# Patient Record
Sex: FEMALE | Race: WHITE | Employment: OTHER | URBAN - METROPOLITAN AREA
[De-identification: names, ages, dates, MRNs, and addresses within clinical notes are randomized per-mention and may not be internally consistent; named-entity substitution may affect disease eponyms.]

---

## 2017-02-14 ENCOUNTER — APPOINTMENT (OUTPATIENT)
Dept: GENERAL RADIOLOGY | Age: 82
End: 2017-02-14
Payer: MEDICARE

## 2017-02-14 ENCOUNTER — APPOINTMENT (OUTPATIENT)
Dept: CT IMAGING | Age: 82
End: 2017-02-14
Payer: MEDICARE

## 2017-02-14 ENCOUNTER — HOSPITAL ENCOUNTER (EMERGENCY)
Age: 82
Discharge: HOME | End: 2017-02-14
Attending: EMERGENCY MEDICINE
Payer: MEDICARE

## 2017-02-14 VITALS
DIASTOLIC BLOOD PRESSURE: 79 MMHG | TEMPERATURE: 97.9 F | WEIGHT: 106 LBS | HEART RATE: 82 BPM | RESPIRATION RATE: 16 BRPM | OXYGEN SATURATION: 97 % | SYSTOLIC BLOOD PRESSURE: 156 MMHG | HEIGHT: 59 IN | BODY MASS INDEX: 21.37 KG/M2

## 2017-02-14 DIAGNOSIS — S22.32XA CLOSED FRACTURE OF ONE RIB OF LEFT SIDE, INITIAL ENCOUNTER: ICD-10-CM

## 2017-02-14 DIAGNOSIS — S51.812A SKIN TEAR OF FOREARM WITHOUT COMPLICATION, LEFT, INITIAL ENCOUNTER: ICD-10-CM

## 2017-02-14 DIAGNOSIS — T30.0 BURN: Primary | ICD-10-CM
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
## 2017-02-14 PROCEDURE — 73562 X-RAY EXAM OF KNEE 3: CPT

## 2017-02-14 PROCEDURE — 70450 CT HEAD/BRAIN W/O DYE: CPT

## 2017-02-14 PROCEDURE — 99283 EMERGENCY DEPT VISIT LOW MDM: CPT | Performed by: PHYSICIAN ASSISTANT

## 2017-02-14 PROCEDURE — 71101 X-RAY EXAM UNILAT RIBS/CHEST: CPT

## 2017-02-14 PROCEDURE — 74011000250 HC RX REV CODE- 250: Performed by: PHYSICIAN ASSISTANT

## 2017-02-14 RX ORDER — LIDOCAINE HYDROCHLORIDE 20 MG/ML
JELLY TOPICAL
Status: COMPLETED | OUTPATIENT
Start: 2017-02-14 | End: 2017-02-14

## 2017-02-14 RX ADMIN — LIDOCAINE HYDROCHLORIDE: 20 JELLY TOPICAL at 15:22
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
## 2017-02-14 NOTE — DISCHARGE INSTRUCTIONS
Skin Tears: Care Instructions  Your Care Instructions  As we get older, our skin gets drier and more fragile. Sometimes this can cause the outer layers of skin to split and tear open. Skin tears are treated in different ways. In some cases, doctors use pieces of tape called Steri-Strips to pull the skin together and help it heal. Other times, it's best to leave the tear open and cover it with a special wound-care bandage. Skin tears are usually not serious. They usually heal in a few weeks. But how long you take to heal depends on your body and the type of tear you have. Sometimes the torn piece of skin is used to protect the wound while it heals. But that piece of skin does not heal. It may fall off on its own. Or the doctor may remove it. As your tear heals, it's important to keep it clean to help prevent infection. The doctor has checked you carefully, but problems can develop later. If you notice any problems or new symptoms, get medical treatment right away. Follow-up care is a key part of your treatment and safety. Be sure to make and go to all appointments, and call your doctor if you are having problems. It's also a good idea to know your test results and keep a list of the medicines you take. How can you care for yourself at home? · If you have pain, ask your doctor if you can take an over-the-counter pain medicine, such as acetaminophen (Tylenol), ibuprofen (Advil, Motrin), or naproxen (Aleve). Be safe with medicines. Read and follow all instructions on the label. · If you have a bandage, follow your doctor's instructions for changing it. · If you have Steri-Strips, leave them on until they fall off. · Follow your doctor's instructions about bathing. · Gently wash the skin tear with plain water 2 times a day. Do not rub the area. · Let the area air dry. Or you can pat it carefully with a soft towel. When should you call for help?   Call your doctor now or seek immediate medical care if:  · You have signs of infection, such as:  ¨ Increased pain, swelling, warmth, or redness around the tear. ¨ Red streaks leading from the tear. ¨ Pus draining from the tear. ¨ A fever. · The tear starts to bleed a lot. Small amounts of blood are normal.  Watch closely for changes in your health, and be sure to contact your doctor if:  · You do not get better as expected. Where can you learn more? Go to [employer]  Enter K466 in the search box to learn more about \"Skin Tears: Care Instructions. \"   © 2162-2874 [employer], [place]. Care instructions adapted under license by [employer] (which disclaims liability or warranty for this information). This care instruction is for use with your licensed healthcare professional. If you have questions about a medical condition or this instruction, always ask your healthcare professional. [place] [address] any warranty or liability for your use of this information. Content Version: 50.3.231443; Current as of: May 22, 2015           Reis: Care Instructions  Your Care Instructions    Reis--even minor ones--can be very painful. A minor burn may heal within several days, while a more serious burn may take weeks or even months to heal completely. You may notice that the burned area feels tight and hard while it is healing. It is important to continue to move the area as the burn heals to prevent loss of motion or loss of function in the area. When your skin is damaged by a burn, you have a greater risk of infection. Keep the wound clean and change the bandages regularly to prevent infection and help the burn heal.  Burns can leave permanent scars. Taking good care of the burn as it heals may help prevent bad scars. The doctor has checked you carefully, but problems can develop later. If you notice any problems or new symptoms, get medical treatment right away. Follow-up care is a key part of your treatment and safety. Be sure to make and go to all appointments, and call your doctor if you are having problems. It's also a good idea to know your test results and keep a list of the medicines you take. How can you care for yourself at home? · If your doctor told you how to care for your burn, follow your doctor's instructions. If you did not get instructions, follow this general advice:  ¨ Wash the burn with clean water 2 times a day. Don't use hydrogen peroxide or alcohol, which can slow healing. ¨ Gently pat the burn dry after you wash it. ¨ You may cover the burn with a thin layer of petroleum jelly, such as Vaseline, and a nonstick bandage. ¨ Apply more petroleum jelly and replace the bandage as needed. · Protect your burn while it is healing. Cover your burn if you are going out in the cold or the sun. ¨ Wear long sleeves if the burn is on your hands or arms. ¨ Wear a hat if the burn is on your face. ¨ Wear socks and shoes if the burn is on your feet. · Do not break blisters open. This increases the chance of infection. If a blister breaks open by itself, blot up the liquid, and leave the skin that covered the blister. This helps protect the new skin. · If your doctor prescribed antibiotics, take them as directed. Do not stop taking them just because you feel better. You need to take the full course of antibiotics. For pain and itching  · Take pain medicines exactly as directed. ¨ If the doctor gave you a prescription medicine for pain, take it as prescribed. ¨ If you are not taking a prescription pain medicine, ask your doctor if you can take an over-the-counter medicine. · If the burn itches, try not to scratch it. Try an over-the-counter antihistamine such as diphenhydramine (Benadryl) or loratadine (Claritin). Read and follow all instructions on the label. When should you call for help? Call your doctor now or seek immediate medical care if:  · Your pain gets worse.   · You have symptoms of infection, such as:  ¨ Increased pain, swelling, warmth, or redness near the burn. ¨ Red streaks leading from the burn. ¨ Pus draining from the burn. ¨ A fever. Watch closely for changes in your health, and be sure to contact your doctor if:  · You do not get better as expected. Where can you learn more? Go to http://ronda-shen.info/. Enter V128 in the search box to learn more about \"Burns: Care Instructions. \"  Current as of: May 27, 2016  Content Version: 11.1  © 4589-6567 [employer]. Care instructions adapted under license by [employer] (which disclaims liability or warranty for this information). If you have questions about a medical condition or this instruction, always ask your healthcare professional. [address] any warranty or liability for your use of this information.
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
## 2017-02-14 NOTE — ED NOTES
I have reviewed discharge instructions with the patient. Printed instructions and follow-up information was given. The patient verbalized understanding.
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
## 2017-02-15 PROBLEM — R94.6 ABNORMAL THYROID FUNCTION TEST: Status: ACTIVE | Noted: 2017-02-15

## 2017-04-03 ENCOUNTER — APPOINTMENT (RX ONLY)
Dept: URBAN - METROPOLITAN AREA CLINIC 23 | Facility: CLINIC | Age: 82
Setting detail: DERMATOLOGY
End: 2017-04-03

## 2017-04-03 PROBLEM — C44.722 SQUAMOUS CELL CARCINOMA OF SKIN OF RIGHT LOWER LIMB, INCLUDING HIP: Status: ACTIVE | Noted: 2017-04-03

## 2017-04-03 PROCEDURE — 99212 OFFICE O/P EST SF 10 MIN: CPT

## 2017-04-03 PROCEDURE — ? DEFER

## 2017-04-03 NOTE — HPI: SKIN LESION
How Severe Is Your Skin Lesion?: severe
treated_been_treated
Is This A New Presentation, Or A Follow-Up?: Growth

## 2017-04-19 ENCOUNTER — APPOINTMENT (RX ONLY)
Dept: URBAN - METROPOLITAN AREA CLINIC 23 | Facility: CLINIC | Age: 82
Setting detail: DERMATOLOGY
End: 2017-04-19

## 2017-04-19 PROBLEM — C44.722 SQUAMOUS CELL CARCINOMA OF SKIN OF RIGHT LOWER LIMB, INCLUDING HIP: Status: ACTIVE | Noted: 2017-04-19

## 2017-04-19 PROCEDURE — 11603 EXC TR-EXT MAL+MARG 2.1-3 CM: CPT

## 2017-04-19 PROCEDURE — ? PRESCRIPTION

## 2017-04-19 PROCEDURE — ? EXCISION

## 2017-04-19 PROCEDURE — 13121 CMPLX RPR S/A/L 2.6-7.5 CM: CPT

## 2017-04-19 PROCEDURE — 13122 CMPLX RPR S/A/L ADDL 5 CM/>: CPT

## 2017-04-19 RX ORDER — CEPHALEXIN 500 MG/1
CAPSULE ORAL BID
Qty: 28 | Refills: 0 | Status: ERX | COMMUNITY
Start: 2017-04-19 | End: 2017-05-03

## 2017-04-19 RX ADMIN — CEPHALEXIN: 500 CAPSULE ORAL at 00:00

## 2017-04-19 NOTE — PROCEDURE: EXCISION
Alar Island Pedicle Flap Text: The defect edges were debeveled with a #15 scalpel blade.  Given the location of the defect, shape of the defect and the proximity to the alar rim an island pedicle advancement flap was deemed most appropriate.  Using a sterile surgical marker, an appropriate advancement flap was drawn incorporating the defect, outlining the appropriate donor tissue and placing the expected incisions within the nasal ala running parallel to the alar rim. The area thus outlined was incised with a #15 scalpel blade.  The skin margins were undermined minimally to an appropriate distance in all directions around the primary defect and laterally outward around the island pedicle utilizing iris scissors.  There was minimal undermining beneath the pedicle flap.
Island Pedicle Flap Text: The defect edges were debeveled with a #15 scalpel blade.  Given the location of the defect, shape of the defect and the proximity to free margins an island pedicle advancement flap was deemed most appropriate.  Using a sterile surgical marker, an appropriate advancement flap was drawn incorporating the defect, outlining the appropriate donor tissue and placing the expected incisions within the relaxed skin tension lines where possible.    The area thus outlined was incised deep to adipose tissue with a #15 scalpel blade.  The skin margins were undermined to an appropriate distance in all directions around the primary defect and laterally outward around the island pedicle utilizing iris scissors.  There was minimal undermining beneath the pedicle flap.
Wound Care: Vaseline
Purse String (Simple) Text: Given the location of the defect and the characteristics of the surrounding skin a purse string simple closure was deemed most appropriate.  Undermining was performed circumferentially around the surgical defect.  A purse string suture was then placed and tightened.
Complex Repair And W Plasty Text: The defect edges were debeveled with a #15 scalpel blade.  The primary defect was closed partially with a complex linear closure.  Given the location of the remaining defect, shape of the defect and the proximity to free margins a W plasty was deemed most appropriate for complete closure of the defect.  Using a sterile surgical marker, an appropriate advancement flap was drawn incorporating the defect and placing the expected incisions within the relaxed skin tension lines where possible.    The area thus outlined was incised deep to adipose tissue with a #15 scalpel blade.  The skin margins were undermined to an appropriate distance in all directions utilizing iris scissors.
Complex Repair And Transposition Flap Text: The defect edges were debeveled with a #15 scalpel blade.  The primary defect was closed partially with a complex linear closure.  Given the location of the remaining defect, shape of the defect and the proximity to free margins a transposition flap was deemed most appropriate for complete closure of the defect.  Using a sterile surgical marker, an appropriate advancement flap was drawn incorporating the defect and placing the expected incisions within the relaxed skin tension lines where possible.    The area thus outlined was incised deep to adipose tissue with a #15 scalpel blade.  The skin margins were undermined to an appropriate distance in all directions utilizing iris scissors.
Render The Repair Note As A Separate Paragraph: No
Anesthesia Type: 1% lidocaine with epinephrine
Melolabial Interpolation Flap Text: A decision was made to reconstruct the defect utilizing an interpolation axial flap and a staged reconstruction.  A telfa template was made of the defect.  This telfa template was then used to outline the melolabial interpolation flap.  The donor area for the pedicle flap was then injected with anesthesia.  The flap was excised through the skin and subcutaneous tissue down to the layer of the underlying musculature.  The pedicle flap was carefully excised within this deep plane to maintain its blood supply.  The edges of the donor site were undermined.   The donor site was closed in a primary fashion.  The pedicle was then rotated into position and sutured.  Once the tube was sutured into place, adequate blood supply was confirmed with blanching and refill.  The pedicle was then wrapped with xeroform gauze and dressed appropriately with a telfa and gauze bandage to ensure continued blood supply and protect the attached pedicle.
Helical Rim Advancement Flap Text: The defect edges were debeveled with a #15 blade scalpel.  Given the location of the defect and the proximity to free margins (helical rim) a double helical rim advancement flap was deemed most appropriate.  Using a sterile surgical marker, the appropriate advancement flaps were drawn incorporating the defect and placing the expected incisions between the helical rim and antihelix where possible.  The area thus outlined was incised through and through with a #15 scalpel blade.  With a skin hook and iris scissors, the flaps were gently and sharply undermined and freed up.
Complex Repair And Tissue Cultured Epidermal Autograft Text: The defect edges were debeveled with a #15 scalpel blade.  The primary defect was closed partially with a complex linear closure.  Given the location of the defect, shape of the defect and the proximity to free margins an tissue cultured epidermal autograft was deemed most appropriate to repair the remaining defect.  The graft was trimmed to fit the size of the remaining defect.  The graft was then placed in the primary defect, oriented appropriately, and sutured into place.
Bilateral Helical Rim Advancement Flap Text: The defect edges were debeveled with a #15 blade scalpel.  Given the location of the defect and the proximity to free margins (helical rim) a bilateral helical rim advancement flap was deemed most appropriate.  Using a sterile surgical marker, the appropriate advancement flaps were drawn incorporating the defect and placing the expected incisions between the helical rim and antihelix where possible.  The area thus outlined was incised through and through with a #15 scalpel blade.  With a skin hook and iris scissors, the flaps were gently and sharply undermined and freed up.
Complex Repair And Skin Substitute Graft Text: The defect edges were debeveled with a #15 scalpel blade.  The primary defect was closed partially with a complex linear closure.  Given the location of the remaining defect, shape of the defect and the proximity to free margins a skin substitute graft was deemed most appropriate to repair the remaining defect.  The graft was trimmed to fit the size of the remaining defect.  The graft was then placed in the primary defect, oriented appropriately, and sutured into place.
Slit Excision Additional Text (Leave Blank If You Do Not Want): A linear line was drawn on the skin overlying the lesion. An incision was made slowly until the lesion was visualized.  Once visualized, the lesion was removed with blunt dissection.
Consent was obtained from the patient. The risks and benefits to therapy were discussed in detail. Specifically, the risks of infection, scarring, bleeding, prolonged wound healing, incomplete removal, allergy to anesthesia, nerve injury and recurrence were addressed. Prior to the procedure, the treatment site was clearly identified and confirmed by the patient. All components of Universal Protocol/PAUSE Rule completed.
Additional Secondary Defect Length (In Cm): -
Additional Anesthesia Volume In Cc: 0
Dorsal Nasal Flap Text: The defect edges were debeveled with a #15 scalpel blade.  Given the location of the defect and the proximity to free margins a dorsal nasal flap was deemed most appropriate.  Using a sterile surgical marker, an appropriate dorsal nasal flap was drawn around the defect.    The area thus outlined was incised deep to adipose tissue with a #15 scalpel blade.  The skin margins were undermined to an appropriate distance in all directions utilizing iris scissors.
Rotation Flap Text: The defect edges were debeveled with a #15 scalpel blade.  Given the location of the defect, shape of the defect and the proximity to free margins a rotation flap was deemed most appropriate.  Using a sterile surgical marker, an appropriate rotation flap was drawn incorporating the defect and placing the expected incisions within the relaxed skin tension lines where possible.    The area thus outlined was incised deep to adipose tissue with a #15 scalpel blade.  The skin margins were undermined to an appropriate distance in all directions utilizing iris scissors.
Complex Repair And Modified Advancement Flap Text: The defect edges were debeveled with a #15 scalpel blade.  The primary defect was closed partially with a complex linear closure.  Given the location of the remaining defect, shape of the defect and the proximity to free margins a modified advancement flap was deemed most appropriate for complete closure of the defect.  Using a sterile surgical marker, an appropriate advancement flap was drawn incorporating the defect and placing the expected incisions within the relaxed skin tension lines where possible.    The area thus outlined was incised deep to adipose tissue with a #15 scalpel blade.  The skin margins were undermined to an appropriate distance in all directions utilizing iris scissors.
Curvilinear Excision Additional Text (Leave Blank If You Do Not Want): The margin was drawn around the clinically apparent lesion.  A curvilinear shape was then drawn on the skin incorporating the lesion and margins.  Incisions were then made along these lines to the appropriate tissue plane and the lesion was extirpated.
Muscle Hinge Flap Text: The defect edges were debeveled with a #15 scalpel blade.  Given the size, depth and location of the defect and the proximity to free margins a muscle hinge flap was deemed most appropriate.  Using a sterile surgical marker, an appropriate hinge flap was drawn incorporating the defect. The area thus outlined was incised with a #15 scalpel blade.  The skin margins were undermined to an appropriate distance in all directions utilizing iris scissors.
Crescentic Advancement Flap Text: The defect edges were debeveled with a #15 scalpel blade.  Given the location of the defect and the proximity to free margins a crescentic advancement flap was deemed most appropriate.  Using a sterile surgical marker, the appropriate advancement flap was drawn incorporating the defect and placing the expected incisions within the relaxed skin tension lines where possible.    The area thus outlined was incised deep to adipose tissue with a #15 scalpel blade.  The skin margins were undermined to an appropriate distance in all directions utilizing iris scissors.
Advancement Flap (Double) Text: The defect edges were debeveled with a #15 scalpel blade.  Given the location of the defect and the proximity to free margins a double advancement flap was deemed most appropriate.  Using a sterile surgical marker, the appropriate advancement flaps were drawn incorporating the defect and placing the expected incisions within the relaxed skin tension lines where possible.    The area thus outlined was incised deep to adipose tissue with a #15 scalpel blade.  The skin margins were undermined to an appropriate distance in all directions utilizing iris scissors.
Complex Repair And O-L Flap Text: The defect edges were debeveled with a #15 scalpel blade.  The primary defect was closed partially with a complex linear closure.  Given the location of the remaining defect, shape of the defect and the proximity to free margins an O-L flap was deemed most appropriate for complete closure of the defect.  Using a sterile surgical marker, an appropriate flap was drawn incorporating the defect and placing the expected incisions within the relaxed skin tension lines where possible.    The area thus outlined was incised deep to adipose tissue with a #15 scalpel blade.  The skin margins were undermined to an appropriate distance in all directions utilizing iris scissors.
Complex Repair And Dorsal Nasal Flap Text: The defect edges were debeveled with a #15 scalpel blade.  The primary defect was closed partially with a complex linear closure.  Given the location of the remaining defect, shape of the defect and the proximity to free margins a dorsal nasal flap was deemed most appropriate for complete closure of the defect.  Using a sterile surgical marker, an appropriate flap was drawn incorporating the defect and placing the expected incisions within the relaxed skin tension lines where possible.    The area thus outlined was incised deep to adipose tissue with a #15 scalpel blade.  The skin margins were undermined to an appropriate distance in all directions utilizing iris scissors.
Complex Repair And Ftsg Text: The defect edges were debeveled with a #15 scalpel blade.  The primary defect was closed partially with a complex linear closure.  Given the location of the defect, shape of the defect and the proximity to free margins a full thickness skin graft was deemed most appropriate to repair the remaining defect.  The graft was trimmed to fit the size of the remaining defect.  The graft was then placed in the primary defect, oriented appropriately, and sutured into place.
Complex Repair And Z Plasty Text: The defect edges were debeveled with a #15 scalpel blade.  The primary defect was closed partially with a complex linear closure.  Given the location of the remaining defect, shape of the defect and the proximity to free margins a Z plasty was deemed most appropriate for complete closure of the defect.  Using a sterile surgical marker, an appropriate advancement flap was drawn incorporating the defect and placing the expected incisions within the relaxed skin tension lines where possible.    The area thus outlined was incised deep to adipose tissue with a #15 scalpel blade.  The skin margins were undermined to an appropriate distance in all directions utilizing iris scissors.
Cheek-To-Nose Interpolation Flap Text: A decision was made to reconstruct the defect utilizing an interpolation axial flap and a staged reconstruction.  A telfa template was made of the defect.  This telfa template was then used to outline the Cheek-To-Nose Interpolation flap.  The donor area for the pedicle flap was then injected with anesthesia.  The flap was excised through the skin and subcutaneous tissue down to the layer of the underlying musculature.  The interpolation flap was carefully excised within this deep plane to maintain its blood supply.  The edges of the donor site were undermined.   The donor site was closed in a primary fashion.  The pedicle was then rotated into position and sutured.  Once the tube was sutured into place, adequate blood supply was confirmed with blanching and refill.  The pedicle was then wrapped with xeroform gauze and dressed appropriately with a telfa and gauze bandage to ensure continued blood supply and protect the attached pedicle.
Complex Repair And Rotation Flap Text: The defect edges were debeveled with a #15 scalpel blade.  The primary defect was closed partially with a complex linear closure.  Given the location of the remaining defect, shape of the defect and the proximity to free margins a rotation flap was deemed most appropriate for complete closure of the defect.  Using a sterile surgical marker, an appropriate advancement flap was drawn incorporating the defect and placing the expected incisions within the relaxed skin tension lines where possible.    The area thus outlined was incised deep to adipose tissue with a #15 scalpel blade.  The skin margins were undermined to an appropriate distance in all directions utilizing iris scissors.
Suture Removal: 14 days
Home Suture Removal Text: Patient was provided a home suture removal kit and will remove their sutures at home.  If they have any questions or difficulties they will call the office.
Scalpel Size: 15 blade
Mucosal Advancement Flap Text: Given the location of the defect, shape of the defect and the proximity to free margins a mucosal advancement flap was deemed most appropriate. Incisions were made with a 15 blade scalpel in the appropriate fashion along the cutaneous vermillion border and the mucosal lip. The remaining actinically damaged mucosal tissue was excised.  The mucosal advancement flap was then elevated to the gingival sulcus with care taken to preserve the neurovascular structures and advanced into the primary defect. Care was taken to ensure that precise realignment of the vermillion border was achieved.
Size Of Margin In Cm: 0.2
Mastoid Interpolation Flap Text: A decision was made to reconstruct the defect utilizing an interpolation axial flap and a staged reconstruction.  A telfa template was made of the defect.  This telfa template was then used to outline the mastoid interpolation flap.  The donor area for the pedicle flap was then injected with anesthesia.  The flap was excised through the skin and subcutaneous tissue down to the layer of the underlying musculature.  The pedicle flap was carefully excised within this deep plane to maintain its blood supply.  The edges of the donor site were undermined.   The donor site was closed in a primary fashion.  The pedicle was then rotated into position and sutured.  Once the tube was sutured into place, adequate blood supply was confirmed with blanching and refill.  The pedicle was then wrapped with xeroform gauze and dressed appropriately with a telfa and gauze bandage to ensure continued blood supply and protect the attached pedicle.
Pre-Excision Curettage Text (Leave Blank If You Do Not Want): Prior to drawing the surgical margin the visible lesion was removed with electrodesiccation and curettage to clearly define the lesion size.
O-T Plasty Text: The defect edges were debeveled with a #15 scalpel blade.  Given the location of the defect, shape of the defect and the proximity to free margins an O-T plasty was deemed most appropriate.  Using a sterile surgical marker, an appropriate O-T plasty was drawn incorporating the defect and placing the expected incisions within the relaxed skin tension lines where possible.    The area thus outlined was incised deep to adipose tissue with a #15 scalpel blade.  The skin margins were undermined to an appropriate distance in all directions utilizing iris scissors.
V-Y Plasty Text: The defect edges were debeveled with a #15 scalpel blade.  Given the location of the defect, shape of the defect and the proximity to free margins an V-Y advancement flap was deemed most appropriate.  Using a sterile surgical marker, an appropriate advancement flap was drawn incorporating the defect and placing the expected incisions within the relaxed skin tension lines where possible.    The area thus outlined was incised deep to adipose tissue with a #15 scalpel blade.  The skin margins were undermined to an appropriate distance in all directions utilizing iris scissors.
V-Y Flap Text: The defect edges were debeveled with a #15 scalpel blade.  Given the location of the defect, shape of the defect and the proximity to free margins a V-Y flap was deemed most appropriate.  Using a sterile surgical marker, an appropriate advancement flap was drawn incorporating the defect and placing the expected incisions within the relaxed skin tension lines where possible.    The area thus outlined was incised deep to adipose tissue with a #15 scalpel blade.  The skin margins were undermined to an appropriate distance in all directions utilizing iris scissors.
W Plasty Text: The lesion was extirpated to the level of the fat with a #15 scalpel blade.  Given the location of the defect, shape of the defect and the proximity to free margins a W-plasty was deemed most appropriate for repair.  Using a sterile surgical marker, the appropriate transposition arms of the W-plasty were drawn incorporating the defect and placing the expected incisions within the relaxed skin tension lines where possible.    The area thus outlined was incised deep to adipose tissue with a #15 scalpel blade.  The skin margins were undermined to an appropriate distance in all directions utilizing iris scissors.  The opposing transposition arms were then transposed into place in opposite direction and anchored with interrupted buried subcutaneous sutures.
A-T Advancement Flap Text: The defect edges were debeveled with a #15 scalpel blade.  Given the location of the defect, shape of the defect and the proximity to free margins an A-T advancement flap was deemed most appropriate.  Using a sterile surgical marker, an appropriate advancement flap was drawn incorporating the defect and placing the expected incisions within the relaxed skin tension lines where possible.    The area thus outlined was incised deep to adipose tissue with a #15 scalpel blade.  The skin margins were undermined to an appropriate distance in all directions utilizing iris scissors.
Xenograft Text: The defect edges were debeveled with a #15 scalpel blade.  Given the location of the defect, shape of the defect and the proximity to free margins a xenograft was deemed most appropriate.  The graft was then trimmed to fit the size of the defect.  The graft was then placed in the primary defect and oriented appropriately.
Purse String (Intermediate) Text: Given the location of the defect and the characteristics of the surrounding skin a pursestring intermediate closure was deemed most appropriate.  Undermining was performed circumfirentially around the surgical defect.  A purstring suture was then placed and tightened.
Interpolation Flap Text: A decision was made to reconstruct the defect utilizing an interpolation axial flap and a staged reconstruction.  A telfa template was made of the defect.  This telfa template was then used to outline the interpolation flap.  The donor area for the pedicle flap was then injected with anesthesia.  The flap was excised through the skin and subcutaneous tissue down to the layer of the underlying musculature.  The interpolation flap was carefully excised within this deep plane to maintain its blood supply.  The edges of the donor site were undermined.   The donor site was closed in a primary fashion.  The pedicle was then rotated into position and sutured.  Once the tube was sutured into place, adequate blood supply was confirmed with blanching and refill.  The pedicle was then wrapped with xeroform gauze and dressed appropriately with a telfa and gauze bandage to ensure continued blood supply and protect the attached pedicle.
Dermal Autograft Text: The defect edges were debeveled with a #15 scalpel blade.  Given the location of the defect, shape of the defect and the proximity to free margins a dermal autograft was deemed most appropriate.  Using a sterile surgical marker, the primary defect shape was transferred to the donor site. The area thus outlined was incised deep to adipose tissue with a #15 scalpel blade.  The harvested graft was then trimmed of adipose and epidermal tissue until only dermis was left.  The skin graft was then placed in the primary defect and oriented appropriately.
Epidermal Sutures: 3-0 Prolene
Repair Type: Complex
Complex Repair And Dermal Autograft Text: The defect edges were debeveled with a #15 scalpel blade.  The primary defect was closed partially with a complex linear closure.  Given the location of the defect, shape of the defect and the proximity to free margins an dermal autograft was deemed most appropriate to repair the remaining defect.  The graft was trimmed to fit the size of the remaining defect.  The graft was then placed in the primary defect, oriented appropriately, and sutured into place.
Perilesional Excision Additional Text (Leave Blank If You Do Not Want): The margin was drawn around the clinically apparent lesion. Incisions were then made along these lines to the appropriate tissue plane and the lesion was extirpated.
Elliptical Excision Additional Text (Leave Blank If You Do Not Want): The margin was drawn around the clinically apparent lesion.  An elliptical shape was then drawn on the skin incorporating the lesion and margins.  Incisions were then made along these lines to the appropriate tissue plane and the lesion was extirpated.
Intermediate Repair Preamble Text (Leave Blank If You Do Not Want): Undermining was performed with blunt dissection.
Epidermal Closure: running
Path Notes (To The Dermatopathologist): Please check margins
Complex Repair And Rhombic Flap Text: The defect edges were debeveled with a #15 scalpel blade.  The primary defect was closed partially with a complex linear closure.  Given the location of the remaining defect, shape of the defect and the proximity to free margins a rhombic flap was deemed most appropriate for complete closure of the defect.  Using a sterile surgical marker, an appropriate advancement flap was drawn incorporating the defect and placing the expected incisions within the relaxed skin tension lines where possible.    The area thus outlined was incised deep to adipose tissue with a #15 scalpel blade.  The skin margins were undermined to an appropriate distance in all directions utilizing iris scissors.
Melolabial Transposition Flap Text: The defect edges were debeveled with a #15 scalpel blade.  Given the location of the defect and the proximity to free margins a melolabial flap was deemed most appropriate.  Using a sterile surgical marker, an appropriate melolabial transposition flap was drawn incorporating the defect.    The area thus outlined was incised deep to adipose tissue with a #15 scalpel blade.  The skin margins were undermined to an appropriate distance in all directions utilizing iris scissors.
Deep Sutures: 4-0 Vicryl
Detail Level: Detailed
Spiral Flap Text: The defect edges were debeveled with a #15 scalpel blade.  Given the location of the defect, shape of the defect and the proximity to free margins a spiral flap was deemed most appropriate.  Using a sterile surgical marker, an appropriate rotation flap was drawn incorporating the defect and placing the expected incisions within the relaxed skin tension lines where possible. The area thus outlined was incised deep to adipose tissue with a #15 scalpel blade.  The skin margins were undermined to an appropriate distance in all directions utilizing iris scissors.
Island Pedicle Flap-Requiring Vessel Identification Text: The defect edges were debeveled with a #15 scalpel blade.  Given the location of the defect, shape of the defect and the proximity to free margins an island pedicle advancement flap was deemed most appropriate.  Using a sterile surgical marker, an appropriate advancement flap was drawn, based on the axial vessel mentioned above, incorporating the defect, outlining the appropriate donor tissue and placing the expected incisions within the relaxed skin tension lines where possible.    The area thus outlined was incised deep to adipose tissue with a #15 scalpel blade.  The skin margins were undermined to an appropriate distance in all directions around the primary defect and laterally outward around the island pedicle utilizing iris scissors.  There was minimal undermining beneath the pedicle flap.
O-Z Plasty Text: The defect edges were debeveled with a #15 scalpel blade.  Given the location of the defect, shape of the defect and the proximity to free margins an O-Z plasty (double transposition flap) was deemed most appropriate.  Using a sterile surgical marker, the appropriate transposition flaps were drawn incorporating the defect and placing the expected incisions within the relaxed skin tension lines where possible.    The area thus outlined was incised deep to adipose tissue with a #15 scalpel blade.  The skin margins were undermined to an appropriate distance in all directions utilizing iris scissors.  Hemostasis was achieved with electrocautery.  The flaps were then transposed into place, one clockwise and the other counterclockwise, and anchored with interrupted buried subcutaneous sutures.
Epidermal Autograft Text: The defect edges were debeveled with a #15 scalpel blade.  Given the location of the defect, shape of the defect and the proximity to free margins an epidermal autograft was deemed most appropriate.  Using a sterile surgical marker, the primary defect shape was transferred to the donor site. The epidermal graft was then harvested.  The skin graft was then placed in the primary defect and oriented appropriately.
Advancement-Rotation Flap Text: The defect edges were debeveled with a #15 scalpel blade.  Given the location of the defect, shape of the defect and the proximity to free margins an advancement-rotation flap was deemed most appropriate.  Using a sterile surgical marker, an appropriate flap was drawn incorporating the defect and placing the expected incisions within the relaxed skin tension lines where possible. The area thus outlined was incised deep to adipose tissue with a #15 scalpel blade.  The skin margins were undermined to an appropriate distance in all directions utilizing iris scissors.
Render Post-Care Instructions In Note?: yes
Complex Repair Preamble Text (Leave Blank If You Do Not Want): Extensive wide undermining was performed.
Ear Star Wedge Flap Text: The defect edges were debeveled with a #15 blade scalpel.  Given the location of the defect and the proximity to free margins (helical rim) an ear star wedge flap was deemed most appropriate.  Using a sterile surgical marker, the appropriate flap was drawn incorporating the defect and placing the expected incisions between the helical rim and antihelix where possible.  The area thus outlined was incised through and through with a #15 scalpel blade.
H Plasty Text: Given the location of the defect, shape of the defect and the proximity to free margins a H-plasty was deemed most appropriate for repair.  Using a sterile surgical marker, the appropriate advancement arms of the H-plasty were drawn incorporating the defect and placing the expected incisions within the relaxed skin tension lines where possible. The area thus outlined was incised deep to adipose tissue with a #15 scalpel blade. The skin margins were undermined to an appropriate distance in all directions utilizing iris scissors.  The opposing advancement arms were then advanced into place in opposite direction and anchored with interrupted buried subcutaneous sutures.
Tissue Cultured Epidermal Autograft Text: The defect edges were debeveled with a #15 scalpel blade.  Given the location of the defect, shape of the defect and the proximity to free margins a tissue cultured epidermal autograft was deemed most appropriate.  The graft was then trimmed to fit the size of the defect.  The graft was then placed in the primary defect and oriented appropriately.
Split-Thickness Skin Graft Text: The defect edges were debeveled with a #15 scalpel blade.  Given the location of the defect, shape of the defect and the proximity to free margins a split thickness skin graft was deemed most appropriate.  Using a sterile surgical marker, the primary defect shape was transferred to the donor site. The split thickness graft was then harvested.  The skin graft was then placed in the primary defect and oriented appropriately.
Transposition Flap Text: The defect edges were debeveled with a #15 scalpel blade.  Given the location of the defect and the proximity to free margins a transposition flap was deemed most appropriate.  Using a sterile surgical marker, an appropriate transposition flap was drawn incorporating the defect.    The area thus outlined was incised deep to adipose tissue with a #15 scalpel blade.  The skin margins were undermined to an appropriate distance in all directions utilizing iris scissors.
Bilobed Transposition Flap Text: The defect edges were debeveled with a #15 scalpel blade.  Given the location of the defect and the proximity to free margins a bilobed transposition flap was deemed most appropriate.  Using a sterile surgical marker, an appropriate bilobe flap drawn around the defect.    The area thus outlined was incised deep to adipose tissue with a #15 scalpel blade.  The skin margins were undermined to an appropriate distance in all directions utilizing iris scissors.
Z Plasty Text: The lesion was extirpated to the level of the fat with a #15 scalpel blade.  Given the location of the defect, shape of the defect and the proximity to free margins a Z-plasty was deemed most appropriate for repair.  Using a sterile surgical marker, the appropriate transposition arms of the Z-plasty were drawn incorporating the defect and placing the expected incisions within the relaxed skin tension lines where possible.    The area thus outlined was incised deep to adipose tissue with a #15 scalpel blade.  The skin margins were undermined to an appropriate distance in all directions utilizing iris scissors.  The opposing transposition arms were then transposed into place in opposite direction and anchored with interrupted buried subcutaneous sutures.
Paramedian Forehead Flap Text: A decision was made to reconstruct the defect utilizing an interpolation axial flap and a staged reconstruction.  A telfa template was made of the defect.  This telfa template was then used to outline the paramedian forehead pedicle flap.  The donor area for the pedicle flap was then injected with anesthesia.  The flap was excised through the skin and subcutaneous tissue down to the layer of the underlying musculature.  The pedicle flap was carefully excised within this deep plane to maintain its blood supply.  The edges of the donor site were undermined.   The donor site was closed in a primary fashion.  The pedicle was then rotated into position and sutured.  Once the tube was sutured into place, adequate blood supply was confirmed with blanching and refill.  The pedicle was then wrapped with xeroform gauze and dressed appropriately with a telfa and gauze bandage to ensure continued blood supply and protect the attached pedicle.
Skin Substitute Text: The defect edges were debeveled with a #15 scalpel blade.  Given the location of the defect, shape of the defect and the proximity to free margins a skin substitute graft was deemed most appropriate.  The graft material was trimmed to fit the size of the defect. The graft was then placed in the primary defect and oriented appropriately.
Modified Advancement Flap Text: The defect edges were debeveled with a #15 scalpel blade.  Given the location of the defect, shape of the defect and the proximity to free margins a modified advancement flap was deemed most appropriate.  Using a sterile surgical marker, an appropriate advancement flap was drawn incorporating the defect and placing the expected incisions within the relaxed skin tension lines where possible.    The area thus outlined was incised deep to adipose tissue with a #15 scalpel blade.  The skin margins were undermined to an appropriate distance in all directions utilizing iris scissors.
Estimated Blood Loss (Cc): minimal
Keystone Flap Text: The defect edges were debeveled with a #15 scalpel blade.  Given the location of the defect, shape of the defect a keystone flap was deemed most appropriate.  Using a sterile surgical marker, an appropriate keystone flap was drawn incorporating the defect, outlining the appropriate donor tissue and placing the expected incisions within the relaxed skin tension lines where possible. The area thus outlined was incised deep to adipose tissue with a #15 scalpel blade.  The skin margins were undermined to an appropriate distance in all directions around the primary defect and laterally outward around the flap utilizing iris scissors.
Billing Type: Third-Party Bill
Posterior Auricular Interpolation Flap Text: A decision was made to reconstruct the defect utilizing an interpolation axial flap and a staged reconstruction.  A telfa template was made of the defect.  This telfa template was then used to outline the posterior auricular interpolation flap.  The donor area for the pedicle flap was then injected with anesthesia.  The flap was excised through the skin and subcutaneous tissue down to the layer of the underlying musculature.  The pedicle flap was carefully excised within this deep plane to maintain its blood supply.  The edges of the donor site were undermined.   The donor site was closed in a primary fashion.  The pedicle was then rotated into position and sutured.  Once the tube was sutured into place, adequate blood supply was confirmed with blanching and refill.  The pedicle was then wrapped with xeroform gauze and dressed appropriately with a telfa and gauze bandage to ensure continued blood supply and protect the attached pedicle.
Lip Wedge Excision Repair Text: Given the location of the defect and the proximity to free margins a full thickness wedge repair was deemed most appropriate.  Using a sterile surgical marker, the appropriate repair was drawn incorporating the defect and placing the expected incisions perpendicular to the vermillion border.  The vermillion border was also meticulously outlined to ensure appropriate reapproximation during the repair.  The area thus outlined was incised through and through with a #15 scalpel blade.  The muscularis and dermis were reaproximated with deep sutures following hemostasis. Care was taken to realign the vermillion border before proceeding with the superficial closure.  Once the vermillion was realigned the superfical and mucosal closure was finished.
Burow's Advancement Flap Text: The defect edges were debeveled with a #15 scalpel blade.  Given the location of the defect and the proximity to free margins a Burow's advancement flap was deemed most appropriate.  Using a sterile surgical marker, the appropriate advancement flap was drawn incorporating the defect and placing the expected incisions within the relaxed skin tension lines where possible.    The area thus outlined was incised deep to adipose tissue with a #15 scalpel blade.  The skin margins were undermined to an appropriate distance in all directions utilizing iris scissors.
Bilobed Flap Text: The defect edges were debeveled with a #15 scalpel blade.  Given the location of the defect and the proximity to free margins a bilobe flap was deemed most appropriate.  Using a sterile surgical marker, an appropriate bilobe flap drawn around the defect.    The area thus outlined was incised deep to adipose tissue with a #15 scalpel blade.  The skin margins were undermined to an appropriate distance in all directions utilizing iris scissors.
Complex Repair And V-Y Plasty Text: The defect edges were debeveled with a #15 scalpel blade.  The primary defect was closed partially with a complex linear closure.  Given the location of the remaining defect, shape of the defect and the proximity to free margins a V-Y plasty was deemed most appropriate for complete closure of the defect.  Using a sterile surgical marker, an appropriate advancement flap was drawn incorporating the defect and placing the expected incisions within the relaxed skin tension lines where possible.    The area thus outlined was incised deep to adipose tissue with a #15 scalpel blade.  The skin margins were undermined to an appropriate distance in all directions utilizing iris scissors.
Ftsg Text: The defect edges were debeveled with a #15 scalpel blade.  Given the location of the defect, shape of the defect and the proximity to free margins a full thickness skin graft was deemed most appropriate.  Using a sterile surgical marker, the primary defect shape was transferred to the donor site. The area thus outlined was incised deep to adipose tissue with a #15 scalpel blade.  The harvested graft was then trimmed of adipose tissue until only dermis and epidermis was left.  The skin margins of the secondary defect were undermined to an appropriate distance in all directions utilizing iris scissors.  The secondary defect was closed with interrupted buried subcutaneous sutures.  The skin edges were then re-apposed with running  sutures.  The skin graft was then placed in the primary defect and oriented appropriately.
Composite Graft Text: The defect edges were debeveled with a #15 scalpel blade.  Given the location of the defect, shape of the defect, the proximity to free margins and the fact the defect was full thickness a composite graft was deemed most appropriate.  The defect was outline and then transferred to the donor site.  A full thickness graft was then excised from the donor site. The graft was then placed in the primary defect, oriented appropriately and then sutured into place.  The secondary defect was then repaired using a primary closure.
Cheek Interpolation Flap Text: A decision was made to reconstruct the defect utilizing an interpolation axial flap and a staged reconstruction.  A telfa template was made of the defect.  This telfa template was then used to outline the Cheek Interpolation flap.  The donor area for the pedicle flap was then injected with anesthesia.  The flap was excised through the skin and subcutaneous tissue down to the layer of the underlying musculature.  The interpolation flap was carefully excised within this deep plane to maintain its blood supply.  The edges of the donor site were undermined.   The donor site was closed in a primary fashion.  The pedicle was then rotated into position and sutured.  Once the tube was sutured into place, adequate blood supply was confirmed with blanching and refill.  The pedicle was then wrapped with xeroform gauze and dressed appropriately with a telfa and gauze bandage to ensure continued blood supply and protect the attached pedicle.
Size Of Lesion In Cm: 2.1
Accession #: [id number]
Fusiform Excision Additional Text (Leave Blank If You Do Not Want): The margin was drawn around the clinically apparent lesion.  A fusiform shape was then drawn on the skin incorporating the lesion and margins.  Incisions were then made along these lines to the appropriate tissue plane and the lesion was extirpated.
Hatchet Flap Text: The defect edges were debeveled with a #15 scalpel blade.  Given the location of the defect, shape of the defect and the proximity to free margins a hatchet flap was deemed most appropriate.  Using a sterile surgical marker, an appropriate hatchet flap was drawn incorporating the defect and placing the expected incisions within the relaxed skin tension lines where possible.    The area thus outlined was incised deep to adipose tissue with a #15 scalpel blade.  The skin margins were undermined to an appropriate distance in all directions utilizing iris scissors.
Excision Method: Elliptical
Intermediate / Complex Repair - Final Wound Length In Cm: 8.2
O-T Advancement Flap Text: The defect edges were debeveled with a #15 scalpel blade.  Given the location of the defect, shape of the defect and the proximity to free margins an O-T advancement flap was deemed most appropriate.  Using a sterile surgical marker, an appropriate advancement flap was drawn incorporating the defect and placing the expected incisions within the relaxed skin tension lines where possible.    The area thus outlined was incised deep to adipose tissue with a #15 scalpel blade.  The skin margins were undermined to an appropriate distance in all directions utilizing iris scissors.
Island Pedicle Flap With Canthal Suspension Text: The defect edges were debeveled with a #15 scalpel blade.  Given the location of the defect, shape of the defect and the proximity to free margins an island pedicle advancement flap was deemed most appropriate.  Using a sterile surgical marker, an appropriate advancement flap was drawn incorporating the defect, outlining the appropriate donor tissue and placing the expected incisions within the relaxed skin tension lines where possible. The area thus outlined was incised deep to adipose tissue with a #15 scalpel blade.  The skin margins were undermined to an appropriate distance in all directions around the primary defect and laterally outward around the island pedicle utilizing iris scissors.  There was minimal undermining beneath the pedicle flap. A suspension suture was placed in the canthal tendon to prevent tension and prevent ectropion.
Hemostasis: Electrocautery
Rhombic Flap Text: The defect edges were debeveled with a #15 scalpel blade.  Given the location of the defect and the proximity to free margins a rhombic flap was deemed most appropriate.  Using a sterile surgical marker, an appropriate rhombic flap was drawn incorporating the defect.    The area thus outlined was incised deep to adipose tissue with a #15 scalpel blade.  The skin margins were undermined to an appropriate distance in all directions utilizing iris scissors.
Complex Repair And A-T Advancement Flap Text: The defect edges were debeveled with a #15 scalpel blade.  The primary defect was closed partially with a complex linear closure.  Given the location of the remaining defect, shape of the defect and the proximity to free margins an A-T advancement flap was deemed most appropriate for complete closure of the defect.  Using a sterile surgical marker, an appropriate advancement flap was drawn incorporating the defect and placing the expected incisions within the relaxed skin tension lines where possible.    The area thus outlined was incised deep to adipose tissue with a #15 scalpel blade.  The skin margins were undermined to an appropriate distance in all directions utilizing iris scissors.
Post-Care Instructions: I reviewed with the patient in detail post-care instructions. Patient is not to engage in any heavy lifting, exercise, or swimming for the next 7 days. Should the patient develop any fevers, chills, bleeding, severe pain patient will contact the office immediately.
Advancement Flap (Single) Text: The defect edges were debeveled with a #15 scalpel blade.  Given the location of the defect and the proximity to free margins a single advancement flap was deemed most appropriate.  Using a sterile surgical marker, an appropriate advancement flap was drawn incorporating the defect and placing the expected incisions within the relaxed skin tension lines where possible.    The area thus outlined was incised deep to adipose tissue with a #15 scalpel blade.  The skin margins were undermined to an appropriate distance in all directions utilizing iris scissors.
O-L Flap Text: The defect edges were debeveled with a #15 scalpel blade.  Given the location of the defect, shape of the defect and the proximity to free margins an O-L flap was deemed most appropriate.  Using a sterile surgical marker, an appropriate advancement flap was drawn incorporating the defect and placing the expected incisions within the relaxed skin tension lines where possible.    The area thus outlined was incised deep to adipose tissue with a #15 scalpel blade.  The skin margins were undermined to an appropriate distance in all directions utilizing iris scissors.
Complex Repair And O-T Advancement Flap Text: The defect edges were debeveled with a #15 scalpel blade.  The primary defect was closed partially with a complex linear closure.  Given the location of the remaining defect, shape of the defect and the proximity to free margins an O-T advancement flap was deemed most appropriate for complete closure of the defect.  Using a sterile surgical marker, an appropriate advancement flap was drawn incorporating the defect and placing the expected incisions within the relaxed skin tension lines where possible.    The area thus outlined was incised deep to adipose tissue with a #15 scalpel blade.  The skin margins were undermined to an appropriate distance in all directions utilizing iris scissors.
S Plasty Text: Given the location and shape of the defect, and the orientation of relaxed skin tension lines, an S-plasty was deemed most appropriate for repair.  Using a sterile surgical marker, the appropriate outline of the S-plasty was drawn, incorporating the defect and placing the expected incisions within the relaxed skin tension lines where possible.  The area thus outlined was incised deep to adipose tissue with a #15 scalpel blade.  The skin margins were undermined to an appropriate distance in all directions utilizing iris scissors. The skin flaps were advanced over the defect.  The opposing margins were then approximated with interrupted buried subcutaneous sutures.
Complex Repair And Bilobe Flap Text: The defect edges were debeveled with a #15 scalpel blade.  The primary defect was closed partially with a complex linear closure.  Given the location of the remaining defect, shape of the defect and the proximity to free margins a bilobe flap was deemed most appropriate for complete closure of the defect.  Using a sterile surgical marker, an appropriate advancement flap was drawn incorporating the defect and placing the expected incisions within the relaxed skin tension lines where possible.    The area thus outlined was incised deep to adipose tissue with a #15 scalpel blade.  The skin margins were undermined to an appropriate distance in all directions utilizing iris scissors.
Complex Repair And Epidermal Autograft Text: The defect edges were debeveled with a #15 scalpel blade.  The primary defect was closed partially with a complex linear closure.  Given the location of the defect, shape of the defect and the proximity to free margins an epidermal autograft was deemed most appropriate to repair the remaining defect.  The graft was trimmed to fit the size of the remaining defect.  The graft was then placed in the primary defect, oriented appropriately, and sutured into place.
Complex Repair And Single Advancement Flap Text: The defect edges were debeveled with a #15 scalpel blade.  The primary defect was closed partially with a complex linear closure.  Given the location of the remaining defect, shape of the defect and the proximity to free margins a single advancement flap was deemed most appropriate for complete closure of the defect.  Using a sterile surgical marker, an appropriate advancement flap was drawn incorporating the defect and placing the expected incisions within the relaxed skin tension lines where possible.    The area thus outlined was incised deep to adipose tissue with a #15 scalpel blade.  The skin margins were undermined to an appropriate distance in all directions utilizing iris scissors.
Double Island Pedicle Flap Text: The defect edges were debeveled with a #15 scalpel blade.  Given the location of the defect, shape of the defect and the proximity to free margins a double island pedicle advancement flap was deemed most appropriate.  Using a sterile surgical marker, an appropriate advancement flap was drawn incorporating the defect, outlining the appropriate donor tissue and placing the expected incisions within the relaxed skin tension lines where possible.    The area thus outlined was incised deep to adipose tissue with a #15 scalpel blade.  The skin margins were undermined to an appropriate distance in all directions around the primary defect and laterally outward around the island pedicle utilizing iris scissors.  There was minimal undermining beneath the pedicle flap.
Cartilage Graft Text: The defect edges were debeveled with a #15 scalpel blade.  Given the location of the defect, shape of the defect, the fact the defect involved a full thickness cartilage defect a cartilage graft was deemed most appropriate.  An appropriate donor site was identified, cleansed, and anesthetized. The cartilage graft was then harvested and transferred to the recipient site, oriented appropriately and then sutured into place.  The secondary defect was then repaired using a primary closure.
Complex Repair And Melolabial Flap Text: The defect edges were debeveled with a #15 scalpel blade.  The primary defect was closed partially with a complex linear closure.  Given the location of the remaining defect, shape of the defect and the proximity to free margins a melolabial flap was deemed most appropriate for complete closure of the defect.  Using a sterile surgical marker, an appropriate advancement flap was drawn incorporating the defect and placing the expected incisions within the relaxed skin tension lines where possible.    The area thus outlined was incised deep to adipose tissue with a #15 scalpel blade.  The skin margins were undermined to an appropriate distance in all directions utilizing iris scissors.
Anesthesia Volume In Cc: 5
Excision Depth: adipose tissue
Complex Repair And Double M Plasty Text: The defect edges were debeveled with a #15 scalpel blade.  The primary defect was closed partially with a complex linear closure.  Given the location of the remaining defect, shape of the defect and the proximity to free margins a double M plasty was deemed most appropriate for complete closure of the defect.  Using a sterile surgical marker, an appropriate advancement flap was drawn incorporating the defect and placing the expected incisions within the relaxed skin tension lines where possible.    The area thus outlined was incised deep to adipose tissue with a #15 scalpel blade.  The skin margins were undermined to an appropriate distance in all directions utilizing iris scissors.
Dressing: pressure dressing
No Repair - Repaired With Adjacent Surgical Defect Text (Leave Blank If You Do Not Want): After the excision the defect was repaired concurrently with another surgical defect which was in close approximation.
Saucerization Excision Additional Text (Leave Blank If You Do Not Want): The margin was drawn around the clinically apparent lesion.  Incisions were then made along these lines, in a tangential fashion, to the appropriate tissue plane and the lesion was extirpated.
Bi-Rhombic Flap Text: The defect edges were debeveled with a #15 scalpel blade.  Given the location of the defect and the proximity to free margins a bi-rhombic flap was deemed most appropriate.  Using a sterile surgical marker, an appropriate rhombic flap was drawn incorporating the defect. The area thus outlined was incised deep to adipose tissue with a #15 scalpel blade.  The skin margins were undermined to an appropriate distance in all directions utilizing iris scissors.
Complex Repair And Split-Thickness Skin Graft Text: The defect edges were debeveled with a #15 scalpel blade.  The primary defect was closed partially with a complex linear closure.  Given the location of the defect, shape of the defect and the proximity to free margins a split thickness skin graft was deemed most appropriate to repair the remaining defect.  The graft was trimmed to fit the size of the remaining defect.  The graft was then placed in the primary defect, oriented appropriately, and sutured into place.
Trilobed Flap Text: The defect edges were debeveled with a #15 scalpel blade.  Given the location of the defect and the proximity to free margins a trilobed flap was deemed most appropriate.  Using a sterile surgical marker, an appropriate trilobed flap drawn around the defect.    The area thus outlined was incised deep to adipose tissue with a #15 scalpel blade.  The skin margins were undermined to an appropriate distance in all directions utilizing iris scissors.
Complex Repair And Double Advancement Flap Text: The defect edges were debeveled with a #15 scalpel blade.  The primary defect was closed partially with a complex linear closure.  Given the location of the remaining defect, shape of the defect and the proximity to free margins a double advancement flap was deemed most appropriate for complete closure of the defect.  Using a sterile surgical marker, an appropriate advancement flap was drawn incorporating the defect and placing the expected incisions within the relaxed skin tension lines where possible.    The area thus outlined was incised deep to adipose tissue with a #15 scalpel blade.  The skin margins were undermined to an appropriate distance in all directions utilizing iris scissors.
Complex Repair And M Plasty Text: The defect edges were debeveled with a #15 scalpel blade.  The primary defect was closed partially with a complex linear closure.  Given the location of the remaining defect, shape of the defect and the proximity to free margins an M plasty was deemed most appropriate for complete closure of the defect.  Using a sterile surgical marker, an appropriate advancement flap was drawn incorporating the defect and placing the expected incisions within the relaxed skin tension lines where possible.    The area thus outlined was incised deep to adipose tissue with a #15 scalpel blade.  The skin margins were undermined to an appropriate distance in all directions utilizing iris scissors.

## 2017-05-03 ENCOUNTER — APPOINTMENT (RX ONLY)
Dept: URBAN - METROPOLITAN AREA CLINIC 23 | Facility: CLINIC | Age: 82
Setting detail: DERMATOLOGY
End: 2017-05-03

## 2017-05-03 DIAGNOSIS — Z48.01 ENCOUNTER FOR CHANGE OR REMOVAL OF SURGICAL WOUND DRESSING: ICD-10-CM

## 2017-05-03 PROCEDURE — ? SUTURE REMOVAL (GLOBAL PERIOD)

## 2017-05-03 PROCEDURE — ? PRESCRIPTION

## 2017-05-03 PROCEDURE — ? ORDER TESTS

## 2017-05-03 RX ORDER — SULFAMETHOXAZOLE AND TRIMETHOPRIM 800; 160 MG/1; MG/1
TABLET ORAL
Qty: 10 | Refills: 0 | Status: ERX | COMMUNITY
Start: 2017-05-03 | End: 2017-05-04

## 2017-05-03 RX ADMIN — SULFAMETHOXAZOLE AND TRIMETHOPRIM: 800; 160 TABLET ORAL at 00:00

## 2017-05-03 ASSESSMENT — LOCATION ZONE DERM: LOCATION ZONE: LEG

## 2017-05-03 ASSESSMENT — LOCATION DETAILED DESCRIPTION DERM: LOCATION DETAILED: RIGHT PROXIMAL PRETIBIAL REGION

## 2017-05-03 ASSESSMENT — LOCATION SIMPLE DESCRIPTION DERM: LOCATION SIMPLE: RIGHT PRETIBIAL REGION

## 2017-05-03 NOTE — PROCEDURE: SUTURE REMOVAL (GLOBAL PERIOD)
Detail Level: Simple
Add 06285 Cpt? (Important Note: In 2017 The Use Of 46185 Is Being Tracked By Cms To Determine Future Global Period Reimbursement For Global Periods): no

## 2017-05-04 ENCOUNTER — RX ONLY (OUTPATIENT)
Age: 82
Setting detail: RX ONLY
End: 2017-05-04

## 2017-05-04 RX ORDER — CIPROFLOXACIN HYDROCHLORIDE 500 MG/1
TABLET, FILM COATED ORAL
Qty: 20 | Refills: 0 | Status: ERX | COMMUNITY
Start: 2017-05-04 | End: 2017-05-04

## 2017-05-05 ENCOUNTER — RX ONLY (OUTPATIENT)
Age: 82
Setting detail: RX ONLY
End: 2017-05-05

## 2017-05-05 RX ORDER — CLINDAMYCIN HYDROCHLORIDE 300 MG/1
CAPSULE ORAL
Qty: 20 | Refills: 0 | Status: ERX | COMMUNITY
Start: 2017-05-05 | End: 2017-05-10

## 2017-05-10 ENCOUNTER — RX ONLY (OUTPATIENT)
Age: 82
Setting detail: RX ONLY
End: 2017-05-10

## 2017-05-10 RX ORDER — DOXYCYCLINE HYCLATE 100 MG/1
CAPSULE, GELATIN COATED ORAL
Qty: 10 | Refills: 0 | Status: CANCELLED

## 2017-05-17 ENCOUNTER — APPOINTMENT (RX ONLY)
Dept: URBAN - METROPOLITAN AREA CLINIC 23 | Facility: CLINIC | Age: 82
Setting detail: DERMATOLOGY
End: 2017-05-17

## 2017-05-17 DIAGNOSIS — Z48.817 ENCOUNTER FOR SURGICAL AFTERCARE FOLLOWING SURGERY ON THE SKIN AND SUBCUTANEOUS TISSUE: ICD-10-CM

## 2017-05-17 PROCEDURE — ? PRESCRIPTION

## 2017-05-17 PROCEDURE — ? POST-OP WOUND CHECK

## 2017-05-17 PROCEDURE — ? OTHER

## 2017-05-17 RX ORDER — COLLAGENASE SANTYL 250 [ARB'U]/G
OINTMENT TOPICAL
Qty: 1 | Refills: 1 | Status: ERX | COMMUNITY
Start: 2017-05-17

## 2017-05-17 RX ADMIN — COLLAGENASE SANTYL: 250 OINTMENT TOPICAL at 00:00

## 2017-05-17 ASSESSMENT — LOCATION SIMPLE DESCRIPTION DERM: LOCATION SIMPLE: RIGHT PRETIBIAL REGION

## 2017-05-17 ASSESSMENT — LOCATION ZONE DERM: LOCATION ZONE: LEG

## 2017-05-17 ASSESSMENT — LOCATION DETAILED DESCRIPTION DERM: LOCATION DETAILED: RIGHT PROXIMAL PRETIBIAL REGION

## 2017-05-17 NOTE — PROCEDURE: POST-OP WOUND CHECK
Detail Level: Detailed
Wound Evaluated By: Dr. Morro Morales
Add 48453 Cpt? (Important Note: In 2017 The Use Of 34001 Is Being Tracked By Cms To Determine Future Global Period Reimbursement For Global Periods): no

## 2017-05-17 NOTE — HPI: WOUND CHECK (POST-OP)
Date Of Procedure: 04/19/2017
Additional History: Culture done and Bactrim DS 1 PO QD x 10 days given at suture removal on May 3. Culture showed moderate growth enterobacter aerogenes and moderate growth psuedomonas aeruginosa. Due to other medication the patient is on Dr. Morales prescribed doxycycline 100mg 1 PO QD x 10 days. Pt stated she has 3 days left after today's appointment.

## 2017-05-17 NOTE — PROCEDURE: OTHER
Detail Level: Simple
Note Text (......Xxx Chief Complaint.): This diagnosis correlates with the
Other (Free Text): Samples of Doxycycline 100 mg capsules were provided to patient to continue taking for another 7 days. Take in the mornings by with a full glass of water

## 2017-05-26 ENCOUNTER — APPOINTMENT (RX ONLY)
Dept: URBAN - METROPOLITAN AREA CLINIC 23 | Facility: CLINIC | Age: 82
Setting detail: DERMATOLOGY
End: 2017-05-26

## 2017-05-26 DIAGNOSIS — Z48.817 ENCOUNTER FOR SURGICAL AFTERCARE FOLLOWING SURGERY ON THE SKIN AND SUBCUTANEOUS TISSUE: ICD-10-CM

## 2017-05-26 PROCEDURE — ? POST-OP WOUND CHECK

## 2017-05-26 PROCEDURE — ? OTHER

## 2017-05-26 PROCEDURE — ? PRESCRIPTION

## 2017-05-26 RX ORDER — COLLAGENASE SANTYL 250 [ARB'U]/G
OINTMENT TOPICAL
Qty: 1 | Refills: 1 | Status: ACTIVE

## 2017-05-26 ASSESSMENT — LOCATION DETAILED DESCRIPTION DERM: LOCATION DETAILED: RIGHT PROXIMAL PRETIBIAL REGION

## 2017-05-26 ASSESSMENT — LOCATION ZONE DERM: LOCATION ZONE: LEG

## 2017-05-26 ASSESSMENT — LOCATION SIMPLE DESCRIPTION DERM: LOCATION SIMPLE: RIGHT PRETIBIAL REGION

## 2017-05-26 NOTE — PROCEDURE: POST-OP WOUND CHECK
Wound Evaluated By: Dr. Morro Morales
Add 70962 Cpt? (Important Note: In 2017 The Use Of 04795 Is Being Tracked By Cms To Determine Future Global Period Reimbursement For Global Periods): no
Detail Level: Detailed

## 2017-05-26 NOTE — PROCEDURE: OTHER
Note Text (......Xxx Chief Complaint.): This diagnosis correlates with the
Detail Level: Simple
Other (Free Text): Samples of Doxycycline 100 mg capsules were provided to patient to continue taking for another 7 days. Take in the mornings by with a full glass of water

## 2017-06-12 ENCOUNTER — APPOINTMENT (RX ONLY)
Dept: URBAN - METROPOLITAN AREA CLINIC 23 | Facility: CLINIC | Age: 82
Setting detail: DERMATOLOGY
End: 2017-06-12

## 2017-06-12 DIAGNOSIS — Z48.817 ENCOUNTER FOR SURGICAL AFTERCARE FOLLOWING SURGERY ON THE SKIN AND SUBCUTANEOUS TISSUE: ICD-10-CM

## 2017-06-12 PROCEDURE — ? OTHER

## 2017-06-12 PROCEDURE — 99024 POSTOP FOLLOW-UP VISIT: CPT

## 2017-06-12 ASSESSMENT — LOCATION ZONE DERM: LOCATION ZONE: LEG

## 2017-06-12 ASSESSMENT — LOCATION SIMPLE DESCRIPTION DERM: LOCATION SIMPLE: RIGHT PRETIBIAL REGION

## 2017-06-12 ASSESSMENT — LOCATION DETAILED DESCRIPTION DERM: LOCATION DETAILED: RIGHT DISTAL PRETIBIAL REGION

## 2017-06-12 NOTE — PROCEDURE: OTHER
Detail Level: Simple
Other (Free Text): Continue regular wound care with santyl ointment and a bandage.
Note Text (......Xxx Chief Complaint.): This diagnosis correlates with the

## 2017-07-04 ENCOUNTER — APPOINTMENT (OUTPATIENT)
Dept: CT IMAGING | Age: 82
End: 2017-07-04
Attending: EMERGENCY MEDICINE
Payer: MEDICARE

## 2017-07-04 ENCOUNTER — APPOINTMENT (OUTPATIENT)
Dept: GENERAL RADIOLOGY | Age: 82
End: 2017-07-04
Attending: EMERGENCY MEDICINE
Payer: MEDICARE

## 2017-07-04 ENCOUNTER — HOSPITAL ENCOUNTER (EMERGENCY)
Age: 82
Discharge: HOME | End: 2017-07-04
Attending: EMERGENCY MEDICINE
Payer: MEDICARE

## 2017-07-04 VITALS
TEMPERATURE: 98.7 F | BODY MASS INDEX: 21.17 KG/M2 | RESPIRATION RATE: 16 BRPM | DIASTOLIC BLOOD PRESSURE: 99 MMHG | OXYGEN SATURATION: 95 % | SYSTOLIC BLOOD PRESSURE: 155 MMHG | HEIGHT: 59 IN | HEART RATE: 75 BPM | WEIGHT: 105 LBS

## 2017-07-04 DIAGNOSIS — R07.81 RIB PAIN ON RIGHT SIDE: Primary | ICD-10-CM

## 2017-07-04 DIAGNOSIS — S00.03XA CONTUSION OF SCALP, INITIAL ENCOUNTER: ICD-10-CM

## 2017-07-04 PROCEDURE — 71101 X-RAY EXAM UNILAT RIBS/CHEST: CPT

## 2017-07-04 PROCEDURE — 70450 CT HEAD/BRAIN W/O DYE: CPT

## 2017-07-04 PROCEDURE — 74011250637 HC RX REV CODE- 250/637: Performed by: EMERGENCY MEDICINE

## 2017-07-04 PROCEDURE — 99283 EMERGENCY DEPT VISIT LOW MDM: CPT | Performed by: EMERGENCY MEDICINE

## 2017-07-04 RX ORDER — HYDROCODONE BITARTRATE AND ACETAMINOPHEN 5; 325 MG/1; MG/1
1 TABLET ORAL ONCE
Status: COMPLETED | OUTPATIENT
Start: 2017-07-04 | End: 2017-07-04

## 2017-07-04 RX ORDER — HYDROCODONE BITARTRATE AND ACETAMINOPHEN 5; 325 MG/1; MG/1
TABLET ORAL
Qty: 10 TAB | Refills: 0 | Status: SHIPPED | OUTPATIENT
Start: 2017-07-04 | End: 2017-07-06 | Stop reason: SDUPTHER

## 2017-07-04 RX ADMIN — HYDROCODONE BITARTRATE AND ACETAMINOPHEN 1 TABLET: 5; 325 TABLET ORAL at 22:13

## 2017-07-05 NOTE — ED TRIAGE NOTES
Pt states that she lost her balance last night and fell. C/o right sided rib pain. States that she hit her head but no pain.   Took 2 tylenol PTA

## 2017-07-05 NOTE — ED PROVIDER NOTES
Patient is a 80 y.o. female presenting with fall. The history is provided by the patient and a relative. Fall   The accident occurred yesterday. The fall occurred while standing. She fell from a height of ground level. She landed on hard floor. There was no blood loss. The point of impact was the head (right upper lateral ribs). The pain is present in the head (Right upper lateral ribs). The pain is mild. She was ambulatory at the scene. Pertinent negatives include no fever, no numbness, no abdominal pain, no nausea, no vomiting, no headaches, no extremity weakness, no loss of consciousness, no tingling and no laceration. The risk factors include recurrent falls and being elderly. The symptoms are aggravated by activity (Movement worsens the rib pain, whereas deep breathing does not.). She has tried nothing for the symptoms. Past Medical History:   Diagnosis Date    Gastroesophageal reflux disease 12/9/2016    [facility] cell arter[facility] ([facility] [address]     Giant cell arteritis ([facility] [address] 3/20/2010    Hypertension     Macular degeneration     Other ill-defined conditions     back and pelvis pain    Other ill-defined conditions     hip fx ;   arteritis     TIA (transient ischemic attack) 6/23/2015       Past Surgical History:   Procedure Laterality Date    HX CHOLECYSTECTOMY      HX HEENT      tonsilectomy    HX ORTHOPAEDIC           Family History:   Problem Relation Age of Onset    Cancer Father      stomach    Arthritis-osteo Mother     Hypertension Mother     Arthritis-osteo Sister        Social History     Social History    Marital status:      Spouse name: N/A    Number of children: N/A    Years of education: N/A     Occupational History    Not on file.      Social History Main Topics    Smoking status: Never Smoker    Smokeless tobacco: Never Used    Alcohol use No    Drug use: No    Sexual activity: No     Other Topics Concern    Not on file     Social History Narrative         ALLERGIES: Calcium carbonate; Ranitidine hcl; and Guaifenesin    Review of Systems   Constitutional: Negative for chills and fever. Eyes: Negative for discharge and redness. Respiratory: Negative for cough and shortness of breath. Cardiovascular: Positive for chest pain (Right lateral ribs). Negative for palpitations. Gastrointestinal: Negative for abdominal pain, nausea and vomiting. Musculoskeletal: Positive for arthralgias and myalgias. Negative for back pain, extremity weakness, neck pain and neck stiffness. Sore all over, a little bit. Skin: Negative for rash. Neurological: Negative for dizziness, tingling, loss of consciousness, numbness and headaches. All other systems reviewed and are negative. Vitals:    07/04/17 2055 07/04/17 2213 07/04/17 2216   BP: 157/79 (!) 155/99    Pulse: 74  75   Resp: 16  16   Temp: 98.7 °F (37.1 °C)     SpO2: 94%  95%   Weight: 47.6 kg (105 lb)     Height: 4' 11\" (1.499 m)              Physical Exam   Constitutional: She is oriented to person, place, and time. She appears well-developed and well-nourished. No distress. HENT:   Head: Normocephalic and atraumatic. Eyes: Conjunctivae and EOM are normal. Pupils are equal, round, and reactive to light. Right eye exhibits no discharge. Left eye exhibits no discharge. No scleral icterus. Neck: Normal range of motion. Neck supple. Cardiovascular: Normal rate, regular rhythm and normal heart sounds. Exam reveals no gallop. No murmur heard. Pulmonary/Chest: Effort normal and breath sounds normal. No respiratory distress. She has no wheezes. She has no rales. She exhibits tenderness. Abdominal: Soft. Bowel sounds are normal. There is no tenderness. There is no guarding. Musculoskeletal: Normal range of motion. She exhibits no edema. Neurological: She is alert and oriented to person, place, and time. She exhibits normal muscle tone. cni 2-12 grossly   Skin: Skin is warm and dry.  No laceration noted. She is not diaphoretic. Psychiatric: She has a normal mood and affect. Her behavior is normal.   Nursing note and vitals reviewed. MDM  Number of Diagnoses or Management Options  Contusion of scalp, initial encounter:   Rib pain on right side:   Diagnosis management comments: Medical decision making note:  Patient presents 24 hours after a fall sustaining mild right rib pain. This is been worsened perhaps by application of heat rather than cold during the interim. No loss of consciousness but she is elderly so CT scan was ordered of her head and is negative. Rib x-rays are negative. A very short course of very low-dose pain medicine has been written for her patient encouraged to use cold packs rather than hot. She is stable and safe for discharge  This concludes the \"medical decision making note\" part of this emergency department visit note.       ED Course       Procedures

## 2017-07-05 NOTE — DISCHARGE INSTRUCTIONS
Use cold packs 5 to 10 minutes, every hour to every-other-hour, for first 48 hours,  Then switch to a heating pad, 5 to 10 minutes, every hour to every-other-hour, for a few days,  Do not go to heat, right away       Bruises: Care Instructions  Your Care Instructions    Bruises occur when small blood vessels under the skin tear or rupture, most often from a twist, bump, or fall. Blood leaks into tissues under the skin and causes a black-and-blue spot that often turns colors, including purplish black, reddish blue, or yellowish green, as the bruise heals. Bruises hurt, but most are not serious and will go away on their own within 2 to 4 weeks. Sometimes, gravity causes them to spread down the body. A leg bruise usually will take longer to heal than a bruise on the face or arms. Follow-up care is a key part of your treatment and safety. Be sure to make and go to all appointments, and call your doctor if you are having problems. Its also a good idea to know your test results and keep a list of the medicines you take. How can you care for yourself at home? · Take pain medicines exactly as directed. ¨ If the doctor gave you a prescription medicine for pain, take it as prescribed. ¨ If you are not taking a prescription pain medicine, ask your doctor if you can take an over-the-counter medicine. · Put ice or a cold pack on the area for 10 to 20 minutes at a time. Put a thin cloth between the ice and your skin. · If you can, prop up the bruised area on pillows as much as possible for the next few days. Try to keep the bruise above the level of your heart. When should you call for help? Call your doctor now or seek immediate medical care if:  · You have signs of infection, such as:  ¨ Increased pain, swelling, warmth, or redness. ¨ Red streaks leading from the bruise. ¨ Pus draining from the bruise. ¨ A fever.   · You have a bruise on your leg and signs of a blood clot, such as:  ¨ Increasing redness and swelling along with warmth, tenderness, and pain in the bruised area. ¨ Pain in your calf, back of the knee, thigh, or groin. ¨ Redness and swelling in your leg or groin. · Your pain gets worse. Watch closely for changes in your health, and be sure to contact your doctor if:  · You do not get better as expected. Where can you learn more? Go to http://ronda-shen.info/. Enter [phone] in the search box to learn more about \"Bruises: Care Instructions. \"  Current as of: March 20, 2017  Content Version: 11.3  © 2335-2288 [employer]. Care instructions adapted under license by [employer] (which disclaims liability or warranty for this information). If you have questions about a medical condition or this instruction, always ask your healthcare professional. [address] any warranty or liability for your use of this information.

## 2017-08-07 ENCOUNTER — APPOINTMENT (RX ONLY)
Dept: URBAN - METROPOLITAN AREA CLINIC 23 | Facility: CLINIC | Age: 82
Setting detail: DERMATOLOGY
End: 2017-08-07

## 2017-08-07 DIAGNOSIS — D18.0 HEMANGIOMA: ICD-10-CM

## 2017-08-07 DIAGNOSIS — Z85.828 PERSONAL HISTORY OF OTHER MALIGNANT NEOPLASM OF SKIN: ICD-10-CM

## 2017-08-07 DIAGNOSIS — L82.1 OTHER SEBORRHEIC KERATOSIS: ICD-10-CM

## 2017-08-07 DIAGNOSIS — D22 MELANOCYTIC NEVI: ICD-10-CM

## 2017-08-07 PROBLEM — D22.5 MELANOCYTIC NEVI OF TRUNK: Status: ACTIVE | Noted: 2017-08-07

## 2017-08-07 PROBLEM — L55.1 SUNBURN OF SECOND DEGREE: Status: ACTIVE | Noted: 2017-08-07

## 2017-08-07 PROBLEM — E13.9 OTHER SPECIFIED DIABETES MELLITUS WITHOUT COMPLICATIONS: Status: ACTIVE | Noted: 2017-08-07

## 2017-08-07 PROBLEM — I10 ESSENTIAL (PRIMARY) HYPERTENSION: Status: ACTIVE | Noted: 2017-08-07

## 2017-08-07 PROBLEM — D18.01 HEMANGIOMA OF SKIN AND SUBCUTANEOUS TISSUE: Status: ACTIVE | Noted: 2017-08-07

## 2017-08-07 PROBLEM — E78.5 HYPERLIPIDEMIA, UNSPECIFIED: Status: ACTIVE | Noted: 2017-08-07

## 2017-08-07 PROCEDURE — ? OTHER

## 2017-08-07 PROCEDURE — ? EDUCATIONAL RESOURCES PROVIDED

## 2017-08-07 PROCEDURE — 99213 OFFICE O/P EST LOW 20 MIN: CPT

## 2017-08-07 PROCEDURE — ? COUNSELING

## 2017-08-07 ASSESSMENT — LOCATION ZONE DERM
LOCATION ZONE: FACE
LOCATION ZONE: LEG
LOCATION ZONE: ARM
LOCATION ZONE: HAND
LOCATION ZONE: TRUNK
LOCATION ZONE: NECK

## 2017-08-07 ASSESSMENT — LOCATION DETAILED DESCRIPTION DERM
LOCATION DETAILED: LEFT MEDIAL BREAST 11-12:00 REGION
LOCATION DETAILED: RIGHT SUPERIOR CENTRAL MALAR CHEEK
LOCATION DETAILED: INFERIOR THORACIC SPINE
LOCATION DETAILED: LEFT POSTERIOR SHOULDER
LOCATION DETAILED: LEFT INFERIOR ANTERIOR NECK
LOCATION DETAILED: RIGHT LATERAL MALAR CHEEK
LOCATION DETAILED: LOWER STERNUM
LOCATION DETAILED: RIGHT DISTAL PRETIBIAL REGION
LOCATION DETAILED: RIGHT ANTERIOR DISTAL THIGH
LOCATION DETAILED: LEFT RADIAL DORSAL HAND
LOCATION DETAILED: LEFT MEDIAL POPLITEAL SKIN
LOCATION DETAILED: LEFT ANTERIOR DISTAL THIGH
LOCATION DETAILED: UPPER STERNUM

## 2017-08-07 ASSESSMENT — LOCATION SIMPLE DESCRIPTION DERM
LOCATION SIMPLE: RIGHT THIGH
LOCATION SIMPLE: UPPER BACK
LOCATION SIMPLE: LEFT THIGH
LOCATION SIMPLE: LEFT ANTERIOR NECK
LOCATION SIMPLE: LEFT POPLITEAL SKIN
LOCATION SIMPLE: LEFT HAND
LOCATION SIMPLE: LEFT SHOULDER
LOCATION SIMPLE: RIGHT CHEEK
LOCATION SIMPLE: LEFT BREAST
LOCATION SIMPLE: RIGHT PRETIBIAL REGION
LOCATION SIMPLE: CHEST

## 2017-08-07 NOTE — PROCEDURE: MIPS QUALITY
Quality 130: Documentation Of Current Medications In The Medical Record: Current Medications with Name, Dosage, Frequency, or Route not Documented, Reason not Given
Quality 111:Pneumonia Vaccination Status For Older Adults: Pneumococcal Vaccination Previously Received
Quality 110: Preventive Care And Screening: Influenza Immunization: Influenza Immunization previously received during influenza season
Detail Level: Detailed

## 2017-08-07 NOTE — PROCEDURE: OTHER
Note Text (......Xxx Chief Complaint.): This diagnosis correlates with the
Detail Level: Detailed
Other (Free Text): Area of scale is noticed on right leg

## 2017-08-09 ENCOUNTER — HOSPITAL ENCOUNTER (OUTPATIENT)
Dept: PHYSICAL THERAPY | Age: 82
Discharge: HOME | End: 2017-08-09
Attending: FAMILY MEDICINE
Payer: MEDICARE

## 2017-08-09 DIAGNOSIS — R29.6 FREQUENT FALLS: ICD-10-CM

## 2017-08-09 DIAGNOSIS — R53.81 PHYSICAL DECONDITIONING: ICD-10-CM

## 2017-08-09 DIAGNOSIS — R26.89 BALANCE PROBLEM: ICD-10-CM

## 2017-08-09 PROCEDURE — 97162 PT EVAL MOD COMPLEX 30 MIN: CPT

## 2017-08-09 PROCEDURE — G8982 BODY POS GOAL STATUS: HCPCS

## 2017-08-09 PROCEDURE — G8981 BODY POS CURRENT STATUS: HCPCS

## 2017-08-09 NOTE — PROGRESS NOTES
Ambulatory/Rehab Services H2 Model Falls Risk Assessment    Risk Factor Pts. ·   Confusion/Disorientation/Impulsivity  []    4 ·   Symptomatic Depression  []   2 ·   Altered Elimination  []   1 ·   Dizziness/Vertigo  []   1 ·   Gender (Male)  []   1 ·   Any administered antiepileptics (anticonvulsants):  []   2 ·   Any administered benzodiazepines:  []   1 ·   Visual Impairment (specify):  []   1 ·   Portable Oxygen Use  []   1 ·   Orthostatic ? BP  []   1 ·   History of Recent Falls (within 3 mos.)  [x]   5     Ability to Rise from Chair (choose one) Pts. ·   Ability to rise in a single movement  []   0 ·   Pushes up, successful in one attempt  [x]   1 ·   Multiple attempts, but successful  []   3 ·   Unable to rise without assistance  []   4   Total: (5 or greater = High Risk) 6     Falls Prevention Plan:   []                Physical Limitations to Exercise (specify):   []                Mobility Assistance Device (type):   [x]                Exercise/Equipment Adaptation (specify):close supervision  ©2010 [facility] [address] [place] [address] Patent #[id number].  Federal Law prohibits the replication, distribution or use without written permission from [facility] [employer]

## 2017-08-09 NOTE — PROGRESS NOTES
Elvira Elizalde  : 1928 [facility] [place] [address], [facility], [facility].  [phone]   [phone]           OUTPATIENT PHYSICAL THERAPY:Initial Assessment 2017    ICD-10: Treatment Diagnosis: Other abnormalities of gait and mobility R26.89;  Precautions/Allergies:   Calcium carbonate; Ranitidine hcl; and Guaifenesin   Fall Risk Score: 6 (? 5 = High Risk)  MD Orders: Eval and treat MEDICAL/REFERRING DIAGNOSIS:  Balance problem [R26.89]  Frequent falls [R29.6]  Physical deconditioning [R53.81]   DATE OF ONSET: a couple of years  REFERRING PHYSICIAN: Deja Rashid MD  RETURN PHYSICIAN APPOINTMENT:      INITIAL ASSESSMENT:  Ms. Raul Neal presents with decreased gait, imbalance, and high risk of falls. Patient would benefit from PT to address these problems to improve patient's independence and safety with mobility and daily activities. Thank you. PROBLEM LIST (Impacting functional limitations):  1. Decreased Strength  2. Decreased ADL/Functional Activities  3. Decreased Transfer Abilities  4. Decreased Ambulation Ability/Technique  5. Decreased Balance  6. Decreased Activity Tolerance  7. Decreased [place] with Home Exercise Program INTERVENTIONS PLANNED:  1. Balance Exercise  2. Gait Training  3. Home Exercise Program (HEP)  4. Neuromuscular Re-education/Strengthening  5. Therapeutic Activites  6. Therapeutic Exercise/Strengthening  7. Transfer Training   8. TREATMENT PLAN:  Effective Dates: 2017 TO 11/3/2017. Frequency/Duration: 1-2 times a week for 8-12 weeks  GOALS: (Goals have been discussed and agreed upon with patient.)  Short-Term Functional Goals: Time Frame: 2-4 weeks  1. Patient will demonstrate independence and compliance with home exercise program to improve balance and strength for daily activities.    2. Patient will increase her score on the Oliva Balance Scale to greater than or equal to 38/56 indicating improved safety and decreased fall risk for daily activities. 3.   Discharge Goals: Time Frame: 8-12 weeks  1. Patient will increase bilateral lower extremity strength to greater than or equal to 4+/5 to improve strength for functional mobility activities. 2. Patient will increase her score on the Oliva Balance Scale to greater than or equal to 48/56 indicating improved safety and decreased fall risk for daily activities. 3. Patient will increase her score on the dynamic gait index to greater than or equal to 15/24 indicating improved balance and safety for community ambulation. 4. Patient will ambulate with least assistive device over level and unlevel surfaces without evidence of imbalance to improve safety for daily activities. 5.   Rehabilitation Potential For Stated Goals: Good  Regarding Selene Lee's therapy, I certify that the treatment plan above will be carried out by a therapist or under their direction. Thank you for this referral,  Jeanie Kirby PT     Referring Physician Signature: Ethan James MD              Date                    The information in this section was collected on 8/9/17 (except where otherwise noted). HISTORY:   History of Present Injury/Illness (Reason for Referral):  Aylin De La Fuente 2/14/17 and broke ribs, getting tea out of microwave and burned self. 7/3/17 fell when standing and changing closes and bruised her ribs. In the last 6 months, vision has decreased a lot, macular degeneration, L eye 20/200. No dizziness. Starts to lose balance and can't catch self. Has temperal arteritis and take prednisone for that. No pain. Gets back pain with standing, relieved with sitting. Was using no AD until fell in July, now uses RW or HHA. Past Medical History/Comorbidities:   Ms. Nia Ortiz  has a past medical history of Gastroesophageal reflux disease (12/9/2016); Giant cell arteritis ([address] Giant cell arteritis ([address] (3/20/2010);  Hypertension; Macular degeneration; Other ill-defined conditions; Other ill-defined conditions; and TIA (transient ischemic attack) (6/23/2015). She also has no past medical history of Arthritis; Asthma; Autoimmune disease ([facility] CAD (coronary artery disease); Cancer ([facility] Chronic kidney disease; COPD; DEMENTIA; Diabetes ([facility] Endocrine disease; Heart failure ([facility] Infectious disease; Liver disease; Psychiatric disorder; PUD (peptic ulcer disease); Seizures ([facility] Sleep disorder; or Thromboembolus ([facility] Ms. Mechele Spurling  has a past surgical history that includes cholecystectomy; heent; and orthopaedic. Social History/Living Environment:     lives with family; no driving. One step to enter home. Walk in shower with bench but doesn't use bench. Prior Level of Function/Work/Activity:  Still cooks and cleans some  Dominant Side:         RIGHT  Personal Factors:          Sex:  female        Age:  80 y.o. Current Medications:       Current Outpatient Prescriptions:     HYDROcodone-acetaminophen (NORCO) 5-325 mg per tablet, Take 1/2 to 1 tablet every 6 hours as needed for pain, Disp: 20 Tab, Rfl: 0    amLODIPine (NORVASC) 10 mg tablet, Take 1 Tab by mouth daily. , Disp: 90 Tab, Rfl: 1    mupirocin (BACTROBAN) 2 % ointment, Apply  to affected area daily. , Disp: 22 g, Rfl: 0    predniSONE (DELTASONE) 1 mg tablet, TAKE FOUR TABLETS BY MOUTH ONE TIME DAILY, Disp: , Rfl: 6    atorvastatin (LIPITOR) 10 mg tablet, Take 1/2 tab every other night., Disp: 30 Tab, Rfl: 2    calcium-vitamin D (CALCIUM 600 + D,3,) 600 mg(1,500mg) -200 unit tab, Take 1 Tab by mouth., Disp: , Rfl:     aspirin delayed-release 81 mg tablet, Take 1 Tab by mouth daily. , Disp: 100 Tab, Rfl: 0    Cholecalciferol, Vitamin D3, (VITAMIN D3) 1,000 unit cap, Take 1 Cap by mouth daily. , Disp: , Rfl:     Denosumab (PROLIA) 60 mg/mL injection, 60 mg by SubCUTAneous route., Disp: , Rfl:     bevacizumab (AVASTIN) 25 mg/ml soln intravitreal solution, by IntraVITreal route once., Disp: , Rfl:    Date Last Reviewed:  8/9/17   Number of Personal Factors/Comorbidities that affect the Plan of Care: 1-2: MODERATE COMPLEXITY   EXAMINATION:   Observation/Orthostatic Postural Assessment:          Mild forward head posture  Strength:          LE STRENGTH:  4/5 B LE  Functional Mobility:         Gait/Ambulation:  Patient ambulates at a slower pace, decreased step length and heel strike, path deviations, with no AD. Transfers:  Slow, supervision with no AD        Bed Mobility:  independent        Stairs:  NT  Sensation:         intact  Postural Control & Balance:  · Oliva Balance Scale:  35/56.   (A score less than 45/56 indicates high risk of falls)     · Dynamic Gait Index:  8/24.   (A score less than or equal to19/24 is abnormal and predictive of falls)     · [employer] Sensory Organization Test:  ONT       Body Structures Involved:  1. Nerves  2. Eyes and Ears  3. Muscles Body Functions Affected:  1. Sensory/Pain  2. Neuromusculoskeletal  3. Movement Related Activities and Participation Affected:  1. General Tasks and Demands  2. Mobility  3. Community, Social and [place]   Number of elements (examined above) that affect the Plan of Care: 3: MODERATE COMPLEXITY   CLINICAL PRESENTATION:   Presentation: Evolving clinical presentation with changing clinical characteristics: MODERATE COMPLEXITY   CLINICAL DECISION MAKING:   Outcome Measure: Tool Used: Oliva Balance Scale  Score:  Initial: 35/56 Most Recent: X/56 (Date: -- )   Interpretation of Score: Each section is scored on a 0-4 scale, 0 representing the patients inability to perform the task and 4 representing independence. The scores of each section are added together for a total score of 56. The higher the patients score, the more independent the patient is. Any score below 45 indicates increased risk for falls. Score 56 55-45 44-34 33-23 22-12 11-1 0   Modifier CH CI CJ CK CL CM CN     ?  Changing and Maintaining Body Position:  - CURRENT STATUS: CJ - 20%-39% impaired, limited or restricted    - GOAL STATUS: CI - 1%-19% impaired, limited or restricted    - D/C STATUS:  ---------------To be determined---------------    Tool Used: Timed Up and Go (TUG)  Score:  Initial: 23 seconds Most Recent: X seconds (Date: -- )   Interpretation of Score: The test measures, in seconds, the time taken by an individual to stand up from a standard arm chair (seat height 46 cm [18 in], arm height 65 cm [25.6 in]), walk a distance of 3 meters (118 in, approx 10 ft), turn, walk back to the chair and sit down. If the individual takes longer than 14 seconds to complete TUG, this indicates risk for falls. Score 7 7.5-10.5 11-14 14.5-17.5 18-21 21.5-24.5 25+   Modifier CH CI CJ CK CL CM CN         Medical Necessity:   · Patient is expected to demonstrate progress in strength, balance and functional technique to improve safety during daily activities. Reason for Services/Other Comments:  · Patient continues to demonstrate capacity to improve strength, balance, gait which will increase independence and increase safety. Use of outcome tool(s) and clinical judgement create a POC that gives a: Questionable prediction of patient's progress: MODERATE COMPLEXITY            TREATMENT:   (In addition to Assessment/Re-Assessment sessions the following treatments were rendered)  Pre-treatment Symptoms/Complaints:  I'm okay  Pain: Initial:   Pain Intensity 1: 0  Post Session:  0     Assessment only today, no treatment provided. Neuromuscular Re-education ( ):  Exercise/activities per grid below to improve balance, coordination and proprioception. Required minimal verbal cues to promote static and dynamic balance in standing. [employer] Portal  Treatment/Session Assessment:    · Response to Treatment:  Patient tolerated session well and verbalized understanding of HEP. · Compliance with Program/Exercises:  Will assess as treatment progresses. · Recommendations/Intent for next treatment session: \"Next visit will focus on advancements to more challenging activities\".   Total Treatment Duration:  PT Patient Time In/Time Out  Time In: 1258  Time Out: 1350Linda Groves, PT

## 2017-08-15 ENCOUNTER — HOSPITAL ENCOUNTER (OUTPATIENT)
Dept: PHYSICAL THERAPY | Age: 82
Discharge: HOME | End: 2017-08-15
Attending: FAMILY MEDICINE
Payer: MEDICARE

## 2017-08-15 PROCEDURE — 97110 THERAPEUTIC EXERCISES: CPT

## 2017-08-15 PROCEDURE — 97112 NEUROMUSCULAR REEDUCATION: CPT

## 2017-08-15 NOTE — PROGRESS NOTES
Sherine Press  : 1928 [facility] [place] [address] [facility], [place] [address].  [phone]   [phone]           OUTPATIENT PHYSICAL THERAPY:Daily Note 8/15/2017    ICD-10: Treatment Diagnosis: Other abnormalities of gait and mobility R26.89;  Precautions/Allergies:   Calcium carbonate; Ranitidine hcl; and Guaifenesin   Fall Risk Score: 6 (? 5 = High Risk)  MD Orders: Eval and treat MEDICAL/REFERRING DIAGNOSIS:  Balance problem [R26.89]  Frequent falls [R29.6]  Physical deconditioning [R53.81]   DATE OF ONSET: a couple of years  REFERRING PHYSICIAN: Bibi Valentino MD  RETURN PHYSICIAN APPOINTMENT:      INITIAL ASSESSMENT:  Ms. Yajaira Jeffrey presents with decreased gait, imbalance, and high risk of falls. Patient would benefit from PT to address these problems to improve patient's independence and safety with mobility and daily activities. Thank you. PROBLEM LIST (Impacting functional limitations):  1. Decreased Strength  2. Decreased ADL/Functional Activities  3. Decreased Transfer Abilities  4. Decreased Ambulation Ability/Technique  5. Decreased Balance  6. Decreased Activity Tolerance  7. Decreased [place] with Home Exercise Program INTERVENTIONS PLANNED:  1. Balance Exercise  2. Gait Training  3. Home Exercise Program (HEP)  4. Neuromuscular Re-education/Strengthening  5. Therapeutic Activites  6. Therapeutic Exercise/Strengthening  7. Transfer Training   8. TREATMENT PLAN:  Effective Dates: 2017 TO 11/3/2017. Frequency/Duration: 1-2 times a week for 8-12 weeks  GOALS: (Goals have been discussed and agreed upon with patient.)  Short-Term Functional Goals: Time Frame: 2-4 weeks  1. Patient will demonstrate independence and compliance with home exercise program to improve balance and strength for daily activities.    2. Patient will increase her score on the Oliva Balance Scale to greater than or equal to 38/56 indicating improved safety and decreased fall risk for daily activities. 3.   Discharge Goals: Time Frame: 8-12 weeks  1. Patient will increase bilateral lower extremity strength to greater than or equal to 4+/5 to improve strength for functional mobility activities. 2. Patient will increase her score on the Oliva Balance Scale to greater than or equal to 48/56 indicating improved safety and decreased fall risk for daily activities. 3. Patient will increase her score on the dynamic gait index to greater than or equal to 15/24 indicating improved balance and safety for community ambulation. 4. Patient will ambulate with least assistive device over level and unlevel surfaces without evidence of imbalance to improve safety for daily activities. 5.   Rehabilitation Potential For Stated Goals: Good  Regarding Selene Lee's therapy, I certify that the treatment plan above will be carried out by a therapist or under their direction. Thank you for this referral,  Alonzo Ritter PT     Referring Physician Signature: Shorty Wilson MD              Date                    The information in this section was collected on 8/9/17 (except where otherwise noted). HISTORY:   History of Present Injury/Illness (Reason for Referral):  Michael Arellano 2/14/17 and broke ribs, getting tea out of microwave and burned self. 7/3/17 fell when standing and changing closes and bruised her ribs. In the last 6 months, vision has decreased a lot, macular degeneration, L eye 20/200. No dizziness. Starts to lose balance and can't catch self. Has temperal arteritis and take prednisone for that. No pain. Gets back pain with standing, relieved with sitting. Was using no AD until fell in July, now uses RW or HHA. Past Medical History/Comorbidities:   Ms. Harden He  has a past medical history of Gastroesophageal reflux disease (12/9/2016); Giant cell arteritis ([address] Giant cell arteritis ([address] (3/20/2010); Hypertension; Macular degeneration;  Other ill-defined conditions; Other ill-defined conditions; and TIA (transient ischemic attack) (6/23/2015). She also has no past medical history of Arthritis; Asthma; Autoimmune disease ([facility] CAD (coronary artery disease); Cancer ([facility] Chronic kidney disease; COPD; DEMENTIA; Diabetes ([facility] [address] [facility] Endocrine disease; Heart failure ([facility] [facility] Infectious disease; Liver disease; Psychiatric disorder; PUD (peptic ulcer disease); Seizures ([facility] Sleep disorder; or Thromboembolus ([facility] Ms. Malgorzata Montilla  has a past surgical history that includes cholecystectomy; heent; and orthopaedic. Social History/Living Environment:     lives with family; no driving. One step to enter home. Walk in shower with bench but doesn't use bench. Prior Level of Function/Work/Activity:  Still cooks and cleans some  Dominant Side:         RIGHT  Personal Factors:          Sex:  female        Age:  80 y.o. Current Medications:       Current Outpatient Prescriptions:     HYDROcodone-acetaminophen (NORCO) 5-325 mg per tablet, Take 1/2 to 1 tablet every 6 hours as needed for pain, Disp: 20 Tab, Rfl: 0    amLODIPine (NORVASC) 10 mg tablet, Take 1 Tab by mouth daily. , Disp: 90 Tab, Rfl: 1    mupirocin (BACTROBAN) 2 % ointment, Apply  to affected area daily. , Disp: 22 g, Rfl: 0    predniSONE (DELTASONE) 1 mg tablet, TAKE FOUR TABLETS BY MOUTH ONE TIME DAILY, Disp: , Rfl: 6    atorvastatin (LIPITOR) 10 mg tablet, Take 1/2 tab every other night., Disp: 30 Tab, Rfl: 2    calcium-vitamin D (CALCIUM 600 + D,3,) 600 mg(1,500mg) -200 unit tab, Take 1 Tab by mouth., Disp: , Rfl:     aspirin delayed-release 81 mg tablet, Take 1 Tab by mouth daily. , Disp: 100 Tab, Rfl: 0    Cholecalciferol, Vitamin D3, (VITAMIN D3) 1,000 unit cap, Take 1 Cap by mouth daily. , Disp: , Rfl:     Denosumab (PROLIA) 60 mg/mL injection, 60 mg by SubCUTAneous route., Disp: , Rfl:     bevacizumab (AVASTIN) 25 mg/ml soln intravitreal solution, by IntraVITreal route once., Disp: , Rfl: Date Last Reviewed:  8/15/17   Number of Personal Factors/Comorbidities that affect the Plan of Care: 1-2: MODERATE COMPLEXITY   EXAMINATION:   Observation/Orthostatic Postural Assessment:          Mild forward head posture  Strength:          LE STRENGTH:  4/5 B LE  Functional Mobility:         Gait/Ambulation:  Patient ambulates at a slower pace, decreased step length and heel strike, path deviations, with no AD. Transfers:  Slow, supervision with no AD        Bed Mobility:  independent        Stairs:  NT  Sensation:         intact  Postural Control & Balance:  · Oliva Balance Scale:  35/56.   (A score less than 45/56 indicates high risk of falls)     · Dynamic Gait Index:  8/24.   (A score less than or equal to19/24 is abnormal and predictive of falls)     · [employer] Sensory Organization Test:  ONT       Body Structures Involved:  1. Nerves  2. Eyes and Ears  3. Muscles Body Functions Affected:  1. Sensory/Pain  2. Neuromusculoskeletal  3. Movement Related Activities and Participation Affected:  1. General Tasks and Demands  2. Mobility  3. Community, Social and [place]   Number of elements (examined above) that affect the Plan of Care: 3: MODERATE COMPLEXITY   CLINICAL PRESENTATION:   Presentation: Evolving clinical presentation with changing clinical characteristics: MODERATE COMPLEXITY   CLINICAL DECISION MAKING:   Outcome Measure: Tool Used: Oliva Balance Scale  Score:  Initial: 35/56 Most Recent: X/56 (Date: -- )   Interpretation of Score: Each section is scored on a 0-4 scale, 0 representing the patients inability to perform the task and 4 representing independence. The scores of each section are added together for a total score of 56. The higher the patients score, the more independent the patient is. Any score below 45 indicates increased risk for falls. Score 56 55-45 44-34 33-23 22-12 11-1 0   Modifier CH CI CJ CK CL CM CN     ?  Changing and Maintaining Body Position:     - CURRENT STATUS: CJ - 20%-39% impaired, limited or restricted    - GOAL STATUS: CI - 1%-19% impaired, limited or restricted    - D/C STATUS:  ---------------To be determined---------------    Tool Used: Timed Up and Go (TUG)  Score:  Initial: 23 seconds Most Recent: X seconds (Date: -- )   Interpretation of Score: The test measures, in seconds, the time taken by an individual to stand up from a standard arm chair (seat height 46 cm [18 in], arm height 65 cm [25.6 in]), walk a distance of 3 meters (118 in, approx 10 ft), turn, walk back to the chair and sit down. If the individual takes longer than 14 seconds to complete TUG, this indicates risk for falls. Score 7 7.5-10.5 11-14 14.5-17.5 18-21 21.5-24.5 25+   Modifier CH CI CJ CK CL CM CN         Medical Necessity:   · Patient is expected to demonstrate progress in strength, balance and functional technique to improve safety during daily activities. Reason for Services/Other Comments:  · Patient continues to demonstrate capacity to improve strength, balance, gait which will increase independence and increase safety. Use of outcome tool(s) and clinical judgement create a POC that gives a: Questionable prediction of patient's progress: MODERATE COMPLEXITY            TREATMENT:   (In addition to Assessment/Re-Assessment sessions the following treatments were rendered)  Pre-treatment Symptoms/Complaints: \"doing HEP. Hardest one is getting out of the chair. \"  Pain: Initial:   Pain Intensity 1: 0  Post Session:  0         Neuromuscular Re-education (30 Minutes):  Exercise/activities per grid below to improve balance, coordination and proprioception. Required minimal verbal cues to promote static and dynamic balance in standing.   Balance/Vestibular Treatment:   Activity   Date  8/15/17   Date Date Date Date Date   Activity/Exercise   Sets/reps/equipment Sets/reps/  equipment Sets/reps/  equipment Sets/reps/  equipment Sets/reps/  equipment Sets/reps/  equipment   Walking with head turns             Walking with head up & down             Step overs     Over 1/2 foam roll x 15 reps each leg        Step taps     6 inch step x 30 reps forward and cross tap        Marching   4 laps in hallway        Sidestepping   4 laps in hallway        Crossovers           [place]           Walking  backwards     4 laps in hallway        Tandem walking           Weaving in/out of cones             Picking up cones             Sports cord             [employer]           Kick ball           Figure 8s            Circles right/left           Walking with 360 degree turns           Spirals           Weight shifting:    Left & Right     Blue foams eyes open        Weight shifting: Forward & Backward      Blue foams eyes open        Static Standing Balance     Blue foams eyes open and closed        Standing with feet apart             Standing with feet together             Standing with feet semitandem   Blue foams eyes open and closed        Standing with feet tandem           Single leg stance           X1/X2 Viewing exercises             Hallpike-Francois testing for BPPV (Benign Paroxysmal Positional Vertigo)             Hendrix-Daroff exercises           Canalith Repositioning treatment/Epley Maneuver  for BPPV (Benign Paroxysmal Positional Vertigo)           Smart [employer] Training: See scanned report. Therapeutic Exercise: (13 Minutes):  Exercises per grid below to improve mobility, strength and balance. Required minimal verbal cues to promote proper body alignment, promote proper body posture and promote proper body mechanics. Progressed resistance, repetitions and complexity of movement as indicated.    Date:  8/15/17 Date:   Date:     Activity/Exercise Parameters Parameters Parameters   nustep      Sit to stand 2 x 10 reps from  chair     Step ups 6 inch step x 10 reps each leg with rail                                   [employer] Portal  Treatment/Session Assessment:    · Response to Treatment:  Patient tolerated session well. Patient tends to weight bear more on R LE, and drifts back as well. Continue to progress as tolerated. · Compliance with Program/Exercises: Will assess as treatment progresses. · Recommendations/Intent for next treatment session: \"Next visit will focus on advancements to more challenging activities\".   Total Treatment Duration:  PT Patient Time In/Time Out  Time In: 0945  Time Out: 1028Marjorie Liza Holstein, DINH

## 2017-08-17 ENCOUNTER — HOSPITAL ENCOUNTER (OUTPATIENT)
Dept: PHYSICAL THERAPY | Age: 82
Discharge: HOME | End: 2017-08-17
Attending: FAMILY MEDICINE
Payer: MEDICARE

## 2017-08-17 PROCEDURE — 97110 THERAPEUTIC EXERCISES: CPT

## 2017-08-17 PROCEDURE — 97112 NEUROMUSCULAR REEDUCATION: CPT

## 2017-08-17 NOTE — PROGRESS NOTES
Key Lerma  : 1928 [facility] [place] [address] [facility], [place] [address].  [phone]   [phone]           OUTPATIENT PHYSICAL THERAPY:Daily Note 2017    ICD-10: Treatment Diagnosis: Other abnormalities of gait and mobility R26.89;  Precautions/Allergies:   Calcium carbonate; Ranitidine hcl; and Guaifenesin   Fall Risk Score: 6 (? 5 = High Risk)  MD Orders: Eval and treat MEDICAL/REFERRING DIAGNOSIS:  Balance problem [R26.89]  Frequent falls [R29.6]  Physical deconditioning [R53.81]   DATE OF ONSET: a couple of years  REFERRING PHYSICIAN: Essie Luna MD  RETURN PHYSICIAN APPOINTMENT:      INITIAL ASSESSMENT:  Ms. Ashok Garibay presents with decreased gait, imbalance, and high risk of falls. Patient would benefit from PT to address these problems to improve patient's independence and safety with mobility and daily activities. Thank you. PROBLEM LIST (Impacting functional limitations):  1. Decreased Strength  2. Decreased ADL/Functional Activities  3. Decreased Transfer Abilities  4. Decreased Ambulation Ability/Technique  5. Decreased Balance  6. Decreased Activity Tolerance  7. Decreased [place] with Home Exercise Program INTERVENTIONS PLANNED:  1. Balance Exercise  2. Gait Training  3. Home Exercise Program (HEP)  4. Neuromuscular Re-education/Strengthening  5. Therapeutic Activites  6. Therapeutic Exercise/Strengthening  7. Transfer Training   8. TREATMENT PLAN:  Effective Dates: 2017 TO 11/3/2017. Frequency/Duration: 1-2 times a week for 8-12 weeks  GOALS: (Goals have been discussed and agreed upon with patient.)  Short-Term Functional Goals: Time Frame: 2-4 weeks  1. Patient will demonstrate independence and compliance with home exercise program to improve balance and strength for daily activities.    2. Patient will increase her score on the Oliva Balance Scale to greater than or equal to 38/56 indicating improved safety and decreased fall risk for daily activities. Discharge Goals: Time Frame: 8-12 weeks  1. Patient will increase bilateral lower extremity strength to greater than or equal to 4+/5 to improve strength for functional mobility activities. 2. Patient will increase her score on the Loiva Balance Scale to greater than or equal to 48/56 indicating improved safety and decreased fall risk for daily activities. 3. Patient will increase her score on the dynamic gait index to greater than or equal to 15/24 indicating improved balance and safety for community ambulation. 4. Patient will ambulate with least assistive device over level and unlevel surfaces without evidence of imbalance to improve safety for daily activities. Rehabilitation Potential For Stated Goals: Good            The information in this section was collected on 8/9/17 (except where otherwise noted). HISTORY:   History of Present Injury/Illness (Reason for Referral):  Roberta Sol 2/14/17 and broke ribs, getting tea out of microwave and burned self. 7/3/17 fell when standing and changing closes and bruised her ribs. In the last 6 months, vision has decreased a lot, macular degeneration, L eye 20/200. No dizziness. Starts to lose balance and can't catch self. Has temperal arteritis and take prednisone for that. No pain. Gets back pain with standing, relieved with sitting. Was using no AD until fell in July, now uses RW or HHA. Past Medical History/Comorbidities:   Ms. Kofi Dawson  has a past medical history of Gastroesophageal reflux disease (12/9/2016); Giant cell arteritis ([address] Giant cell arteritis ([address] (3/20/2010); Hypertension; Macular degeneration; Other ill-defined conditions; Other ill-defined conditions; and TIA (transient ischemic attack) (6/23/2015). She also has no past medical history of Arthritis; Asthma; Autoimmune disease ([address] CAD (coronary artery disease); Cancer ([address]  Chronic kidney disease; COPD; DEMENTIA; Diabetes ([address] Endocrine disease; Heart failure ([facility] Infectious disease; Liver disease; Psychiatric disorder; PUD (peptic ulcer disease); Seizures ([facility] Sleep disorder; or Thromboembolus ([facility] Ms. Toribio Medina  has a past surgical history that includes cholecystectomy; heent; and orthopaedic. Social History/Living Environment:     lives with family; no driving. One step to enter home. Walk in shower with bench but doesn't use bench. Prior Level of Function/Work/Activity:  Still cooks and cleans some  Dominant Side:         RIGHT  Personal Factors:          Sex:  female        Age:  80 y.o. Current Medications:       Current Outpatient Prescriptions:     HYDROcodone-acetaminophen (NORCO) 5-325 mg per tablet, Take 1/2 to 1 tablet every 6 hours as needed for pain, Disp: 20 Tab, Rfl: 0    amLODIPine (NORVASC) 10 mg tablet, Take 1 Tab by mouth daily. , Disp: 90 Tab, Rfl: 1    mupirocin (BACTROBAN) 2 % ointment, Apply  to affected area daily. , Disp: 22 g, Rfl: 0    predniSONE (DELTASONE) 1 mg tablet, TAKE FOUR TABLETS BY MOUTH ONE TIME DAILY, Disp: , Rfl: 6    atorvastatin (LIPITOR) 10 mg tablet, Take 1/2 tab every other night., Disp: 30 Tab, Rfl: 2    calcium-vitamin D (CALCIUM 600 + D,3,) 600 mg(1,500mg) -200 unit tab, Take 1 Tab by mouth., Disp: , Rfl:     aspirin delayed-release 81 mg tablet, Take 1 Tab by mouth daily. , Disp: 100 Tab, Rfl: 0    Cholecalciferol, Vitamin D3, (VITAMIN D3) 1,000 unit cap, Take 1 Cap by mouth daily. , Disp: , Rfl:     Denosumab (PROLIA) 60 mg/mL injection, 60 mg by SubCUTAneous route., Disp: , Rfl:     bevacizumab (AVASTIN) 25 mg/ml soln intravitreal solution, by IntraVITreal route once., Disp: , Rfl:    Date Last Reviewed:  8/15/17   Number of Personal Factors/Comorbidities that affect the Plan of Care: 1-2: MODERATE COMPLEXITY   EXAMINATION:   Observation/Orthostatic Postural Assessment:          Mild forward head posture  Strength:          LE STRENGTH:  4/5 B LE  Functional Mobility: Gait/Ambulation:  Patient ambulates at a slower pace, decreased step length and heel strike, path deviations, with no AD. Transfers:  Slow, supervision with no AD        Bed Mobility:  independent        Stairs:  NT  Sensation:         intact  Postural Control & Balance:  · Oliva Balance Scale:  35/56.   (A score less than 45/56 indicates high risk of falls)     · Dynamic Gait Index:  8/24.   (A score less than or equal to19/24 is abnormal and predictive of falls)     · [employer] Sensory Organization Test:  ONT     Body Structures Involved:  1. Nerves  2. Eyes and Ears  3. Muscles Body Functions Affected:  1. Sensory/Pain  2. Neuromusculoskeletal  3. Movement Related Activities and Participation Affected:  1. General Tasks and Demands  2. Mobility  3. Community, Social and [place]   Number of elements (examined above) that affect the Plan of Care: 3: MODERATE COMPLEXITY   CLINICAL PRESENTATION:   Presentation: Evolving clinical presentation with changing clinical characteristics: MODERATE COMPLEXITY   CLINICAL DECISION MAKING:   Outcome Measure: Tool Used: Oliva Balance Scale  Score:  Initial: 35/56 Most Recent: X/56 (Date: -- )   Interpretation of Score: Each section is scored on a 0-4 scale, 0 representing the patients inability to perform the task and 4 representing independence. The scores of each section are added together for a total score of 56. The higher the patients score, the more independent the patient is. Any score below 45 indicates increased risk for falls. Score 56 55-45 44-34 33-23 22-12 11-1 0   Modifier CH CI CJ CK CL CM CN     ?  Changing and Maintaining Body Position:    [id number] - CURRENT STATUS: CJ - 20%-39% impaired, limited or restricted    - GOAL STATUS: CI - 1%-19% impaired, limited or restricted    - D/C STATUS:  ---------------To be determined---------------    Tool Used: Timed Up and Go (TUG)  Score:  Initial: 23 seconds Most Recent: X seconds (Date: -- )   Interpretation of Score: The test measures, in seconds, the time taken by an individual to stand up from a standard arm chair (seat height 46 cm [18 in], arm height 65 cm [25.6 in]), walk a distance of 3 meters (118 in, approx 10 ft), turn, walk back to the chair and sit down. If the individual takes longer than 14 seconds to complete TUG, this indicates risk for falls. Score 7 7.5-10.5 11-14 14.5-17.5 18-21 21.5-24.5 25+   Modifier CH CI CJ CK CL CM CN     Medical Necessity:   · Patient is expected to demonstrate progress in strength, balance and functional technique to improve safety during daily activities. Reason for Services/Other Comments:  · Patient continues to demonstrate capacity to improve strength, balance, gait which will increase independence and increase safety. Use of outcome tool(s) and clinical judgement create a POC that gives a: Questionable prediction of patient's progress: MODERATE COMPLEXITY            TREATMENT:   (In addition to Assessment/Re-Assessment sessions the following treatments were rendered)  Pre-treatment Symptoms/Complaints: 8/17/2017: Patient reports her back was sore after her last treatment, but she reports her back can get sore with different activities. Pain: Initial:   Pain Intensity 1: 0  Post Session:  0     Neuromuscular Re-education (30 Minutes):  Exercise/activities per grid below to improve balance, coordination and proprioception. Required minimal verbal cues to promote static and dynamic balance in standing.   Balance/Vestibular Treatment:   Activity   Date  8/15/17 Date  8/17/2017   Activity/Exercise   Sets/reps/equipment Sets/reps/  equipment   Walking with head turns         Walking with head up & down         Step overs     Over 1/2 foam roll x 15 reps each leg Over 1/2 foam roll  15 reps   B LE   Step taps     6 inch step x 30 reps forward and cross tap 6 inch step  30 reps forward and cross tap   Marching   4 laps in hallway 4 laps in hallway Sidestepping   4 laps in hallway 4 laps in hallway   Crossovers       [place]       Walking  backwards     4 laps in hallway 4 laps in hallway   Tandem walking       Weaving in/out of cones         Picking up cones         Sports cord         [employer]       Kick ball       Figure 8s        Circles right/left       Walking with 360 degree turns       Spirals       Weight shifting:    Left & Right     Blue foams eyes open Blue foams  Eyes open   Weight shifting: Forward & Backward      Blue foams eyes open Blue foams  Eyes open   Static Standing Balance     Blue foams eyes open and closed Blue foams  Eyes open and closed   Standing with feet apart         Standing with feet together         Standing with feet semitandem   Blue foams eyes open and closed Blue foams  Eyes open and closed   Standing with feet tandem       Single leg stance         Therapeutic Exercise: (15 Minutes):  Exercises per grid below to improve mobility, strength and balance. Required minimal verbal cues to promote proper body alignment, promote proper body posture and promote proper body mechanics. Progressed resistance, repetitions and complexity of movement as indicated. Date:  8/15/17 Date:  8/17/2017   Activity/Exercise Parameters Parameters   nustep     Sit to stand 2 x 10 reps from  chair 10 reps from chair  2 sets   Step ups 6 inch step x 10 reps each leg with rail 6 inch step  10 reps  B [facility] Portal  Treatment/Session Assessment:    · Response to Treatment:  Patient tolerated treatment well. Took extra rest breaks secondary to attempt to decrease back pain soreness after treatment. Patient reported no back pain after treatment. · Compliance with Program/Exercises: Will assess as treatment progresses. · Recommendations/Intent for next treatment session: \"Next visit will focus on advancements to more challenging activities\".   Total Treatment Duration:  PT Patient Time In/Time Out  Time In: 1615  Time Out: 1700 Alma Cates, PT

## 2017-08-22 ENCOUNTER — HOSPITAL ENCOUNTER (OUTPATIENT)
Dept: PHYSICAL THERAPY | Age: 82
Discharge: HOME | End: 2017-08-22
Attending: FAMILY MEDICINE
Payer: MEDICARE

## 2017-08-22 PROCEDURE — 97112 NEUROMUSCULAR REEDUCATION: CPT

## 2017-08-22 NOTE — PROGRESS NOTES
Tyrell Burnett  : 1928 [facility] [place] [address], [place] [address].  [phone]   [phone]           OUTPATIENT PHYSICAL THERAPY:Daily Note 2017    ICD-10: Treatment Diagnosis: Other abnormalities of gait and mobility R26.89;  Precautions/Allergies:   Calcium carbonate; Ranitidine hcl; and Guaifenesin   Fall Risk Score: 6 (? 5 = High Risk)  MD Orders: Eval and treat MEDICAL/REFERRING DIAGNOSIS:  Balance problem [R26.89]  Frequent falls [R29.6]  Physical deconditioning [R53.81]   DATE OF ONSET: a couple of years  REFERRING PHYSICIAN: Arlet Gonzalez MD  RETURN PHYSICIAN APPOINTMENT:      INITIAL ASSESSMENT:  Ms. Claudia Rizo presents with decreased gait, imbalance, and high risk of falls. Patient would benefit from PT to address these problems to improve patient's independence and safety with mobility and daily activities. Thank you. PROBLEM LIST (Impacting functional limitations):  1. Decreased Strength  2. Decreased ADL/Functional Activities  3. Decreased Transfer Abilities  4. Decreased Ambulation Ability/Technique  5. Decreased Balance  6. Decreased Activity Tolerance  7. Decreased [place] with Home Exercise Program INTERVENTIONS PLANNED:  1. Balance Exercise  2. Gait Training  3. Home Exercise Program (HEP)  4. Neuromuscular Re-education/Strengthening  5. Therapeutic Activites  6. Therapeutic Exercise/Strengthening  7. Transfer Training   8. TREATMENT PLAN:  Effective Dates: 2017 TO 11/3/2017. Frequency/Duration: 1-2 times a week for 8-12 weeks  GOALS: (Goals have been discussed and agreed upon with patient.)  Short-Term Functional Goals: Time Frame: 2-4 weeks  1. Patient will demonstrate independence and compliance with home exercise program to improve balance and strength for daily activities.    2. Patient will increase her score on the Oliva Balance Scale to greater than or equal to 38/56 indicating improved safety and decreased fall risk for daily activities. Discharge Goals: Time Frame: 8-12 weeks  1. Patient will increase bilateral lower extremity strength to greater than or equal to 4+/5 to improve strength for functional mobility activities. 2. Patient will increase her score on the Oliva Balance Scale to greater than or equal to 48/56 indicating improved safety and decreased fall risk for daily activities. 3. Patient will increase her score on the dynamic gait index to greater than or equal to 15/24 indicating improved balance and safety for community ambulation. 4. Patient will ambulate with least assistive device over level and unlevel surfaces without evidence of imbalance to improve safety for daily activities. Rehabilitation Potential For Stated Goals: Good            The information in this section was collected on 8/9/17 (except where otherwise noted). HISTORY:   History of Present Injury/Illness (Reason for Referral):  Rain Stanley 2/14/17 and broke ribs, getting tea out of microwave and burned self. 7/3/17 fell when standing and changing clothes and bruised her ribs. In the last 6 months, vision has decreased a lot, macular degeneration, L eye 20/200. No dizziness. Starts to lose balance and can't catch self. Has temperal arteritis and take prednisone for that. No pain. Gets back pain with standing, relieved with sitting. Was using no AD until fell in July, now uses RW or HHA. Past Medical History/Comorbidities:   Ms. Thien Rosario  has a past medical history of Gastroesophageal reflux disease (12/9/2016); Giant cell arteritis ([address] Giant cell arteritis ([address] (3/20/2010); Hypertension; Macular degeneration; Other ill-defined conditions; Other ill-defined conditions; and TIA (transient ischemic attack) (6/23/2015). She also has no past medical history of Arthritis; Asthma; Autoimmune disease ([address] CAD (coronary artery disease); Cancer ([address]  Chronic kidney disease; COPD; DEMENTIA; Diabetes ([address] Endocrine disease; Heart failure ([facility] Infectious disease; Liver disease; Psychiatric disorder; PUD (peptic ulcer disease); Seizures ([facility] Sleep disorder; or Thromboembolus ([facility] Ms. Massiel Schmidt  has a past surgical history that includes cholecystectomy; heent; and orthopaedic. Social History/Living Environment:     lives with family; no driving. One step to enter home. Walk in shower with bench but doesn't use bench. Prior Level of Function/Work/Activity:  Still cooks and cleans some  Dominant Side:         RIGHT  Personal Factors:          Sex:  female        Age:  80 y.o. Current Medications:       Current Outpatient Prescriptions:     HYDROcodone-acetaminophen (NORCO) 5-325 mg per tablet, Take 1/2 to 1 tablet every 6 hours as needed for pain, Disp: 20 Tab, Rfl: 0    amLODIPine (NORVASC) 10 mg tablet, Take 1 Tab by mouth daily. , Disp: 90 Tab, Rfl: 1    mupirocin (BACTROBAN) 2 % ointment, Apply  to affected area daily. , Disp: 22 g, Rfl: 0    predniSONE (DELTASONE) 1 mg tablet, TAKE FOUR TABLETS BY MOUTH ONE TIME DAILY, Disp: , Rfl: 6    atorvastatin (LIPITOR) 10 mg tablet, Take 1/2 tab every other night., Disp: 30 Tab, Rfl: 2    calcium-vitamin D (CALCIUM 600 + D,3,) 600 mg(1,500mg) -200 unit tab, Take 1 Tab by mouth., Disp: , Rfl:     aspirin delayed-release 81 mg tablet, Take 1 Tab by mouth daily. , Disp: 100 Tab, Rfl: 0    Cholecalciferol, Vitamin D3, (VITAMIN D3) 1,000 unit cap, Take 1 Cap by mouth daily. , Disp: , Rfl:     Denosumab (PROLIA) 60 mg/mL injection, 60 mg by SubCUTAneous route., Disp: , Rfl:     bevacizumab (AVASTIN) 25 mg/ml soln intravitreal solution, by IntraVITreal route once., Disp: , Rfl:    Date Last Reviewed:  8/22/17   Number of Personal Factors/Comorbidities that affect the Plan of Care: 1-2: MODERATE COMPLEXITY   EXAMINATION:   Observation/Orthostatic Postural Assessment:          Mild forward head posture  Strength:          LE STRENGTH:  4/5 B LE  Functional Mobility: Gait/Ambulation:  Patient ambulates at a slower pace, decreased step length and heel strike, path deviations, with no AD. Transfers:  Slow, supervision with no AD        Bed Mobility:  independent        Stairs:  NT  Sensation:         intact  Postural Control & Balance:  · Oliva Balance Scale:  35/56.   (A score less than 45/56 indicates high risk of falls)     · Dynamic Gait Index:  8/24.   (A score less than or equal to19/24 is abnormal and predictive of falls)     · [employer] Sensory Organization Test:  ONT     Body Structures Involved:  1. Nerves  2. Eyes and Ears  3. Muscles Body Functions Affected:  1. Sensory/Pain  2. Neuromusculoskeletal  3. Movement Related Activities and Participation Affected:  1. General Tasks and Demands  2. Mobility  3. Community, Social and [place]   Number of elements (examined above) that affect the Plan of Care: 3: MODERATE COMPLEXITY   CLINICAL PRESENTATION:   Presentation: Evolving clinical presentation with changing clinical characteristics: MODERATE COMPLEXITY   CLINICAL DECISION MAKING:   Outcome Measure: Tool Used: Oliva Balance Scale  Score:  Initial: 35/56 Most Recent: X/56 (Date: -- )   Interpretation of Score: Each section is scored on a 0-4 scale, 0 representing the patients inability to perform the task and 4 representing independence. The scores of each section are added together for a total score of 56. The higher the patients score, the more independent the patient is. Any score below 45 indicates increased risk for falls. Score 56 55-45 44-34 33-23 22-12 11-1 0   Modifier CH CI CJ CK CL CM CN     ?  Changing and Maintaining Body Position:    [id number] - CURRENT STATUS: CJ - 20%-39% impaired, limited or restricted    - GOAL STATUS: CI - 1%-19% impaired, limited or restricted    - D/C STATUS:  ---------------To be determined---------------    Tool Used: Timed Up and Go (TUG)  Score:  Initial: 23 seconds Most Recent: X seconds (Date: -- )   Interpretation of Score: The test measures, in seconds, the time taken by an individual to stand up from a standard arm chair (seat height 46 cm [18 in], arm height 65 cm [25.6 in]), walk a distance of 3 meters (118 in, approx 10 ft), turn, walk back to the chair and sit down. If the individual takes longer than 14 seconds to complete TUG, this indicates risk for falls. Score 7 7.5-10.5 11-14 14.5-17.5 18-21 21.5-24.5 25+   Modifier CH CI CJ CK CL CM CN     Medical Necessity:   · Patient is expected to demonstrate progress in strength, balance and functional technique to improve safety during daily activities. Reason for Services/Other Comments:  · Patient continues to demonstrate capacity to improve strength, balance, gait which will increase independence and increase safety. Use of outcome tool(s) and clinical judgement create a POC that gives a: Questionable prediction of patient's progress: MODERATE COMPLEXITY            TREATMENT:   (In addition to Assessment/Re-Assessment sessions the following treatments were rendered)  Pre-treatment Symptoms/Complaints: 8/22/2017: \"doing well. \"  Pain: Initial:   Pain Intensity 1: 0  Post Session:  0     Neuromuscular Re-education (42 Minutes):  Exercise/activities per grid below to improve balance, coordination and proprioception. Required minimal verbal cues to promote static and dynamic balance in standing.   Balance/Vestibular Treatment:   Activity   Date  8/15/17 Date  8/17/2017 Date  8/22/17   Activity/Exercise   Sets/reps/equipment Sets/reps/  equipment    Walking with head turns          Walking with head up & down          Step overs     Over 1/2 foam roll x 15 reps each leg Over 1/2 foam roll  15 reps   B LE Over blue foam   2x10 reps   B LE   Step taps     6 inch step x 30 reps forward and cross tap 6 inch step  30 reps forward and cross tap 6 inch step  30 reps forward and cross tap   Marching   4 laps in hallway 4 laps in hallway    Sidestepping   4 laps in hallway 4 laps in hallway    Crossovers        [place]        Walking  backwards     4 laps in hallway 4 laps in hallway    Walking with obstacles     In between cones and over 4 inch block x 6 reps with CGA   Walking with heel strike       4 laps [place]   Picking up cones          Sports cord          [employer]        Kick ball        Figure 8s         Circles right/left        Walking with 360 degree turns        Spirals        Weight shifting:    Left & Right     Blue foams eyes open Blue foams  Eyes open rockerboard  Eyes open   Weight shifting: Forward & Backward      Blue foams eyes open Blue foams  Eyes open rockerboard  Eyes open   Static Standing Balance     Blue foams eyes open and closed Blue foams  Eyes open and closed rockerboard  Eyes open   Standing with feet apart       Blue foams eyes open and closed   Standing with feet together          Standing with feet semitandem   Blue foams eyes open and closed Blue foams  Eyes open and closed Blue foams  Eyes open and closed   Standing with feet tandem        Single leg stance     Each LE      Therapeutic Exercise: (5 Minutes):  Exercises per grid below to improve mobility, strength and balance. Required minimal verbal cues to promote proper body alignment, promote proper body posture and promote proper body mechanics. Progressed resistance, repetitions and complexity of movement as indicated. Date:  8/15/17 Date:  8/17/2017 Date   8/22/17   Activity/Exercise Parameters Parameters    nustep      Sit to stand 2 x 10 reps from  chair 10 reps from chair  2 sets    Step ups 6 inch step x 10 reps each leg with rail 6 inch step  10 reps  B LE 6 inch step  10 reps  B LE     [employer] Portal  Treatment/Session Assessment:    · Response to Treatment:  Patient tolerated treatment well. Continue to work on gait pattern. · Compliance with Program/Exercises: Will assess as treatment progresses. · Recommendations/Intent for next treatment session:  \"Next visit will focus on advancements to more challenging activities\".   Total Treatment Duration:  PT Patient Time In/Time Out  Time In: 1313  Time Out: 300 [facility] Alec Rios

## 2017-08-24 ENCOUNTER — HOSPITAL ENCOUNTER (OUTPATIENT)
Dept: PHYSICAL THERAPY | Age: 82
Discharge: HOME | End: 2017-08-24
Attending: FAMILY MEDICINE
Payer: MEDICARE

## 2017-08-24 PROCEDURE — 97112 NEUROMUSCULAR REEDUCATION: CPT

## 2017-08-24 PROCEDURE — 97110 THERAPEUTIC EXERCISES: CPT

## 2017-08-24 NOTE — PROGRESS NOTES
Siomara Haile  : [facility] [place] [address], [place] [address].  [phone]   [phone]           OUTPATIENT PHYSICAL THERAPY:Daily Note 2017    ICD-10: Treatment Diagnosis: Other abnormalities of gait and mobility R26.89;  Precautions/Allergies:   Calcium carbonate; Ranitidine hcl; and Guaifenesin   Fall Risk Score: 6 (? 5 = High Risk)  MD Orders: Eval and treat MEDICAL/REFERRING DIAGNOSIS:  Balance problem [R26.89]  Frequent falls [R29.6]  Physical deconditioning [R53.81]   DATE OF ONSET: a couple of years  REFERRING PHYSICIAN: Gaby Gomez MD  RETURN PHYSICIAN APPOINTMENT:      INITIAL ASSESSMENT:  Ms. Mechele Spurling presents with decreased gait, imbalance, and high risk of falls. Patient would benefit from PT to address these problems to improve patient's independence and safety with mobility and daily activities. Thank you. PROBLEM LIST (Impacting functional limitations):  1. Decreased Strength  2. Decreased ADL/Functional Activities  3. Decreased Transfer Abilities  4. Decreased Ambulation Ability/Technique  5. Decreased Balance  6. Decreased Activity Tolerance  7. Decreased Morgan with Home Exercise Program INTERVENTIONS PLANNED:  1. Balance Exercise  2. Gait Training  3. Home Exercise Program (HEP)  4. Neuromuscular Re-education/Strengthening  5. Therapeutic Activites  6. Therapeutic Exercise/Strengthening  7. Transfer Training   8. TREATMENT PLAN:  Effective Dates: 2017 TO 11/3/2017. Frequency/Duration: 1-2 times a week for 8-12 weeks  GOALS: (Goals have been discussed and agreed upon with patient.)  Short-Term Functional Goals: Time Frame: 2-4 weeks  1. Patient will demonstrate independence and compliance with home exercise program to improve balance and strength for daily activities.    2. Patient will increase her score on the Oliva Balance Scale to greater than or equal to 38/56 indicating improved safety and decreased fall risk for daily activities. Discharge Goals: Time Frame: 8-12 weeks  1. Patient will increase bilateral lower extremity strength to greater than or equal to 4+/5 to improve strength for functional mobility activities. 2. Patient will increase her score on the Oliva Balance Scale to greater than or equal to 48/56 indicating improved safety and decreased fall risk for daily activities. 3. Patient will increase her score on the dynamic gait index to greater than or equal to 15/24 indicating improved balance and safety for community ambulation. 4. Patient will ambulate with least assistive device over level and unlevel surfaces without evidence of imbalance to improve safety for daily activities. Rehabilitation Potential For Stated Goals: Good            The information in this section was collected on 8/9/17 (except where otherwise noted). HISTORY:   History of Present Injury/Illness (Reason for Referral):  Mandie Campoverde 2/14/17 and broke ribs, getting tea out of microwave and burned self. 7/3/17 fell when standing and changing clothes and bruised her ribs. In the last 6 months, vision has decreased a lot, macular degeneration, L eye 20/200. No dizziness. Starts to lose balance and can't catch self. Has temperal arteritis and take prednisone for that. No pain. Gets back pain with standing, relieved with sitting. Was using no AD until fell in July, now uses RW or HHA. Past Medical History/Comorbidities:   Ms. Ashok Garibay  has a past medical history of Gastroesophageal reflux disease (12/9/2016); Giant cell arteritis ([facility] [address] Giant cell arteritis ([address] (3/20/2010); Hypertension; Macular degeneration; Other ill-defined conditions; Other ill-defined conditions; and TIA (transient ischemic attack) (6/23/2015). She also has no past medical history of Arthritis; Asthma; Autoimmune disease ([address] CAD (coronary artery disease); Cancer ([address]  Chronic kidney disease; COPD; DEMENTIA; Diabetes ([address] Endocrine disease; Heart failure ([facility] [address] Infectious disease; Liver disease; Psychiatric disorder; PUD (peptic ulcer disease); Seizures ([facility] [address] Sleep disorder; or Thromboembolus ([facility] [address] Ms. Indira Irwin  has a past surgical history that includes cholecystectomy; heent; and orthopaedic. Social History/Living Environment:     lives with family; no driving. One step to enter home. Walk in shower with bench but doesn't use bench. Prior Level of Function/Work/Activity:  Still cooks and cleans some  Dominant Side:         RIGHT  Personal Factors:          Sex:  female        Age:  80 y.o. Current Medications:       Current Outpatient Prescriptions:     HYDROcodone-acetaminophen (NORCO) 5-325 mg per tablet, Take 1/2 to 1 tablet every 6 hours as needed for pain, Disp: 20 Tab, Rfl: 0    amLODIPine (NORVASC) 10 mg tablet, Take 1 Tab by mouth daily. , Disp: 90 Tab, Rfl: 1    mupirocin (BACTROBAN) 2 % ointment, Apply  to affected area daily. , Disp: 22 g, Rfl: 0    predniSONE (DELTASONE) 1 mg tablet, TAKE FOUR TABLETS BY MOUTH ONE TIME DAILY, Disp: , Rfl: 6    atorvastatin (LIPITOR) 10 mg tablet, Take 1/2 tab every other night., Disp: 30 Tab, Rfl: 2    calcium-vitamin D (CALCIUM 600 + D,3,) 600 mg(1,500mg) -200 unit tab, Take 1 Tab by mouth., Disp: , Rfl:     aspirin delayed-release 81 mg tablet, Take 1 Tab by mouth daily. , Disp: 100 Tab, Rfl: 0    Cholecalciferol, Vitamin D3, (VITAMIN D3) 1,000 unit cap, Take 1 Cap by mouth daily. , Disp: , Rfl:     Denosumab (PROLIA) 60 mg/mL injection, 60 mg by SubCUTAneous route., Disp: , Rfl:     bevacizumab (AVASTIN) 25 mg/ml soln intravitreal solution, by IntraVITreal route once., Disp: , Rfl:    Date Last Reviewed:  8/24/17   Number of Personal Factors/Comorbidities that affect the Plan of Care: 1-2: MODERATE COMPLEXITY   EXAMINATION:   Observation/Orthostatic Postural Assessment:          Mild forward head posture  Strength:          LE STRENGTH:  4/5 B LE  Functional Mobility: Gait/Ambulation:  Patient ambulates at a slower pace, decreased step length and heel strike, path deviations, with no AD. Transfers:  Slow, supervision with no AD        Bed Mobility:  independent        Stairs:  NT  Sensation:         intact  Postural Control & Balance:  · Oliva Balance Scale:  35/56.   (A score less than 45/56 indicates high risk of falls)     · Dynamic Gait Index:  8/24.   (A score less than or equal to19/24 is abnormal and predictive of falls)     · [employer] Sensory Organization Test:  ONT     Body Structures Involved:  1. Nerves  2. Eyes and Ears  3. Muscles Body Functions Affected:  1. Sensory/Pain  2. Neuromusculoskeletal  3. Movement Related Activities and Participation Affected:  1. General Tasks and Demands  2. Mobility  3. Community, Social and [place]   Number of elements (examined above) that affect the Plan of Care: 3: MODERATE COMPLEXITY   CLINICAL PRESENTATION:   Presentation: Evolving clinical presentation with changing clinical characteristics: MODERATE COMPLEXITY   CLINICAL DECISION MAKING:   Outcome Measure: Tool Used: Oliva Balance Scale  Score:  Initial: 35/56 Most Recent: X/56 (Date: -- )   Interpretation of Score: Each section is scored on a 0-4 scale, 0 representing the patients inability to perform the task and 4 representing independence. The scores of each section are added together for a total score of 56. The higher the patients score, the more independent the patient is. Any score below 45 indicates increased risk for falls. Score 56 55-45 44-34 33-23 22-12 11-1 0   Modifier CH CI CJ CK CL CM CN     ?  Changing and Maintaining Body Position:    [id number] - CURRENT STATUS: CJ - 20%-39% impaired, limited or restricted    - GOAL STATUS: CI - 1%-19% impaired, limited or restricted    - D/C STATUS:  ---------------To be determined---------------    Tool Used: Timed Up and Go (TUG)  Score:  Initial: 23 seconds Most Recent: X seconds (Date: -- )   Interpretation of Score: The test measures, in seconds, the time taken by an individual to stand up from a standard arm chair (seat height 46 cm [18 in], arm height 65 cm [25.6 in]), walk a distance of 3 meters (118 in, approx 10 ft), turn, walk back to the chair and sit down. If the individual takes longer than 14 seconds to complete TUG, this indicates risk for falls. Score 7 7.5-10.5 11-14 14.5-17.5 18-21 21.5-24.5 25+   Modifier CH CI CJ CK CL CM CN     Medical Necessity:   · Patient is expected to demonstrate progress in strength, balance and functional technique to improve safety during daily activities. Reason for Services/Other Comments:  · Patient continues to demonstrate capacity to improve strength, balance, gait which will increase independence and increase safety. Use of outcome tool(s) and clinical judgement create a POC that gives a: Questionable prediction of patient's progress: MODERATE COMPLEXITY            TREATMENT:   (In addition to Assessment/Re-Assessment sessions the following treatments were rendered)  Pre-treatment Symptoms/Complaints: 8/24/2017: \"Doing HEP. \"  Pain: Initial:   Pain Intensity 1: 0  Post Session:  0     Neuromuscular Re-education (30 Minutes):  Exercise/activities per grid below to improve balance, coordination and proprioception. Required minimal verbal cues to promote static and dynamic balance in standing.   Balance/Vestibular Treatment:   Activity   Date  8/15/17 Date  8/17/2017 Date  8/22/17 Date   8/24/17   Activity/Exercise   Sets/reps/equipment Sets/reps/  equipment     Walking with head turns           Walking with head up & down           Step overs     Over 1/2 foam roll x 15 reps each leg Over 1/2 foam roll  15 reps   B LE Over blue foam   2x10 reps   B LE Over blue foam   2x10 reps   B LE forward and laterally   Step taps     6 inch step x 30 reps forward and cross tap 6 inch step  30 reps forward and cross tap 6 inch step  30 reps forward and cross tap 6 inch step  30 reps forward and cross tap   Marching   4 laps in hallway 4 laps in hallway  4 laps in hallway   Sidestepping   4 laps in hallway 4 laps in hallway  4 laps in hallway   Crossovers         Malin         Walking  backwards     4 laps in hallway 4 laps in hallway     Walking with obstacles     In between cones and over 4 inch block x 6 reps with CGA In between cones and over 4 inch block, up/down 4 inch and six inch step x 6 reps with CGA   Walking with heel strike       4 laps collier 4 laps collier and up/down ramps   Picking up cones           Sports cord           [employer]         Kick ball         Figure 8s          Circles right/left         Walking with 360 degree turns         Spirals         Weight shifting:    Left & Right     Blue foams eyes open Blue foams  Eyes open rockerboard  Eyes open rockerboard  Eyes open   Weight shifting: Forward & Backward      Blue foams eyes open Blue foams  Eyes open rockerboard  Eyes open rockerboard  Eyes open   Static Standing Balance     Blue foams eyes open and closed Blue foams  Eyes open and closed rockerboard  Eyes open rockerboard  Eyes open and closed   Standing with feet apart       Blue foams eyes open and closed    Standing with feet together           Standing with feet semitandem   Blue foams eyes open and closed Blue foams  Eyes open and closed Blue foams  Eyes open and closed Blue foams  Eyes open and closed   Standing with feet tandem         Single leg stance     Each LE  Each LE     Therapeutic Exercise: (15 Minutes):  Exercises per grid below to improve mobility, strength and balance. Required minimal verbal cues to promote proper body alignment, promote proper body posture and promote proper body mechanics. Progressed resistance, repetitions and complexity of movement as indicated.    Date:  8/15/17 Date:  8/17/2017 Date   8/22/17 Date   8/24/17   Activity/Exercise Parameters Parameters     nustep       Sit to stand 2 x 10 reps from chair 10 reps from chair  2 sets  2 x 10 reps from chair with no UE assist   Step ups 6 inch step x 10 reps each leg with rail 6 inch step  10 reps  B LE 6 inch step  10 reps  B LE 6 inch step  10 reps  B LE   Standing heel and toe raises    2 x 10 reps each B     [employer] [facility]  Treatment/Session Assessment:    · Response to Treatment:  Patient tolerated treatment well. Balance is improving. Continue to work on gait pattern. · Compliance with Program/Exercises: Will assess as treatment progresses. · Recommendations/Intent for next treatment session: \"Next visit will focus on advancements to more challenging activities\".   Total Treatment Duration:  PT Patient Time In/Time Out  Time In: 1400  Time Out: 1044 ALICIA Dumont

## 2017-08-29 ENCOUNTER — HOSPITAL ENCOUNTER (OUTPATIENT)
Dept: PHYSICAL THERAPY | Age: 82
Discharge: HOME | End: 2017-08-29
Attending: FAMILY MEDICINE
Payer: MEDICARE

## 2017-08-29 PROCEDURE — 97112 NEUROMUSCULAR REEDUCATION: CPT

## 2017-08-29 PROCEDURE — 97110 THERAPEUTIC EXERCISES: CPT

## 2017-08-31 ENCOUNTER — HOSPITAL ENCOUNTER (OUTPATIENT)
Dept: PHYSICAL THERAPY | Age: 82
Discharge: HOME | End: 2017-08-31
Attending: FAMILY MEDICINE
Payer: MEDICARE

## 2017-08-31 PROCEDURE — 97110 THERAPEUTIC EXERCISES: CPT

## 2017-08-31 PROCEDURE — 97112 NEUROMUSCULAR REEDUCATION: CPT

## 2017-08-31 NOTE — PROGRESS NOTES
Babita Goddard  : [address] [facility]  [address], [address], [address]  [phone]   [phone]           OUTPATIENT PHYSICAL THERAPY:Daily Note 2017    ICD-10: Treatment Diagnosis: Other abnormalities of gait and mobility R26.89;  Precautions/Allergies:   Calcium carbonate; Ranitidine hcl; and Guaifenesin   Fall Risk Score: 6 (? 5 = High Risk)  MD Orders: Eval and treat MEDICAL/REFERRING DIAGNOSIS:  Balance problem [R26.89]  Frequent falls [R29.6]  Physical deconditioning [R53.81]   DATE OF ONSET: a couple of years  REFERRING PHYSICIAN: Krys Awan MD  RETURN PHYSICIAN APPOINTMENT:      INITIAL ASSESSMENT:  Ms. Martinez Overall presents with decreased gait, imbalance, and high risk of falls. Patient would benefit from PT to address these problems to improve patient's independence and safety with mobility and daily activities. Thank you. PROBLEM LIST (Impacting functional limitations):  1. Decreased Strength  2. Decreased ADL/Functional Activities  3. Decreased Transfer Abilities  4. Decreased Ambulation Ability/Technique  5. Decreased Balance  6. Decreased Activity Tolerance  7. Decreased [place] with Home Exercise Program INTERVENTIONS PLANNED:  1. Balance Exercise  2. Gait Training  3. Home Exercise Program (HEP)  4. Neuromuscular Re-education/Strengthening  5. Therapeutic Activites  6. Therapeutic Exercise/Strengthening  7. Transfer Training   8. TREATMENT PLAN:  Effective Dates: 2017 TO 11/3/2017. Frequency/Duration: 1-2 times a week for 8-12 weeks  GOALS: (Goals have been discussed and agreed upon with patient.)  Short-Term Functional Goals: Time Frame: 2-4 weeks  1. Patient will demonstrate independence and compliance with home exercise program to improve balance and strength for daily activities.    2. Patient will increase her score on the Oliva Balance Scale to greater than or equal to 38/56 indicating improved safety and decreased fall risk for daily activities. Discharge Goals: Time Frame: 8-12 weeks  1. Patient will increase bilateral lower extremity strength to greater than or equal to 4+/5 to improve strength for functional mobility activities. 2. Patient will increase her score on the Oliva Balance Scale to greater than or equal to 48/56 indicating improved safety and decreased fall risk for daily activities. 3. Patient will increase her score on the dynamic gait index to greater than or equal to 15/24 indicating improved balance and safety for community ambulation. 4. Patient will ambulate with least assistive device over level and unlevel surfaces without evidence of imbalance to improve safety for daily activities. Rehabilitation Potential For Stated Goals: Good            The information in this section was collected on 8/9/17 (except where otherwise noted). HISTORY:   History of Present Injury/Illness (Reason for Referral):  Violeta Pradhan 2/14/17 and broke ribs, getting tea out of microwave and burned self. 7/3/17 fell when standing and changing clothes and bruised her ribs. In the last 6 months, vision has decreased a lot, macular degeneration, L eye 20/200. No dizziness. Starts to lose balance and can't catch self. Has temperal arteritis and take prednisone for that. No pain. Gets back pain with standing, relieved with sitting. Was using no AD until fell in July, now uses RW or HHA. Past Medical History/Comorbidities:   Ms. Augustine Braun  has a past medical history of Gastroesophageal reflux disease (12/9/2016); Giant cell arteritis ([address] Giant cell arteritis ([address] (3/20/2010); Hypertension; Macular degeneration; Other ill-defined conditions; Other ill-defined conditions; and TIA (transient ischemic attack) (6/23/2015). She also has no past medical history of Arthritis; Asthma; Autoimmune disease ([address] CAD (coronary artery disease); Cancer ([address]  Chronic kidney disease; COPD; DEMENTIA; Diabetes ([address] Endocrine disease; Heart failure ([facility] [address] [facility] Infectious disease; Liver disease; Psychiatric disorder; PUD (peptic ulcer disease); Seizures ([facility] [address] Sleep disorder; or Thromboembolus ([facility] [address] Ms. Tinsley Lipoma  has a past surgical history that includes cholecystectomy; heent; and orthopaedic. Social History/Living Environment:     lives with family; no driving. One step to enter home. Walk in shower with bench but doesn't use bench. Prior Level of Function/Work/Activity:  Still cooks and cleans some  Dominant Side:         RIGHT  Personal Factors:          Sex:  female        Age:  80 y.o. Current Medications:       Current Outpatient Prescriptions:     HYDROcodone-acetaminophen (NORCO) 5-325 mg per tablet, Take 1/2 to 1 tablet every 6 hours as needed for pain, Disp: 20 Tab, Rfl: 0    amLODIPine (NORVASC) 10 mg tablet, Take 1 Tab by mouth daily. , Disp: 90 Tab, Rfl: 1    mupirocin (BACTROBAN) 2 % ointment, Apply  to affected area daily. , Disp: 22 g, Rfl: 0    predniSONE (DELTASONE) 1 mg tablet, TAKE FOUR TABLETS BY MOUTH ONE TIME DAILY, Disp: , Rfl: 6    atorvastatin (LIPITOR) 10 mg tablet, Take 1/2 tab every other night., Disp: 30 Tab, Rfl: 2    calcium-vitamin D (CALCIUM 600 + D,3,) 600 mg(1,500mg) -200 unit tab, Take 1 Tab by mouth., Disp: , Rfl:     aspirin delayed-release 81 mg tablet, Take 1 Tab by mouth daily. , Disp: 100 Tab, Rfl: 0    Cholecalciferol, Vitamin D3, (VITAMIN D3) 1,000 unit cap, Take 1 Cap by mouth daily. , Disp: , Rfl:     Denosumab (PROLIA) 60 mg/mL injection, 60 mg by SubCUTAneous route., Disp: , Rfl:     bevacizumab (AVASTIN) 25 mg/ml soln intravitreal solution, by IntraVITreal route once., Disp: , Rfl:    Date Last Reviewed:  8/31/17   Number of Personal Factors/Comorbidities that affect the Plan of Care: 1-2: MODERATE COMPLEXITY   EXAMINATION:   Observation/Orthostatic Postural Assessment:          Mild forward head posture  Strength:          LE STRENGTH:  4/5 B LE  Functional Mobility: Gait/Ambulation:  Patient ambulates at a slower pace, decreased step length and heel strike, path deviations, with no AD. Transfers:  Slow, supervision with no AD        Bed Mobility:  independent        Stairs:  NT  Sensation:         intact  Postural Control & Balance:  · Oliva Balance Scale:  35/56.   (A score less than 45/56 indicates high risk of falls)     · Dynamic Gait Index:  8/24.   (A score less than or equal to19/24 is abnormal and predictive of falls)     · [employer] Sensory Organization Test:  ONT     Body Structures Involved:  1. Nerves  2. Eyes and Ears  3. Muscles Body Functions Affected:  1. Sensory/Pain  2. Neuromusculoskeletal  3. Movement Related Activities and Participation Affected:  1. General Tasks and Demands  2. Mobility  3. Community, Social and [place]   Number of elements (examined above) that affect the Plan of Care: 3: MODERATE COMPLEXITY   CLINICAL PRESENTATION:   Presentation: Evolving clinical presentation with changing clinical characteristics: MODERATE COMPLEXITY   CLINICAL DECISION MAKING:   Outcome Measure: Tool Used: Oliva Balance Scale  Score:  Initial: 35/56 Most Recent: X/56 (Date: -- )   Interpretation of Score: Each section is scored on a 0-4 scale, 0 representing the patients inability to perform the task and 4 representing independence. The scores of each section are added together for a total score of 56. The higher the patients score, the more independent the patient is. Any score below 45 indicates increased risk for falls. Score 56 55-45 44-34 33-23 22-12 11-1 0   Modifier CH CI CJ CK CL CM CN     ?  Changing and Maintaining Body Position:    [id number] - CURRENT STATUS: CJ - 20%-39% impaired, limited or restricted    - GOAL STATUS: CI - 1%-19% impaired, limited or restricted    - D/C STATUS:  ---------------To be determined---------------    Tool Used: Timed Up and Go (TUG)  Score:  Initial: 23 seconds Most Recent: X seconds (Date: -- )   Interpretation of Score: The test measures, in seconds, the time taken by an individual to stand up from a standard arm chair (seat height 46 cm [18 in], arm height 65 cm [25.6 in]), walk a distance of 3 meters (118 in, approx 10 ft), turn, walk back to the chair and sit down. If the individual takes longer than 14 seconds to complete TUG, this indicates risk for falls. Score 7 7.5-10.5 11-14 14.5-17.5 18-21 21.5-24.5 25+   Modifier CH CI CJ CK CL CM CN     Medical Necessity:   · Patient is expected to demonstrate progress in strength, balance and functional technique to improve safety during daily activities. Reason for Services/Other Comments:  · Patient continues to demonstrate capacity to improve strength, balance, gait which will increase independence and increase safety. Use of outcome tool(s) and clinical judgement create a POC that gives a: Questionable prediction of patient's progress: MODERATE COMPLEXITY            TREATMENT:   (In addition to Assessment/Re-Assessment sessions the following treatments were rendered)  Pre-treatment Symptoms/Complaints: 8/31/2017: \"Doing okay. No falls. \"  Pain: Initial: 0/10 Pain Intensity 1: 0  Post Session:  0     Neuromuscular Re-education (30 Minutes):  Exercise/activities per grid below to improve balance, coordination and proprioception. Required minimal verbal cues to promote static and dynamic balance in standing.   Balance/Vestibular Treatment:   Activity   Date  8/22/17 Date   8/24/17 Date:  8/29/2017 Date   8/31/17   Activity/Exercise         Walking with head turns           Walking with head up & down           Step overs     Over blue foam   2x10 reps   B LE Over blue foam   2x10 reps   B LE forward and laterally Blue foam  2 sets  10 reps  B LE  Forward and lateral Blue foam  2 sets  10 reps  B LE  Forward and lateral   Step taps     6 inch step  30 reps forward and cross tap 6 inch step  30 reps forward and cross tap 6 inch step  30 reps   Forward and cross tap 6 inch step  30 reps   Forward and cross tap   Marching    4 laps in hallway 4 laps 4 laps   Sidestepping    4 laps in hallway 4 laps 4 laps   Crossovers         [place]         Walking  backwards           Walking with obstacles   In between cones and over 4 inch block x 6 reps with CGA In between cones and over 4 inch block, up/down 4 inch and six inch step x 6 reps with CGA Cones, 4 inch step, 6 inch step  6 reps  CGA Cones, 4 inch step, 6 inch step  6 reps  CGA   Walking with heel strike     4 laps collier 4 laps collier and up/down ramps 4 laps with ramps 4 laps with ramps; then again in clinic working on keeping space between feet (increasing MARK), and heel strike   Picking up cones           Sports cord           [employer]         Kick ball         Figure 8s          Circles right/left         Walking with 360 degree turns         Spirals         Weight shifting:    Left & Right     rockerboard  Eyes open rockerboard  Eyes open Rockerboard  Eyes open    Weight shifting: Forward & Backward      rockerboard  Eyes open rockerboard  Eyes open Rockerboard  Eyes open    Static Standing Balance     rockerboard  Eyes open rockerboard  Eyes open and closed Rockerboard  Eyes open and closed    Standing with feet apart     Blue foams eyes open and closed      Standing with feet together           Standing with feet semitandem   Blue foams  Eyes open and closed Blue foams  Eyes open and closed Blue foams  Eyes open and closed    Standing with feet tandem         Single leg stance   Each LE  Each LE B LE      Therapeutic Exercise: (15 Minutes):  Exercises per grid below to improve mobility, strength and balance. Required minimal verbal cues to promote proper body alignment, promote proper body posture and promote proper body mechanics. Progressed resistance, repetitions and complexity of movement as indicated.    Date   8/22/17 Date   8/24/17 Date:  8/29/2017 Date   8/31/17   Activity/Exercise   Parameters nustep       Sit to stand  2 x 10 reps from chair with no UE assist 10 reps  2 sets  No UE assst 10 reps  2 sets from chair  No UE assist   Step ups 6 inch step  10 reps  B LE 6 inch step  10 reps  B LE 6 inch step  10 reps  B LE 6 inch step  10 reps  each LE   Standing heel and toe raises  2 x 10 reps each B 2 sets  10 reps  B LE 2 sets  10 reps  B LE   Heel walking    Along railing 6 laps to work on ankle strength     [employer] Portal  Treatment/Session Assessment:    · Response to Treatment:  Patient tolerated treatment well. Patient is demonstrating improving confidence with balance, and improving ability to perform gait and balance activities. Continue to work on gait pattern, MARK and heel strike. · Compliance with Program/Exercises: Will assess as treatment progresses. · Recommendations/Intent for next treatment session: \"Next visit will focus on advancements to more challenging activities\".   Total Treatment Duration:  PT Patient Time In/Time Out  Time In: 1400  Time Out: 1044 ALICIA Matthews

## 2017-09-05 ENCOUNTER — HOSPITAL ENCOUNTER (OUTPATIENT)
Dept: PHYSICAL THERAPY | Age: 82
Discharge: HOME | End: 2017-09-05
Attending: FAMILY MEDICINE
Payer: MEDICARE

## 2017-09-05 PROCEDURE — 97110 THERAPEUTIC EXERCISES: CPT

## 2017-09-05 PROCEDURE — 97112 NEUROMUSCULAR REEDUCATION: CPT

## 2017-09-05 NOTE — PROGRESS NOTES
Elliot Llamas  : [address] [facility] [place] [address], [place] [address].  [phone]   [phone]           OUTPATIENT PHYSICAL THERAPY:Daily Note 2017    ICD-10: Treatment Diagnosis: Other abnormalities of gait and mobility R26.89;  Precautions/Allergies:   Calcium carbonate; Ranitidine hcl; and Guaifenesin   Fall Risk Score: 6 (? 5 = High Risk)  MD Orders: Eval and treat MEDICAL/REFERRING DIAGNOSIS:  Balance problem [R26.89]  Frequent falls [R29.6]  Physical deconditioning [R53.81]   DATE OF ONSET: a couple of years  REFERRING PHYSICIAN: Rafal Gonzalez MD  RETURN PHYSICIAN APPOINTMENT:      INITIAL ASSESSMENT:  Ms. Augustine Braun presents with decreased gait, imbalance, and high risk of falls. Patient would benefit from PT to address these problems to improve patient's independence and safety with mobility and daily activities. Thank you. PROBLEM LIST (Impacting functional limitations):  1. Decreased Strength  2. Decreased ADL/Functional Activities  3. Decreased Transfer Abilities  4. Decreased Ambulation Ability/Technique  5. Decreased Balance  6. Decreased Activity Tolerance  7. Decreased [place] with Home Exercise Program INTERVENTIONS PLANNED:  1. Balance Exercise  2. Gait Training  3. Home Exercise Program (HEP)  4. Neuromuscular Re-education/Strengthening  5. Therapeutic Activites  6. Therapeutic Exercise/Strengthening  7. Transfer Training   8. TREATMENT PLAN:  Effective Dates: 2017 TO 11/3/2017. Frequency/Duration: 1-2 times a week for 8-12 weeks  GOALS: (Goals have been discussed and agreed upon with patient.)  Short-Term Functional Goals: Time Frame: 2-4 weeks  1. Patient will demonstrate independence and compliance with home exercise program to improve balance and strength for daily activities.    2. Patient will increase her score on the Oliva Balance Scale to greater than or equal to 38/56 indicating improved safety and decreased fall risk for daily activities. Discharge Goals: Time Frame: 8-12 weeks  1. Patient will increase bilateral lower extremity strength to greater than or equal to 4+/5 to improve strength for functional mobility activities. 2. Patient will increase her score on the Oliva Balance Scale to greater than or equal to 48/56 indicating improved safety and decreased fall risk for daily activities. 3. Patient will increase her score on the dynamic gait index to greater than or equal to 15/24 indicating improved balance and safety for community ambulation. 4. Patient will ambulate with least assistive device over level and unlevel surfaces without evidence of imbalance to improve safety for daily activities. Rehabilitation Potential For Stated Goals: Good            The information in this section was collected on 8/9/17 (except where otherwise noted). HISTORY:   History of Present Injury/Illness (Reason for Referral):  Bibiana Osorio 2/14/17 and broke ribs, getting tea out of microwave and burned self. 7/3/17 fell when standing and changing clothes and bruised her ribs. In the last 6 months, vision has decreased a lot, macular degeneration, L eye 20/200. No dizziness. Starts to lose balance and can't catch self. Has temperal arteritis and take prednisone for that. No pain. Gets back pain with standing, relieved with sitting. Was using no AD until fell in July, now uses RW or HHA. Past Medical History/Comorbidities:   Ms. Juan Jones  has a past medical history of Gastroesophageal reflux disease (12/9/2016); Giant cell arteritis ([address] Giant cell arteritis ([address] (3/20/2010); Hypertension; Macular degeneration; Other ill-defined conditions; Other ill-defined conditions; and TIA (transient ischemic attack) (6/23/2015). She also has no past medical history of Arthritis; Asthma; Autoimmune disease ([address] CAD (coronary artery disease); Cancer ([address] Chronic kidney disease; COPD; DEMENTIA; Diabetes ([address]  Endocrine disease; Heart failure ([facility] Infectious disease; Liver disease; Psychiatric disorder; PUD (peptic ulcer disease); Seizures ([facility] [facility] Sleep disorder; or Thromboembolus ([facility] [address] [facility] Ms. Kofi Dawson  has a past surgical history that includes cholecystectomy; heent; and orthopaedic. Social History/Living Environment:     lives with family; no driving. One step to enter home. Walk in shower with bench but doesn't use bench. Prior Level of Function/Work/Activity:  Still cooks and cleans some  Dominant Side:         RIGHT  Personal Factors:          Sex:  female        Age:  80 y.o. Current Medications:       Current Outpatient Prescriptions:     HYDROcodone-acetaminophen (NORCO) 5-325 mg per tablet, Take 1/2 to 1 tablet every 6 hours as needed for pain, Disp: 20 Tab, Rfl: 0    amLODIPine (NORVASC) 10 mg tablet, Take 1 Tab by mouth daily. , Disp: 90 Tab, Rfl: 1    mupirocin (BACTROBAN) 2 % ointment, Apply  to affected area daily. , Disp: 22 g, Rfl: 0    predniSONE (DELTASONE) 1 mg tablet, TAKE FOUR TABLETS BY MOUTH ONE TIME DAILY, Disp: , Rfl: 6    atorvastatin (LIPITOR) 10 mg tablet, Take 1/2 tab every other night., Disp: 30 Tab, Rfl: 2    calcium-vitamin D (CALCIUM 600 + D,3,) 600 mg(1,500mg) -200 unit tab, Take 1 Tab by mouth., Disp: , Rfl:     aspirin delayed-release 81 mg tablet, Take 1 Tab by mouth daily. , Disp: 100 Tab, Rfl: 0    Cholecalciferol, Vitamin D3, (VITAMIN D3) 1,000 unit cap, Take 1 Cap by mouth daily. , Disp: , Rfl:     Denosumab (PROLIA) 60 mg/mL injection, 60 mg by SubCUTAneous route., Disp: , Rfl:     bevacizumab (AVASTIN) 25 mg/ml soln intravitreal solution, by IntraVITreal route once., Disp: , Rfl:    Date Last Reviewed:  8/31/17   Number of Personal Factors/Comorbidities that affect the Plan of Care: 1-2: MODERATE COMPLEXITY   EXAMINATION:   Observation/Orthostatic Postural Assessment:          Mild forward head posture  Strength:          LE STRENGTH:  4/5 B LE  Functional Mobility: Gait/Ambulation:  Patient ambulates at a slower pace, decreased step length and heel strike, path deviations, with no AD. Transfers:  Slow, supervision with no AD        Bed Mobility:  independent        Stairs:  NT  Sensation:         intact  Postural Control & Balance:  · Oliva Balance Scale:  35/56.   (A score less than 45/56 indicates high risk of falls)     · Dynamic Gait Index:  8/24.   (A score less than or equal to19/24 is abnormal and predictive of falls)     · [employer]ensory [employer] Test:  ONT     Body Structures Involved:  1. Nerves  2. Eyes and Ears  3. Muscles Body Functions Affected:  1. Sensory/Pain  2. Neuromusculoskeletal  3. Movement Related Activities and Participation Affected:  1. General Tasks and Demands  2. Mobility  3. Community, Social and [place]   Number of elements (examined above) that affect the Plan of Care: 3: MODERATE COMPLEXITY   CLINICAL PRESENTATION:   Presentation: Evolving clinical presentation with changing clinical characteristics: MODERATE COMPLEXITY   CLINICAL DECISION MAKING:   Outcome Measure: Tool Used: Oliva Balance Scale  Score:  Initial: 35/56 Most Recent: X/56 (Date: -- )   Interpretation of Score: Each section is scored on a 0-4 scale, 0 representing the patients inability to perform the task and 4 representing independence. The scores of each section are added together for a total score of 56. The higher the patients score, the more independent the patient is. Any score below 45 indicates increased risk for falls. Score 56 55-45 44-34 33-23 22-12 11-1 0   Modifier CH CI CJ CK CL CM CN     ?  Changing and Maintaining Body Position:    [id number] - CURRENT STATUS: CJ - 20%-39% impaired, limited or restricted    - GOAL STATUS: CI - 1%-19% impaired, limited or restricted    - D/C STATUS:  ---------------To be determined---------------    Tool Used: Timed Up and Go (TUG)  Score:  Initial: 23 seconds Most Recent: X seconds (Date: -- ) Interpretation of Score: The test measures, in seconds, the time taken by an individual to stand up from a standard arm chair (seat height 46 cm [18 in], arm height 65 cm [25.6 in]), walk a distance of 3 meters (118 in, approx 10 ft), turn, walk back to the chair and sit down. If the individual takes longer than 14 seconds to complete TUG, this indicates risk for falls. Score 7 7.5-10.5 11-14 14.5-17.5 18-21 21.5-24.5 25+   Modifier CH CI CJ CK CL CM CN     Medical Necessity:   · Patient is expected to demonstrate progress in strength, balance and functional technique to improve safety during daily activities. Reason for Services/Other Comments:  · Patient continues to demonstrate capacity to improve strength, balance, gait which will increase independence and increase safety. Use of outcome tool(s) and clinical judgement create a POC that gives a: Questionable prediction of patient's progress: MODERATE COMPLEXITY            TREATMENT:   (In addition to Assessment/Re-Assessment sessions the following treatments were rendered)  Pre-treatment Symptoms/Complaints: 9/5/2017: Patient reports she is doing well today. Pain: Initial: 0/10 Pain Intensity 1: 0  Post Session:  0     Neuromuscular Re-education (30 Minutes):  Exercise/activities per grid below to improve balance, coordination and proprioception. Required minimal verbal cues to promote static and dynamic balance in standing.   Balance/Vestibular Treatment:   Activity   Date:  8/29/2017 Date   8/31/17 Date:  9/5/2017   Activity/Exercise        Walking with head turns          Walking with head up & down          Step overs     Blue foam  2 sets  10 reps  B LE  Forward and lateral Blue foam  2 sets  10 reps  B LE  Forward and lateral Blue foam  2 sets  10 reps  B LE  Forward and lateral   Step taps     6 inch step  30 reps   Forward and cross tap 6 inch step  30 reps   Forward and cross tap 6 inch step  30 reps   Forward and cross tap   Marching   4 laps 4 laps 4 laps   Sidestepping   4 laps 4 laps 4 laps   Crossovers        [place]        Walking  backwards          Walking with obstacles   Cones, 4 inch step, 6 inch step  6 reps  CGA [place], 4 inch step, 6 inch step  6 reps  CGA [place], 4 inch step, 6 inch step  6 reps  CGA   Walking with heel strike     4 laps with ramps 4 laps with ramps; then again in clinic working on keeping space between feet (increasing MARK), and heel strike 4 laps with ramps; then again in clinic working on keeping space between feet (increasing MARK), and heel strike   Picking up cones          Sports cord          [employer] ball        Figure 8s         Circles right/left        Walking with 360 degree turns        Spirals        Weight shifting:    Left & Right     Rockerboard  Eyes open     Weight shifting: Forward & Backward      Rockerboard  Eyes open     Static Standing Balance     Rockerboard  Eyes open and closed     Standing with feet apart          Standing with feet together          Standing with feet semitandem   Blue foams  Eyes open and closed     Standing with feet tandem        Single leg stance   B LE       Therapeutic Exercise: (15 Minutes):  Exercises per grid below to improve mobility, strength and balance. Required minimal verbal cues to promote proper body alignment, promote proper body posture and promote proper body mechanics. Progressed resistance, repetitions and complexity of movement as indicated.    Date:  8/29/2017 Date   8/31/17 Date:  9/5/2017   Activity/Exercise Parameters  Parameters   nustep      Sit to stand 10 reps  2 sets  No UE assst 10 reps  2 sets from chair  No UE assist 10 reps  2 sets from chair  No UE assist   Step ups 6 inch step  10 reps  B LE 6 inch step  10 reps  each LE 6 inch step  10 reps  each LE   Standing heel and toe raises 2 sets  10 reps  B LE 2 sets  10 reps  B LE 2 sets  10 reps  B LE   Heel walking  Along railing 6 laps to work on ankle strength Along railing 6 laps to work on ankle strength     [facility]  Treatment/Session Assessment:    · Response to Treatment:  Patient completed all exercises with minimal cueing and rest breaks. · Compliance with Program/Exercises: Compliant  · Recommendations/Intent for next treatment session: \"Next visit will focus on advancements to more challenging activities\".   Total Treatment Duration:  PT Patient Time In/Time Out  Time In: 1430  Time Out: Brandon Dougherty PT

## 2017-09-07 ENCOUNTER — HOSPITAL ENCOUNTER (OUTPATIENT)
Dept: PHYSICAL THERAPY | Age: 82
Discharge: HOME | End: 2017-09-07
Attending: FAMILY MEDICINE
Payer: MEDICARE

## 2017-09-07 PROCEDURE — 97110 THERAPEUTIC EXERCISES: CPT

## 2017-09-07 PROCEDURE — 97112 NEUROMUSCULAR REEDUCATION: CPT

## 2017-09-07 NOTE — PROGRESS NOTES
Arnoldo Arreaga  : [facility] [place] [address], [place] [address].  [phone]   [phone]           OUTPATIENT PHYSICAL THERAPY:Daily Note 2017    ICD-10: Treatment Diagnosis: Other abnormalities of gait and mobility R26.89;  Precautions/Allergies:   Calcium carbonate; Ranitidine hcl; and Guaifenesin   Fall Risk Score: 6 (? 5 = High Risk)  MD Orders: Eval and treat MEDICAL/REFERRING DIAGNOSIS:  Balance problem [R26.89]  Frequent falls [R29.6]  Physical deconditioning [R53.81]   DATE OF ONSET: a couple of years  REFERRING PHYSICIAN: Ronald Bernheim, MD  RETURN PHYSICIAN APPOINTMENT:      INITIAL ASSESSMENT:  Ms. Farheen Hernandez presents with decreased gait, imbalance, and high risk of falls. Patient would benefit from PT to address these problems to improve patient's independence and safety with mobility and daily activities. Thank you. PROBLEM LIST (Impacting functional limitations):  1. Decreased Strength  2. Decreased ADL/Functional Activities  3. Decreased Transfer Abilities  4. Decreased Ambulation Ability/Technique  5. Decreased Balance  6. Decreased Activity Tolerance  7. Decreased [place] with Home Exercise Program INTERVENTIONS PLANNED:  1. Balance Exercise  2. Gait Training  3. Home Exercise Program (HEP)  4. Neuromuscular Re-education/Strengthening  5. Therapeutic Activites  6. Therapeutic Exercise/Strengthening  7. Transfer Training   8. TREATMENT PLAN:  Effective Dates: 2017 TO 11/3/2017. Frequency/Duration: 1-2 times a week for 8-12 weeks  GOALS: (Goals have been discussed and agreed upon with patient.)  Short-Term Functional Goals: Time Frame: 2-4 weeks  1. Patient will demonstrate independence and compliance with home exercise program to improve balance and strength for daily activities.    2. Patient will increase her score on the Oliva Balance Scale to greater than or equal to 38/56 indicating improved safety and decreased fall risk for daily activities. Discharge Goals: Time Frame: 8-12 weeks  1. Patient will increase bilateral lower extremity strength to greater than or equal to 4+/5 to improve strength for functional mobility activities. 2. Patient will increase her score on the Oliva Balance Scale to greater than or equal to 48/56 indicating improved safety and decreased fall risk for daily activities. 3. Patient will increase her score on the dynamic gait index to greater than or equal to 15/24 indicating improved balance and safety for community ambulation. 4. Patient will ambulate with least assistive device over level and unlevel surfaces without evidence of imbalance to improve safety for daily activities. Rehabilitation Potential For Stated Goals: Good            The information in this section was collected on 8/9/17 (except where otherwise noted). HISTORY:   History of Present Injury/Illness (Reason for Referral):  Bia Echavarria 2/14/17 and broke ribs, getting tea out of microwave and burned self. 7/3/17 fell when standing and changing clothes and bruised her ribs. In the last 6 months, vision has decreased a lot, macular degeneration, L eye 20/200. No dizziness. Starts to lose balance and can't catch self. Has temperal arteritis and take prednisone for that. No pain. Gets back pain with standing, relieved with sitting. Was using no AD until fell in July, now uses RW or HHA. Past Medical History/Comorbidities:   Ms. Elmira Rubio  has a past medical history of Gastroesophageal reflux disease (12/9/2016); Giant cell arteritis ([address] Giant cell arteritis ([address] (3/20/2010); Hypertension; Macular degeneration; Other ill-defined conditions; Other ill-defined conditions; and TIA (transient ischemic attack) (6/23/2015). She also has no past medical history of Arthritis; Asthma; Autoimmune disease ([address] CAD (coronary artery disease); Cancer ([address] Chronic kidney disease; COPD; DEMENTIA; Diabetes ([address]  Endocrine disease; Heart failure ([facility] Infectious disease; Liver disease; Psychiatric disorder; PUD (peptic ulcer disease); Seizures ([facility] Sleep disorder; or Thromboembolus ([facility] Ms. Vicky Webster  has a past surgical history that includes cholecystectomy; heent; and orthopaedic. Social History/Living Environment:     lives with family; no driving. One step to enter home. Walk in shower with bench but doesn't use bench. Prior Level of Function/Work/Activity:  Still cooks and cleans some  Dominant Side:         RIGHT  Personal Factors:          Sex:  female        Age:  80 y.o. Current Medications:       Current Outpatient Prescriptions:     HYDROcodone-acetaminophen (NORCO) 5-325 mg per tablet, Take 1/2 to 1 tablet every 6 hours as needed for pain, Disp: 20 Tab, Rfl: 0    amLODIPine (NORVASC) 10 mg tablet, Take 1 Tab by mouth daily. , Disp: 90 Tab, Rfl: 1    mupirocin (BACTROBAN) 2 % ointment, Apply  to affected area daily. , Disp: 22 g, Rfl: 0    predniSONE (DELTASONE) 1 mg tablet, TAKE FOUR TABLETS BY MOUTH ONE TIME DAILY, Disp: , Rfl: 6    atorvastatin (LIPITOR) 10 mg tablet, Take 1/2 tab every other night., Disp: 30 Tab, Rfl: 2    calcium-vitamin D (CALCIUM 600 + D,3,) 600 mg(1,500mg) -200 unit tab, Take 1 Tab by mouth., Disp: , Rfl:     aspirin delayed-release 81 mg tablet, Take 1 Tab by mouth daily. , Disp: 100 Tab, Rfl: 0    Cholecalciferol, Vitamin D3, (VITAMIN D3) 1,000 unit cap, Take 1 Cap by mouth daily. , Disp: , Rfl:     Denosumab (PROLIA) 60 mg/mL injection, 60 mg by SubCUTAneous route., Disp: , Rfl:     bevacizumab (AVASTIN) 25 mg/ml soln intravitreal solution, by IntraVITreal route once., Disp: , Rfl:    Date Last Reviewed:  9/7/17   Number of Personal Factors/Comorbidities that affect the Plan of Care: 1-2: MODERATE COMPLEXITY   EXAMINATION:   Observation/Orthostatic Postural Assessment:          Mild forward head posture  Strength:          LE STRENGTH:  4/5 B LE  Functional Mobility: Gait/Ambulation:  Patient ambulates at a slower pace, decreased step length and heel strike, path deviations, with no AD. Transfers:  Slow, supervision with no AD        Bed Mobility:  independent        Stairs:  NT  Sensation:         intact  Postural Control & Balance:  · Oliva Balance Scale:  35/56.   (A score less than 45/56 indicates high risk of falls)     · Dynamic Gait Index:  8/24.   (A score less than or equal to19/24 is abnormal and predictive of falls)     · Videolicious Sensory Organization Test:  ONT     Body Structures Involved:  1. Nerves  2. Eyes and Ears  3. Muscles Body Functions Affected:  1. Sensory/Pain  2. Neuromusculoskeletal  3. Movement Related Activities and Participation Affected:  1. General Tasks and Demands  2. Mobility  3. Community, Social and [place]nn Guthrie   Number of elements (examined above) that affect the Plan of Care: 3: MODERATE COMPLEXITY   CLINICAL PRESENTATION:   Presentation: Evolving clinical presentation with changing clinical characteristics: MODERATE COMPLEXITY   CLINICAL DECISION MAKING:   Outcome Measure: Tool Used: Oliva Balance Scale  Score:  Initial: 35/56 Most Recent: X/56 (Date: -- )   Interpretation of Score: Each section is scored on a 0-4 scale, 0 representing the patients inability to perform the task and 4 representing independence. The scores of each section are added together for a total score of 56. The higher the patients score, the more independent the patient is. Any score below 45 indicates increased risk for falls. Score 56 55-45 44-34 33-23 22-12 11-1 0   Modifier CH CI CJ CK CL CM CN     ?  Changing and Maintaining Body Position:    [id number] - CURRENT STATUS: CJ - 20%-39% impaired, limited or restricted    - GOAL STATUS: CI - 1%-19% impaired, limited or restricted    - D/C STATUS:  ---------------To be determined---------------    Tool Used: Timed Up and Go (TUG)  Score:  Initial: 23 seconds Most Recent: X seconds (Date: -- ) Interpretation of Score: The test measures, in seconds, the time taken by an individual to stand up from a standard arm chair (seat height 46 cm [18 in], arm height 65 cm [25.6 in]), walk a distance of 3 meters (118 in, approx 10 ft), turn, walk back to the chair and sit down. If the individual takes longer than 14 seconds to complete TUG, this indicates risk for falls. Score 7 7.5-10.5 11-14 14.5-17.5 18-21 21.5-24.5 25+   Modifier CH CI CJ CK CL CM CN     Medical Necessity:   · Patient is expected to demonstrate progress in strength, balance and functional technique to improve safety during daily activities. Reason for Services/Other Comments:  · Patient continues to demonstrate capacity to improve strength, balance, gait which will increase independence and increase safety. Use of outcome tool(s) and clinical judgement create a POC that gives a: Questionable prediction of patient's progress: MODERATE COMPLEXITY            TREATMENT:   (In addition to Assessment/Re-Assessment sessions the following treatments were rendered)  Pre-treatment Symptoms/Complaints: 9/7/2017: \"doing well. \"  Pain: Initial: 0/10 Pain Intensity 1: 0  Post Session:  0     Neuromuscular Re-education (25 Minutes):  Exercise/activities per grid below to improve balance, coordination and proprioception. Required minimal verbal cues to promote static and dynamic balance in standing.   Balance/Vestibular Treatment:   Activity   Date:  8/29/2017 Date   8/31/17 Date:  9/5/2017 Date   9/7/17   Activity/Exercise         Walking with head turns           Walking with head up & down           Step overs     Blue foam  2 sets  10 reps  B LE  Forward and lateral Blue foam  2 sets  10 reps  B LE  Forward and lateral Blue foam  2 sets  10 reps  B LE  Forward and lateral Blue foam  2 sets  10 reps  B LE  Forward and lateral   Step taps     6 inch step  30 reps   Forward and cross tap 6 inch step  30 reps   Forward and cross tap 6 inch step  30 reps Forward and cross tap 6 inch step  30 reps   Forward and cross tap   Marching   4 laps 4 laps 4 laps 4 laps   Sidestepping   4 laps 4 laps 4 laps 4 laps   Crossovers         [place]         Walking  backwards           Walking with obstacles   Cones, 4 inch step, 6 inch step  6 reps  CGA Cones, 4 inch step, 6 inch step  6 reps  CGA Cones, 4 inch step, 6 inch step  6 reps  CGA Cones, 4 inch step, 6 inch step  6 reps  CGA   Walking with heel strike     4 laps with ramps 4 laps with ramps; then again in clinic working on keeping space between feet (increasing MARK), and heel strike 4 laps with ramps; then again in clinic working on keeping space between feet (increasing MARK), and heel strike 4 laps with ramps; then again in clinic working on keeping space between feet (increasing MARK), and heel strike   Picking up cones           Sports cord           [employer] ball         Figure 8s          Circles right/left         Walking with 360 degree turns         Spirals         Weight shifting:    Left & Right     Rockerboard  Eyes open      Weight shifting: Forward & Backward      Rockerboard  Eyes open      Static Standing Balance     Rockerboard  Eyes open and closed      Standing with feet apart           Standing with feet together           Standing with feet semitandem   Blue foams  Eyes open and closed      Standing with feet tandem         Single leg stance   B LE        Therapeutic Exercise: (15 Minutes):  Exercises per grid below to improve mobility, strength and balance. Required minimal verbal cues to promote proper body alignment, promote proper body posture and promote proper body mechanics. Progressed resistance, repetitions and complexity of movement as indicated.    Date:  8/29/2017 Date   8/31/17 Date:  9/5/2017 Date   9/7/17   Activity/Exercise Parameters  Parameters    nustep       Sit to stand 10 reps  2 sets  No UE assst 10 reps  2 sets from chair  No UE assist 10 reps  2 sets from chair  No UE assist 10 reps  2 sets from chair  No UE assist   Step ups 6 inch step  10 reps  B LE 6 inch step  10 reps  each LE 6 inch step  10 reps  each LE 6 inch step  10 reps  each LE   Standing heel and toe raises 2 sets  10 reps  B LE 2 sets  10 reps  B LE 2 sets  10 reps  B LE 2 sets  10 reps  B LE   Heel walking  Along railing 6 laps to work on ankle strength Along railing 6 laps to work on ankle strength Along railing 6 laps to work on ankle strength     [facility]  Treatment/Session Assessment:    · Response to Treatment:  Patient completed all exercises Patient is progressing well. REASSESS NEXT SESSION. · Compliance with Program/Exercises: Compliant  · Recommendations/Intent for next treatment session: \"Next visit will focus on advancements to more challenging activities\".   Total Treatment Duration:  PT Patient Time In/Time Out  Time In: 1320  Time Out: [address] [facility] Toby Pickett

## 2017-09-12 ENCOUNTER — HOSPITAL ENCOUNTER (OUTPATIENT)
Dept: PHYSICAL THERAPY | Age: 82
Discharge: HOME | End: 2017-09-12
Attending: FAMILY MEDICINE
Payer: MEDICARE

## 2017-09-12 PROCEDURE — G8981 BODY POS CURRENT STATUS: HCPCS

## 2017-09-12 PROCEDURE — G8982 BODY POS GOAL STATUS: HCPCS

## 2017-09-12 PROCEDURE — 97110 THERAPEUTIC EXERCISES: CPT

## 2017-09-12 PROCEDURE — 97112 NEUROMUSCULAR REEDUCATION: CPT

## 2017-09-12 NOTE — PROGRESS NOTES
Daniel Salcedo  : 1928 [facility]  [address], [address] [address],[address], [address] [facility]  [phone]   [phone]           OUTPATIENT PHYSICAL THERAPY:Daily Note and Progress Report 2017    ICD-10: Treatment Diagnosis: Other abnormalities of gait and mobility R26.89;  Precautions/Allergies:   Calcium carbonate; Ranitidine hcl; and Guaifenesin   Fall Risk Score: 6 (? 5 = High Risk)  MD Orders: Eval and treat MEDICAL/REFERRING DIAGNOSIS:  Balance problem [R26.89]  Frequent falls [R29.6]  Physical deconditioning [R53.81]   DATE OF ONSET: a couple of years  REFERRING PHYSICIAN: Ritu Johnson MD  RETURN PHYSICIAN APPOINTMENT:      PROGRESS NOTE 17:  Ms. Bhaskar Levi has attended 10 PT sessions from 17 to 17 for decreased gait, imbalance, and high risk of falls. Patient is demonstrating improving balance, strength, and gait since beginning therapy. Patient would benefit from continuing PT to address these problems to improve patient's independence and safety with mobility and daily activities. Thank you. PROBLEM LIST (Impacting functional limitations):  1. Decreased Strength  2. Decreased ADL/Functional Activities  3. Decreased Transfer Abilities  4. Decreased Ambulation Ability/Technique  5. Decreased Balance  6. Decreased Activity Tolerance  7. Decreased [place] with Home Exercise Program INTERVENTIONS PLANNED:  1. Balance Exercise  2. Gait Training  3. Home Exercise Program (HEP)  4. Neuromuscular Re-education/Strengthening  5. Therapeutic Activites  6. Therapeutic Exercise/Strengthening  7. Transfer Training   8. TREATMENT PLAN:  Effective Dates: 2017 TO 11/3/2017. Frequency/Duration: 1-2 times a week for 8-12 weeks  GOALS: (Goals have been discussed and agreed upon with patient.)  Short-Term Functional Goals: Time Frame: 2-4 weeks  1.  Patient will demonstrate independence and compliance with home exercise program to improve balance and strength for daily activities. MET  2. Patient will increase her score on the Oliva Balance Scale to greater than or equal to 38/56 indicating improved safety and decreased fall risk for daily activities. MET  Discharge Goals: Time Frame: 8-12 weeks  1. Patient will increase bilateral lower extremity strength to greater than or equal to 4+/5 to improve strength for functional mobility activities. Progressing and ongoing  2. Patient will increase her score on the Oliva Balance Scale to greater than or equal to 48/56 indicating improved safety and decreased fall risk for daily activities. Progressing and ongoing  3. Patient will increase her score on the dynamic gait index to greater than or equal to 15/24 indicating improved balance and safety for community ambulation. met  4. Patient will ambulate with least assistive device over level and unlevel surfaces without evidence of imbalance to improve safety for daily activities. Progressing and ongoing  Rehabilitation Potential For Stated Goals: Good            The information in this section was collected on 8/9/17 (except where otherwise noted). HISTORY:   History of Present Injury/Illness (Reason for Referral):  Violeta Pradhan 2/14/17 and broke ribs, getting tea out of microwave and burned self. 7/3/17 fell when standing and changing clothes and bruised her ribs. In the last 6 months, vision has decreased a lot, macular degeneration, L eye 20/200. No dizziness. Starts to lose balance and can't catch self. Has temperal arteritis and take prednisone for that. No pain. Gets back pain with standing, relieved with sitting. Was using no AD until fell in July, now uses RW or HHA. Past Medical History/Comorbidities:   Ms. Augustine Braun  has a past medical history of Gastroesophageal reflux disease (12/9/2016); Giant cell arteritis ([facility] Giant cell arteritis ([facility] (3/20/2010); Hypertension; Macular degeneration; Other ill-defined conditions;  Other ill-defined conditions; and TIA (transient ischemic attack) (6/23/2015). She also has no past medical history of Arthritis; Asthma; Autoimmune disease ([facility] CAD (coronary artery disease); Cancer ([facility] Chronic kidney disease; COPD; DEMENTIA; Diabetes ([facility] Endocrine disease; Heart failure ([facility] Infectious disease; Liver disease; Psychiatric disorder; PUD (peptic ulcer disease); Seizures ([facility] Sleep disorder; or Thromboembolus ([facility] Ms. Arelis Arredondo  has a past surgical history that includes cholecystectomy; heent; and orthopaedic. Social History/Living Environment:     lives with family; no driving. One step to enter home. Walk in shower with bench but doesn't use bench. Prior Level of Function/Work/Activity:  Still cooks and cleans some  Dominant Side:         RIGHT  Personal Factors:          Sex:  female        Age:  80 y.o. Current Medications:       Current Outpatient Prescriptions:     HYDROcodone-acetaminophen (NORCO) 5-325 mg per tablet, Take 1/2 to 1 tablet every 6 hours as needed for pain, Disp: 20 Tab, Rfl: 0    amLODIPine (NORVASC) 10 mg tablet, Take 1 Tab by mouth daily. , Disp: 90 Tab, Rfl: 1    mupirocin (BACTROBAN) 2 % ointment, Apply  to affected area daily. , Disp: 22 g, Rfl: 0    predniSONE (DELTASONE) 1 mg tablet, TAKE FOUR TABLETS BY MOUTH ONE TIME DAILY, Disp: , Rfl: 6    atorvastatin (LIPITOR) 10 mg tablet, Take 1/2 tab every other night., Disp: 30 Tab, Rfl: 2    calcium-vitamin D (CALCIUM 600 + D,3,) 600 mg(1,500mg) -200 unit tab, Take 1 Tab by mouth., Disp: , Rfl:     aspirin delayed-release 81 mg tablet, Take 1 Tab by mouth daily. , Disp: 100 Tab, Rfl: 0    Cholecalciferol, Vitamin D3, (VITAMIN D3) 1,000 unit cap, Take 1 Cap by mouth daily. , Disp: , Rfl:     Denosumab (PROLIA) 60 mg/mL injection, 60 mg by SubCUTAneous route., Disp: , Rfl:     bevacizumab (AVASTIN) 25 mg/ml soln intravitreal solution, by IntraVITreal route once., Disp: , Rfl:    Date Last Reviewed:  9/12/17   Number of Personal Factors/Comorbidities that affect the Plan of Care: 1-2: MODERATE COMPLEXITY   EXAMINATION:   Observation/Orthostatic Postural Assessment:          Mild forward head posture  Strength:          LE STRENGTH:  4+/5 B knees, 4/5 ankles, 4/5  Hip flexion and abduction. Functional Mobility:         Gait/Ambulation:  Patient ambulates at a more normal pace, demonstrating improving step length and heel strike, with no AD. Transfers:  Slow, supervision with no AD        Bed Mobility:  independent        Stairs:  NT  Sensation:         intact  Postural Control & Balance:  · Oliva Balance Scale:  45/56.   (A score less than 45/56 indicates high risk of falls)   Improved from 35/56 at evaluation  · Dynamic Gait Index:  15/24.   (A score less than or equal to19/24 is abnormal and predictive of falls)   Improved from 8/24 at evaluation  · Zulat Sensory Organization Test:  NT     Body Structures Involved:  1. Nerves  2. Eyes and Ears  3. Muscles Body Functions Affected:  1. Sensory/Pain  2. Neuromusculoskeletal  3. Movement Related Activities and Participation Affected:  1. General Tasks and Demands  2. Mobility  3. Community, Social and [place]   Number of elements (examined above) that affect the Plan of Care: 3: MODERATE COMPLEXITY   CLINICAL PRESENTATION:   Presentation: Evolving clinical presentation with changing clinical characteristics: MODERATE COMPLEXITY   CLINICAL DECISION MAKING:   Outcome Measure: Tool Used: Oliva Balance Scale  Score:  Initial: 35/56 Most Recent: 45/56 (Date: 9/12/17 )   Interpretation of Score: Each section is scored on a 0-4 scale, 0 representing the patients inability to perform the task and 4 representing independence. The scores of each section are added together for a total score of 56. The higher the patients score, the more independent the patient is. Any score below 45 indicates increased risk for falls.     Score 56 55-45 44-34 33-23 22-12 11-1 0   Modifier CH CI CJ CK CL CM CN     ? Changing and Maintaining Body Position:    [id number] - CURRENT STATUS: CI - 1%-19% impaired, limited or restricted    - GOAL STATUS: CI - 1%-19% impaired, limited or restricted    - D/C STATUS:  ---------------To be determined---------------    Tool Used: Timed Up and Go (TUG)  Score:  Initial: 23 seconds Most Recent: 18 seconds (Date: 9/12/17 )   Interpretation of Score: The test measures, in seconds, the time taken by an individual to stand up from a standard arm chair (seat height 46 cm [18 in], arm height 65 cm [25.6 in]), walk a distance of 3 meters (118 in, approx 10 ft), turn, walk back to the chair and sit down. If the individual takes longer than 14 seconds to complete TUG, this indicates risk for falls. Score 7 7.5-10.5 11-14 14.5-17.5 18-21 21.5-24.5 25+   Modifier CH CI CJ CK CL CM CN     Medical Necessity:   · Patient is expected to demonstrate progress in strength, balance and functional technique to improve safety during daily activities. Reason for Services/Other Comments:  · Patient continues to demonstrate capacity to improve strength, balance, gait which will increase independence and increase safety. Use of outcome tool(s) and clinical judgement create a POC that gives a: Questionable prediction of patient's progress: MODERATE COMPLEXITY            TREATMENT:   (In addition to Assessment/Re-Assessment sessions the following treatments were rendered)  Pre-treatment Symptoms/Complaints: 9/12/2017: I'm doing okay. Pain: Initial: 0/10 Pain Intensity 1: 0  Post Session:  0     Neuromuscular Re-education (35 Minutes):  Exercise/activities per grid below to improve balance, coordination and proprioception. Required minimal verbal cues to promote static and dynamic balance in standing.   Balance/Vestibular Treatment:   Activity   Date:  8/29/2017 Date   8/31/17 Date:  9/5/2017 Date   9/7/17 Date   9/12/17   Activity/Exercise          Walking with head turns         2 laps Walking with head up & down         2 laps   Step overs     Blue foam  2 sets  10 reps  B LE  Forward and lateral Blue foam  2 sets  10 reps  B LE  Forward and lateral Blue foam  2 sets  10 reps  B LE  Forward and lateral Blue foam  2 sets  10 reps  B LE  Forward and lateral Blue foam  X 20 reps each LE  Forward and laterally with no UE assist   Step taps     6 inch step  30 reps   Forward and cross tap 6 inch step  30 reps   Forward and cross tap 6 inch step  30 reps   Forward and cross tap 6 inch step  30 reps   Forward and cross tap 6 inch step  30 reps   Forward and cross tap   Marching   4 laps 4 laps 4 laps 4 laps 4 laps   Sidestepping   4 laps 4 laps 4 laps 4 laps 4 laps   Crossovers          [place]          Walking  backwards            Walking with obstacles   Cones, 4 inch step, 6 inch step  6 reps  CGA Cones, 4 inch step, 6 inch step  6 reps  CGA Cones, 4 inch step, 6 inch step  6 reps  CGA Cones, 4 inch step, 6 inch step  6 reps  CGA    Walking with heel strike     4 laps with ramps 4 laps with ramps; then again in clinic working on keeping space between feet (increasing MARK), and heel strike 4 laps with ramps; then again in clinic working on keeping space between feet (increasing MARK), and heel strike 4 laps with ramps; then again in clinic working on keeping space between feet (increasing MARK), and heel strike 4 laps with ramps; then again in clinic working on keeping space between feet (increasing MARK), and heel strike   Picking up cones            Sports cord            [employer] ball          Figure 8s           Circles right/left          Walking with 360 degree turns          Spirals          Weight shifting:    Left & Right     Rockerboard  Eyes open       Weight shifting:   Forward & Backward      Rockerboard  Eyes open       Static Standing Balance     Rockerboard  Eyes open and closed       Standing with feet apart            Standing with feet together            Standing with feet semitandem   Blue foams  Eyes open and closed    Blue foams  Eyes open   Standing with feet tandem          Single leg stance   B LE         Therapeutic Exercise: (10 Minutes):  Exercises per grid below to improve mobility, strength and balance. Required minimal verbal cues to promote proper body alignment, promote proper body posture and promote proper body mechanics. Progressed resistance, repetitions and complexity of movement as indicated. Date:  8/29/2017 Date   8/31/17 Date:  9/5/2017 Date   9/7/17 Date   9/12/17   Activity/Exercise Parameters  Parameters     nustep        Sit to stand 10 reps  2 sets  No UE assst 10 reps  2 sets from chair  No UE assist 10 reps  2 sets from chair  No UE assist 10 reps  2 sets from chair  No UE assist 10 reps   from chair  No UE assist   Step ups 6 inch step  10 reps  B LE 6 inch step  10 reps  each LE 6 inch step  10 reps  each LE 6 inch step  10 reps  each LE 6 inch step  10 reps  each LE with no UE assist, CG A   Standing heel and toe raises 2 sets  10 reps  B LE 2 sets  10 reps  B LE 2 sets  10 reps  B LE 2 sets  10 reps  B LE    Heel walking  Along railing 6 laps to work on ankle strength Along railing 6 laps to work on ankle strength Along railing 6 laps to work on ankle strength      [facility] Portal  Treatment/Session Assessment:    · Response to Treatment:  Patient completed all exercises Patient is progressing well and demonstrating improving scores on balance tests. · Compliance with Program/Exercises: Compliant  · Recommendations/Intent for next treatment session: \"Next visit will focus on advancements to more challenging activities\".   Total Treatment Duration:  PT Patient Time In/Time Out  Time In: 0910  Time Out: [address] BILLY Burrows PT

## 2017-09-14 ENCOUNTER — HOSPITAL ENCOUNTER (OUTPATIENT)
Dept: PHYSICAL THERAPY | Age: 82
Discharge: HOME | End: 2017-09-14
Attending: FAMILY MEDICINE
Payer: MEDICARE

## 2017-09-14 PROCEDURE — 97110 THERAPEUTIC EXERCISES: CPT

## 2017-09-14 PROCEDURE — 97112 NEUROMUSCULAR REEDUCATION: CPT

## 2017-09-14 NOTE — PROGRESS NOTES
Ximena Dumont  : [facility] [place] [id number] [facility], [address] [place] [address],[address], [place] [address].  [phone]   [phone]           OUTPATIENT PHYSICAL THERAPY:Daily Note 2017    ICD-10: Treatment Diagnosis: Other abnormalities of gait and mobility R26.89;  Precautions/Allergies:   Calcium carbonate; Ranitidine hcl; and Guaifenesin   Fall Risk Score: 6 (? 5 = High Risk)  MD Orders: Eval and treat MEDICAL/REFERRING DIAGNOSIS:  Balance problem [R26.89]  Frequent falls [R29.6]  Physical deconditioning [R53.81]   DATE OF ONSET: a couple of years  REFERRING PHYSICIAN: Booker Dove MD  RETURN PHYSICIAN APPOINTMENT:      PROGRESS NOTE 17:  Ms. Srini Kc has attended 10 PT sessions from 17 to 17 for decreased gait, imbalance, and high risk of falls. Patient is demonstrating improving balance, strength, and gait since beginning therapy. Patient would benefit from continuing PT to address these problems to improve patient's independence and safety with mobility and daily activities. Thank you. PROBLEM LIST (Impacting functional limitations):  1. Decreased Strength  2. Decreased ADL/Functional Activities  3. Decreased Transfer Abilities  4. Decreased Ambulation Ability/Technique  5. Decreased Balance  6. Decreased Activity Tolerance  7. Decreased [place] with Home Exercise Program INTERVENTIONS PLANNED:  1. Balance Exercise  2. Gait Training  3. Home Exercise Program (HEP)  4. Neuromuscular Re-education/Strengthening  5. Therapeutic Activites  6. Therapeutic Exercise/Strengthening  7. Transfer Training   8. TREATMENT PLAN:  Effective Dates: 2017 TO 11/3/2017. Frequency/Duration: 1-2 times a week for 8-12 weeks  GOALS: (Goals have been discussed and agreed upon with patient.)  Short-Term Functional Goals: Time Frame: 2-4 weeks  1.  Patient will demonstrate independence and compliance with home exercise program to improve balance and strength for daily activities. MET  2. Patient will increase her score on the Oliva Balance Scale to greater than or equal to 38/56 indicating improved safety and decreased fall risk for daily activities. MET  Discharge Goals: Time Frame: 8-12 weeks  1. Patient will increase bilateral lower extremity strength to greater than or equal to 4+/5 to improve strength for functional mobility activities. Progressing and ongoing  2. Patient will increase her score on the Oliva Balance Scale to greater than or equal to 48/56 indicating improved safety and decreased fall risk for daily activities. Progressing and ongoing  3. Patient will increase her score on the dynamic gait index to greater than or equal to 15/24 indicating improved balance and safety for community ambulation. met  4. Patient will ambulate with least assistive device over level and unlevel surfaces without evidence of imbalance to improve safety for daily activities. Progressing and ongoing  Rehabilitation Potential For Stated Goals: Good            The information in this section was collected on 8/9/17 (except where otherwise noted). HISTORY:   History of Present Injury/Illness (Reason for Referral):  Radha [place] 2/14/17 and broke ribs, getting tea out of microwave and burned self. 7/3/17 fell when standing and changing clothes and bruised her ribs. In the last 6 months, vision has decreased a lot, macular degeneration, L eye 20/200. No dizziness. Starts to lose balance and can't catch self. Has temperal arteritis and take prednisone for that. No pain. Gets back pain with standing, relieved with sitting. Was using no AD until fell in July, now uses RW or HHA. Past Medical History/Comorbidities:   Ms. Toribio Medina  has a past medical history of Gastroesophageal reflux disease (12/9/2016); Giant cell arteritis ([facility] Giant cell arteritis ([facility] (3/20/2010); Hypertension; Macular degeneration; Other ill-defined conditions;  Other ill-defined conditions; and TIA (transient ischemic attack) (6/23/2015). She also has no past medical history of Arthritis; Asthma; Autoimmune disease ([facility] CAD (coronary artery disease); Cancer ([facility] Chronic kidney disease; COPD; DEMENTIA; Diabetes ([facility] Endocrine disease; Heart failure ([facility] Infectious disease; Liver disease; Psychiatric disorder; PUD (peptic ulcer disease); Seizures ([facility] Sleep disorder; or Thromboembolus ([facility] Ms. Harden He  has a past surgical history that includes cholecystectomy; heent; and orthopaedic. Social History/Living Environment:     lives with family; no driving. One step to enter home. Walk in shower with bench but doesn't use bench. Prior Level of Function/Work/Activity:  Still cooks and cleans some  Dominant Side:         RIGHT  Personal Factors:          Sex:  female        Age:  80 y.o. Current Medications:       Current Outpatient Prescriptions:     HYDROcodone-acetaminophen (NORCO) 5-325 mg per tablet, Take 1/2 to 1 tablet every 6 hours as needed for pain, Disp: 20 Tab, Rfl: 0    amLODIPine (NORVASC) 10 mg tablet, Take 1 Tab by mouth daily. , Disp: 90 Tab, Rfl: 1    mupirocin (BACTROBAN) 2 % ointment, Apply  to affected area daily. , Disp: 22 g, Rfl: 0    predniSONE (DELTASONE) 1 mg tablet, TAKE FOUR TABLETS BY MOUTH ONE TIME DAILY, Disp: , Rfl: 6    atorvastatin (LIPITOR) 10 mg tablet, Take 1/2 tab every other night., Disp: 30 Tab, Rfl: 2    calcium-vitamin D (CALCIUM 600 + D,3,) 600 mg(1,500mg) -200 unit tab, Take 1 Tab by mouth., Disp: , Rfl:     aspirin delayed-release 81 mg tablet, Take 1 Tab by mouth daily. , Disp: 100 Tab, Rfl: 0    Cholecalciferol, Vitamin D3, (VITAMIN D3) 1,000 unit cap, Take 1 Cap by mouth daily. , Disp: , Rfl:     Denosumab (PROLIA) 60 mg/mL injection, 60 mg by SubCUTAneous route., Disp: , Rfl:     bevacizumab (AVASTIN) 25 mg/ml soln intravitreal solution, by IntraVITreal route once., Disp: , Rfl:    Date Last Reviewed:  9/14/17   Number of Personal Factors/Comorbidities that affect the Plan of Care: 1-2: MODERATE COMPLEXITY   EXAMINATION:   Observation/Orthostatic Postural Assessment:          Mild forward head posture  Strength:          LE STRENGTH:  4+/5 B knees, 4/5 ankles, 4/5  Hip flexion and abduction. Functional Mobility:         Gait/Ambulation:  Patient ambulates at a more normal pace, demonstrating improving step length and heel strike, with no AD. Transfers:  Slow, supervision with no AD        Bed Mobility:  independent        Stairs:  NT  Sensation:         intact  Postural Control & Balance:  · Oliva Balance Scale:  45/56.   (A score less than 45/56 indicates high risk of falls)   Improved from 35/56 at evaluation  · Dynamic Gait Index:  15/24.   (A score less than or equal to19/24 is abnormal and predictive of falls)   Improved from 8/24 at evaluation  · Ensyn Sensory Organization Test:  NT     Body Structures Involved:  1. Nerves  2. Eyes and Ears  3. Muscles Body Functions Affected:  1. Sensory/Pain  2. Neuromusculoskeletal  3. Movement Related Activities and Participation Affected:  1. General Tasks and Demands  2. Mobility  3. Community, Social and [place]   Number of elements (examined above) that affect the Plan of Care: 3: MODERATE COMPLEXITY   CLINICAL PRESENTATION:   Presentation: Evolving clinical presentation with changing clinical characteristics: MODERATE COMPLEXITY   CLINICAL DECISION MAKING:   Outcome Measure: Tool Used: Oliva Balance Scale  Score:  Initial: 35/56 Most Recent: 45/56 (Date: 9/12/17 )   Interpretation of Score: Each section is scored on a 0-4 scale, 0 representing the patients inability to perform the task and 4 representing independence. The scores of each section are added together for a total score of 56. The higher the patients score, the more independent the patient is. Any score below 45 indicates increased risk for falls. Score 56 55-45 44-34 33-23 22-12 11-1 0   Modifier CH CI CJ CK CL CM CN     ?  Changing and Maintaining Body Position:    [id number] - CURRENT STATUS: CI - 1%-19% impaired, limited or restricted    - GOAL STATUS: CI - 1%-19% impaired, limited or restricted    - D/C STATUS:  ---------------To be determined---------------    Tool Used: Timed Up and Go (TUG)  Score:  Initial: 23 seconds Most Recent: 18 seconds (Date: 9/12/17 )   Interpretation of Score: The test measures, in seconds, the time taken by an individual to stand up from a standard arm chair (seat height 46 cm [18 in], arm height 65 cm [25.6 in]), walk a distance of 3 meters (118 in, approx 10 ft), turn, walk back to the chair and sit down. If the individual takes longer than 14 seconds to complete TUG, this indicates risk for falls. Score 7 7.5-10.5 11-14 14.5-17.5 18-21 21.5-24.5 25+   Modifier CH CI CJ CK CL CM CN     Medical Necessity:   · Patient is expected to demonstrate progress in strength, balance and functional technique to improve safety during daily activities. Reason for Services/Other Comments:  · Patient continues to demonstrate capacity to improve strength, balance, gait which will increase independence and increase safety. Use of outcome tool(s) and clinical judgement create a POC that gives a: Questionable prediction of patient's progress: MODERATE COMPLEXITY            TREATMENT:   (In addition to Assessment/Re-Assessment sessions the following treatments were rendered)  Pre-treatment Symptoms/Complaints: 9/14/2017:\"Tired today. \"  Pain: Initial: 0/10 Pain Intensity 1: 0  Post Session:  0     Neuromuscular Re-education (32 Minutes):  Exercise/activities per grid below to improve balance, coordination and proprioception. Required minimal verbal cues to promote static and dynamic balance in standing.   Balance/Vestibular Treatment:   Activity   Date:  8/29/2017 Date   8/31/17 Date:  9/5/2017 Date   9/7/17 Date   9/12/17 Date   9/14/17   Activity/Exercise           Walking with head turns         2 laps 4 laps Walking with head up & down         2 laps 4 laps   Step overs     Blue foam  2 sets  10 reps  B LE  Forward and lateral Blue foam  2 sets  10 reps  B LE  Forward and lateral Blue foam  2 sets  10 reps  B LE  Forward and lateral Blue foam  2 sets  10 reps  B LE  Forward and lateral Blue foam  X 20 reps each LE  Forward and laterally with no UE assist Blue foam  X 15 reps each LE  Forward and laterally with no UE assist   Step taps     6 inch step  30 reps   Forward and cross tap 6 inch step  30 reps   Forward and cross tap 6 inch step  30 reps   Forward and cross tap 6 inch step  30 reps   Forward and cross tap 6 inch step  30 reps   Forward and cross tap 6 inch step  30 reps   Forward and cross tap   Marching   4 laps 4 laps 4 laps 4 laps 4 laps 4 laps   Sidestepping   4 laps 4 laps 4 laps 4 laps 4 laps 4 laps   Crossovers           [place]           Walking  backwards          4 laps in hallway   Walking with obstacles   Cones, 4 inch step, 6 inch step  6 reps  CGA Cones, 4 inch step, 6 inch step  6 reps  CGA Cones, 4 inch step, 6 inch step  6 reps  CGA Cones, 4 inch step, 6 inch step  6 reps  CGA  Cones, 4 inch step, 6 inch step, and over 2 obstacles x  6 reps  CGA   Walking with heel strike     4 laps with ramps 4 laps with ramps; then again in clinic working on keeping space between feet (increasing MARK), and heel strike 4 laps with ramps; then again in clinic working on keeping space between feet (increasing MARK), and heel strike 4 laps with ramps; then again in clinic working on keeping space between feet (increasing MARK), and heel strike 4 laps with ramps; then again in clinic working on keeping space between feet (increasing MARK), and heel strike In outside hallway 2 laps and up and down ramps   Picking up cones             Sports cord             [employer] ball           Figure 8s            Circles right/left           Walking with 360 degree turns           Spirals           Weight shifting:    Left & Right     Rockerboard  Eyes open        Weight shifting: Forward & Backward      Rockerboard  Eyes open        Static Standing Balance     Rockerboard  Eyes open and closed        Standing with feet apart             Standing with feet together             Standing with feet semitandem   Blue foams  Eyes open and closed    Blue foams  Eyes open    Standing with feet tandem           Single leg stance   B LE          Therapeutic Exercise: (10 Minutes):  Exercises per grid below to improve mobility, strength and balance. Required minimal verbal cues to promote proper body alignment, promote proper body posture and promote proper body mechanics. Progressed resistance, repetitions and complexity of movement as indicated. Date:  8/29/2017 Date   8/31/17 Date:  9/5/2017 Date   9/7/17 Date   9/12/17 Date   9/14/17   Activity/Exercise Parameters  Parameters      nustep         Sit to stand 10 reps  2 sets  No UE assst 10 reps  2 sets from chair  No UE assist 10 reps  2 sets from chair  No UE assist 10 reps  2 sets from chair  No UE assist 2 x 10 reps   from chair  No UE assist 10 reps  2 sets from chair  No UE assist   Step ups 6 inch step  10 reps  B LE 6 inch step  10 reps  each LE 6 inch step  10 reps  each LE 6 inch step  10 reps  each LE 6 inch step  10 reps  each LE with no UE assist, CG A 6 inch step  10 reps  each LE with no UE assist, CG A   Standing heel and toe raises 2 sets  10 reps  B LE 2 sets  10 reps  B LE 2 sets  10 reps  B LE 2 sets  10 reps  B LE     Heel walking  Along railing 6 laps to work on ankle strength Along railing 6 laps to work on ankle strength Along railing 6 laps to work on ankle strength       [facility] Portal  Treatment/Session Assessment:    · Response to Treatment:  Patient completed all exercises. Patient is demonstrating improving confidence with balance and gait activities.   · Compliance with Program/Exercises: Compliant  · Recommendations/Intent for next treatment session: \"Next visit will focus on advancements to more challenging activities\".   Total Treatment Duration:  PT Patient Time In/Time Out  Time In: 1358  Time Out: 1812 Isabella Mcmanus

## 2017-09-19 ENCOUNTER — HOSPITAL ENCOUNTER (OUTPATIENT)
Dept: PHYSICAL THERAPY | Age: 82
Discharge: HOME | End: 2017-09-19
Attending: FAMILY MEDICINE
Payer: MEDICARE

## 2017-09-19 PROCEDURE — 97110 THERAPEUTIC EXERCISES: CPT

## 2017-09-19 PROCEDURE — 97112 NEUROMUSCULAR REEDUCATION: CPT

## 2017-09-19 NOTE — PROGRESS NOTES
Daniel Salcedo  : 1928 [facility]  [facility] [address] [address],[address], [facility] [address]  [phone]   [phone]           OUTPATIENT PHYSICAL THERAPY:Daily Note 2017    ICD-10: Treatment Diagnosis: Other abnormalities of gait and mobility R26.89;  Precautions/Allergies:   Calcium carbonate; Ranitidine hcl; and Guaifenesin   Fall Risk Score: 6 (? 5 = High Risk)  MD Orders: Eval and treat MEDICAL/REFERRING DIAGNOSIS:  Balance problem [R26.89]  Frequent falls [R29.6]  Physical deconditioning [R53.81]   DATE OF ONSET: a couple of years  REFERRING PHYSICIAN: Ritu Johnson MD  RETURN PHYSICIAN APPOINTMENT:      PROGRESS NOTE 17:  Ms. Bhaskar Levi has attended 10 PT sessions from 17 to 17 for decreased gait, imbalance, and high risk of falls. Patient is demonstrating improving balance, strength, and gait since beginning therapy. Patient would benefit from continuing PT to address these problems to improve patient's independence and safety with mobility and daily activities. Thank you. PROBLEM LIST (Impacting functional limitations):  1. Decreased Strength  2. Decreased ADL/Functional Activities  3. Decreased Transfer Abilities  4. Decreased Ambulation Ability/Technique  5. Decreased Balance  6. Decreased Activity Tolerance  7. Decreased [place] with Home Exercise Program INTERVENTIONS PLANNED:  1. Balance Exercise  2. Gait Training  3. Home Exercise Program (HEP)  4. Neuromuscular Re-education/Strengthening  5. Therapeutic Activites  6. Therapeutic Exercise/Strengthening  7. Transfer Training   8. TREATMENT PLAN:  Effective Dates: 2017 TO 11/3/2017. Frequency/Duration: 1-2 times a week for 8-12 weeks  GOALS: (Goals have been discussed and agreed upon with patient.)  Short-Term Functional Goals: Time Frame: 2-4 weeks  1.  Patient will demonstrate independence and compliance with home exercise program to improve balance and strength for daily activities. MET  2. Patient will increase her score on the Oliva Balance Scale to greater than or equal to 38/56 indicating improved safety and decreased fall risk for daily activities. MET  Discharge Goals: Time Frame: 8-12 weeks  1. Patient will increase bilateral lower extremity strength to greater than or equal to 4+/5 to improve strength for functional mobility activities. Progressing and ongoing  2. Patient will increase her score on the Oliva Balance Scale to greater than or equal to 48/56 indicating improved safety and decreased fall risk for daily activities. Progressing and ongoing  3. Patient will increase her score on the dynamic gait index to greater than or equal to 15/24 indicating improved balance and safety for community ambulation. met  4. Patient will ambulate with least assistive device over level and unlevel surfaces without evidence of imbalance to improve safety for daily activities. Progressing and ongoing  Rehabilitation Potential For Stated Goals: Good            The information in this section was collected on 8/9/17 (except where otherwise noted). HISTORY:   History of Present Injury/Illness (Reason for Referral):  Zena Bond 2/14/17 and broke ribs, getting tea out of microwave and burned self. 7/3/17 fell when standing and changing clothes and bruised her ribs. In the last 6 months, vision has decreased a lot, macular degeneration, L eye 20/200. No dizziness. Starts to lose balance and can't catch self. Has temperal arteritis and take prednisone for that. No pain. Gets back pain with standing, relieved with sitting. Was using no AD until fell in July, now uses RW or HHA. Past Medical History/Comorbidities:   Ms. Tinsley Lipoma  has a past medical history of Gastroesophageal reflux disease (12/9/2016); Giant cell arteritis ([facility] [address] Giant cell arteritis ([facility] [address] (3/20/2010); Hypertension; Macular degeneration; Other ill-defined conditions;  Other ill-defined conditions; and TIA (transient ischemic attack) (6/23/2015). She also has no past medical history of Arthritis; Asthma; Autoimmune disease ([facility] [address] CAD (coronary artery disease); [facility] Chronic kidney disease; COPD; DEMENTIA; Diabetes ([facility] Endocrine disease; Heart failure ([facility] Infectious disease; Liver disease; Psychiatric disorder; PUD (peptic ulcer disease); Seizures ([facility] Sleep disorder; or Thromboembolus ([facility] Ms. Brown Benavidez  has a past surgical history that includes cholecystectomy; heent; and orthopaedic. Social History/Living Environment:     lives with family; no driving. One step to enter home. Walk in shower with bench but doesn't use bench. Prior Level of Function/Work/Activity:  Still cooks and cleans some  Dominant Side:         RIGHT  Personal Factors:          Sex:  female        Age:  80 y.o. Current Medications:       Current Outpatient Prescriptions:     HYDROcodone-acetaminophen (NORCO) 5-325 mg per tablet, Take 1/2 to 1 tablet every 6 hours as needed for pain, Disp: 20 Tab, Rfl: 0    amLODIPine (NORVASC) 10 mg tablet, Take 1 Tab by mouth daily. , Disp: 90 Tab, Rfl: 1    mupirocin (BACTROBAN) 2 % ointment, Apply  to affected area daily. , Disp: 22 g, Rfl: 0    predniSONE (DELTASONE) 1 mg tablet, TAKE FOUR TABLETS BY MOUTH ONE TIME DAILY, Disp: , Rfl: 6    atorvastatin (LIPITOR) 10 mg tablet, Take 1/2 tab every other night., Disp: 30 Tab, Rfl: 2    calcium-vitamin D (CALCIUM 600 + D,3,) 600 mg(1,500mg) -200 unit tab, Take 1 Tab by mouth., Disp: , Rfl:     aspirin delayed-release 81 mg tablet, Take 1 Tab by mouth daily. , Disp: 100 Tab, Rfl: 0    Cholecalciferol, Vitamin D3, (VITAMIN D3) 1,000 unit cap, Take 1 Cap by mouth daily. , Disp: , Rfl:     Denosumab (PROLIA) 60 mg/mL injection, 60 mg by SubCUTAneous route., Disp: , Rfl:     bevacizumab (AVASTIN) 25 mg/ml soln intravitreal solution, by IntraVITreal route once., Disp: , Rfl:    Date Last Reviewed:  9/19/17   Number of Personal Factors/Comorbidities that affect the Plan of Care: 1-2: MODERATE COMPLEXITY   EXAMINATION:   Observation/Orthostatic Postural Assessment:          Mild forward head posture  Strength:          LE STRENGTH:  4+/5 B knees, 4/5 ankles, 4/5  Hip flexion and abduction. Functional Mobility:         Gait/Ambulation:  Patient ambulates at a more normal pace, demonstrating improving step length and heel strike, with no AD. Transfers:  Slow, supervision with no AD        Bed Mobility:  independent        Stairs:  NT  Sensation:         intact  Postural Control & Balance:  · Oliva Balance Scale:  45/56.   (A score less than 45/56 indicates high risk of falls)   Improved from 35/56 at evaluation  · Dynamic Gait Index:  15/24.   (A score less than or equal to19/24 is abnormal and predictive of falls)   Improved from 8/24 at evaluation  · RegulatoryBinder Sensory Organization Test:  NT     Body Structures Involved:  1. Nerves  2. Eyes and Ears  3. Muscles Body Functions Affected:  1. Sensory/Pain  2. Neuromusculoskeletal  3. Movement Related Activities and Participation Affected:  1. General Tasks and Demands  2. Mobility  3. Community, Social and [place]   Number of elements (examined above) that affect the Plan of Care: 3: MODERATE COMPLEXITY   CLINICAL PRESENTATION:   Presentation: Evolving clinical presentation with changing clinical characteristics: MODERATE COMPLEXITY   CLINICAL DECISION MAKING:   Outcome Measure: Tool Used: Oliva Balance Scale  Score:  Initial: 35/56 Most Recent: 45/56 (Date: 9/12/17 )   Interpretation of Score: Each section is scored on a 0-4 scale, 0 representing the patients inability to perform the task and 4 representing independence. The scores of each section are added together for a total score of 56. The higher the patients score, the more independent the patient is. Any score below 45 indicates increased risk for falls. Score 56 55-45 44-34 33-23 22-12 11-1 0   Modifier CH CI CJ CK CL CM CN     ?  Changing and Maintaining Body Position:    [id number] - CURRENT STATUS: CI - 1%-19% impaired, limited or restricted    - GOAL STATUS: CI - 1%-19% impaired, limited or restricted    - D/C STATUS:  ---------------To be determined---------------    Tool Used: Timed Up and Go (TUG)  Score:  Initial: 23 seconds Most Recent: 18 seconds (Date: 9/12/17 )   Interpretation of Score: The test measures, in seconds, the time taken by an individual to stand up from a standard arm chair (seat height 46 cm [18 in], arm height 65 cm [25.6 in]), walk a distance of 3 meters (118 in, approx 10 ft), turn, walk back to the chair and sit down. If the individual takes longer than 14 seconds to complete TUG, this indicates risk for falls. Score 7 7.5-10.5 11-14 14.5-17.5 18-21 21.5-24.5 25+   Modifier CH CI CJ CK CL CM CN     Medical Necessity:   · Patient is expected to demonstrate progress in strength, balance and functional technique to improve safety during daily activities. Reason for Services/Other Comments:  · Patient continues to demonstrate capacity to improve strength, balance, gait which will increase independence and increase safety. Use of outcome tool(s) and clinical judgement create a POC that gives a: Questionable prediction of patient's progress: MODERATE COMPLEXITY            TREATMENT:   (In addition to Assessment/Re-Assessment sessions the following treatments were rendered)  Pre-treatment Symptoms/Complaints: 9/19/2017:\"Tired this morning. \"  Pain: Initial: 0/10 Pain Intensity 1: 0  Post Session:  0     Neuromuscular Re-education (34 Minutes):  Exercise/activities per grid below to improve balance, coordination and proprioception. Required minimal verbal cues to promote static and dynamic balance in standing.   Balance/Vestibular Treatment:   Activity   Date   9/19/17   Activity/Exercise      Walking with head turns     4 laps   Walking with head up & down     4 laps   Step overs     Blue foam  X 15 reps each LE  Forward and laterally with no UE assist   Step taps     6 inch step  30 reps   Forward and cross tap   Marching   4 laps   Sidestepping   4 laps   Crossovers      [place]      Walking  backwards     4 laps in hallway   Walking with obstacles   Cones, 4 inch step, 6 inch step, and over 2 obstacles x  8 reps  CGA   Walking with heel strike     In outside hallway 4 laps and up and down ramps   Picking up cones        Sports cord        [employer]OK ball      Figure 8s       Circles right/left      Walking with 360 degree turns      Spirals      Weight shifting:    Left & Right        Weight shifting: Forward & Backward         Static Standing Balance        Standing with feet apart        Standing with feet together        Standing with feet semitandem      Standing with feet tandem      Single leg stance        Therapeutic Exercise: (10 Minutes):  Exercises per grid below to improve mobility, strength and balance. Required minimal verbal cues to promote proper body alignment, promote proper body posture and promote proper body mechanics. Progressed resistance, repetitions and complexity of movement as indicated. Date   9/19/17   Activity/Exercise    nustep    Sit to stand 10 reps  2 sets from chair  No UE assist   Step ups 6 inch step  15 reps  each LE with no UE assist, CG A   Standing heel and toe raises    Heel walking      [facility]  Treatment/Session Assessment:    · Response to Treatment:  Patient completed all exercises. She is demonstrating improving endurance, strength and balance with exercises. Patient has no reports of dizziness. Continue to progress as tolerated. · Compliance with Program/Exercises: Compliant  · Recommendations/Intent for next treatment session: \"Next visit will focus on advancements to more challenging activities\".   Total Treatment Duration:  PT Patient Time In/Time Out  Time In: 0913  Time Out: [address] Davian Cohen, DINH

## 2017-09-21 ENCOUNTER — HOSPITAL ENCOUNTER (OUTPATIENT)
Dept: PHYSICAL THERAPY | Age: 82
Discharge: HOME | End: 2017-09-21
Attending: FAMILY MEDICINE
Payer: MEDICARE

## 2017-09-21 PROCEDURE — 97112 NEUROMUSCULAR REEDUCATION: CPT

## 2017-09-21 PROCEDURE — 97110 THERAPEUTIC EXERCISES: CPT

## 2017-09-21 NOTE — PROGRESS NOTES
Daniel Sterling  : [facility]  [address], [address] [place] [address], [place] [address].  [phone]   [phone]           OUTPATIENT PHYSICAL THERAPY:Daily Note 2017    ICD-10: Treatment Diagnosis: Other abnormalities of gait and mobility R26.89;  Precautions/Allergies:   Calcium carbonate; Ranitidine hcl; and Guaifenesin   Fall Risk Score: 6 (? 5 = High Risk)  MD Orders: Eval and treat MEDICAL/REFERRING DIAGNOSIS:  Balance problem [R26.89]  Frequent falls [R29.6]  Physical deconditioning [R53.81]   DATE OF ONSET: a couple of years  REFERRING PHYSICIAN: Corey Hale MD  RETURN PHYSICIAN APPOINTMENT:      PROGRESS NOTE 17:  Ms. Thien Rosario has attended 10 PT sessions from 17 to 17 for decreased gait, imbalance, and high risk of falls. Patient is demonstrating improving balance, strength, and gait since beginning therapy. Patient would benefit from continuing PT to address these problems to improve patient's independence and safety with mobility and daily activities. Thank you. PROBLEM LIST (Impacting functional limitations):  1. Decreased Strength  2. Decreased ADL/Functional Activities  3. Decreased Transfer Abilities  4. Decreased Ambulation Ability/Technique  5. Decreased Balance  6. Decreased Activity Tolerance  7. Decreased [place] with Home Exercise Program INTERVENTIONS PLANNED:  1. Balance Exercise  2. Gait Training  3. Home Exercise Program (HEP)  4. Neuromuscular Re-education/Strengthening  5. Therapeutic Activites  6. Therapeutic Exercise/Strengthening  7. Transfer Training   8. TREATMENT PLAN:  Effective Dates: 2017 TO 11/3/2017. Frequency/Duration: 1-2 times a week for 8-12 weeks  GOALS: (Goals have been discussed and agreed upon with patient.)  Short-Term Functional Goals: Time Frame: 2-4 weeks  1.  Patient will demonstrate independence and compliance with home exercise program to improve balance and strength for daily activities. MET  2. Patient will increase her score on the Oliva Balance Scale to greater than or equal to 38/56 indicating improved safety and decreased fall risk for daily activities. MET  Discharge Goals: Time Frame: 8-12 weeks  1. Patient will increase bilateral lower extremity strength to greater than or equal to 4+/5 to improve strength for functional mobility activities. Progressing and ongoing  2. Patient will increase her score on the Oliva Balance Scale to greater than or equal to 48/56 indicating improved safety and decreased fall risk for daily activities. Progressing and ongoing  3. Patient will increase her score on the dynamic gait index to greater than or equal to 15/24 indicating improved balance and safety for community ambulation. met  4. Patient will ambulate with least assistive device over level and unlevel surfaces without evidence of imbalance to improve safety for daily activities. Progressing and ongoing  Rehabilitation Potential For Stated Goals: Good            The information in this section was collected on 8/9/17 (except where otherwise noted). HISTORY:   History of Present Injury/Illness (Reason for Referral):  Evangelina Leas 2/14/17 and broke ribs, getting tea out of microwave and burned self. 7/3/17 fell when standing and changing clothes and bruised her ribs. In the last 6 months, vision has decreased a lot, macular degeneration, L eye 20/200. No dizziness. Starts to lose balance and can't catch self. Has temperal arteritis and take prednisone for that. No pain. Gets back pain with standing, relieved with sitting. Was using no AD until fell in July, now uses RW or HHA. Past Medical History/Comorbidities:   Ms. Henny Slater  has a past medical history of Gastroesophageal reflux disease (12/9/2016); Giant cell arteritis ([facility] [address] Giant cell arteritis ([facility] [address] (3/20/2010); Hypertension; Macular degeneration; Other ill-defined conditions;  Other ill-defined conditions; and TIA (transient ischemic attack) (6/23/2015). She also has no past medical history of Arthritis; Asthma; Autoimmune disease ([facility] [address] CAD (coronary artery disease); Cancer ([facility] Chronic kidney disease; COPD; DEMENTIA; Diabetes ([facility] [address] Endocrine disease; Heart failure ([facility] Infectious disease; Liver disease; Psychiatric disorder; PUD (peptic ulcer disease); Seizures ([facility] Sleep disorder; or Thromboembolus ([facility] Ms. Debra Cadena  has a past surgical history that includes cholecystectomy; heent; and orthopaedic. Social History/Living Environment:     lives with family; no driving. One step to enter home. Walk in shower with bench but doesn't use bench. Prior Level of Function/Work/Activity:  Still cooks and cleans some  Dominant Side:         RIGHT  Personal Factors:          Sex:  female        Age:  80 y.o. Current Medications:       Current Outpatient Prescriptions:     HYDROcodone-acetaminophen (NORCO) 5-325 mg per tablet, Take 1/2 to 1 tablet every 6 hours as needed for pain, Disp: 20 Tab, Rfl: 0    amLODIPine (NORVASC) 10 mg tablet, Take 1 Tab by mouth daily. , Disp: 90 Tab, Rfl: 1    mupirocin (BACTROBAN) 2 % ointment, Apply  to affected area daily. , Disp: 22 g, Rfl: 0    predniSONE (DELTASONE) 1 mg tablet, TAKE FOUR TABLETS BY MOUTH ONE TIME DAILY, Disp: , Rfl: 6    atorvastatin (LIPITOR) 10 mg tablet, Take 1/2 tab every other night., Disp: 30 Tab, Rfl: 2    calcium-vitamin D (CALCIUM 600 + D,3,) 600 mg(1,500mg) -200 unit tab, Take 1 Tab by mouth., Disp: , Rfl:     aspirin delayed-release 81 mg tablet, Take 1 Tab by mouth daily. , Disp: 100 Tab, Rfl: 0    Cholecalciferol, Vitamin D3, (VITAMIN D3) 1,000 unit cap, Take 1 Cap by mouth daily. , Disp: , Rfl:     Denosumab (PROLIA) 60 mg/mL injection, 60 mg by SubCUTAneous route., Disp: , Rfl:     bevacizumab (AVASTIN) 25 mg/ml soln intravitreal solution, by IntraVITreal route once., Disp: , Rfl:    Date Last Reviewed:  9/21/17   Number of Personal Factors/Comorbidities that affect the Plan of Care: 1-2: MODERATE COMPLEXITY   EXAMINATION:   Observation/Orthostatic Postural Assessment:          Mild forward head posture  Strength:          LE STRENGTH:  4+/5 B knees, 4/5 ankles, 4/5  Hip flexion and abduction. Functional Mobility:         Gait/Ambulation:  Patient ambulates at a more normal pace, demonstrating improving step length and heel strike, with no AD. Transfers:  Slow, supervision with no AD        Bed Mobility:  independent        Stairs:  NT  Sensation:         intact  Postural Control & Balance:  · Oliva Balance Scale:  45/56.   (A score less than 45/56 indicates high risk of falls)   Improved from 35/56 at evaluation  · Dynamic Gait Index:  15/24.   (A score less than or equal to19/24 is abnormal and predictive of falls)   Improved from 8/24 at evaluation  · Gild Sensory Organization Test:  NT     Body Structures Involved:  1. Nerves  2. Eyes and Ears  3. Muscles Body Functions Affected:  1. Sensory/Pain  2. Neuromusculoskeletal  3. Movement Related Activities and Participation Affected:  1. General Tasks and Demands  2. Mobility  3. Community, Social and [place]   Number of elements (examined above) that affect the Plan of Care: 3: MODERATE COMPLEXITY   CLINICAL PRESENTATION:   Presentation: Evolving clinical presentation with changing clinical characteristics: MODERATE COMPLEXITY   CLINICAL DECISION MAKING:   Outcome Measure: Tool Used: Oliva Balance Scale  Score:  Initial: 35/56 Most Recent: 45/56 (Date: 9/12/17 )   Interpretation of Score: Each section is scored on a 0-4 scale, 0 representing the patients inability to perform the task and 4 representing independence. The scores of each section are added together for a total score of 56. The higher the patients score, the more independent the patient is. Any score below 45 indicates increased risk for falls. Score 56 55-45 44-34 33-23 22-12 11-1 0   Modifier CH CI CJ CK CL CM CN     ?  Changing and Maintaining Body Position:    [id number] - CURRENT STATUS: CI - 1%-19% impaired, limited or restricted    - GOAL STATUS: CI - 1%-19% impaired, limited or restricted    - D/C STATUS:  ---------------To be determined---------------    Tool Used: Timed Up and Go (TUG)  Score:  Initial: 23 seconds Most Recent: 18 seconds (Date: 9/12/17 )   Interpretation of Score: The test measures, in seconds, the time taken by an individual to stand up from a standard arm chair (seat height 46 cm [18 in], arm height 65 cm [25.6 in]), walk a distance of 3 meters (118 in, approx 10 ft), turn, walk back to the chair and sit down. If the individual takes longer than 14 seconds to complete TUG, this indicates risk for falls. Score 7 7.5-10.5 11-14 14.5-17.5 18-21 21.5-24.5 25+   Modifier CH CI CJ CK CL CM CN     Medical Necessity:   · Patient is expected to demonstrate progress in strength, balance and functional technique to improve safety during daily activities. Reason for Services/Other Comments:  · Patient continues to demonstrate capacity to improve strength, balance, gait which will increase independence and increase safety. Use of outcome tool(s) and clinical judgement create a POC that gives a: Questionable prediction of patient's progress: MODERATE COMPLEXITY            TREATMENT:   (In addition to Assessment/Re-Assessment sessions the following treatments were rendered)  Pre-treatment Symptoms/Complaints: 9/21/2017:\"\"Did okay after last session. \"  Pain: Initial: 0/10 Pain Intensity 1: 0  Post Session:  0     Neuromuscular Re-education (35 Minutes):  Exercise/activities per grid below to improve balance, coordination and proprioception. Required minimal verbal cues to promote static and dynamic balance in standing.   Balance/Vestibular Treatment:   Activity   Date   9/19/17 Date   9/21/17   Activity/Exercise       Walking with head turns     4 laps 4 laps   Walking with head up & down     4 laps 4 laps   Step overs     Blue foam  X 15 reps each LE  Forward and laterally with no UE assist Blue foam  X 15 reps each LE  Forward and laterally with no UE assist   Step taps     6 inch step  30 reps   Forward and cross tap 6 inch step  30 reps   Forward and cross tap   Marching   4 laps 4 laps   Sidestepping   4 laps 4 laps   Crossovers       Weaving in/out of cones    4 laps   Walking  backwards     4 laps in hallway 4 laps in hallway   Walking with obstacles   Cones, 4 inch step, 6 inch step, and over 2 obstacles x  8 reps  CGA Cones, 4 inch step, 6 inch step, and over 2 obstacles, and up/down blue coams x  8 reps  CGA   Walking with heel strike     In outside hallway 4 laps and up and down ramps In outside hallway 4 laps and up and down ramps   Picking up cones         Sports cord         [employer] ball       Figure 8s        Circles right/left       Walking with 360 degree turns       Spirals       Weight shifting:    Left & Right         Weight shifting: Forward & Backward          Static Standing Balance         Standing with feet apart         Standing with feet together         Standing with feet semitandem       Standing with feet tandem       Single leg stance         Therapeutic Exercise: (10 Minutes):  Exercises per grid below to improve mobility, strength and balance. Required minimal verbal cues to promote proper body alignment, promote proper body posture and promote proper body mechanics. Progressed resistance, repetitions and complexity of movement as indicated.    Date   9/19/17 Date   9/21/17   Activity/Exercise     nustep     Sit to stand 10 reps  2 sets from chair  No UE assist 10 reps  2 sets from chair  No UE assist   Step ups 6 inch step  15 reps  each LE with no UE assist, CG A 6 inch step  15 reps  each LE with no UE assist, CG A   Standing heel and toe raises     Heel walking       [facility] Portal  Treatment/Session Assessment:    · Response to Treatment:  Patient completed all exercises. Patient did very well with obstacle course and is progressing very well. . Continue to progress as tolerated. REASSESS AGAIN NEXT WEEK. · Compliance with Program/Exercises: Compliant  · Recommendations/Intent for next treatment session: \"Next visit will focus on advancements to more challenging activities\".   Total Treatment Duration:  PT Patient Time In/Time Out  Time In: 0915  Time Out: 1000   Cam Desouza, PT

## 2017-09-26 ENCOUNTER — HOSPITAL ENCOUNTER (OUTPATIENT)
Dept: PHYSICAL THERAPY | Age: 82
Discharge: HOME | End: 2017-09-26
Attending: FAMILY MEDICINE
Payer: MEDICARE

## 2017-09-26 PROCEDURE — 97110 THERAPEUTIC EXERCISES: CPT

## 2017-09-26 PROCEDURE — 97112 NEUROMUSCULAR REEDUCATION: CPT

## 2017-09-26 NOTE — PROGRESS NOTES
Daniel Sterling  : [facility] [place] [address], [place] [address].  [phone]   [phone]           OUTPATIENT PHYSICAL THERAPY:Daily Note 2017    ICD-10: Treatment Diagnosis: Other abnormalities of gait and mobility R26.89;  Precautions/Allergies:   Calcium carbonate; Ranitidine hcl; and Guaifenesin   Fall Risk Score: 6 (? 5 = High Risk)  MD Orders: Eval and treat MEDICAL/REFERRING DIAGNOSIS:  Balance problem [R26.89]  Frequent falls [R29.6]  Physical deconditioning [R53.81]   DATE OF ONSET: a couple of years  REFERRING PHYSICIAN: Corey Hale MD  RETURN PHYSICIAN APPOINTMENT:      PROGRESS NOTE 17:  Ms. Thien Rosario has attended 10 PT sessions from 17 to 17 for decreased gait, imbalance, and high risk of falls. Patient is demonstrating improving balance, strength, and gait since beginning therapy. Patient would benefit from continuing PT to address these problems to improve patient's independence and safety with mobility and daily activities. Thank you. PROBLEM LIST (Impacting functional limitations):  1. Decreased Strength  2. Decreased ADL/Functional Activities  3. Decreased Transfer Abilities  4. Decreased Ambulation Ability/Technique  5. Decreased Balance  6. Decreased Activity Tolerance  7. Decreased Garber with Home Exercise Program INTERVENTIONS PLANNED:  1. Balance Exercise  2. Gait Training  3. Home Exercise Program (HEP)  4. Neuromuscular Re-education/Strengthening  5. Therapeutic Activites  6. Therapeutic Exercise/Strengthening  7. Transfer Training   8. TREATMENT PLAN:  Effective Dates: 2017 TO 11/3/2017. Frequency/Duration: 1-2 times a week for 8-12 weeks  GOALS: (Goals have been discussed and agreed upon with patient.)  Short-Term Functional Goals: Time Frame: 2-4 weeks  1.  Patient will demonstrate independence and compliance with home exercise program to improve balance and strength for daily activities. MET  2. Patient will increase her score on the Oliva Balance Scale to greater than or equal to 38/56 indicating improved safety and decreased fall risk for daily activities. MET  Discharge Goals: Time Frame: 8-12 weeks  1. Patient will increase bilateral lower extremity strength to greater than or equal to 4+/5 to improve strength for functional mobility activities. Progressing and ongoing  2. Patient will increase her score on the Oliva Balance Scale to greater than or equal to 48/56 indicating improved safety and decreased fall risk for daily activities. Progressing and ongoing  3. Patient will increase her score on the dynamic gait index to greater than or equal to 15/24 indicating improved balance and safety for community ambulation. met  4. Patient will ambulate with least assistive device over level and unlevel surfaces without evidence of imbalance to improve safety for daily activities. Progressing and ongoing  Rehabilitation Potential For Stated Goals: Good            The information in this section was collected on 8/9/17 (except where otherwise noted). HISTORY:   History of Present Injury/Illness (Reason for Referral):  Zena Bond 2/14/17 and broke ribs, getting tea out of microwave and burned self. 7/3/17 fell when standing and changing clothes and bruised her ribs. In the last 6 months, vision has decreased a lot, macular degeneration, L eye 20/200. No dizziness. Starts to lose balance and can't catch self. Has temperal arteritis and take prednisone for that. No pain. Gets back pain with standing, relieved with sitting. Was using no AD until fell in July, now uses RW or HHA. Past Medical History/Comorbidities:   Ms. Tinsley Lipoma  has a past medical history of Gastroesophageal reflux disease (12/9/2016); Giant cell arteritis ([facility] Giant cell arteritis ([facility] (3/20/2010); Hypertension; Macular degeneration; Other ill-defined conditions;  Other ill-defined conditions; and TIA (transient ischemic attack) (6/23/2015). She also has no past medical history of Arthritis; Asthma; Autoimmune disease ([facility] CAD (coronary artery disease); Cancer ([facility] Chronic kidney disease; COPD; DEMENTIA; Diabetes ([facility] Endocrine disease; Heart failure ([facility] Infectious disease; Liver disease; Psychiatric disorder; PUD (peptic ulcer disease); Seizures ([facility] Sleep disorder; or Thromboembolus ([facility] Ms. Vicky Webster  has a past surgical history that includes cholecystectomy; heent; and orthopaedic. Social History/Living Environment:     lives with family; no driving. One step to enter home. Walk in shower with bench but doesn't use bench. Prior Level of Function/Work/Activity:  Still cooks and cleans some  Dominant Side:         RIGHT  Personal Factors:          Sex:  female        Age:  80 y.o. Current Medications:       Current Outpatient Prescriptions:     HYDROcodone-acetaminophen (NORCO) 5-325 mg per tablet, Take 1/2 to 1 tablet every 6 hours as needed for pain, Disp: 20 Tab, Rfl: 0    amLODIPine (NORVASC) 10 mg tablet, Take 1 Tab by mouth daily. , Disp: 90 Tab, Rfl: 1    mupirocin (BACTROBAN) 2 % ointment, Apply  to affected area daily. , Disp: 22 g, Rfl: 0    predniSONE (DELTASONE) 1 mg tablet, TAKE FOUR TABLETS BY MOUTH ONE TIME DAILY, Disp: , Rfl: 6    atorvastatin (LIPITOR) 10 mg tablet, Take 1/2 tab every other night., Disp: 30 Tab, Rfl: 2    calcium-vitamin D (CALCIUM 600 + D,3,) 600 mg(1,500mg) -200 unit tab, Take 1 Tab by mouth., Disp: , Rfl:     aspirin delayed-release 81 mg tablet, Take 1 Tab by mouth daily. , Disp: 100 Tab, Rfl: 0    Cholecalciferol, Vitamin D3, (VITAMIN D3) 1,000 unit cap, Take 1 Cap by mouth daily. , Disp: , Rfl:     Denosumab (PROLIA) 60 mg/mL injection, 60 mg by SubCUTAneous route., Disp: , Rfl:     bevacizumab (AVASTIN) 25 mg/ml soln intravitreal solution, by IntraVITreal route once., Disp: , Rfl:    Date Last Reviewed:  9/26/17   Number of Personal Factors/Comorbidities that affect the Plan of Care: 1-2: MODERATE COMPLEXITY   EXAMINATION:   Observation/Orthostatic Postural Assessment:          Mild forward head posture  Strength:          LE STRENGTH:  4+/5 B knees, 4/5 ankles, 4/5  Hip flexion and abduction. Functional Mobility:         Gait/Ambulation:  Patient ambulates at a more normal pace, demonstrating improving step length and heel strike, with no AD. Transfers:  Slow, supervision with no AD        Bed Mobility:  independent        Stairs:  NT  Sensation:         intact  Postural Control & Balance:  · Oliva Balance Scale:  45/56.   (A score less than 45/56 indicates high risk of falls)   Improved from 35/56 at evaluation  · Dynamic Gait Index:  15/24.   (A score less than or equal to19/24 is abnormal and predictive of falls)   Improved from 8/24 at evaluation  · [id number] Sensory Organization Test:  NT     Body Structures Involved:  1. Nerves  2. Eyes and Ears  3. Muscles Body Functions Affected:  1. Sensory/Pain  2. Neuromusculoskeletal  3. Movement Related Activities and Participation Affected:  1. General Tasks and Demands  2. Mobility  3. Community, Social and [place]   Number of elements (examined above) that affect the Plan of Care: 3: MODERATE COMPLEXITY   CLINICAL PRESENTATION:   Presentation: Evolving clinical presentation with changing clinical characteristics: MODERATE COMPLEXITY   CLINICAL DECISION MAKING:   Outcome Measure: Tool Used: Oliva Balance Scale  Score:  Initial: 35/56 Most Recent: 45/56 (Date: 9/12/17 )   Interpretation of Score: Each section is scored on a 0-4 scale, 0 representing the patients inability to perform the task and 4 representing independence. The scores of each section are added together for a total score of 56. The higher the patients score, the more independent the patient is. Any score below 45 indicates increased risk for falls. Score 56 55-45 44-34 33-23 22-12 11-1 0   Modifier CH CI CJ CK CL CM CN     ?  Changing and Maintaining Body Position:    [id number] - CURRENT STATUS: CI - 1%-19% impaired, limited or restricted    - GOAL STATUS: CI - 1%-19% impaired, limited or restricted    - D/C STATUS:  ---------------To be determined---------------    Tool Used: Timed Up and Go (TUG)  Score:  Initial: 23 seconds Most Recent: 18 seconds (Date: 9/12/17 )   Interpretation of Score: The test measures, in seconds, the time taken by an individual to stand up from a standard arm chair (seat height 46 cm [18 in], arm height 65 cm [25.6 in]), walk a distance of 3 meters (118 in, approx 10 ft), turn, walk back to the chair and sit down. If the individual takes longer than 14 seconds to complete TUG, this indicates risk for falls. Score 7 7.5-10.5 11-14 14.5-17.5 18-21 21.5-24.5 25+   Modifier CH CI CJ CK CL CM CN     Medical Necessity:   · Patient is expected to demonstrate progress in strength, balance and functional technique to improve safety during daily activities. Reason for Services/Other Comments:  · Patient continues to demonstrate capacity to improve strength, balance, gait which will increase independence and increase safety. Use of outcome tool(s) and clinical judgement create a POC that gives a: Questionable prediction of patient's progress: MODERATE COMPLEXITY            TREATMENT:   (In addition to Assessment/Re-Assessment sessions the following treatments were rendered)  Pre-treatment Symptoms/Complaints: 9/26/2017:\"\"I want to be done this week. \"  Pain: Initial: 0/10 Pain Intensity 1: 0  Post Session:  0     Neuromuscular Re-education (32 Minutes):  Exercise/activities per grid below to improve balance, coordination and proprioception. Required minimal verbal cues to promote static and dynamic balance in standing.   Balance/Vestibular Treatment:   Activity   Date   9/19/17 Date   9/21/17 Date   9/26/17   Activity/Exercise        Walking with head turns     4 laps 4 laps 4 laps   Walking with head up & down     4 laps 4 laps 4 laps   Step overs     Blue foam  X 15 reps each LE  Forward and laterally with no UE assist Blue foam  X 15 reps each LE  Forward and laterally with no UE assist Blue foam  X 15 reps each LE  Forward and laterally with no UE assist   Step taps     6 inch step  30 reps   Forward and cross tap 6 inch step  30 reps   Forward and cross tap 6 inch step  30 reps   Forward and cross tap   Marching   4 laps 4 laps 4 laps   Sidestepping   4 laps 4 laps 4 laps   Crossovers        Weaving in/out of cones    4 laps 4 laps   Walking  backwards     4 laps in hallway 4 laps in hallway 4 laps in hallway   Walking with obstacles   Cones, 4 inch step, 6 inch step, and over 2 obstacles x  8 reps  CGA Cones, 4 inch step, 6 inch step, and over 2 obstacles, and up/down blue coams x  8 reps  CGA Cones, 4 inch step, 6 inch step, and over 2 obstacles, and up/down blue coams x  8 reps   Walking with heel strike     In outside hallway 4 laps and up and down ramps In outside hallway 4 laps and up and down ramps In outside hallway 4 laps and up and down ramps   Picking up cones          Sports cord          [employer]        Kick ball        Figure 8s         Circles right/left        Walking with 360 degree turns        Spirals        Weight shifting:    Left & Right          Weight shifting: Forward & Backward           Static Standing Balance          Standing with feet apart          Standing with feet together          Standing with feet semitandem        Standing with feet tandem        Single leg stance          Therapeutic Exercise: (10 Minutes):  Exercises per grid below to improve mobility, strength and balance. Required minimal verbal cues to promote proper body alignment, promote proper body posture and promote proper body mechanics. Progressed resistance, repetitions and complexity of movement as indicated.    Date   9/19/17 Date   9/21/17 Date   9/26/17   Activity/Exercise      nustep      Sit to stand 10 reps  2 sets from chair  No UE assist 10 reps  2 sets from chair  No UE assist 10 reps  2 sets from chair  No UE assist   Step ups 6 inch step  15 reps  each LE with no UE assist, CG A 6 inch step  15 reps  each LE with no UE assist, CG A 6 inch step  15 reps  each LE with no UE assist, CG A   Standing heel and toe raises      Heel walking        [facility] Portal  Treatment/Session Assessment:    · Response to Treatment:  Patient completed all exercises. Reassess next visit for discharge. · Compliance with Program/Exercises: Compliant  · Recommendations/Intent for next treatment session: \"Next visit will focus on advancements to more challenging activities\".   Total Treatment Duration:  PT Patient Time In/Time Out  Time In: 0915  Time Out: [address] Landon Libman, PT

## 2017-09-28 ENCOUNTER — HOSPITAL ENCOUNTER (OUTPATIENT)
Dept: PHYSICAL THERAPY | Age: 82
Discharge: HOME | End: 2017-09-28
Attending: FAMILY MEDICINE
Payer: MEDICARE

## 2017-09-28 PROCEDURE — 97112 NEUROMUSCULAR REEDUCATION: CPT

## 2017-09-28 PROCEDURE — 97110 THERAPEUTIC EXERCISES: CPT

## 2017-09-28 PROCEDURE — G8982 BODY POS GOAL STATUS: HCPCS

## 2017-09-28 PROCEDURE — G8983 BODY POS D/C STATUS: HCPCS

## 2017-09-28 NOTE — PROGRESS NOTES
Tonya Lock  : [facility]  [address], [address] [address],[address], [address] [facility]  [phone]   [phone]           OUTPATIENT PHYSICAL THERAPY:Daily Note and Discharge 2017    ICD-10: Treatment Diagnosis: Other abnormalities of gait and mobility R26.89;  Precautions/Allergies:   Calcium carbonate; Ranitidine hcl; and Guaifenesin   Fall Risk Score: 6 (? 5 = High Risk)  MD Orders: Eval and treat MEDICAL/REFERRING DIAGNOSIS:  Balance problem [R26.89]  Frequent falls [R29.6]  Physical deconditioning [R53.81]   DATE OF ONSET: a couple of years  REFERRING PHYSICIAN: Fiordaliza Johnson MD  RETURN PHYSICIAN APPOINTMENT:      DISCHARGE NOTE 17: Ms. Rashaun Batista has attended 15 PT sessions from 17 to 17 for decreased gait, imbalance, and high risk of falls. Patient has demonstrated improving balance, strength, and gait since beginning therapy, as well as improved confidence with mobility. Patient has met goals. Patient is independent in HEP and is to continue with HEP. We will discharge patient at this time. Thank you      PROBLEM LIST (Impacting functional limitations):  1. Decreased Strength  2. Decreased ADL/Functional Activities  3. Decreased Transfer Abilities  4. Decreased Ambulation Ability/Technique  5. Decreased Balance  6. Decreased Activity Tolerance  7. Decreased White with Home Exercise Program INTERVENTIONS PLANNED:  1. Balance Exercise  2. Gait Training  3. Home Exercise Program (HEP)  4. Neuromuscular Re-education/Strengthening  5. Therapeutic Activites  6. Therapeutic Exercise/Strengthening  7. Transfer Training   8. TREATMENT PLAN:  Effective Dates: 2017 TO 2017  Frequency/Duration: Discharge  GOALS: (Goals have been discussed and agreed upon with patient.)  Short-Term Functional Goals: Time Frame: 2-4 weeks  1.  Patient will demonstrate independence and compliance with home exercise program to improve balance and strength for daily activities. MET  2. Patient will increase her score on the Oliva Balance Scale to greater than or equal to 38/56 indicating improved safety and decreased fall risk for daily activities. MET  Discharge Goals: Time Frame: 8-12 weeks  1. Patient will increase bilateral lower extremity strength to greater than or equal to 4+/5 to improve strength for functional mobility activities. MET  2. Patient will increase her score on the Oliva Balance Scale to greater than or equal to 48/56 indicating improved safety and decreased fall risk for daily activities. MET  3. Patient will increase her score on the dynamic gait index to greater than or equal to 15/24 indicating improved balance and safety for community ambulation. MET  4. Patient will ambulate with least assistive device over level and unlevel surfaces without evidence of imbalance to improve safety for daily activities. MET  Rehabilitation Potential For Stated Goals: Good            The information in this section was collected on 8/9/17 (except where otherwise noted). HISTORY:   History of Present Injury/Illness (Reason for Referral):  Cletamara Latricia 2/14/17 and broke ribs, getting tea out of microwave and burned self. 7/3/17 fell when standing and changing clothes and bruised her ribs. In the last 6 months, vision has decreased a lot, macular degeneration, L eye 20/200. No dizziness. Starts to lose balance and can't catch self. Has temperal arteritis and take prednisone for that. No pain. Gets back pain with standing, relieved with sitting. Was using no AD until fell in July, now uses RW or HHA. Past Medical History/Comorbidities:   Ms. Lori Barrera  has a past medical history of Gastroesophageal reflux disease (12/9/2016); Giant cell arteritis ([facility] [address] Giant cell arteritis ([facility] [address] (3/20/2010); Hypertension; Macular degeneration; Other ill-defined conditions; Other ill-defined conditions; and TIA (transient ischemic attack) (6/23/2015).  She also has no past medical history of Arthritis; Asthma; Autoimmune disease ([facility] [address] CAD (coronary artery disease); Cancer ([facility] [address] Chronic kidney disease; COPD; DEMENTIA; Diabetes ([facility] [address] Endocrine disease; Heart failure ([facility] [address] Infectious disease; Liver disease; Psychiatric disorder; PUD (peptic ulcer disease); Seizures ([facility] [address] Sleep disorder; or Thromboembolus ([facility] [address] Ms. Ramesh   has a past surgical history that includes cholecystectomy; heent; and orthopaedic. Social History/Living Environment:     lives with family; no driving. One step to enter home. Walk in shower with bench but doesn't use bench. Prior Level of Function/Work/Activity:  Still cooks and cleans some  Dominant Side:         RIGHT  Personal Factors:          Sex:  female        Age:  80 y.o. Current Medications:       Current Outpatient Prescriptions:     HYDROcodone-acetaminophen (NORCO) 5-325 mg per tablet, Take 1/2 to 1 tablet every 6 hours as needed for pain, Disp: 20 Tab, Rfl: 0    amLODIPine (NORVASC) 10 mg tablet, Take 1 Tab by mouth daily. , Disp: 90 Tab, Rfl: 1    mupirocin (BACTROBAN) 2 % ointment, Apply  to affected area daily. , Disp: 22 g, Rfl: 0    predniSONE (DELTASONE) 1 mg tablet, TAKE FOUR TABLETS BY MOUTH ONE TIME DAILY, Disp: , Rfl: 6    atorvastatin (LIPITOR) 10 mg tablet, Take 1/2 tab every other night., Disp: 30 Tab, Rfl: 2    calcium-vitamin D (CALCIUM 600 + D,3,) 600 mg(1,500mg) -200 unit tab, Take 1 Tab by mouth., Disp: , Rfl:     aspirin delayed-release 81 mg tablet, Take 1 Tab by mouth daily. , Disp: 100 Tab, Rfl: 0    Cholecalciferol, Vitamin D3, (VITAMIN D3) 1,000 unit cap, Take 1 Cap by mouth daily. , Disp: , Rfl:     Denosumab (PROLIA) 60 mg/mL injection, 60 mg by SubCUTAneous route., Disp: , Rfl:     bevacizumab (AVASTIN) 25 mg/ml soln intravitreal solution, by IntraVITreal route once., Disp: , Rfl:    Date Last Reviewed:  9/28/17   Number of Personal Factors/Comorbidities that affect the Plan of Care: 1-2: MODERATE COMPLEXITY EXAMINATION:   Observation/Orthostatic Postural Assessment:          Mild forward head posture  Strength:          LE STRENGTH:  4+/5 B LE  Functional Mobility:         Gait/Ambulation:  Patient ambulates at a more normal pace, demonstrating improving step length and heel strike, with no AD. Transfers:  Independent with No AD        Bed Mobility:  independent        Stairs:  NT  Sensation:         intact  Postural Control & Balance:  · Oliva Balance Scale:  51/56.   (A score less than 45/56 indicates high risk of falls)   Improved from 35/56 at evaluation  · Dynamic Gait Index:  21/24.   (A score less than or equal to19/24 is abnormal and predictive of falls)   Improved from 8/24 at evaluation  · [email or web address] Sensory Organization Test:  NT     Body Structures Involved:  1. Nerves  2. Eyes and Ears  3. Muscles Body Functions Affected:  1. Sensory/Pain  2. Neuromusculoskeletal  3. Movement Related Activities and Participation Affected:  1. General Tasks and Demands  2. Mobility  3. Community, Social and [facility]   Number of elements (examined above) that affect the Plan of Care: 3: MODERATE COMPLEXITY   CLINICAL PRESENTATION:   Presentation: Evolving clinical presentation with changing clinical characteristics: MODERATE COMPLEXITY   CLINICAL DECISION MAKING:   Outcome Measure: Tool Used: Oliva Balance Scale  Score:  Initial: 35/56 Most Recent: 51/56 (Date: 9/28/17 )   Interpretation of Score: Each section is scored on a 0-4 scale, 0 representing the patients inability to perform the task and 4 representing independence. The scores of each section are added together for a total score of 56. The higher the patients score, the more independent the patient is. Any score below 45 indicates increased risk for falls. Score 56 55-45 44-34 33-23 22-12 11-1 0   Modifier CH CI CJ CK CL CM CN     ?  Changing and Maintaining Body Position:    [id number] - CURRENT STATUS: CI - 1%-19% impaired, limited or restricted    - GOAL STATUS: CI - 1%-19% impaired, limited or restricted    - D/C STATUS:  CI - 1%-19% impaired, limited or restricted    Tool Used: Timed Up and Go (TUG)  Score:  Initial: 23 seconds Most Recent: 18 seconds (Date: 9/28/17 )   Interpretation of Score: The test measures, in seconds, the time taken by an individual to stand up from a standard arm chair (seat height 46 cm [18 in], arm height 65 cm [25.6 in]), walk a distance of 3 meters (118 in, approx 10 ft), turn, walk back to the chair and sit down. If the individual takes longer than 14 seconds to complete TUG, this indicates risk for falls. Score 7 7.5-10.5 11-14 14.5-17.5 18-21 21.5-24.5 25+   Modifier CH CI CJ CK CL CM CN     Medical Necessity:   · Patient is expected to demonstrate progress in strength, balance and functional technique to improve safety during daily activities. Reason for Services/Other Comments:  · Patient continues to demonstrate capacity to improve strength, balance, gait which will increase independence and increase safety. Use of outcome tool(s) and clinical judgement create a POC that gives a: Questionable prediction of patient's progress: MODERATE COMPLEXITY            TREATMENT:   (In addition to Assessment/Re-Assessment sessions the following treatments were rendered)  Pre-treatment Symptoms/Complaints: 9/28/2017:\"\"today is my last day. \"  Pain: Initial: 0/10 Pain Intensity 1: 0  Post Session:  0     Neuromuscular Re-education (34 Minutes):  Exercise/activities per grid below to improve balance, coordination and proprioception. Required minimal verbal cues to promote static and dynamic balance in standing.   Balance/Vestibular Treatment:   Activity   Date   9/28/17   Activity/Exercise      Walking with head turns     4 laps   Walking with head up & down     4 laps   Step overs     Blue foam  X 15 reps each LE  Forward and laterally with no UE assist   Step taps     6 inch step  30 reps   Forward and cross tap   Marching   4 laps   Sidestepping   4 laps   Crossovers      Weaving in/out of cones   4 laps   Walking  backwards     4 laps in hallway   Walking with obstacles   Cones, 4 inch step, 6 inch step, and over 2 obstacles, and up/down blue coams x  8 reps   Walking with heel strike     In outside hallway 4 laps and up and down ramps   Picking up cones        Sports cord        [employer]      Kick ball      Figure 8s       Circles right/left      Walking with 360 degree turns      Spirals      Weight shifting:    Left & Right        Weight shifting: Forward & Backward         Static Standing Balance        Standing with feet apart        Standing with feet together        Standing with feet semitandem      Standing with feet tandem      Single leg stance        Therapeutic Exercise: (10 Minutes):  Exercises per grid below to improve mobility, strength and balance. Required minimal verbal cues to promote proper body alignment, promote proper body posture and promote proper body mechanics. Progressed resistance, repetitions and complexity of movement as indicated. Date   9/28/17   Activity/Exercise    nustep    Sit to stand 10 reps  2 sets from chair  No UE assist   Step ups 6 inch step  15 reps  each LE with no UE assist, CG A   Standing heel and toe raises    Heel walking      [facility] Portal  Treatment/Session Assessment:    · Response to Treatment:  Patient completed all exercises. She has met all goals. Recommended to patient to continue with HEP. Discharge today with HEP. · Compliance with Program/Exercises: Compliant  · Recommendations/Intent for next treatment session: \"Next visit will focus on advancements to more challenging activities\".   Total Treatment Duration:  PT Patient Time In/Time Out  Time In: 0912  Time Out: 238 [facility] Juwan Yates PT

## 2018-02-22 ENCOUNTER — APPOINTMENT (RX ONLY)
Dept: URBAN - METROPOLITAN AREA CLINIC 23 | Facility: CLINIC | Age: 83
Setting detail: DERMATOLOGY
End: 2018-02-22

## 2018-02-22 DIAGNOSIS — I83.9 ASYMPTOMATIC VARICOSE VEINS OF LOWER EXTREMITIES: ICD-10-CM

## 2018-02-22 DIAGNOSIS — L82.1 OTHER SEBORRHEIC KERATOSIS: ICD-10-CM

## 2018-02-22 DIAGNOSIS — L81.8 OTHER SPECIFIED DISORDERS OF PIGMENTATION: ICD-10-CM

## 2018-02-22 DIAGNOSIS — Z85.828 PERSONAL HISTORY OF OTHER MALIGNANT NEOPLASM OF SKIN: ICD-10-CM

## 2018-02-22 PROBLEM — I83.91 ASYMPTOMATIC VARICOSE VEINS OF RIGHT LOWER EXTREMITY: Status: ACTIVE | Noted: 2018-02-22

## 2018-02-22 PROBLEM — M12.9 ARTHROPATHY, UNSPECIFIED: Status: ACTIVE | Noted: 2018-02-22

## 2018-02-22 PROCEDURE — 99213 OFFICE O/P EST LOW 20 MIN: CPT

## 2018-02-22 PROCEDURE — ? COUNSELING

## 2018-02-22 PROCEDURE — ? ADDITIONAL NOTES

## 2018-02-22 ASSESSMENT — LOCATION DETAILED DESCRIPTION DERM
LOCATION DETAILED: RIGHT SUPERIOR CENTRAL MALAR CHEEK
LOCATION DETAILED: LEFT MEDIAL POPLITEAL SKIN
LOCATION DETAILED: RIGHT LATERAL MALAR CHEEK
LOCATION DETAILED: RIGHT SUPERIOR LATERAL MALAR CHEEK
LOCATION DETAILED: RIGHT LATERAL ZYGOMA
LOCATION DETAILED: LEFT SUPERIOR ANTERIOR NECK
LOCATION DETAILED: LEFT VENTRAL PROXIMAL FOREARM
LOCATION DETAILED: LEFT CLAVICULAR SKIN
LOCATION DETAILED: RIGHT INFERIOR LATERAL NECK
LOCATION DETAILED: RIGHT DISTAL PRETIBIAL REGION
LOCATION DETAILED: LEFT PROXIMAL DORSAL FOREARM
LOCATION DETAILED: RIGHT PROXIMAL DORSAL FOREARM
LOCATION DETAILED: LEFT RADIAL DORSAL HAND
LOCATION DETAILED: RIGHT VENTRAL PROXIMAL FOREARM
LOCATION DETAILED: LEFT PROXIMAL PRETIBIAL REGION
LOCATION DETAILED: RIGHT PROXIMAL PRETIBIAL REGION

## 2018-02-22 ASSESSMENT — LOCATION SIMPLE DESCRIPTION DERM
LOCATION SIMPLE: RIGHT ANTERIOR NECK
LOCATION SIMPLE: LEFT CLAVICULAR SKIN
LOCATION SIMPLE: RIGHT PRETIBIAL REGION
LOCATION SIMPLE: RIGHT CHEEK
LOCATION SIMPLE: RIGHT FOREARM
LOCATION SIMPLE: LEFT FOREARM
LOCATION SIMPLE: LEFT ANTERIOR NECK
LOCATION SIMPLE: LEFT PRETIBIAL REGION
LOCATION SIMPLE: LEFT POPLITEAL SKIN
LOCATION SIMPLE: RIGHT ZYGOMA
LOCATION SIMPLE: LEFT HAND

## 2018-02-22 ASSESSMENT — LOCATION ZONE DERM
LOCATION ZONE: NECK
LOCATION ZONE: TRUNK
LOCATION ZONE: FACE
LOCATION ZONE: LEG
LOCATION ZONE: ARM
LOCATION ZONE: HAND

## 2018-02-22 NOTE — PROCEDURE: MIPS QUALITY
Quality 110: Preventive Care And Screening: Influenza Immunization: Influenza Immunization previously received during influenza season
Quality 130: Documentation Of Current Medications In The Medical Record: Current Medications with Name, Dosage, Frequency, or Route not Documented, Reason not Given
Detail Level: Detailed
Quality 111:Pneumonia Vaccination Status For Older Adults: Pneumococcal Vaccination Previously Received

## 2018-06-28 ENCOUNTER — APPOINTMENT (RX ONLY)
Dept: URBAN - METROPOLITAN AREA CLINIC 23 | Facility: CLINIC | Age: 83
Setting detail: DERMATOLOGY
End: 2018-06-28

## 2018-06-28 DIAGNOSIS — L82.1 OTHER SEBORRHEIC KERATOSIS: ICD-10-CM

## 2018-06-28 DIAGNOSIS — L81.8 OTHER SPECIFIED DISORDERS OF PIGMENTATION: ICD-10-CM

## 2018-06-28 PROBLEM — E13.9 OTHER SPECIFIED DIABETES MELLITUS WITHOUT COMPLICATIONS: Status: ACTIVE | Noted: 2018-06-28

## 2018-06-28 PROBLEM — M12.9 ARTHROPATHY, UNSPECIFIED: Status: ACTIVE | Noted: 2018-06-28

## 2018-06-28 PROCEDURE — ? COUNSELING

## 2018-06-28 PROCEDURE — 99213 OFFICE O/P EST LOW 20 MIN: CPT

## 2018-06-28 ASSESSMENT — LOCATION SIMPLE DESCRIPTION DERM
LOCATION SIMPLE: LEFT FOREARM
LOCATION SIMPLE: LEFT PRETIBIAL REGION

## 2018-06-28 ASSESSMENT — LOCATION DETAILED DESCRIPTION DERM
LOCATION DETAILED: LEFT DISTAL PRETIBIAL REGION
LOCATION DETAILED: LEFT VENTRAL LATERAL PROXIMAL FOREARM

## 2018-06-28 ASSESSMENT — LOCATION ZONE DERM
LOCATION ZONE: ARM
LOCATION ZONE: LEG

## 2018-07-23 PROBLEM — E03.9 ACQUIRED HYPOTHYROIDISM: Status: ACTIVE | Noted: 2017-02-15

## 2018-09-12 ENCOUNTER — APPOINTMENT (OUTPATIENT)
Dept: GENERAL RADIOLOGY | Age: 83
DRG: 281 | End: 2018-09-12
Attending: EMERGENCY MEDICINE
Payer: MEDICARE

## 2018-09-12 ENCOUNTER — HOSPITAL ENCOUNTER (INPATIENT)
Age: 83
LOS: 1 days | Discharge: HOME | DRG: 281 | End: 2018-09-15
Attending: EMERGENCY MEDICINE | Admitting: INTERNAL MEDICINE
Payer: MEDICARE

## 2018-09-12 DIAGNOSIS — D63.1 ANEMIA IN STAGE 1 CHRONIC KIDNEY DISEASE: ICD-10-CM

## 2018-09-12 DIAGNOSIS — N18.1 ANEMIA IN STAGE 1 CHRONIC KIDNEY DISEASE: ICD-10-CM

## 2018-09-12 DIAGNOSIS — E78.5 HYPERLIPIDEMIA LDL GOAL <100: ICD-10-CM

## 2018-09-12 DIAGNOSIS — K92.2 GASTROINTESTINAL HEMORRHAGE, UNSPECIFIED GASTROINTESTINAL HEMORRHAGE TYPE: ICD-10-CM

## 2018-09-12 DIAGNOSIS — D64.9 ANEMIA, UNSPECIFIED TYPE: ICD-10-CM

## 2018-09-12 DIAGNOSIS — I10 ESSENTIAL HYPERTENSION: Chronic | ICD-10-CM

## 2018-09-12 DIAGNOSIS — R07.9 CHEST PAIN, UNSPECIFIED TYPE: Primary | ICD-10-CM

## 2018-09-12 LAB
ALBUMIN SERPL-MCNC: 3.3 G/DL (ref 3.2–4.6)
ALBUMIN/GLOB SERPL: 1.1 {RATIO} (ref 1.2–3.5)
ALP SERPL-CCNC: 58 U/L (ref 50–136)
ALT SERPL-CCNC: 14 U/L (ref 12–65)
ANION GAP SERPL CALC-SCNC: 10 MMOL/L (ref 7–16)
AST SERPL-CCNC: 17 U/L (ref 15–37)
BACTERIA URNS QL MICRO: ABNORMAL /HPF
BASOPHILS # BLD: 0.1 K/UL (ref 0–0.2)
BASOPHILS NFR BLD: 1 % (ref 0–2)
BILIRUB SERPL-MCNC: 0.3 MG/DL (ref 0.2–1.1)
BUN SERPL-MCNC: 22 MG/DL (ref 8–23)
CALCIUM SERPL-MCNC: 9 MG/DL (ref 8.3–10.4)
CASTS URNS QL MICRO: 0 /LPF
CHLORIDE SERPL-SCNC: 111 MMOL/L (ref 98–107)
CO2 SERPL-SCNC: 22 MMOL/L (ref 21–32)
CREAT SERPL-MCNC: 1.21 MG/DL (ref 0.6–1)
CRYSTALS URNS QL MICRO: 0 /LPF
DIFFERENTIAL METHOD BLD: ABNORMAL
EOSINOPHIL # BLD: 0.1 K/UL (ref 0–0.8)
EOSINOPHIL NFR BLD: 2 % (ref 0.5–7.8)
EPI CELLS #/AREA URNS HPF: ABNORMAL /HPF
ERYTHROCYTE [DISTWIDTH] IN BLOOD BY AUTOMATED COUNT: 15.2 %
GLOBULIN SER CALC-MCNC: 3.1 G/DL (ref 2.3–3.5)
GLUCOSE SERPL-MCNC: 149 MG/DL (ref 65–100)
HCT VFR BLD AUTO: 27.1 % (ref 35.8–46.3)
HGB BLD-MCNC: 8.1 G/DL (ref 11.7–15.4)
IMM GRANULOCYTES # BLD: 0 K/UL (ref 0–0.5)
IMM GRANULOCYTES NFR BLD AUTO: 0 % (ref 0–5)
LYMPHOCYTES # BLD: 1.3 K/UL (ref 0.5–4.6)
LYMPHOCYTES NFR BLD: 14 % (ref 13–44)
MCH RBC QN AUTO: 25 PG (ref 26.1–32.9)
MCHC RBC AUTO-ENTMCNC: 29.9 G/DL (ref 31.4–35)
MCV RBC AUTO: 83.6 FL (ref 79.6–97.8)
MONOCYTES # BLD: 1 K/UL (ref 0.1–1.3)
MONOCYTES NFR BLD: 11 % (ref 4–12)
MUCOUS THREADS URNS QL MICRO: 0 /LPF
NEUTS SEG # BLD: 6.5 K/UL (ref 1.7–8.2)
NEUTS SEG NFR BLD: 72 % (ref 43–78)
NRBC # BLD: 0 K/UL (ref 0–0.2)
PLATELET # BLD AUTO: 185 K/UL (ref 150–450)
PMV BLD AUTO: 12.2 FL (ref 9.4–12.3)
POTASSIUM SERPL-SCNC: 4.2 MMOL/L (ref 3.5–5.1)
PROT SERPL-MCNC: 6.4 G/DL (ref 6.3–8.2)
RBC # BLD AUTO: 3.24 M/UL (ref 4.05–5.2)
RBC #/AREA URNS HPF: ABNORMAL /HPF
SODIUM SERPL-SCNC: 143 MMOL/L (ref 136–145)
TROPONIN I BLD-MCNC: 0.03 NG/ML (ref 0.02–0.05)
TROPONIN I SERPL-MCNC: 0.02 NG/ML (ref 0.02–0.05)
WBC # BLD AUTO: 9 K/UL (ref 4.3–11.1)
WBC URNS QL MICRO: ABNORMAL /HPF

## 2018-09-12 PROCEDURE — 84484 ASSAY OF TROPONIN QUANT: CPT

## 2018-09-12 PROCEDURE — 81003 URINALYSIS AUTO W/O SCOPE: CPT | Performed by: EMERGENCY MEDICINE

## 2018-09-12 PROCEDURE — 81015 MICROSCOPIC EXAM OF URINE: CPT

## 2018-09-12 PROCEDURE — 80053 COMPREHEN METABOLIC PANEL: CPT

## 2018-09-12 PROCEDURE — 99285 EMERGENCY DEPT VISIT HI MDM: CPT | Performed by: EMERGENCY MEDICINE

## 2018-09-12 PROCEDURE — 87086 URINE CULTURE/COLONY COUNT: CPT

## 2018-09-12 PROCEDURE — 71045 X-RAY EXAM CHEST 1 VIEW: CPT

## 2018-09-12 PROCEDURE — 87088 URINE BACTERIA CULTURE: CPT

## 2018-09-12 PROCEDURE — 87186 SC STD MICRODIL/AGAR DIL: CPT

## 2018-09-12 PROCEDURE — 85025 COMPLETE CBC W/AUTO DIFF WBC: CPT

## 2018-09-12 PROCEDURE — 93005 ELECTROCARDIOGRAM TRACING: CPT | Performed by: EMERGENCY MEDICINE

## 2018-09-12 PROCEDURE — 74011250637 HC RX REV CODE- 250/637: Performed by: EMERGENCY MEDICINE

## 2018-09-12 RX ORDER — NITROGLYCERIN 0.4 MG/1
0.4 TABLET SUBLINGUAL
Status: DISCONTINUED | OUTPATIENT
Start: 2018-09-12 | End: 2018-09-15 | Stop reason: HOSPADM

## 2018-09-12 RX ADMIN — NITROGLYCERIN 0.4 MG: 0.4 TABLET, ORALLY DISINTEGRATING SUBLINGUAL at 21:32

## 2018-09-12 NOTE — Clinical Note
Patient Class[de-identified] Observation [897] Type of Bed: Telemetry [19] Reason for Observation: chest pain Admitting Diagnosis: Chest pain [[id number]] Admitting Physician: Sonu Rios [[id number]] Attending Physician: Sonu Rios [[id number]]

## 2018-09-12 NOTE — ED TRIAGE NOTES
Pt arrives via [facility] from home, pt c/o chest pain, states feels like dog sitting on chest, having hard time placing a number on pain, sudden onset 1730, radiation bilateral arms, SHOB, pt given 2 nitro and 325 ASA with relief. EMS noted ST depression.

## 2018-09-12 NOTE — IP AVS SNAPSHOT
[address] [id number] [facility] [address] [facility] 
[phone] Patient: Sofi Fischer MRN: [id number] About your hospitalization You were admitted on:  2018 You last received care in the:  [facility] 8 MED SURG You were discharged on:  September 15, 2018 Why you were hospitalized Your primary diagnosis was:  Nstemi (Non-St Elevated Myocardial Infarction) (Hcc) Your diagnoses also included:  Chest Pain, Acquired Hypothyroidism, Giant Cell Arteritis (Hcc), Hyperlipidemia Ldl Goal <100, Hypertension, Anemia Follow-up Information Follow up With Details Comments Contact Info Dmitry Infante MD  Call Monday am for a 3-5 day hospital follow up appointment [address] [address] [phone] [facility]  Call Monday am for a 2-3 week hospital follow up appointment 2 Chanel Daugherty [id number] [place] [address] 
[phone] Discharge Orders None A check mayank indicates which time of day the medication should be taken. My Medications START taking these medications Instructions Each Dose to Equal  
 Morning Noon Evening Bedtime  
 ciprofloxacin HCl 250 mg tablet Commonly known as:  CIPRO Your next dose is:  09/15/18 pm  
   
 Take 1 Tab by mouth every twelve (12) hours. 250 mg  
    
  
   
   
   
  
  
 metoprolol succinate 25 mg XL tablet Commonly known as:  TOPROL-XL Start taking on:  2018 Your next dose is:  18 am  
   
 Take 1 Tab by mouth daily. 25 mg CONTINUE taking these medications Instructions Each Dose to Equal  
 Morning Noon Evening Bedtime  
 aspirin delayed-release 81 mg tablet Your next dose is:  18 am  
   
 Take 1 Tab by mouth daily. 81 mg  
    
  
   
   
   
  
 atorvastatin 10 mg tablet Commonly known as:  LIPITOR Your next dose is:  09/15/18 pm  
   
 Take 1/2 tab every other night. bevacizumab 25 mg/ml Soln intravitreal solution Commonly known as:  AVASTIN  
   
 by IntraVITreal route once. CALCIUM 600 + D(3) 600 mg(1,500mg) -200 unit Tab Generic drug:  calcium-vitamin D Your next dose is:  09/16/18 am  
   
 Take 1 Tab by mouth. 1 Tab * levothyroxine 50 mcg tablet Commonly known as:  SYNTHROID Your next dose is: Alternate with 25 mg dose Take one tab by mouth every other day. Alternate 50 and 25 mcg dose. * levothyroxine 25 mcg tablet Commonly known as:  SYNTHROID Your next dose is:  09/16/18 am  
   
 Take 1 Tab by mouth Daily (before breakfast). 25 mcg  
    
  
   
   
   
  
 olmesartan 40 mg tablet Commonly known as:  [employer]ands Your next dose is:  09/16/18 am  
   
 Take 0.5 Tabs by mouth daily. 20 mg  
    
  
   
   
   
  
 predniSONE 1 mg tablet Commonly known as:  Renée Hawks Your next dose is:  09/16/18 am  
   
 TAKE FOUR TABLETS BY MOUTH ONE TIME DAILY PROLIA 60 mg/mL injection Generic drug:  denosumab Your next dose is:  Resume home schedule 60 mg by SubCUTAneous route. 60 mg  
    
   
   
   
  
 VITAMIN D3 1,000 unit Cap Generic drug:  cholecalciferol Your next dose is:  09/16/18 am  
   
 Take 1 Cap by mouth daily. 1 Cap * Notice: This list has 2 medication(s) that are the same as other medications prescribed for you. Read the directions carefully, and ask your doctor or other care provider to review them with you. STOP taking these medications   
 amLODIPine 10 mg tablet Commonly known as:  Radha Che Where to Get Your Medications Information on where to get these meds will be given to you by the nurse or doctor. ! Ask your nurse or doctor about these medications ciprofloxacin HCl 250 mg tablet  
 metoprolol succinate 25 mg XL tablet Discharge Instructions DISCHARGE SUMMARY from Nurse PATIENT INSTRUCTIONS: 
 
After general anesthesia or intravenous sedation, for 24 hours or while taking prescription Narcotics: · Limit your activities · Do not drive and operate hazardous machinery · Do not make important personal or business decisions · Do  not drink alcoholic beverages · If you have not urinated within 8 hours after discharge, please contact your surgeon on call. What to do at Home: 
Recommended activity: Activity as tolerated. If you experience any of the following symptoms temp > 101.5, unrelieved pain, nausea or vomiting, shortness of breath or fatigue not relieved with rest, please follow up with MD. 
 
*  Please give a list of your current medications to your Primary Care Provider. *  Please update this list whenever your medications are discontinued, doses are 
    changed, or new medications (including over-the-counter products) are added. *  Please carry medication information at all times in case of emergency situations. These are general instructions for a healthy lifestyle: No smoking/ No tobacco products/ Avoid exposure to second hand smoke Surgeon General's Warning:  Quitting smoking now greatly reduces serious risk to your health. Obesity, smoking, and sedentary lifestyle greatly increases your risk for illness A healthy diet, regular physical exercise & weight monitoring are important for maintaining a healthy lifestyle You may be retaining fluid if you have a history of heart failure or if you experience any of the following symptoms:  Weight gain of 3 pounds or more overnight or 5 pounds in a week, increased swelling in our hands or feet or shortness of breath while lying flat in bed. Please call your doctor as soon as you notice any of these symptoms; do not wait until your next office visit. Recognize signs and symptoms of STROKE: 
 
F-face looks uneven A-arms unable to move or move unevenly S-speech slurred or non-existent T-time-call 911 as soon as signs and symptoms begin-DO NOT go Back to bed or wait to see if you get better-TIME IS BRAIN. Warning Signs of HEART ATTACK Call 911 if you have these symptoms: 
? Chest discomfort. Most heart attacks involve discomfort in the center of the chest that lasts more than a few minutes, or that goes away and comes back. It can feel like uncomfortable pressure, squeezing, fullness, or pain. ? Discomfort in other areas of the upper body. Symptoms can include pain or discomfort in one or both arms, the back, neck, jaw, or stomach. ? Shortness of breath with or without chest discomfort. ? Other signs may include breaking out in a cold sweat, nausea, or lightheadedness. Don't wait more than five minutes to call [address]! Fast action can save your life. Calling 911 is almost always the fastest way to get lifesaving treatment. Emergency Medical Services staff can begin treatment when they arrive  up to an hour sooner than if someone gets to the hospital by car. The discharge information has been reviewed with the patient and caregiver. The patient and caregiver verbalized understanding. Discharge medications reviewed with the patient and caregiver and appropriate educational materials and side effects teaching were provided. Anemia: Care Instructions Your Care Instructions Anemia is a low level of red blood cells, which carry oxygen throughout your body. Many things can cause anemia. Lack of iron is one of the most common causes. Your body needs iron to make hemoglobin, a substance in red blood cells that carries oxygen from the lungs to your body's cells. Without enough iron, the body produces fewer and smaller red blood cells.  As a result, your body's cells do not get enough oxygen, and you feel tired and weak. And you may have trouble concentrating. Bleeding is the most common cause of a lack of iron. You may have heavy menstrual bleeding or bleeding caused by conditions such as ulcers, hemorrhoids, or cancer. Regular use of aspirin or other anti-inflammatory medicines (such as ibuprofen) also can cause bleeding in some people. A lack of iron in your diet also can cause anemia, especially at times when the body needs more iron, such as during pregnancy, infancy, and the teen years. Your doctor may have prescribed iron pills. It may take several months of treatment for your iron levels to return to normal. Your doctor also may suggest that you eat foods that are rich in iron, such as meat and beans. There are many other causes of anemia. It is not always due to a lack of iron. Finding the specific cause of your anemia will help your doctor find the right treatment for you. Follow-up care is a key part of your treatment and safety. Be sure to make and go to all appointments, and call your doctor if you are having problems. It's also a good idea to know your test results and keep a list of the medicines you take. How can you care for yourself at home? · Take your medicines exactly as prescribed. Call your doctor if you think you are having a problem with your medicine. · If your doctor recommends iron pills, take them as directed: ¨ Try to take the pills on an empty stomach about 1 hour before or 2 hours after meals. But you may need to take iron with food to avoid an upset stomach. ¨ Do not take antacids or drink milk or caffeine drinks (such as coffee, tea, or cola) at the same time or within 2 hours of the time that you take your iron. They can make it hard for your body to absorb the iron. ¨ Vitamin C (from food or supplements) helps your body absorb iron. Try taking iron pills with a glass of orange juice or some other food that is high in vitamin C, such as citrus fruits. ¨ Iron pills may cause stomach problems, such as heartburn, nausea, diarrhea, constipation, and cramps. Be sure to drink plenty of fluids, and include fruits, vegetables, and fiber in your diet each day. Iron pills often make your bowel movements dark or green. ¨ If you forget to take an iron pill, do not take a double dose of iron the next time you take a pill. ¨ Keep iron pills out of the reach of small children. An overdose of iron can be very dangerous. · Follow your doctor's advice about eating iron-rich foods. These include red meat, shellfish, poultry, eggs, beans, raisins, whole-grain bread, and leafy green vegetables. · Steam vegetables to help them keep their iron content. When should you call for help? Call 911 anytime you think you may need emergency care. For example, call if: 
  · You have symptoms of a heart attack. These may include: ¨ Chest pain or pressure, or a strange feeling in the chest. 
¨ Sweating. ¨ Shortness of breath. ¨ Nausea or vomiting. ¨ Pain, pressure, or a strange feeling in the back, neck, jaw, or upper belly or in one or both shoulders or arms. ¨ Lightheadedness or sudden weakness. ¨ A fast or irregular heartbeat. After you call 911, the  may tell you to chew 1 adult-strength or 2 to 4 low-dose aspirin. Wait for an ambulance. Do not try to drive yourself.  
  · You passed out (lost consciousness).  
 Call your doctor now or seek immediate medical care if: 
  · You have new or increased shortness of breath.  
  · You are dizzy or lightheaded, or you feel like you may faint.  
  · Your fatigue and weakness continue or get worse.  
  · You have any abnormal bleeding, such as: 
¨ Nosebleeds. ¨ Vaginal bleeding that is different (heavier, more frequent, at a different time of the month) than what you are used to. ¨ Bloody or black stools, or rectal bleeding.  
¨ Bloody or pink urine.  
 Watch closely for changes in your health, and be sure to contact your doctor if: 
  · You do not get better as expected. Where can you learn more? Go to http://ronda-shen.info/. Enter R301 in the search box to learn more about \"Anemia: Care Instructions. \" Current as of: October 9, 2017 Content Version: 11.7 © 8281-2756 [employer]. Care instructions adapted under license by [employer] (which disclaims liability or warranty for this information). If you have questions about a medical condition or this instruction, always ask your healthcare professional. [address] any warranty or liability for your use of this information. Chest Pain: Care Instructions Your Care Instructions There are many things that can cause chest pain. Some are not serious and will get better on their own in a few days. But some kinds of chest pain need more testing and treatment. Your doctor may have recommended a follow-up visit in the next 8 to 12 hours. If you are not getting better, you may need more tests or treatment. Even though your doctor has released you, you still need to watch for any problems. The doctor carefully checked you, but sometimes problems can develop later. If you have new symptoms or if your symptoms do not get better, get medical care right away. If you have worse or different chest pain or pressure that lasts more than 5 minutes or you passed out (lost consciousness), call 911 or seek other emergency help right away. A medical visit is only one step in your treatment. Even if you feel better, you still need to do what your doctor recommends, such as going to all suggested follow-up appointments and taking medicines exactly as directed. This will help you recover and help prevent future problems. How can you care for yourself at home? · Rest until you feel better. · Take your medicine exactly as prescribed. Call your doctor if you think you are having a problem with your medicine. · Do not drive after taking a prescription pain medicine. When should you call for help? Call 911 if: 
  · You passed out (lost consciousness).  
  · You have severe difficulty breathing.  
  · You have symptoms of a heart attack. These may include: ¨ Chest pain or pressure, or a strange feeling in your chest. 
¨ Sweating. ¨ Shortness of breath. ¨ Nausea or vomiting. ¨ Pain, pressure, or a strange feeling in your back, neck, jaw, or upper belly or in one or both shoulders or arms. ¨ Lightheadedness or sudden weakness. ¨ A fast or irregular heartbeat. After you call 911, the  may tell you to chew 1 adult-strength or 2 to 4 low-dose aspirin. Wait for an ambulance. Do not try to drive yourself.  
 Call your doctor today if: 
  · You have any trouble breathing.  
  · Your chest pain gets worse.  
  · You are dizzy or lightheaded, or you feel like you may faint.  
  · You are not getting better as expected.  
  · You are having new or different chest pain. Where can you learn more? Go to http://ronda-shen.info/. Enter A120 in the search box to learn more about \"Chest Pain: Care Instructions. \" Current as of: November 20, 2017 Content Version: 11.7 © 0812-1063 [employer], [employer]. Care instructions adapted under license by [employer] (which disclaims liability or warranty for this information). If you have questions about a medical condition or this instruction, always ask your healthcare professional. [address] [address] any warranty or liability for your use of this information. ___________________________________________________________________________________________________________________________________ ACO Transitions of Care [place] [address] Caroline Negro offers a voluntary care coordination program to provide high quality service and care to [facility] fee-for-service beneficiariesOsmel Murray was designed to help you enhance your health and well-being through the following services: ? Transitions of Care  support for individuals who are transitioning from one care setting to another (example: Hospital to home). ? Chronic and Complex Care Coordination  support for individuals and caregivers of those with serious or chronic illnesses or with more than one chronic (ongoing) condition and those who take a number of different medications. If you meet specific medical criteria, a [address] [place] [address] Rd may call you directly to coordinate your care with your primary care physician and your other care providers. For questions about the [facility] programs, please, contact your physicians office. For general questions or additional information about Accountable Care Organizations: 
Please visit www.medicare.gov/acos. html or call 1-800-MEDICARE (3-921.345.8087) TTY users should call 9-990.737.3449. [email or web address] Announcement We are excited to announce that we are making your provider's discharge notes available to you in [email or web address]. You will see these notes when they are completed and signed by the physician that discharged you from your recent hospital stay. If you have any questions or concerns about any information you see in iListhart, please call the Health Information Department where you were seen or reach out to your Primary Care Provider for more information about your plan of care. Introducing [facility]! Dear Melonie Massey: 
Thank you for requesting a [email or web address] account. Our records indicate that you already have an active [email or web address] account. You can access your account anytime at [email or web address] Did you know that you can access your hospital and ER discharge instructions at any time in [email or web address]? You can also review all of your test results from your hospital stay or ER visit. Additional Information If you have questions, please visit the Frequently Asked Questions section of the MyChart website at [email or web address] Remember, Kids Quizinehart is NOT to be used for urgent needs. For medical emergencies, dial 911. Now available from your iPhone and Android! Introducing Asaf Bañuelos As a [employer] patient, I wanted to make you aware of our electronic visit tool called Asaf Bañuelos. [employer] 24/7 allows you to connect within minutes with a medical provider 24 hours a day, seven days a week via a mobile device or tablet or logging into a secure website from your computer. You can access Asaf Bañuelos from anywhere in the [place]. A virtual visit might be right for you when you have a simple condition and feel like you just dont want to get out of bed, or cant get away from work for an appointment, when your regular [employer] provider is not available (evenings, weekends or holidays), or when youre out of town and need minor care. Electronic visits cost only $49 and if the [employer] 24/7 provider determines a prescription is needed to treat your condition, one can be electronically transmitted to a nearby pharmacy*. Please take a moment to enroll today if you have not already done so. The enrollment process is free and takes just a few minutes. To enroll, please download the [employer] 24/7 scott to your tablet or phone, or visit [email or web address] to enroll on your computer. And, as an [address] [place] [address] patient with a [employer] account, the results of your visits will be scanned into your electronic medical record and your primary care provider will be able to view the scanned results. We urge you to continue to see your regular [employer] provider for your ongoing medical care.   And while your primary care provider may not be the one available when you seek a Gumroadalexandreafin virtual visit, the peace of mind you get from getting a real diagnosis real time can be priceless. For more information on Gumroadalexandreafin, view our Frequently Asked Questions (FAQs) at [email or web address]. Sincerely, 
 
Heaven Dixon MD 
Chief Medical Officer Zaina Negro *:  certain medications cannot be prescribed via Gumroadalexandreaearthmine Unresulted Labs-Please follow up with your PCP about these lab tests Order Current Status CULTURE, URINE Preliminary result Providers Seen During Your Hospitalization Provider Specialty Primary office phone Belkys Prado MD Emergency Medicine [phone] Imer Araiza MD Cardiology [phone] Lindsay De La Cruz MD Internal Medicine [phone] Your Primary Care Physician (PCP) Primary Care Physician Office Phone Office Fax Dyana Saha [phone] You are allergic to the following Allergen Reactions Calcium Carbonate Diarrhea Ranitidine Hcl Diarrhea Guaifenesin Other (comments) Hallucinations Recent Documentation Height Weight BMI OB Status Smoking Status 1.422 m 47 kg 23.23 kg/m2 Postmenopausal Never Smoker Emergency Contacts Name Discharge Info Relation Home Work Mobile Jovana Lee  Child [2] [id number] Patient Belongings The following personal items are in your possession at time of discharge: 
  Dental Appliances: None  Visual Aid: Glasses      Home Medications: None   Jewelry: Ring (two)  Clothing: Shirt, Pants, Undergarments    Other Valuables: None Please provide this summary of care documentation to your next provider. Signatures-by signing, you are acknowledging that this After Visit Summary has been reviewed with you and you have received a copy.   
  
 
  
    
    
 Patient Signature: ____________________________________________________________ Date:  ____________________________________________________________  
  
Moshe Houston Provider Signature:  ____________________________________________________________ Date:  ____________________________________________________________

## 2018-09-12 NOTE — IP AVS SNAPSHOT
[address] 
[phone] Patient: Masha Francis MRN: [id number] :[id number] A check mayank indicates which time of day the medication should be taken. My Medications START taking these medications Instructions Each Dose to Equal  
 Morning Noon Evening Bedtime  
 ciprofloxacin HCl 250 mg tablet Commonly known as:  CIPRO Your next dose is:  09/15/18 pm  
   
 Take 1 Tab by mouth every twelve (12) hours. 250 mg  
    
  
   
   
   
  
  
 metoprolol succinate 25 mg XL tablet Commonly known as:  TOPROL-XL Start taking on:  2018 Your next dose is:  18 am  
   
 Take 1 Tab by mouth daily. 25 mg CONTINUE taking these medications Instructions Each Dose to Equal  
 Morning Noon Evening Bedtime  
 aspirin delayed-release 81 mg tablet Your next dose is:  18 am  
   
 Take 1 Tab by mouth daily. 81 mg  
    
  
   
   
   
  
 atorvastatin 10 mg tablet Commonly known as:  LIPITOR Your next dose is:  09/15/18 pm  
   
 Take 1/2 tab every other night. bevacizumab 25 mg/ml Soln intravitreal solution Commonly known as:  AVASTIN  
   
 by IntraVITreal route once. CALCIUM 600 + D(3) 600 mg(1,500mg) -200 unit Tab Generic drug:  calcium-vitamin D Your next dose is:  18 am  
   
 Take 1 Tab by mouth. 1 Tab * levothyroxine 50 mcg tablet Commonly known as:  SYNTHROID Your next dose is: Alternate with 25 mg dose Take one tab by mouth every other day. Alternate 50 and 25 mcg dose. * levothyroxine 25 mcg tablet Commonly known as:  SYNTHROID Your next dose is:  18 am  
   
 Take 1 Tab by mouth Daily (before breakfast). 25 mcg  
    
  
   
   
   
  
 olmesartan 40 mg tablet Commonly known as:  [employer]ands  
 Your next dose is:  09/16/18 am  
   
 Take 0.5 Tabs by mouth daily. 20 mg  
    
  
   
   
   
  
 predniSONE 1 mg tablet Commonly known as:  Dayanara Gelineau Your next dose is:  09/16/18 am  
   
 TAKE FOUR TABLETS BY MOUTH ONE TIME DAILY PROLIA 60 mg/mL injection Generic drug:  denosumab Your next dose is:  Resume home schedule 60 mg by SubCUTAneous route. 60 mg  
    
   
   
   
  
 VITAMIN D3 1,000 unit Cap Generic drug:  cholecalciferol Your next dose is:  09/16/18 am  
   
 Take 1 Cap by mouth daily. 1 Cap * Notice: This list has 2 medication(s) that are the same as other medications prescribed for you. Read the directions carefully, and ask your doctor or other care provider to review them with you. STOP taking these medications   
 amLODIPine 10 mg tablet Commonly known as:  Kari Bermeo Where to Get Your Medications Information on where to get these meds will be given to you by the nurse or doctor. ! Ask your nurse or doctor about these medications  
  ciprofloxacin HCl 250 mg tablet  
 metoprolol succinate 25 mg XL tablet

## 2018-09-13 PROBLEM — D64.9 ANEMIA: Status: ACTIVE | Noted: 2018-09-13

## 2018-09-13 LAB
APTT PPP: 36.4 SEC (ref 23.2–35.3)
ATRIAL RATE: 66 BPM
ATRIAL RATE: 70 BPM
CALCULATED P AXIS, ECG09: -26 DEGREES
CALCULATED P AXIS, ECG09: 54 DEGREES
CALCULATED R AXIS, ECG10: -55 DEGREES
CALCULATED R AXIS, ECG10: -57 DEGREES
CALCULATED T AXIS, ECG11: 105 DEGREES
CALCULATED T AXIS, ECG11: 90 DEGREES
DIAGNOSIS, 93000: NORMAL
DIAGNOSIS, 93000: NORMAL
HCT VFR BLD AUTO: 27.7 % (ref 35.8–46.3)
HCT VFR BLD AUTO: 33.8 % (ref 35.8–46.3)
HGB BLD-MCNC: 10.5 G/DL (ref 11.7–15.4)
HGB BLD-MCNC: 8.2 G/DL (ref 11.7–15.4)
INR PPP: 1.1
P-R INTERVAL, ECG05: 128 MS
P-R INTERVAL, ECG05: 184 MS
PROTHROMBIN TIME: 13.5 SEC (ref 11.5–14.5)
Q-T INTERVAL, ECG07: 402 MS
Q-T INTERVAL, ECG07: 410 MS
QRS DURATION, ECG06: 100 MS
QRS DURATION, ECG06: 94 MS
QTC CALCULATION (BEZET), ECG08: 421 MS
QTC CALCULATION (BEZET), ECG08: 442 MS
TROPONIN I BLD-MCNC: 0.28 NG/ML (ref 0.02–0.05)
TROPONIN I SERPL-MCNC: 18.7 NG/ML (ref 0.02–0.05)
TROPONIN I SERPL-MCNC: 23.9 NG/ML (ref 0.02–0.05)
VENTRICULAR RATE, ECG03: 66 BPM
VENTRICULAR RATE, ECG03: 70 BPM

## 2018-09-13 PROCEDURE — 74011250637 HC RX REV CODE- 250/637: Performed by: INTERNAL MEDICINE

## 2018-09-13 PROCEDURE — 86901 BLOOD TYPING SEROLOGIC RH(D): CPT

## 2018-09-13 PROCEDURE — 77030012893

## 2018-09-13 PROCEDURE — 74011636637 HC RX REV CODE- 636/637: Performed by: INTERNAL MEDICINE

## 2018-09-13 PROCEDURE — 74011250636 HC RX REV CODE- 250/636: Performed by: INTERNAL MEDICINE

## 2018-09-13 PROCEDURE — 84484 ASSAY OF TROPONIN QUANT: CPT

## 2018-09-13 PROCEDURE — 30233N1 TRANSFUSION OF NONAUTOLOGOUS RED BLOOD CELLS INTO PERIPHERAL VEIN, PERCUTANEOUS APPROACH: ICD-10-PCS | Performed by: INTERNAL MEDICINE

## 2018-09-13 PROCEDURE — 77030039270 HC TU BLD FLTR CARD -A

## 2018-09-13 PROCEDURE — 85730 THROMBOPLASTIN TIME PARTIAL: CPT

## 2018-09-13 PROCEDURE — C9113 INJ PANTOPRAZOLE SODIUM, VIA: HCPCS | Performed by: INTERNAL MEDICINE

## 2018-09-13 PROCEDURE — 99218 HC RM OBSERVATION: CPT

## 2018-09-13 PROCEDURE — 93005 ELECTROCARDIOGRAM TRACING: CPT | Performed by: INTERNAL MEDICINE

## 2018-09-13 PROCEDURE — A9270 NON-COVERED ITEM OR SERVICE: HCPCS | Performed by: INTERNAL MEDICINE

## 2018-09-13 PROCEDURE — 85014 HEMATOCRIT: CPT

## 2018-09-13 PROCEDURE — 93306 TTE W/DOPPLER COMPLETE: CPT

## 2018-09-13 PROCEDURE — 36415 COLL VENOUS BLD VENIPUNCTURE: CPT

## 2018-09-13 PROCEDURE — 86923 COMPATIBILITY TEST ELECTRIC: CPT

## 2018-09-13 PROCEDURE — P9016 RBC LEUKOCYTES REDUCED: HCPCS

## 2018-09-13 PROCEDURE — 77030020263 HC SOL INJ SOD CL0.9% LFCR 1000ML

## 2018-09-13 PROCEDURE — 36430 TRANSFUSION BLD/BLD COMPNT: CPT

## 2018-09-13 PROCEDURE — 85610 PROTHROMBIN TIME: CPT

## 2018-09-13 PROCEDURE — 74011000250 HC RX REV CODE- 250: Performed by: INTERNAL MEDICINE

## 2018-09-13 RX ORDER — AMLODIPINE BESYLATE 5 MG/1
5 TABLET ORAL 2 TIMES DAILY
Status: DISCONTINUED | OUTPATIENT
Start: 2018-09-13 | End: 2018-09-14

## 2018-09-13 RX ORDER — BEVACIZUMAB 100 MG/4ML
0.62 INJECTION, SOLUTION INTRAVENOUS ONCE
Status: DISCONTINUED | OUTPATIENT
Start: 2018-09-13 | End: 2018-09-13

## 2018-09-13 RX ORDER — ATORVASTATIN CALCIUM 40 MG/1
80 TABLET, FILM COATED ORAL
Status: DISCONTINUED | OUTPATIENT
Start: 2018-09-13 | End: 2018-09-15 | Stop reason: HOSPADM

## 2018-09-13 RX ORDER — PREDNISONE 10 MG/1
10 TABLET ORAL
Status: DISCONTINUED | OUTPATIENT
Start: 2018-09-13 | End: 2018-09-15 | Stop reason: HOSPADM

## 2018-09-13 RX ORDER — SODIUM CHLORIDE 9 MG/ML
75 INJECTION, SOLUTION INTRAVENOUS CONTINUOUS
Status: DISCONTINUED | OUTPATIENT
Start: 2018-09-13 | End: 2018-09-13

## 2018-09-13 RX ORDER — ONDANSETRON 2 MG/ML
4 INJECTION INTRAMUSCULAR; INTRAVENOUS
Status: DISCONTINUED | OUTPATIENT
Start: 2018-09-13 | End: 2018-09-15 | Stop reason: HOSPADM

## 2018-09-13 RX ORDER — ATORVASTATIN CALCIUM 10 MG/1
10 TABLET, FILM COATED ORAL
Status: DISCONTINUED | OUTPATIENT
Start: 2018-09-13 | End: 2018-09-13

## 2018-09-13 RX ORDER — LEVOTHYROXINE SODIUM 50 UG/1
25 TABLET ORAL
Status: DISCONTINUED | OUTPATIENT
Start: 2018-09-13 | End: 2018-09-15 | Stop reason: HOSPADM

## 2018-09-13 RX ORDER — ASPIRIN 81 MG/1
81 TABLET ORAL DAILY
Status: DISCONTINUED | OUTPATIENT
Start: 2018-09-13 | End: 2018-09-13

## 2018-09-13 RX ORDER — ASPIRIN 325 MG
325 TABLET ORAL DAILY
Status: DISCONTINUED | OUTPATIENT
Start: 2018-09-13 | End: 2018-09-14

## 2018-09-13 RX ORDER — OLMESARTAN MEDOXOMIL 20 MG/1
20 TABLET ORAL DAILY
Status: DISCONTINUED | OUTPATIENT
Start: 2018-09-13 | End: 2018-09-15 | Stop reason: HOSPADM

## 2018-09-13 RX ORDER — HEPARIN SODIUM 5000 [USP'U]/100ML
12-25 INJECTION, SOLUTION INTRAVENOUS
Status: DISCONTINUED | OUTPATIENT
Start: 2018-09-13 | End: 2018-09-15 | Stop reason: HOSPADM

## 2018-09-13 RX ORDER — ACETAMINOPHEN 325 MG/1
650 TABLET ORAL
Status: DISCONTINUED | OUTPATIENT
Start: 2018-09-13 | End: 2018-09-15 | Stop reason: HOSPADM

## 2018-09-13 RX ORDER — SUCRALFATE 1 G/10ML
1 SUSPENSION ORAL
Status: DISCONTINUED | OUTPATIENT
Start: 2018-09-13 | End: 2018-09-15 | Stop reason: HOSPADM

## 2018-09-13 RX ORDER — SODIUM CHLORIDE 9 MG/ML
250 INJECTION, SOLUTION INTRAVENOUS AS NEEDED
Status: DISCONTINUED | OUTPATIENT
Start: 2018-09-13 | End: 2018-09-15 | Stop reason: HOSPADM

## 2018-09-13 RX ADMIN — SUCRALFATE 1 G: 1 SUSPENSION ORAL at 16:43

## 2018-09-13 RX ADMIN — SODIUM CHLORIDE 40 MG: 9 INJECTION INTRAMUSCULAR; INTRAVENOUS; SUBCUTANEOUS at 05:29

## 2018-09-13 RX ADMIN — HEPARIN SODIUM AND DEXTROSE 12 UNITS/KG/HR: 5000; 5 INJECTION INTRAVENOUS at 11:31

## 2018-09-13 RX ADMIN — SODIUM CHLORIDE 40 MG: 9 INJECTION INTRAMUSCULAR; INTRAVENOUS; SUBCUTANEOUS at 21:44

## 2018-09-13 RX ADMIN — OLMESARTAN MEDOXOMIL 20 MG: 20 TABLET, FILM COATED ORAL at 09:38

## 2018-09-13 RX ADMIN — AMLODIPINE BESYLATE 5 MG: 5 TABLET ORAL at 16:43

## 2018-09-13 RX ADMIN — SUCRALFATE 1 G: 1 SUSPENSION ORAL at 11:34

## 2018-09-13 RX ADMIN — PREDNISONE 10 MG: 10 TABLET ORAL at 09:38

## 2018-09-13 RX ADMIN — SODIUM CHLORIDE 40 MG: 9 INJECTION INTRAMUSCULAR; INTRAVENOUS; SUBCUTANEOUS at 09:38

## 2018-09-13 RX ADMIN — LEVOTHYROXINE SODIUM 25 MCG: 50 TABLET ORAL at 05:30

## 2018-09-13 RX ADMIN — ASPIRIN 325 MG ORAL TABLET 325 MG: 325 PILL ORAL at 11:34

## 2018-09-13 RX ADMIN — AMLODIPINE BESYLATE 5 MG: 5 TABLET ORAL at 09:38

## 2018-09-13 RX ADMIN — SODIUM CHLORIDE 75 ML/HR: 900 INJECTION, SOLUTION INTRAVENOUS at 02:36

## 2018-09-13 RX ADMIN — ATORVASTATIN CALCIUM 80 MG: 40 TABLET, FILM COATED ORAL at 21:44

## 2018-09-13 RX ADMIN — SUCRALFATE 1 G: 1 SUSPENSION ORAL at 05:30

## 2018-09-13 NOTE — PROGRESS NOTES
Patient stable with no complaints at this time. Patient is receiving blood still and is having no s/s of reaction. Patient resting in bed with daughter at bedside for support. Patient denies any pain and appears comfortable. Call light within reach and patient instructed to call if assistance is needed. Report to be given to oncoming RN 7p-7a

## 2018-09-13 NOTE — PROGRESS NOTES
Patient with elevated cardiac biomarkers troponin of 23. Patient not given aspirin nor placed on heparin due to hemoglobin of 8 previous 13. Hemoglobin unchanged today. Patient chest pain-free serial ECG reveals inferior Q waves suggesting patient is evolving through inferior MI. Currently asymptomatic. We discussed additional interventional evaluation and patient is reluctant at her age to proceed with invasive procedures. At this time recommend blood transfusion with goal hemoglobin greater than 9 patient given aspirin due to what appears to be chronic gastrointestinal blood loss and will be placed on intravenous heparin for 48 hours [no bolus]. Plan a conservative therapy for now will discuss with family again. Holding on BB now due to poteintial for bradycardia currently hypertensive. Echo pending.

## 2018-09-13 NOTE — H&P
History and Physical 
 
Patient: Lance Cabezas MRN: [id number]  SSN: [id number] YOB: 1928  Age: 80 y.o. Sex: female Subjective:  
Cc :\" I have chest pain\" Lance Cabezas is a 80 y.o. female who has a PMH of HTN, hypothyroidism, chronic back pain, GERD, giant cell arteritis on chronic prednisone, polymyalgia rheumatica, macular degeneration, who came after she experienced retrosternal chest pain, pressure like, 10/10, radiated to both arms while she was having dinner around 5:30 pm. Her daughter was there and took her to the bathroom to have a BM and suddenly the patient had a near syncopal episode. She had no LOC, nor a fall. Denied dizziness, palpitations, abdominal pain, diarrhea. She did complained of heartburn and took some tums with no improvement. EMS was contacted secondary to her event. ROS: patient complains of chronic left groin pain. Denies falls,  hematemesis, melena. Upon ER arrival VS were stable. She had retrosternal chest pain that resolved after 2 nitroglycerin were given. Labs: normal troponin. Hb 8 ( last from 12/2017 12 gr ), chemistry: normal; CXR: unrevealing, EKG: incomplete RBBB, NSR. FOBT positive. Cardiology will see patient as consult. Hospitalist was consulted due to chest pain and anemia. PMH: as above Social hx: non smoker Family hx: her mother had stomach cancer; her father had OA, HTN Past Medical History:  
Diagnosis Date  Acquired hypothyroidism  Gastroesophageal reflux disease 12/9/2016  Giant cell arteritis ([address]  Giant cell arteritis ([address] 3/20/2010  Hypertension  Macular degeneration  Other ill-defined conditions(799.89)   
 back and pelvis pain  Other ill-defined conditions(799.89)   
 hip fx ;   arteritis  TIA (transient ischemic attack) 6/23/2015 Past Surgical History:  
Procedure Laterality Date  HX CHOLECYSTECTOMY  HX HEENT    
 tonsilectomy  HX ORTHOPAEDIC    
  
 Family History Problem Relation Age of Onset  Cancer Father   
  st[facility]ach [facility] Arthritis-osteo Mother  Hypertension Mother  Arthritis-osteo Sister Social History Substance Use Topics  Smoking status: Never Smoker  Smokeless tobacco: Never Used  Alcohol use No  
  
Prior to Admission medications Medication Sig Start Date End Date Taking? Authorizing Provider  
levothyroxine (SYNTHROID) 25 mcg tablet Take 1 Tab by mouth Daily (before breakfast). 8/31/18   Socorro Delgadillo MD  
levothyroxine (SYNTHROID) 50 mcg tablet Take one tab by mouth every other day. Alternate 50 and 25 mcg dose. 3/22/18   Socorro Delgadillo MD  
olmesartan (BENICAR) 40 mg tablet Take 0.5 Tabs by mouth daily. 1/15/18   Socorro Delgadillo MD  
amLODIPine (NORVASC) 10 mg tablet Take 1 Tab by mouth daily. 12/19/17   Socorro Delgadillo MD  
atorvastatin (LIPITOR) 10 mg tablet Take 1/2 tab every other night. 12/19/17   Socorro Delgadillo MD  
predniSONE (DELTASONE) 1 mg tablet TAKE FOUR TABLETS BY MOUTH ONE TIME DAILY 2/13/17   Historical Provider  
calcium-vitamin D (CALCIUM 600 + D,3,) 600 mg(1,500mg) -200 unit tab Take 1 Tab by mouth. 6/16/15   Historical Provider  
aspirin delayed-release 81 mg tablet Take 1 Tab by mouth daily. 6/24/15   Roddy Ash MD  
Cholecalciferol, Vitamin D3, (VITAMIN D3) 1,000 unit cap Take 1 Cap by mouth daily. Historical Provider Denosumab (PROLIA) 60 mg/mL injection 60 mg by SubCUTAneous route. Historical Provider  
bevacizumab (AVASTIN) 25 mg/ml soln intravitreal solution by IntraVITreal route once. Historical Provider Allergies Allergen Reactions  Calcium Carbonate Diarrhea  Ranitidine Hcl Diarrhea  Guaifenesin Other (comments) Hallucinations Review of Systems: A comprehensive review of systems was negative except for that written in the History of Present Illness. Objective:  
 
Vitals:  
 09/12/18 1848 09/12/18 2132 09/12/18 2338 BP: 156/74 179/79 169/81 Pulse: 69 72 70 Resp: 16 Temp: 97.9 °F (36.6 °C) SpO2: 90%  93% Weight: 47.2 kg (104 lb) Height: 4' 8\" (1.422 m) Physical Exam: 
GENERAL: alert, cooperative, no distress, appears stated age EYE: negative LYMPHATIC: Cervical, supraclavicular, and axillary nodes normal.  
THROAT & NECK: normal and no erythema or exudates noted. LUNG: clear to auscultation bilaterally HEART: regular rate and rhythm, S1, S2 normal, no murmur, click, rub or gallop ABDOMEN: soft, non-tender. Bowel sounds normal. She has a ventral hernia. no organomegaly Left groin area: no masses, no hernia. EXTREMITIES:  extremities normal, atraumatic, no cyanosis or edema SKIN: Normal. 
NEUROLOGIC: negative PSYCHIATRIC: non focal 
 
Assessment:  
 
Hospital Problems  Date Reviewed: 9/13/2018 Codes Class Noted POA * (Principal)Chest pain ICD-10-CM: R07.9 ICD-9-CM: 786.50  9/12/2018 Unknown Acquired hypothyroidism ICD-10-CM: E03.9 ICD-9-CM: 244.9  2/15/2017 Yes Hyperlipidemia LDL goal <100 ICD-10-CM: E78.5 ICD-9-CM: 272.4  12/9/2016 Yes Giant cell arteritis (HCC) (Chronic) ICD-10-CM: M31.6 ICD-9-CM: 446.5  3/20/2010 Yes Hypertension (Chronic) ICD-10-CM: I10 
ICD-9-CM: 401.9  3/20/2010 Yes Plan: 1. Typical chest pain, improved after NTG was given: patient has no cardiac history, but is on chronic prednisone due to giant cell arteritis / chest pain precipitated by anemia / GERD also within the differentials 2. Acute blood loss anemia, possible related to PUD/gastritis/AVM/chronic prednisone and aspirin use 3. GERD 4. HTN 
5.Giant cell arteritis/polymyalgia rheumatica on prednisone Plan: 
Admit as observation under remote telemetry Clear liquid diet Normal saline IVF PPI IV Zofran, carafate Continue prednisone at 10 mg daily ( this is her last regimen suggested by her outside rheumatologist ) Hold aspirin Resume rest of home meds Monitor troponin HH q 12 hrs and transfuse prn Check urine culture in view of mild positive UA. Patient is asymptomatic Consider GI consult if her Hb keeps dropping or if she has overt bleeding Consider cardiology evaluation if her chest pain recurs or if abnormal troponin/ekg changes DVT ppx: GCS Code status: full Estimated LOS < 2MN Risk: high Estimated DC planning: home Goals of care discussed with patient and her daughter present in the room Signed By: Lily Schumacher MD   
 September 13, 2018

## 2018-09-13 NOTE — PROGRESS NOTES
Blood started with no difficulties. Patient relaxed in bed and appears comfortable. Family at bedside for support

## 2018-09-13 NOTE — PROGRESS NOTES
Problem: Falls - Risk of 
Goal: *Absence of Falls Document Pk Knowles Fall Risk and appropriate interventions in the flowsheet. Outcome: Progressing Towards Goal 
Fall Risk Interventions: 
Mobility Interventions: Assess mobility with egress test, OT consult for ADLs, Strengthening exercises (ROM-active/passive), Patient to call before getting OOB, PT Consult for mobility concerns, PT Consult for assist device competence, Communicate number of staff needed for ambulation/transfer Medication Interventions: Evaluate medications/consider consulting pharmacy Elimination Interventions: Patient to call for help with toileting needs, Call light in reach History of Falls Interventions: Bed/chair exit alarm, Investigate reason for fall, Consult care management for discharge planning, Door open when patient unattended, Evaluate medications/consider consulting pharmacy Problem: Pressure Injury - Risk of 
Goal: *Prevention of pressure injury Document Dmitry Scale and appropriate interventions in the flowsheet. Outcome: Progressing Towards Goal 
Pressure Injury Interventions: 
Sensory Interventions: Assess changes in LOC Moisture Interventions: Limit adult briefs Activity Interventions: Increase time out of bed, Pressure redistribution bed/mattress(bed type), PT/OT evaluation Mobility Interventions: HOB 30 degrees or less, PT/OT evaluation, Pressure redistribution bed/mattress (bed type) Nutrition Interventions: Document food/fluid/supplement intake, Discuss nutritional consult with provider, Offer support with meals,snacks and hydration Friction and Shear Interventions: Lift sheet

## 2018-09-13 NOTE — PROGRESS NOTES
Bedside report received from Sinan, [facility]. Assessment completed. Pt is lying in bed with heparin infusing at this time. Pt A&O x 4. Respirations even and unlabored. No s/sx of acute pain or distress. Bed in low, locked position. Call light within reach. Pt instructed to call for assistance. Will monitor.

## 2018-09-13 NOTE — ED PROVIDER NOTES
HPI Comments: Patient states she was having dessert after eating dinner around 5:30 PM.  She was at home sitting down when she had the relatively sudden onset of chest pain. She described it as a feeling like she had gas then it evolved into chest tightness. She states it radiated to both shoulders and got progressively worse. She denies any associated shortness of breath or nausea or diaphoresis. She denies similar pain in the past.  Her daughter states that she helped her to the restroom where she had a bowel movement. She then helped the patient to a chair where she collapsed and passed out. The patient states that she does have syncopal episodes sometimes when she has severe pain. She states her chest pain is currently gone but she does have some mild shoulder discomfort. The daughter states that she had chest pain for about 15 minutes prior to her syncopal episode. She denies any similar symptoms in the past, denies any prior cardiac workup. Elements of this note were made using speech recognition software. As such, errors of speech recognition may occur. Patient is a 80 y.o. female presenting with chest pain. The history is provided by the patient. Chest Pain (Angina) Pertinent negatives include no fever, no nausea and no vomiting. Past Medical History:  
Diagnosis Date  Acquired hypothyroidism  Gastroesophageal reflux disease 12/9/2016  Giant cell arteritis ([address]  Giant cell arteritis ([address] 3/20/2010  Hypertension  Macular degeneration  Other ill-defined conditions(799.89)   
 back and pelvis pain  Other ill-defined conditions(799.89)   
 hip fx ;   arteritis  TIA (transient ischemic attack) 6/23/2015 Past Surgical History:  
Procedure Laterality Date  HX CHOLECYSTECTOMY  HX HEENT    
 tonsilectomy  HX ORTHOPAEDIC Family History:  
Problem Relation Age of Onset  Cancer Father   
  st[facility] Arthritis-osteo Mother  Hypertension Mother  Arthritis-osteo Sister Social History Social History  Marital status:  Spouse name: N/A  
 Number of children: N/A  
 Years of education: N/A Occupational History  Not on file. Social History Main Topics  Smoking status: Never Smoker  Smokeless tobacco: Never Used  Alcohol use No  
 Drug use: No  
 Sexual activity: No  
 
Other Topics Concern  Not on file Social History Narrative ALLERGIES: Calcium carbonate; Ranitidine hcl; and Guaifenesin Review of Systems Constitutional: Negative for chills and fever. Cardiovascular: Positive for chest pain. Gastrointestinal: Negative for nausea and vomiting. All other systems reviewed and are negative. Vitals:  
 09/12/18 1848 BP: 156/74 Pulse: 69 Resp: 16 Temp: 97.9 °F (36.6 °C) SpO2: 90% Weight: 47.2 kg (104 lb) Height: 4' 8\" (1.422 m) Physical Exam  
Constitutional: She is oriented to person, place, and time. She appears well-developed and well-nourished. HENT:  
Head: Normocephalic and atraumatic. Eyes: Conjunctivae are normal. Pupils are equal, round, and reactive to light. Neck: Normal range of motion. Neck supple. Cardiovascular: Normal rate and regular rhythm. Pulmonary/Chest: Effort normal and breath sounds normal.  
Genitourinary: Rectal exam shows guaiac positive stool. Musculoskeletal: She exhibits no edema or tenderness. Neurological: She is alert and oriented to person, place, and time. Skin: Skin is warm and dry. Psychiatric: She has a normal mood and affect. Her behavior is normal.  
Nursing note and vitals reviewed. MDM Number of Diagnoses or Management Options Anemia, unspecified type: new and requires workup Chest pain, unspecified type: new and requires workup Essential hypertension:  
Gastrointestinal hemorrhage, unspecified gastrointestinal hemorrhage type: new and requires workup Hyperlipidemia LDL goal <100:  
Diagnosis management comments: 10:15 PM her shoulder discomfort was relieved with nitroglycerin ×2 
11:15 PM discussed results with patient and daughter, concern for unstable angina given her symptoms. I spoke with Dr. Génesis Yan who will see her for likely admission. 12:50 AM patient's latest hemoglobin per old records was 15, is 8 tonight. Given her heme positive stool, she will be admitted to the hospitalist.  I spoke with Dr. Rain Padilla who will see her for admission Amount and/or Complexity of Data Reviewed Clinical lab tests: ordered and reviewed Tests in the radiology section of CPT®: ordered and reviewed Tests in the medicine section of CPT®: reviewed and ordered Obtain history from someone other than the patient: yes Discuss the patient with other providers: yes Risk of Complications, Morbidity, and/or Mortality Presenting problems: moderate Diagnostic procedures: moderate Management options: moderate Patient Progress Patient progress: stable ED Course Procedures

## 2018-09-13 NOTE — PROGRESS NOTES
Admission assessment complete. Pt alert and oriented times four with daughter at bedside at this time. Pt is on RA with clear lung sounds. Pt unsteady on feet and a little week. Pt has no c/o pain or distress at this time. Dual skin assessment completed with Caleb Howell RN and Lyndle Cogan, RN. Pt skin has age spots and skin tags scattered throughout. Pt has small healing scab on LLE. Pt has scattered bruising. Pt is ecchymotic on upper extremities. Pt has a ventral hernia. Otherwise skin intact. Pt oriented to room and surroundings. Call light within reach. Pt instructed to call for assistance. Will monitor.

## 2018-09-13 NOTE — PROGRESS NOTES
PROGRESS NOTE I saw and examined the patient. Admitted after midnight. Chest pain resolved. Troponin bumped to 23.9. Ordered 2U PRBC. Cardiology discussed with patient and daughter and they opted for medical management at this time. Start ASA daily. Trend trop until going down. Appreciate Cardiology's input and assistance.  
 
Dennis Pritchett DO

## 2018-09-13 NOTE — PROGRESS NOTES
In accordance with Medicare guidelines, the Medicare Outpatient Observation Notice was provided to this Medicare patient. Oral explanation was provided and all questions answered. Signed document placed in the medical record under media tab. Copy provided to patient. Care manager notified.

## 2018-09-13 NOTE — PROGRESS NOTES
Problem: Interdisciplinary Rounds Goal: Interdisciplinary Rounds Interdisciplinary team rounds were held 9/13/2018 with the following team members:Care Management, Physical Therapy, Physician and Clinical Coordinator and the patient and child(yesi). Plan of care discussed. See clinical pathway and/or care plan for interventions and desired outcomes.

## 2018-09-13 NOTE — ED NOTES
TRANSFER - OUT REPORT: 
 
Verbal report given to Dianne Frank RN(name) on [facility]  being transferred to 819(unit) for routine progression of care Report consisted of patients Situation, Background, Assessment and  
Recommendations(SBAR). Information from the following report(s) SBAR and ED Summary was reviewed with the receiving nurse. Lines:  
Peripheral IV 09/13/18 Left Arm (Active) Site Assessment Clean, dry, & intact 9/13/2018  1:36 AM  
Phlebitis Assessment 0 9/13/2018  1:36 AM  
Infiltration Assessment 0 9/13/2018  1:36 AM  
Dressing Status Clean, dry, & intact 9/13/2018  1:36 AM  
Hub Color/Line Status Pink 9/13/2018  1:36 AM  
Alcohol Cap Used Yes 9/13/2018  1:36 AM  
  
 
Opportunity for questions and clarification was provided. Patient transported with: 
 [employer]

## 2018-09-13 NOTE — PROGRESS NOTES
Spiritual Care Visit, initial visit. Visited with patient and adult son and daughter in law at bedside. Patient did not respond. Prayed for patient's healing and health. Visit by Cecelia Medina, Staff .  Gillian., Siddhartha., B.A.

## 2018-09-13 NOTE — PROGRESS NOTES
TRANSFER - IN REPORT: 
 
Verbal report received from Joanna RN(name) on [facility]  being received from ER(unit) for routine progression of care Report consisted of patients Situation, Background, Assessment and  
Recommendations(SBAR). Information from the following report(s) SBAR, ED Summary, [facility] and Recent Results was reviewed with the receiving nurse. Opportunity for questions and clarification was provided. Assessment completed upon patients arrival to unit and care assumed.

## 2018-09-13 NOTE — PROGRESS NOTES
Care Management Interventions PCP Verified by CM: Yes Occupational Therapy Consult: No 
Current Support Network: Relative's Home Confirm Follow Up Transport: Family Plan discussed with Pt/Family/Caregiver: Yes Freedom of Choice Offered: Yes Discharge Location Discharge Placement: Unable to determine at this time Patient is alert and oriented in all spheres. Lives with her daughter. Occasionally uses a cane to ambulate. Independent with ADLs. Verified pcp. History of HH and str. CM following.

## 2018-09-13 NOTE — CONSULTS
[address] Cardiology Consult Date of  Admission: 9/12/2018  6:46 PM  
 
Primary Care Physician: Dr. Dinh Trinidad Primary Cardiologist: None Referring Physician: Dr. Bret Luis (ER MD) Consulting Physician: Dr. Giovanna Quinn 
 
CC/Reason for consult: chest pain Quinn Brooks is a 80 y.o. female with past medical history of hypothyroidism, giant cell arteritis, GERD and HTN who presented to the ER with complaints of chest pain. Patient reports that she developed chest pain after eating dinner. She describes her pain as 10/10 pressure like with radiation into bilateral arms. Her daughter assisted her to the restroom to have a BM. The daughter reports that the patient passed out. EMS was summoned. En route to the ER, patient was given 324mg ASA and SL nitro x 2 with relief in pain. Upon arrival to the ER, EKG showed NSR with ST depression in lead 1. Initial troponin was negative. Other labs showed HGB of 8.1 (was 13.2 in 12/17). Patient reports that she has had dark stools in the past but denies bloody stools. Rectal exam noted to be guaiac positive stool. We were asked to see patient in consultation regarding chest pain. Patient is currently chest pain free. Given findings in the ER, hospitalist has been asked to see patient for admission. Patient Active Problem List  
Diagnosis Code  Syncope R55  Bradycardia R00.1  Giant cell arteritis ([address] M31.6  Hypertension I10  Abnormal EKG R94.31  
 Back pain M54.9  TIA (transient ischemic attack) G45.9  Hyperlipidemia LDL goal <100 E78.5  Diarrhea R19.7  Gastroesophageal reflux disease K21.9  Insulin resistance E88.81  
 Acquired hypothyroidism E03.9  Chest pain R07.9 Past Medical History:  
Diagnosis Date  Acquired hypothyroidism  Gastroesophageal reflux disease 12/9/2016  Giant cell arteritis ([address]  Giant cell arteritis ([address] 3/20/2010  Hypertension  Macular degeneration  Other ill-defined conditions(799.89)   
 back and pelvis pain  Other ill-defined conditions(799.89)   
 hip fx ;   arteritis  TIA (transient ischemic attack) 6/23/2015 Past Surgical History:  
Procedure Laterality Date  HX CHOLECYSTECTOMY  HX HEENT    
 tonsilectomy  HX ORTHOPAEDIC Allergies Allergen Reactions  Calcium Carbonate Diarrhea  Ranitidine Hcl Diarrhea  Guaifenesin Other (comments) Hallucinations Family History Problem Relation Age of Onset  Cancer Father   
  st[facility]ach [facility] Arthritis-osteo Mother  Hypertension Mother  Arthritis-osteo Sister Current Facility-Administered Medications Medication Dose Route Frequency  atorvastatin (LIPITOR) tablet 10 mg  10 mg Oral QHS  bevacizumab (AVASTIN) intravitreal solution 0.625 mg  0.625 mg IntraVITreal ONCE  
 levothyroxine (SYNTHROID) tablet 25 mcg  25 mcg Oral 6am  
 olmesartan (BENICAR) tablet 20 mg  20 mg Oral DAILY  predniSONE (DELTASONE) tablet 10 mg  10 mg Oral DAILY WITH BREAKFAST  amLODIPine (NORVASC) tablet 5 mg  5 mg Oral BID  
 0.9% sodium chloride infusion  75 mL/hr IntraVENous CONTINUOUS  
 ondansetron (ZOFRAN) injection 4 mg  4 mg IntraVENous Q8H PRN  
 acetaminophen (TYLENOL) tablet 650 mg  650 mg Oral Q6H PRN  
 sucralfate (CARAFATE) 100 mg/mL oral suspension 1 g  1 g Oral TIDAC  pantoprazole (PROTONIX) 40 mg in sodium chloride 0.9% 10 mL injection  40 mg IntraVENous Q12H  
 nitroglycerin (NITROSTAT) tablet 0.4 mg  0.4 mg SubLINGual Q5MIN PRN Review of Systems Respiratory: Positive for shortness of breath. Cardiovascular: Positive for chest pain. Musculoskeletal: Positive for back pain. Neurological: Positive for dizziness. Near syncope All other systems reviewed and are negative. Physical Exam 
Vitals:  
 09/12/18 4082 09/12/18 2132 09/12/18 2338 09/13/18 0216 BP: 156/74 179/79 169/81 163/60 Pulse: 69 72 70 78 Resp: 16   18 Temp: 97.9 °F (36.6 °C)   97.8 °F (36.6 °C) SpO2: 90%  93% 90% Weight: 47.2 kg (104 lb) Height: 4' 8\" (1.422 m) Physical Exam: 
Physical Exam  
Constitutional: She is oriented to person, place, and time and well-developed, well-nourished, and in no distress. Cardiovascular: Normal rate. Pulmonary/Chest: Effort normal and breath sounds normal.  
Abdominal: Soft. Bowel sounds are normal.  
Neurological: She is alert and oriented to person, place, and time. Skin: Skin is warm and dry. Psychiatric: Affect normal.  
 
 
Cardiographics Telemetry: normal sinus rhythm ECG: normal EKG, normal sinus rhythm, nonspecific ST and T waves changes Echocardiogram: ordered/pending Labs:  
Recent Labs  
   09/13/18 
 0146  09/12/18 
 1931  09/12/18 
 1854 NA   --    --   143 K   --    --   4.2 BUN   --    --   22  
CREA   --    --   1.21* GLU   --    --   149* WBC   --   9.0   --   
HGB   --   8.1*   --   
HCT   --   27.1*   --   
PLT   --   185   --   
INR  1.1   --    --   
 
 
 Assessment/Plan: 
 
 Assessment:  
  
  Chest pain -- possibly due to symptomatic anemia. Initial troponin negative, second troponin mildly elevated. ASA not ordered on admission by primary team. Check echocardiogram in the AM to rule out WMA. Given anemia and + occult stool, patient is not a cath lab candidate at this time. Giant cell arteritis -- on oral prednisone as outpatient. Hypertension -- continue home medications. Monitor BP closely. Titrate medications as needed. Hyperlipidemia LDL goal <100 -- continue statin therapy. Acquired hypothyroidism -- continue home Synthroid dose Anemia -- work-up per primary team 
 
 
Thank you very much for this referral. We appreciate the opportunity to participate in this patient's care. We will follow along with above stated plan. Wallace Miller, PAL[employer] Consulting MD: Teodoro Champion

## 2018-09-13 NOTE — PROGRESS NOTES
Patient voices no concerns at this time. Patient resting in bed with family at bedside for support. Patient denies any pain and appears comfortable. Call light within reach and patient instructed to call if assistance is needed. Will continue to monitor.

## 2018-09-14 ENCOUNTER — APPOINTMENT (OUTPATIENT)
Dept: CT IMAGING | Age: 83
DRG: 281 | End: 2018-09-14
Attending: INTERNAL MEDICINE
Payer: MEDICARE

## 2018-09-14 PROBLEM — I21.4 NSTEMI (NON-ST ELEVATED MYOCARDIAL INFARCTION) (HCC): Status: ACTIVE | Noted: 2018-09-14

## 2018-09-14 LAB
ANION GAP SERPL CALC-SCNC: 10 MMOL/L (ref 7–16)
APPEARANCE UR: CLEAR
APTT PPP: 115.2 SEC (ref 23.2–35.3)
APTT PPP: 60.4 SEC (ref 23.2–35.3)
APTT PPP: 99.9 SEC (ref 23.2–35.3)
BILIRUB UR QL: NEGATIVE
BUN SERPL-MCNC: 8 MG/DL (ref 8–23)
CALCIUM SERPL-MCNC: 8.5 MG/DL (ref 8.3–10.4)
CHLORIDE SERPL-SCNC: 111 MMOL/L (ref 98–107)
CO2 SERPL-SCNC: 22 MMOL/L (ref 21–32)
COLOR UR: YELLOW
CREAT SERPL-MCNC: 0.55 MG/DL (ref 0.6–1)
GLUCOSE SERPL-MCNC: 92 MG/DL (ref 65–100)
GLUCOSE UR STRIP.AUTO-MCNC: NEGATIVE MG/DL
HCT VFR BLD AUTO: 33 % (ref 35.8–46.3)
HCT VFR BLD AUTO: 36 % (ref 35.8–46.3)
HGB BLD-MCNC: 10.2 G/DL (ref 11.7–15.4)
HGB BLD-MCNC: 10.7 G/DL (ref 11.7–15.4)
HGB UR QL STRIP: NEGATIVE
KETONES UR QL STRIP.AUTO: NEGATIVE MG/DL
LEUKOCYTE ESTERASE UR QL STRIP.AUTO: NEGATIVE
MAGNESIUM SERPL-MCNC: 1.9 MG/DL (ref 1.8–2.4)
NITRITE UR QL STRIP.AUTO: NEGATIVE
PH UR STRIP: 6 [PH] (ref 5–9)
POTASSIUM SERPL-SCNC: 3.1 MMOL/L (ref 3.5–5.1)
PROT UR STRIP-MCNC: ABNORMAL MG/DL
SODIUM SERPL-SCNC: 143 MMOL/L (ref 136–145)
SP GR UR REFRACTOMETRY: 1.02 (ref 1–1.02)
TROPONIN I SERPL-MCNC: 16.4 NG/ML (ref 0.02–0.05)
UROBILINOGEN UR QL STRIP.AUTO: 0.2 EU/DL (ref 0.2–1)

## 2018-09-14 PROCEDURE — 99218 HC RM OBSERVATION: CPT

## 2018-09-14 PROCEDURE — 74011250637 HC RX REV CODE- 250/637: Performed by: INTERNAL MEDICINE

## 2018-09-14 PROCEDURE — 81003 URINALYSIS AUTO W/O SCOPE: CPT

## 2018-09-14 PROCEDURE — 85018 HEMOGLOBIN: CPT

## 2018-09-14 PROCEDURE — 74011250636 HC RX REV CODE- 250/636: Performed by: INTERNAL MEDICINE

## 2018-09-14 PROCEDURE — 65270000029 HC RM PRIVATE

## 2018-09-14 PROCEDURE — 84484 ASSAY OF TROPONIN QUANT: CPT

## 2018-09-14 PROCEDURE — 85730 THROMBOPLASTIN TIME PARTIAL: CPT

## 2018-09-14 PROCEDURE — 80048 BASIC METABOLIC PNL TOTAL CA: CPT

## 2018-09-14 PROCEDURE — 83735 ASSAY OF MAGNESIUM: CPT

## 2018-09-14 PROCEDURE — A9270 NON-COVERED ITEM OR SERVICE: HCPCS | Performed by: INTERNAL MEDICINE

## 2018-09-14 PROCEDURE — 70450 CT HEAD/BRAIN W/O DYE: CPT

## 2018-09-14 PROCEDURE — 74011636637 HC RX REV CODE- 636/637: Performed by: INTERNAL MEDICINE

## 2018-09-14 PROCEDURE — 36415 COLL VENOUS BLD VENIPUNCTURE: CPT

## 2018-09-14 PROCEDURE — 74011000250 HC RX REV CODE- 250: Performed by: INTERNAL MEDICINE

## 2018-09-14 PROCEDURE — C9113 INJ PANTOPRAZOLE SODIUM, VIA: HCPCS | Performed by: INTERNAL MEDICINE

## 2018-09-14 RX ORDER — METOPROLOL SUCCINATE 25 MG/1
25 TABLET, EXTENDED RELEASE ORAL DAILY
Status: DISCONTINUED | OUTPATIENT
Start: 2018-09-14 | End: 2018-09-15 | Stop reason: HOSPADM

## 2018-09-14 RX ORDER — POTASSIUM CHLORIDE 20 MEQ/1
40 TABLET, EXTENDED RELEASE ORAL 3 TIMES DAILY
Status: DISPENSED | OUTPATIENT
Start: 2018-09-14 | End: 2018-09-15

## 2018-09-14 RX ORDER — PANTOPRAZOLE SODIUM 40 MG/1
40 TABLET, DELAYED RELEASE ORAL
Status: DISCONTINUED | OUTPATIENT
Start: 2018-09-14 | End: 2018-09-15 | Stop reason: HOSPADM

## 2018-09-14 RX ORDER — GUAIFENESIN 100 MG/5ML
81 LIQUID (ML) ORAL DAILY
Status: DISCONTINUED | OUTPATIENT
Start: 2018-09-14 | End: 2018-09-15 | Stop reason: HOSPADM

## 2018-09-14 RX ADMIN — HEPARIN SODIUM AND DEXTROSE 18 UNITS/KG/HR: 5000; 5 INJECTION INTRAVENOUS at 03:04

## 2018-09-14 RX ADMIN — METOPROLOL SUCCINATE 25 MG: 25 TABLET, EXTENDED RELEASE ORAL at 12:25

## 2018-09-14 RX ADMIN — POTASSIUM CHLORIDE 40 MEQ: 20 TABLET, EXTENDED RELEASE ORAL at 16:48

## 2018-09-14 RX ADMIN — SUCRALFATE 1 G: 1 SUSPENSION ORAL at 05:40

## 2018-09-14 RX ADMIN — AMLODIPINE BESYLATE 5 MG: 5 TABLET ORAL at 09:00

## 2018-09-14 RX ADMIN — SUCRALFATE 1 G: 1 SUSPENSION ORAL at 16:48

## 2018-09-14 RX ADMIN — PREDNISONE 10 MG: 10 TABLET ORAL at 09:00

## 2018-09-14 RX ADMIN — PANTOPRAZOLE SODIUM 40 MG: 40 TABLET, DELAYED RELEASE ORAL at 16:48

## 2018-09-14 RX ADMIN — ASPIRIN 81 MG 81 MG: 81 TABLET ORAL at 09:09

## 2018-09-14 RX ADMIN — SODIUM CHLORIDE 40 MG: 9 INJECTION INTRAMUSCULAR; INTRAVENOUS; SUBCUTANEOUS at 09:00

## 2018-09-14 RX ADMIN — LEVOTHYROXINE SODIUM 25 MCG: 50 TABLET ORAL at 05:40

## 2018-09-14 RX ADMIN — POTASSIUM CHLORIDE 40 MEQ: 20 TABLET, EXTENDED RELEASE ORAL at 12:27

## 2018-09-14 RX ADMIN — HEPARIN SODIUM AND DEXTROSE 18 UNITS/KG/HR: 5000; 5 INJECTION INTRAVENOUS at 17:58

## 2018-09-14 RX ADMIN — SUCRALFATE 1 G: 1 SUSPENSION ORAL at 12:27

## 2018-09-14 RX ADMIN — OLMESARTAN MEDOXOMIL 20 MG: 20 TABLET, FILM COATED ORAL at 09:00

## 2018-09-14 NOTE — PROGRESS NOTES
.2. Heparin infusing at 18 units/kg/hr with rate of 17 ml/hr. No change in heparin dosing. Next PTT in 6 hrs. Pharmacy to be updated. No abnormal bleeding.

## 2018-09-14 NOTE — PROGRESS NOTES
Bedside report received from Damien, [facility]. Assessment completed. Pt is sitting in chair with daughter at bedside at this time. Pt A&O x 4 at this time. Respirations even and unlabored. No s/sx of acute pain or distress. Bed in low, locked position. Call light within reach. Pt instructed to call for assistance. Will monitor.

## 2018-09-14 NOTE — PROGRESS NOTES
Hospitalist Progress Note Patient: Tho Rodrigues MRN: [id number]  SSN: [id number] YOB: 1928  Age: 80 y.o. Sex: female Admit Date: 9/12/2018 LOS: 0 days Subjective:  
 
From H&P: \"80 y.o. female who has a PMH of HTN, hypothyroidism, chronic back pain, GERD, giant cell arteritis on chronic prednisone, polymyalgia rheumatica, macular degeneration, who came after she experienced retrosternal chest pain, pressure like, 10/10, radiated to both arms while she was having dinner around 5:30 pm. Her daughter was there and took her to the bathroom to have a BM and suddenly the patient had a near syncopal episode. She had no LOC, nor a fall. Denied dizziness, palpitations, abdominal pain, diarrhea. She did complained of heartburn and took some tums with no improvement. EMS was contacted secondary to her event. ROS: patient complains of chronic left groin pain. Denies falls,  hematemesis, melena.  
Upon ER arrival VS were stable. She had retrosternal chest pain that resolved after 2 nitroglycerin were given. Labs: normal troponin. Hb 8 ( last from 12/2017 12 gr ), chemistry: normal; CXR: unrevealing, EKG: incomplete RBBB, NSR. FOBT positive. Cardiology will see patient as consult. Hospitalist was consulted due to chest pain and anemia. \" Her troponin went up to 23.9 so she was started on a heparin drip. Family and patient opted for medical management and no cardiac cath. 9/14 - She remains pain free. No CP. Denies SOB. She is eager to go home. Review of systems negative except stated above. Objective:  
 
Visit Vitals  /77 (BP Patient Position: Sitting)  Pulse 81  Temp 98.3 °F (36.8 °C)  Resp 20  
 Ht 4' 8\" (1.422 m)  Wt 47 kg (103 lb 9.6 oz)  SpO2 91%  BMI 23.23 kg/m2 Oxygen Therapy O2 Sat (%): 91 % (09/14/18 0350) Pulse via Oximetry: 71 beats per minute (09/12/18 2338) O2 Device: Room air (09/14/18 0740) ETCO2 (mmHg): 93 mmHg (09/14/18 0822) Intake and Output:  
Intake/Output Summary (Last 24 hours) at 09/14/18 1024 Last data filed at 09/14/18 0154 Gross per 24 hour Intake              556 ml Output             1700 ml Net            -1144 ml Physical Exam:  
GENERAL: alert, cooperative, no distress, appears stated age EYE: conjunctivae/corneas clear. PERRL. THROAT & NECK: normal and no erythema or exudates noted. LUNG: clear to auscultation bilaterally HEART: regular rate and rhythm, S1S2, no murmur, no JVD ABDOMEN: soft, non-tender, non-distended. Bowel sounds normal.  
EXTREMITIES:  No edema, 2+ pedal/radial pulses bilaterally SKIN: no rash or abnormalities NEUROLOGIC: A&Ox3. Cranial nerves 2-12 grossly intact. Lab/Data Review: 
Recent Results (from the past 24 hour(s)) TYPE + CROSSMATCH Collection Time: 09/13/18 11:35 AM  
Result Value Ref Range Crossmatch Expiration 09/16/2018 ABO/Rh(D) O NEGATIVE Antibody screen NEG Unit number [id number] Blood component type [facility] Unit division 00 Status of unit ALLOCATED Crossmatch result Compatible Unit number [id number] Blood component type [facility] Unit division 00 Status of unit TRANSFUSED Crossmatch result Compatible PTT Collection Time: 09/13/18  6:44 PM  
Result Value Ref Range aPTT 36.4 (H) 23.2 - 35.3 SEC  
HGB & HCT Collection Time: 09/13/18  8:28 PM  
Result Value Ref Range HGB 10.5 (L) 11.7 - 15.4 g/dL HCT 33.8 (L) 35.8 - 46.3 % TROPONIN I Collection Time: 09/13/18  8:28 PM  
Result Value Ref Range Troponin-I, Qt. 18.70 (HH) 0.02 - 8.61 NG/ML  
METABOLIC PANEL, BASIC Collection Time: 09/14/18  2:16 AM  
Result Value Ref Range Sodium 143 136 - 145 mmol/L Potassium 3.1 (L) 3.5 - 5.1 mmol/L Chloride 111 (H) 98 - 107 mmol/L  
 CO2 22 21 - 32 mmol/L Anion gap 10 7 - 16 mmol/L Glucose 92 65 - 100 mg/dL  BUN 8 8 - 23 MG/DL  
 Creatinine 0.55 (L) 0.6 - 1.0 MG/DL  
 GFR est AA >60 >60 ml/min/1.73m2 GFR est non-AA >60 >60 ml/min/1.73m2 Calcium 8.5 8.3 - 10.4 MG/DL  
PTT Collection Time: 18  2:16 AM  
Result Value Ref Range aPTT 60.4 (H) 23.2 - 35.3 SEC  
TROPONIN I Collection Time: 18  2:16 AM  
Result Value Ref Range Troponin-I, Qt. 16.40 (HH) 0.02 - 0.05 NG/ML  
HGB & HCT Collection Time: 18  2:16 AM  
Result Value Ref Range HGB 10.2 (L) 11.7 - 15.4 g/dL HCT 33.0 (L) 35.8 - 46.3 % PTT Collection Time: 18  9:08 AM  
Result Value Ref Range aPTT 99.9 (HH) 23.2 - 35.3 SEC Imaging: Xr Chest [facility] Result Date: 2018 Impression: Enlarged cardiac silhouette with grossly clear lungs. Results for orders placed or performed during the hospital encounter of 18  
2D ECHO COMPLETE ADULT (TTE) W OR WO CONTR Narrative [facility] [address] [place], [address] [facility] 
[phone] Transthoracic Echocardiogram 
2D, M-mode, Doppler, and Color Doppler Patient: Solo Geller 
MR #: [id number] : 1928 Age: 80 years Gender: Female Study date: 13-Sep-2018 Account #: [de-identified] Height: 58 in 
Weight: 103.8 lb 
BSA: 1.38 mï¾² Status:Routine Location: 819 BP: 163/ 60 Allergies: CALCIUM CARBONATE, RANITIDINE HCL, GUAIFENESIN Sonographer:  Young Avelar RDCS Group:  [facility] Referring Physician:  Miko Tello NP Reading Physician:  Zabrina Moreno MD [facility] INDICATIONS: Chest Pain PROCEDURE: This was a routine study. A transthoracic echocardiogram was 
performed. The study included complete 2D imaging, M-mode, complete spectral 
Doppler, and color Doppler. Image quality was adequate. LEFT VENTRICLE: Size was normal. Systolic function was normal. Ejection 
fraction was estimated in the range of 60 % to 65 %.  There were no regional 
 wall motion abnormalities. Wall thickness was normal. Left ventricular 
diastolic function was indeterminate. Average E/e'~ 11. RIGHT VENTRICLE: The size was normal. Systolic function was normal. Estimated 
peak pressure was in the range of 35-40 mmHg. LEFT ATRIUM: The atrium was moderately dilated. RIGHT ATRIUM: Size was normal. 
 
SYSTEMIC VEINS: IVC: The inferior vena cava was normal in size and course. AORTIC VALVE: The valve was trileaflet. Leaflets exhibited mild  
calcification. There was no evidence for stenosis. There was trivial regurgitation. MITRAL VALVE: Valve structure was normal. There was no evidence for stenosis. There was moderate regurgitation. TRICUSPID VALVE: The valve structure was normal. There was no evidence for 
stenosis. There was mild to moderate regurgitation. PULMONIC VALVE: Not well visualized. There was no evidence for stenosis. There 
was trivial regurgitation. PERICARDIUM: There was no pericardial effusion. AORTA: The root exhibited normal size. SUMMARY: 
 
-  Left ventricle: Systolic function was normal. Ejection fraction was 
estimated in the range of 60 % to 65 %. There were no regional wall motion 
abnormalities. -  Left atrium: The atrium was moderately dilated. -  Mitral valve: There was moderate regurgitation. 
 
-  Tricuspid valve: There was mild to moderate regurgitation. SYSTEM MEASUREMENT TABLES 
 
2D mode AoR Diam (2D): 2.6 cm 
LA Dimension (2D): 4.8 cm Left Atrium Systolic Volume Index; Method of Disks, Biplane; 2D mode;: 47.8 
ml/m2 IVS/LVPW (2D): 0.9 IVSd (2D): 0.9 cm LVIDd (2D): 3.5 cm LVIDs (2D): 2.2 cm 
LVOT Area (2D): 3.1 cm2 LVPWd (2D): 1 cm Tissue Doppler Imaging LV Peak Early Hatch Tissue Madhu; Mean; Lateral MA (TDI): 12.3 cm/s LV Peak Early Hatch Tissue Madhu; Medial MA (TDI): 8.3 cm/s Unspecified Scan Mode Peak Grad; Mean; Antegrade Flow: 14 mm[Hg] Vmax; Antegrade Flow: 185 cm/s LVOT Diam: 2 cm MV Peak Madhu/LV Peak Tissue Madhu E-Wave; Lateral MA: 9.3 MV Peak Madhu/LV Peak Tissue Madhu E-Wave; Medial MA: 13.8 Prepared and signed by 
 
Rey Odom MD [facility] [place], [place] Signed 13-Sep-2018 11:01:34 Cultures: All Micro Results Procedure Component Value Units Date/Time CULTURE, URINE [[id number]]  (Abnormal) Collected:  09/12/18 2039 Order Status:  Completed Specimen:  Urine from Clean catch Updated:  09/14/18 1971 Special Requests: NO SPECIAL REQUESTS Culture result:      
  50,000-100,000 COLONIES/mL GRAM NEGATIVE RODS SUBCULTURE IN PROGRESS (A)  
        
  50,000-100,000 COLONIES/mL MIXED SKIN MIKE ISOLATED Assessment/Plan:  
 
Principal Problem: 
  NSTEMI (non-ST elevated myocardial infarction) ([facility] [address] (9/14/2018) - Continue Heparin gtt x 24 hours - Continue ASA - Add Plavix tomorrow - Continue Benicar - Cardiology following Active Problems: 
  Chest pain (9/12/2018) - Resolved - Due to #1 Anemia (9/13/2018) - Stable s/p 2U PRBC 
- No s/s of active bleeding Giant cell arteritis ([facility] [address] (3/20/2010) - Continue Prednisone Hypertension (3/20/2010) - Stable - Continue Benicar - Continue Toprol Hyperlipidemia LDL goal <100 (12/9/2016) - Continue Lipitor Acquired hypothyroidism (2/15/2017) - Continue Levothyroxine Dispo - Heparin gtt x 24 more hours. Discharge tomorrow if ok with Cardiology DIET CARDIAC Regular DVT Prophylaxis: Heparin gtt Signed By: EL Church 67, DO September 14, 2018

## 2018-09-14 NOTE — PROGRESS NOTES
Daughter noticed increased confusion. At the bedside with patient noted to be more confused and impulsive. Decreased attention. Facial symmetry normal. PERRLA to left eye. Right eye legally blind. Disoriented to place, situation, and time. Oriented only to self. Patient speech clear. Bilateral upper and lower extremities purposeful and MAEW. VS in document flow sheet. Dr. Randall Billingsley at bedside.

## 2018-09-14 NOTE — PROGRESS NOTES
PTT resulted at 60.4. Rate increased by two. No bolus ordered for this time because pt has history of chronic GI bleed.

## 2018-09-14 NOTE — PROGRESS NOTES
PTT 99.9. Heparin infusing at 18 units/kg/hr with rate of 17 ml/hr. No change in heparin dosing. Next PTT in 6 hrs. Pharmacy to be updated. No abnormal bleeding.

## 2018-09-14 NOTE — PROGRESS NOTES
[facility] CARDIOLOGY PROGRESS NOTE 
      
 
9/14/2018 9:14 AM 
 
Admit Date: 9/12/2018 Subjective:  
No chest pain ROS Objective:  
  
Vitals:  
 09/14/18 9444 09/14/18 4272 09/14/18 8831 09/14/18 7267 BP: 145/70   137/77 Pulse: 70  76 81 Resp: 20   20 Temp: 98.5 °F (36.9 °C)   98.3 °F (36.8 °C) SpO2: 91% Weight:  47 kg (103 lb 9.6 oz) Height:      
 
 
 
Physical Exam  
Constitutional: She is oriented to person, place, and time. HENT:  
Head: Normocephalic and atraumatic. Eyes: Conjunctivae are normal. Pupils are equal, round, and reactive to light. Neck: Normal range of motion. No JVD present. Cardiovascular: Normal rate and normal heart sounds. No murmur heard. Pulmonary/Chest: Effort normal. She has no wheezes. Abdominal: She exhibits no distension. Musculoskeletal: She exhibits no edema. Neurological: She is alert and oriented to person, place, and time. No cranial nerve deficit. Skin: Skin is warm and dry. No rash noted. She is not diaphoretic. Psychiatric: She has a normal mood and affect. Data Review:  
Recent Labs  
   09/14/18 
 0216  09/13/18 2028 09/13/18 
 0146  09/12/18 
 1931  09/12/18 
 1854 NA  143   --    --    --    --   143  
K  3.1*   --    --    --    --   4.2 BUN  8   --    --    --    --   22  
CREA  0.55*   --    --    --    --   1.21* GLU  92   --    --    --    --   149* WBC   --    --    --    --   9.0   --   
HGB  10.2*  10.5*   < >   --   8.1*   --   
HCT  33.0*  33.8*   < >   --   27.1*   --   
PLT   --    --    --    --   185   --   
INR   --    --    --   1.1   --    --   
TROIQ  16.40*  18.70*   < >   --    --   0.02  
 < > = values in this interval not displayed. Intake/Output Summary (Last 24 hours) at 09/14/18 9107 Last data filed at 09/14/18 9304 Gross per 24 hour Intake              676 ml Output             1700 ml Net            -1024 ml Current Facility-Administered Medications Medication Dose Route Frequency  aspirin chewable tablet 81 mg  81 mg Oral DAILY  levothyroxine (SYNTHROID) tablet 25 mcg  25 mcg Oral 6am  
 olmesartan (BENICAR) tablet 20 mg  20 mg Oral DAILY  predniSONE (DELTASONE) tablet 10 mg  10 mg Oral DAILY WITH BREAKFAST  amLODIPine (NORVASC) tablet 5 mg  5 mg Oral BID  ondansetron (ZOFRAN) injection 4 mg  4 mg IntraVENous Q8H PRN  
 acetaminophen (TYLENOL) tablet 650 mg  650 mg Oral Q6H PRN  
 sucralfate (CARAFATE) 100 mg/mL oral suspension 1 g  1 g Oral TIDAC  pantoprazole (PROTONIX) 40 mg in sodium chloride 0.9% 10 mL injection  40 mg IntraVENous Q12H  
 0.9% sodium chloride infusion 250 mL  250 mL IntraVENous PRN  
 heparin 25,000 units in dextrose 500 mL infusion  12-25 Units/kg/hr IntraVENous TITRATE  atorvastatin (LIPITOR) tablet 80 mg  80 mg Oral QHS  nitroglycerin (NITROSTAT) tablet 0.4 mg  0.4 mg SubLINGual Q5MIN PRN Assessment/Plan: 1. NSTEMI management medically patient with anemia due to her age improvement of symptoms and normal left ventricular systolic function medical management was discussed. Continue intravenous heparin 48 hours aspirin Plavix to be initiated if hemoglobin remained stable. Long discussion yesterday regarding invasive versus noninvasive approach patient and family opt for noninvasive evaluation likely infarct of distal RCA territory. Plan to fold and low-dose beta blockers today to prevent recurrent angina. Stop  Norvasc and initiate low-dose metoprolol 2. Anemia status post transfusion per primary team?  Malignancy 3.  Hypokalemia per primary team 
 
 
 
Stephen Perkins MD 
9/14/2018 9:14 AM

## 2018-09-14 NOTE — HYPERBARIC MEDICINE NOTE
Letter of Determination: Upgrade from Observation to Inpatient Status This patient was originally hospitalized as Outpatient Status with Observation Services on 9/12/2018 for chest pain. This patient now meets for Inpatient Admission in accordance with CMS regulation Section 43 .3. Specifically, patient's stay is now over Two Midnights and was medically necessary. The patient's stay was medically necessary based on extreme advanced age, nasogastric tube placement, and giant cell arteritis. Consistent with CMS guidelines, patient meets for inpatient status. It is our recommendation that this patient's hospitalization status should be upgraded from OBSERVATION to INPATIENT status.  
  
The final decision regarding the patient's hospitalization status depends on the attending physician's judgement. Reva Trujillo MD, [facility], Physician Advisor [phone] [facility].

## 2018-09-15 VITALS
BODY MASS INDEX: 23.3 KG/M2 | WEIGHT: 103.6 LBS | TEMPERATURE: 98.1 F | HEIGHT: 56 IN | DIASTOLIC BLOOD PRESSURE: 70 MMHG | HEART RATE: 64 BPM | OXYGEN SATURATION: 95 % | SYSTOLIC BLOOD PRESSURE: 141 MMHG | RESPIRATION RATE: 18 BRPM

## 2018-09-15 PROBLEM — R41.0 DELIRIUM: Status: ACTIVE | Noted: 2018-09-15

## 2018-09-15 PROBLEM — N30.00 ACUTE CYSTITIS WITHOUT HEMATURIA: Status: ACTIVE | Noted: 2018-09-15

## 2018-09-15 LAB
ANION GAP SERPL CALC-SCNC: 10 MMOL/L (ref 7–16)
APTT PPP: 116.5 SEC (ref 23.2–35.3)
APTT PPP: 134 SEC (ref 23.2–35.3)
BUN SERPL-MCNC: 17 MG/DL (ref 8–23)
CALCIUM SERPL-MCNC: 8.3 MG/DL (ref 8.3–10.4)
CHLORIDE SERPL-SCNC: 113 MMOL/L (ref 98–107)
CO2 SERPL-SCNC: 20 MMOL/L (ref 21–32)
CREAT SERPL-MCNC: 0.67 MG/DL (ref 0.6–1)
GLUCOSE SERPL-MCNC: 86 MG/DL (ref 65–100)
HCT VFR BLD AUTO: 33.7 % (ref 35.8–46.3)
HGB BLD-MCNC: 10.3 G/DL (ref 11.7–15.4)
POTASSIUM SERPL-SCNC: 3.6 MMOL/L (ref 3.5–5.1)
SODIUM SERPL-SCNC: 143 MMOL/L (ref 136–145)
TSH SERPL DL<=0.005 MIU/L-ACNC: 4.29 UIU/ML (ref 0.36–3.74)
VIT B12 SERPL-MCNC: 244 PG/ML (ref 193–986)

## 2018-09-15 PROCEDURE — 82607 VITAMIN B-12: CPT

## 2018-09-15 PROCEDURE — 84443 ASSAY THYROID STIM HORMONE: CPT

## 2018-09-15 PROCEDURE — 85018 HEMOGLOBIN: CPT

## 2018-09-15 PROCEDURE — 85730 THROMBOPLASTIN TIME PARTIAL: CPT

## 2018-09-15 PROCEDURE — 74011250637 HC RX REV CODE- 250/637: Performed by: INTERNAL MEDICINE

## 2018-09-15 PROCEDURE — 36415 COLL VENOUS BLD VENIPUNCTURE: CPT

## 2018-09-15 PROCEDURE — 74011250636 HC RX REV CODE- 250/636: Performed by: INTERNAL MEDICINE

## 2018-09-15 PROCEDURE — 80048 BASIC METABOLIC PNL TOTAL CA: CPT

## 2018-09-15 PROCEDURE — 74011000258 HC RX REV CODE- 258: Performed by: INTERNAL MEDICINE

## 2018-09-15 PROCEDURE — 74011636637 HC RX REV CODE- 636/637: Performed by: INTERNAL MEDICINE

## 2018-09-15 RX ORDER — CIPROFLOXACIN 250 MG/1
250 TABLET, FILM COATED ORAL EVERY 12 HOURS
Qty: 6 TAB | Refills: 0 | Status: SHIPPED | OUTPATIENT
Start: 2018-09-15 | End: 2018-09-20

## 2018-09-15 RX ORDER — METOPROLOL SUCCINATE 25 MG/1
25 TABLET, EXTENDED RELEASE ORAL DAILY
Qty: 30 TAB | Refills: 1 | Status: SHIPPED | OUTPATIENT
Start: 2018-09-16 | End: 2018-11-13 | Stop reason: SDUPTHER

## 2018-09-15 RX ORDER — CIPROFLOXACIN 500 MG/1
250 TABLET ORAL EVERY 12 HOURS
Status: DISCONTINUED | OUTPATIENT
Start: 2018-09-15 | End: 2018-09-15 | Stop reason: HOSPADM

## 2018-09-15 RX ORDER — HALOPERIDOL 5 MG/ML
1 INJECTION INTRAMUSCULAR
Status: DISCONTINUED | OUTPATIENT
Start: 2018-09-15 | End: 2018-09-15 | Stop reason: HOSPADM

## 2018-09-15 RX ADMIN — HEPARIN SODIUM AND DEXTROSE 16 UNITS/KG/HR: 5000; 5 INJECTION INTRAVENOUS at 07:26

## 2018-09-15 RX ADMIN — METOPROLOL SUCCINATE 25 MG: 25 TABLET, EXTENDED RELEASE ORAL at 09:11

## 2018-09-15 RX ADMIN — SUCRALFATE 1 G: 1 SUSPENSION ORAL at 05:42

## 2018-09-15 RX ADMIN — ASPIRIN 81 MG 81 MG: 81 TABLET ORAL at 09:11

## 2018-09-15 RX ADMIN — CEFTRIAXONE SODIUM 1 G: 1 INJECTION, POWDER, FOR SOLUTION INTRAMUSCULAR; INTRAVENOUS at 00:05

## 2018-09-15 RX ADMIN — OLMESARTAN MEDOXOMIL 20 MG: 20 TABLET, FILM COATED ORAL at 09:11

## 2018-09-15 RX ADMIN — CIPROFLOXACIN 250 MG: 500 TABLET, FILM COATED ORAL at 11:09

## 2018-09-15 RX ADMIN — PREDNISONE 10 MG: 10 TABLET ORAL at 09:11

## 2018-09-15 RX ADMIN — LEVOTHYROXINE SODIUM 25 MCG: 50 TABLET ORAL at 05:42

## 2018-09-15 RX ADMIN — SUCRALFATE 1 G: 1 SUSPENSION ORAL at 11:24

## 2018-09-15 RX ADMIN — PANTOPRAZOLE SODIUM 40 MG: 40 TABLET, DELAYED RELEASE ORAL at 05:41

## 2018-09-15 NOTE — PROGRESS NOTES
Discharge instructions, follow up information, medication list, and prescription information provided and explained to the patient and patient's daughter at bedside. IVs and remote telemetry removed. Opportunity for questions provided. Instructed to call once ready to leave.

## 2018-09-15 NOTE — PROGRESS NOTES
BSR received from [facility], [address] [place] [address], shift assessment completed, pt in bed resting, family at bedside, call light within reach, no dtsress noted, will continue to monitor

## 2018-09-15 NOTE — DISCHARGE INSTRUCTIONS
DISCHARGE SUMMARY from Nurse    PATIENT INSTRUCTIONS:    After general anesthesia or intravenous sedation, for 24 hours or while taking prescription Narcotics:  · Limit your activities  · Do not drive and operate hazardous machinery  · Do not make important personal or business decisions  · Do  not drink alcoholic beverages  · If you have not urinated within 8 hours after discharge, please contact your surgeon on call. What to do at Home:  Recommended activity: Activity as tolerated. If you experience any of the following symptoms temp > 101.5, unrelieved pain, nausea or vomiting, shortness of breath or fatigue not relieved with rest, please follow up with MD.    *  Please give a list of your current medications to your Primary Care Provider. *  Please update this list whenever your medications are discontinued, doses are      changed, or new medications (including over-the-counter products) are added. *  Please carry medication information at all times in case of emergency situations. These are general instructions for a healthy lifestyle:    No smoking/ No tobacco products/ Avoid exposure to second hand smoke  Surgeon General's Warning:  Quitting smoking now greatly reduces serious risk to your health. Obesity, smoking, and sedentary lifestyle greatly increases your risk for illness    A healthy diet, regular physical exercise & weight monitoring are important for maintaining a healthy lifestyle    You may be retaining fluid if you have a history of heart failure or if you experience any of the following symptoms:  Weight gain of 3 pounds or more overnight or 5 pounds in a week, increased swelling in our hands or feet or shortness of breath while lying flat in bed. Please call your doctor as soon as you notice any of these symptoms; do not wait until your next office visit.     Recognize signs and symptoms of STROKE:    F-face looks uneven    A-arms unable to move or move unevenly    S-speech slurred or non-existent    T-time-call 911 as soon as signs and symptoms begin-DO NOT go       Back to bed or wait to see if you get better-TIME IS BRAIN. Warning Signs of HEART ATTACK     Call 911 if you have these symptoms:   Chest discomfort. Most heart attacks involve discomfort in the center of the chest that lasts more than a few minutes, or that goes away and comes back. It can feel like uncomfortable pressure, squeezing, fullness, or pain.  Discomfort in other areas of the upper body. Symptoms can include pain or discomfort in one or both arms, the back, neck, jaw, or stomach.  Shortness of breath with or without chest discomfort.  Other signs may include breaking out in a cold sweat, nausea, or lightheadedness. Don't wait more than five minutes to call 911 - MINUTES MATTER! Fast action can save your life. Calling 911 is almost always the fastest way to get lifesaving treatment. Emergency Medical Services staff can begin treatment when they arrive -- up to an hour sooner than if someone gets to the hospital by car. The discharge information has been reviewed with the patient and caregiver. The patient and caregiver verbalized understanding. Discharge medications reviewed with the patient and caregiver and appropriate educational materials and side effects teaching were provided. Anemia: Care Instructions  Your Care Instructions    Anemia is a low level of red blood cells, which carry oxygen throughout your body. Many things can cause anemia. Lack of iron is one of the most common causes. Your body needs iron to make hemoglobin, a substance in red blood cells that carries oxygen from the lungs to your body's cells. Without enough iron, the body produces fewer and smaller red blood cells. As a result, your body's cells do not get enough oxygen, and you feel tired and weak. And you may have trouble concentrating. Bleeding is the most common cause of a lack of iron.  You may have heavy menstrual bleeding or bleeding caused by conditions such as ulcers, hemorrhoids, or cancer. Regular use of aspirin or other anti-inflammatory medicines (such as ibuprofen) also can cause bleeding in some people. A lack of iron in your diet also can cause anemia, especially at times when the body needs more iron, such as during pregnancy, infancy, and the teen years. Your doctor may have prescribed iron pills. It may take several months of treatment for your iron levels to return to normal. Your doctor also may suggest that you eat foods that are rich in iron, such as meat and beans. There are many other causes of anemia. It is not always due to a lack of iron. Finding the specific cause of your anemia will help your doctor find the right treatment for you. Follow-up care is a key part of your treatment and safety. Be sure to make and go to all appointments, and call your doctor if you are having problems. It's also a good idea to know your test results and keep a list of the medicines you take. How can you care for yourself at home? · Take your medicines exactly as prescribed. Call your doctor if you think you are having a problem with your medicine. · If your doctor recommends iron pills, take them as directed:  ¨ Try to take the pills on an empty stomach about 1 hour before or 2 hours after meals. But you may need to take iron with food to avoid an upset stomach. ¨ Do not take antacids or drink milk or caffeine drinks (such as coffee, tea, or cola) at the same time or within 2 hours of the time that you take your iron. They can make it hard for your body to absorb the iron. ¨ Vitamin C (from food or supplements) helps your body absorb iron. Try taking iron pills with a glass of orange juice or some other food that is high in vitamin C, such as citrus fruits. ¨ Iron pills may cause stomach problems, such as heartburn, nausea, diarrhea, constipation, and cramps.  Be sure to drink plenty of fluids, and include fruits, vegetables, and fiber in your diet each day. Iron pills often make your bowel movements dark or green. ¨ If you forget to take an iron pill, do not take a double dose of iron the next time you take a pill. ¨ Keep iron pills out of the reach of small children. An overdose of iron can be very dangerous. · Follow your doctor's advice about eating iron-rich foods. These include red meat, shellfish, poultry, eggs, beans, raisins, whole-grain bread, and leafy green vegetables. · Steam vegetables to help them keep their iron content. When should you call for help? Call 911 anytime you think you may need emergency care. For example, call if:    · You have symptoms of a heart attack. These may include:  ¨ Chest pain or pressure, or a strange feeling in the chest.  ¨ Sweating. ¨ Shortness of breath. ¨ Nausea or vomiting. ¨ Pain, pressure, or a strange feeling in the back, neck, jaw, or upper belly or in one or both shoulders or arms. ¨ Lightheadedness or sudden weakness. ¨ A fast or irregular heartbeat. After you call 911, the  may tell you to chew 1 adult-strength or 2 to 4 low-dose aspirin. Wait for an ambulance. Do not try to drive yourself.     · You passed out (lost consciousness).    Call your doctor now or seek immediate medical care if:    · You have new or increased shortness of breath.     · You are dizzy or lightheaded, or you feel like you may faint.     · Your fatigue and weakness continue or get worse.     · You have any abnormal bleeding, such as:  ¨ Nosebleeds. ¨ Vaginal bleeding that is different (heavier, more frequent, at a different time of the month) than what you are used to. ¨ Bloody or black stools, or rectal bleeding. ¨ Bloody or pink urine.    Watch closely for changes in your health, and be sure to contact your doctor if:    · You do not get better as expected. Where can you learn more? Go to http://marcus.info/.   Enter O600 in the search box to learn more about \"Anemia: Care Instructions. \"  Current as of: October 9, 2017  Content Version: 11.7  © 7612-0794 [employer]. Care instructions adapted under license by [employer] (which disclaims liability or warranty for this information). If you have questions about a medical condition or this instruction, always ask your healthcare professional. [address] any warranty or liability for your use of this information. Chest Pain: Care Instructions  Your Care Instructions    There are many things that can cause chest pain. Some are not serious and will get better on their own in a few days. But some kinds of chest pain need more testing and treatment. Your doctor may have recommended a follow-up visit in the next 8 to 12 hours. If you are not getting better, you may need more tests or treatment. Even though your doctor has released you, you still need to watch for any problems. The doctor carefully checked you, but sometimes problems can develop later. If you have new symptoms or if your symptoms do not get better, get medical care right away. If you have worse or different chest pain or pressure that lasts more than 5 minutes or you passed out (lost consciousness), call 911 or seek other emergency help right away. A medical visit is only one step in your treatment. Even if you feel better, you still need to do what your doctor recommends, such as going to all suggested follow-up appointments and taking medicines exactly as directed. This will help you recover and help prevent future problems. How can you care for yourself at home? · Rest until you feel better. · Take your medicine exactly as prescribed. Call your doctor if you think you are having a problem with your medicine. · Do not drive after taking a prescription pain medicine. When should you call for help?   Call 911 if:    · You passed out (lost consciousness).     · You have severe difficulty breathing.     · You have symptoms of a heart attack. These may include:  ¨ Chest pain or pressure, or a strange feeling in your chest.  ¨ Sweating. ¨ Shortness of breath. ¨ Nausea or vomiting. ¨ Pain, pressure, or a strange feeling in your back, neck, jaw, or upper belly or in one or both shoulders or arms. ¨ Lightheadedness or sudden weakness. ¨ A fast or irregular heartbeat. After you call 911, the  may tell you to chew 1 adult-strength or 2 to 4 low-dose aspirin. Wait for an ambulance. Do not try to drive yourself.    Call your doctor today if:    · You have any trouble breathing.     · Your chest pain gets worse.     · You are dizzy or lightheaded, or you feel like you may faint.     · You are not getting better as expected.     · You are having new or different chest pain. Where can you learn more? Go to http://ronda-shen.info/. Enter A120 in the search box to learn more about \"Chest Pain: Care Instructions. \"  Current as of: November 20, 2017  Content Version: 11.7  © 3888-9821 [email or web address]. Care instructions adapted under license by [employer] (which disclaims liability or warranty for this information). If you have questions about a medical condition or this instruction, always ask your healthcare professional. [address] any warranty or liability for your use of this information.     ___________________________________________________________________________________________________________________________________

## 2018-09-15 NOTE — CDMP QUERY
Please clarify if this patient is being treated/managed for: UTI in the setting of advanced age, elderly female with \" urine culture growing bacteria\" and being treated with order for Rocephin, discharge order for Cipro 
 
 
=>Other Explanation of clinical findings =>Unable to Determine (no explanation of clinical findings) The medical record reflects the following: 
 
Risk Factors: advanced age, elderly female Clinical Indicators: Urine culture growing bacteria Treatment: order for Rocephin, discharge order for Cipro Please clarify and document your clinical opinion in the progress notes and discharge summary including the definitive and/or presumptive diagnosis, (suspected or probable), related to the above clinical findings. Please include clinical findings supporting your diagnosis. Thanks, Little [place] RN, [facility] Compliant Documentation Management Program 
[phone]

## 2018-09-15 NOTE — DISCHARGE SUMMARY
Hospitalist Discharge Summary Patient ID: 
Delia Lawson [id number] 
88 y.o. 
7/12/1928 Admit date: 9/12/2018  6:46 PM 
Discharge date and time: 9/15/2018 Attending: No att. providers found PCP:  Cameron Chen MD 
Treatment Team: Consulting Provider: Carmen Longo MD; Utilization Review: Griffin Fritz RN; Care Manager: Merary Nolan Adeline Boothe Principal Diagnosis NSTEMI (non-ST elevated myocardial infarction) ([facility] [address] Hospital Problems as of 9/15/2018  Date Reviewed: 9/13/2018 Codes Class Noted - Resolved POA * (Principal)NSTEMI (non-ST elevated myocardial infarction) ([facility] [address] ICD-10-CM: I21.4 ICD-9-CM: 410.70  9/14/2018 - Present Yes Chest pain ICD-10-CM: R07.9 ICD-9-CM: 786.50  9/12/2018 - Present Yes Delirium ICD-10-CM: R41.0 ICD-9-CM: 780.09  9/15/2018 - Present No  
   
 Acute cystitis without hematuria ICD-10-CM: N30.00 ICD-9-CM: 595.0  9/15/2018 - Present Yes Anemia ICD-10-CM: D64.9 ICD-9-CM: 285.9  9/13/2018 - Present Yes Giant cell arteritis (HCC) (Chronic) ICD-10-CM: M31.6 ICD-9-CM: 446.5  3/20/2010 - Present Yes Hypertension (Chronic) ICD-10-CM: I10 
ICD-9-CM: 401.9  3/20/2010 - Present Yes Acquired hypothyroidism ICD-10-CM: E03.9 ICD-9-CM: 244.9  2/15/2017 - Present Yes Hyperlipidemia LDL goal <100 ICD-10-CM: E78.5 ICD-9-CM: 272.4  12/9/2016 - Present Yes From H&P: \"89 y.o. female who has a PMH of HTN, hypothyroidism, chronic back pain, GERD, giant cell arteritis on chronic prednisone, polymyalgia rheumatica, macular degeneration, who came after she experienced retrosternal chest pain, pressure like, 10/10, radiated to both arms while she was having dinner around 5:30 pm. Her daughter was there and took her to the bathroom to have a BM and suddenly the patient had a near syncopal episode. She had no LOC, nor a fall.  Denied dizziness, palpitations, abdominal pain, diarrhea. She did complained of heartburn and took some tums with no improvement. EMS was contacted secondary to her event. ROS: patient complains of chronic left groin pain. Denies falls,  hematemesis, melena.  
Upon ER arrival VS were stable. She had retrosternal chest pain that resolved after 2 nitroglycerin were given. Labs: normal troponin. Hb 8 ( last from 12/2017 12 gr ), chemistry: normal; CXR: unrevealing, EKG: incomplete RBBB, NSR. FOBT positive. Cardiology will see patient as consult. Hospitalist was consulted due to chest pain and anemia. \" Hospital Course: The patient was admitted to the floor and started on IVFs. Her troponin went up to 23.9 so she was started on a heparin drip. Family and patient opted for medical management and no cardiac cath. Cardiology followed closely. She was on the heparin gtt for 48 hours. On hospital day #3 the patient became acutely confused. CT Head was normal. UA was not suggestive of UTI. Urine culture from admission was growing bacteria so she was started on Ceftriaxone. Metabolic work up was unremarkable. She was transitioned to Cipro. Upon discussion with the daughter, it was decided the patient likely had hospital delirium and she wanted to take the patient home to her normal surrounding. The patient was discharged home in stable condition. Her daughter was instructed to return to the ER if the patient's delirium worsened. Significant Diagnostic Studies: 
 
Labs: Results:  
   
Chemistry Recent Labs  
   09/15/18 
 0602  09/14/18 
 0216  09/12/18 
 1854 GLU  86  92  149* NA  143  143  143  
K  3.6  3.1*  4.2 CL  113*  111*  111* CO2  20*  22  22 BUN  17  8  22 CREA  0.67  0.55*  1.21* CA  8.3  8.5  9.0 AGAP  10  10  10 AP   --    --   58  
TP   --    --   6.4 ALB   --    --   3.3 GLOB   --    --   3.1 AGRAT   --    --   1.1*  
  
CBC w/Diff Recent Labs  
   09/15/18 
 0620  09/14/18 
 1910  09/14/18 
 0216   09/12/18 1931  
WBC   --    --    --    --   9.0  
RBC   --    --    --    --   3.24* HGB  10.3*  10.7*  10.2*   < >  8.1* HCT  33.7*  36.0  33.0*   < >  27.1*  
PLT   --    --    --    --   185 GRANS   --    --    --    --   72  
LYMPH   --    --    --    --   14  
EOS   --    --    --    --   2  
 < > = values in this interval not displayed. Cardiac Enzymes No results for input(s): CPK, CKND1, BRENDON in the last 72 hours. No lab exists for component: Narayanan Peoples Coagulation Recent Labs  
   09/15/18 
 0620  09/14/18 
 2337   09/13/18 
 0146 PTP   --    --    --   13.5 INR   --    --    --   1.1 APTT  116.5*  134.0*   < >   --   
 < > = values in this interval not displayed. Lipid Panel Lab Results Component Value Date/Time Cholesterol, total 183 12/12/2017 09:17 AM  
 HDL Cholesterol 59 12/12/2017 09:17 AM  
 LDL, calculated 93 12/12/2017 09:17 AM  
 VLDL, calculated 31 12/12/2017 09:17 AM  
 Triglyceride 154 (H) 12/12/2017 09:17 AM  
 CHOL/HDL Ratio 4.6 06/24/2015 04:00 AM  
  
BNP No results for input(s): BNPP in the last 72 hours. Liver Enzymes Recent Labs  
   09/12/18 
 1854 TP  6.4 ALB  3.3 AP  58 SGOT  17 Thyroid Studies Lab Results Component Value Date/Time TSH 4.290 (H) 09/15/2018 06:02 AM  
    
 
 
Imaging: 
Ct Head Wo Cont Result Date: 9/14/2018 Impression: No acute intracranial abnormality. Xr Chest [facility] Result Date: 9/12/2018 Impression: Enlarged cardiac silhouette with grossly clear lungs. Microbiology/Cultures: All Micro Results Procedure Component Value Units Date/Time CULTURE, URINE [[id number]]  (Abnormal) Collected:  09/12/18 2039 Order Status:  Completed Specimen:  Urine from Clean catch Updated:  09/15/18 0701 Special Requests: NO SPECIAL REQUESTS Culture result:      
  50,000-100,000 COLONIES/mL GRAM NEGATIVE RODS IDENTIFICATION AND SUSCEPTIBILITY TO FOLLOW (A) 50,000-100,000 COLONIES/mL MIXED SKIN MIKE ISOLATED Discharge Exam: 
Visit Vitals  /70 (BP 1 Location: Right arm, BP Patient Position: Sitting)  Pulse 64  Temp 98.1 °F (36.7 °C)  Resp 18  Ht 4' 8\" (1.422 m)  Wt 47 kg (103 lb 9.6 oz)  SpO2 95%  BMI 23.23 kg/m2 General appearance: alert, cooperative, no distress, appears stated age Lungs: clear to auscultation bilaterally Heart: regular rate and rhythm, S1, S2 normal, no murmur, click, rub or gallop Abdomen: soft, non-tender. Bowel sounds normal. No masses,  no organomegaly Extremities: no cyanosis or edema Neurologic: A&Ox2 (person, place, 1972) Grossly normal 
 
Disposition: home Discharge Condition: stable Patient Instructions:  
Discharge Medication List as of 9/15/2018 10:58 AM  
  
START taking these medications Details  
ciprofloxacin HCl (CIPRO) 250 mg tablet Take 1 Tab by mouth every twelve (12) hours. , Print, Disp-6 Tab, R-0  
  
metoprolol succinate (TOPROL-XL) 25 mg XL tablet Take 1 Tab by mouth daily. , Print, Disp-30 Tab, R-1  
  
  
CONTINUE these medications which have NOT CHANGED Details  
!! levothyroxine (SYNTHROID) 25 mcg tablet Take 1 Tab by mouth Daily (before breakfast). , Normal, Disp-30 Tab, R-5  
  
!! levothyroxine (SYNTHROID) 50 mcg tablet Take one tab by mouth every other day. Alternate 50 and 25 mcg dose., Normal, Disp-30 Tab, R-2  
  
olmesartan (BENICAR) 40 mg tablet Take 0.5 Tabs by mouth daily. , No Print, Disp-30 Tab, R-11  
  
atorvastatin (LIPITOR) 10 mg tablet Take 1/2 tab every other night., Normal, Disp-30 Tab, R-11  
  
predniSONE (DELTASONE) 1 mg tablet TAKE FOUR TABLETS BY MOUTH ONE TIME DAILY, Historical Med, R-6  
  
calcium-vitamin D (CALCIUM 600 + D,3,) 600 mg(1,500mg) -200 unit tab Take 1 Tab by mouth., Historical Med  
  
aspirin delayed-release 81 mg tablet Take 1 Tab by mouth daily. , Print, Disp-100 Tab, R-0  
  
 Cholecalciferol, Vitamin D3, (VITAMIN D3) 1,000 unit cap Take 1 Cap by mouth daily. , Historical Med Denosumab (PROLIA) 60 mg/mL injection 60 mg by SubCUTAneous route., Historical Med  
  
bevacizumab (AVASTIN) 25 mg/ml soln intravitreal solution by IntraVITreal route once., Historical Med  
  
 !! - Potential duplicate medications found. Please discuss with provider. STOP taking these medications  
  
 amLODIPine (NORVASC) 10 mg tablet Comments:  
Reason for Stopping:   
   
  
 
 
Activity: Activity as tolerated Diet: Cardiac Diet Wound Care: None needed Follow-up ·   Dr. Titus Ervin in 3-5 days · [facility] in 2-3 weeks Time spent to discharge patient 35 minutes Signed: 
EL Church 67, DO 
9/15/2018 10:15 AM

## 2018-09-15 NOTE — PROGRESS NOTES
.[facility] CARDIOLOGY PROGRESS NOTE 
      
 
9/15/2018 9:14 AM 
 
Admit Date: 9/12/2018 Subjective:  
No chest pain Review of Systems Constitutional: Negative for chills. Respiratory: Negative for cough. Cardiovascular: Negative for chest pain. Musculoskeletal: Negative for myalgias. Skin: Negative for rash. Neurological: Negative for dizziness. Objective:  
  
Vitals:  
 09/14/18 2222 09/14/18 2321 09/15/18 3649 09/15/18 6560 BP:  162/81 155/78 141/70 Pulse: 78 61 60 64 Resp:  16 17 18 Temp:  97.2 °F (36.2 °C) 98.2 °F (36.8 °C) 98.1 °F (36.7 °C) SpO2:  93% 94% 95% Weight:      
Height:      
 
 
 
Physical Exam  
Constitutional: She is oriented to person, place, and time. HENT:  
Head: Normocephalic and atraumatic. Eyes: Conjunctivae are normal. Pupils are equal, round, and reactive to light. Neck: Normal range of motion. No JVD present. Cardiovascular: Normal rate and normal heart sounds. No murmur heard. Pulmonary/Chest: Effort normal. She has no wheezes. Abdominal: She exhibits no distension. Musculoskeletal: She exhibits no edema. Neurological: She is alert and oriented to person, place, and time. No cranial nerve deficit. Skin: Skin is warm and dry. No rash noted. She is not diaphoretic. Psychiatric: She has a normal mood and affect. Data Review:  
Recent Labs  
   09/15/18 
 0620  09/15/18 
 0602  09/14/18 
 1910  09/14/18 
 0216  09/13/18 2028   09/13/18 
 0146  09/12/18 
 1931 NA   --   143   --   143   --    --    --    --   
K   --   3.6   --   3.1*   --    --    --    --   
MG   --    --    --   1.9   --    --    --    --   
BUN   --   17   --   8   --    --    --    --   
CREA   --   0.67   --   0.55*   --    --    --    --   
GLU   --   86   --   92   --    --    --    --   
WBC   --    --    --    --    --    --    --   9.0 HGB  10.3*   --   10.7*  10.2*  10.5*   < >   --   8.1*  
 HCT  33.7*   --   36.0  33.0*  33.8*   < >   --   27.1*  
PLT   --    --    --    --    --    --    --   185 INR   --    --    --    --    --    --   1.1   --   
TROIQ   --    --    --   16.40*  18.70*   < >   --    --   
 < > = values in this interval not displayed. Intake/Output Summary (Last 24 hours) at 09/15/18 7563 Last data filed at 09/15/18 1002 Gross per 24 hour Intake             1631 ml Output              150 ml Net             1481 ml  
 
Current Facility-Administered Medications Medication Dose Route Frequency  haloperidol lactate (HALDOL) injection 1 mg  1 mg IntraMUSCular Q4H PRN  
 aspirin chewable tablet 81 mg  81 mg Oral DAILY  metoprolol succinate (TOPROL-XL) XL tablet 25 mg  25 mg Oral DAILY  potassium chloride (K-DUR, KLOR-CON) SR tablet 40 mEq  40 mEq Oral TID  pantoprazole (PROTONIX) tablet 40 mg  40 mg Oral ACB&D  cefTRIAXone (ROCEPHIN) 1 g in 0.9% sodium chloride (MBP/ADV) 50 mL  1 g IntraVENous Q24H  
 levothyroxine (SYNTHROID) tablet 25 mcg  25 mcg Oral 6am  
 olmesartan (BENICAR) tablet 20 mg  20 mg Oral DAILY  predniSONE (DELTASONE) tablet 10 mg  10 mg Oral DAILY WITH BREAKFAST  ondansetron (ZOFRAN) injection 4 mg  4 mg IntraVENous Q8H PRN  
 acetaminophen (TYLENOL) tablet 650 mg  650 mg Oral Q6H PRN  
 sucralfate (CARAFATE) 100 mg/mL oral suspension 1 g  1 g Oral TIDAC  
 0.9% sodium chloride infusion 250 mL  250 mL IntraVENous PRN  
 heparin 25,000 units in dextrose 500 mL infusion  12-25 Units/kg/hr IntraVENous TITRATE  atorvastatin (LIPITOR) tablet 80 mg  80 mg Oral QHS  nitroglycerin (NITROSTAT) tablet 0.4 mg  0.4 mg SubLINGual Q5MIN PRN Assessment/Plan: 1. NSTEMI management medically patient with anemia due to her age improvement of symptoms and normal left ventricular systolic function medical management was discussed.   s heparin 48 hours aspirin Plavix to be initiated if hemoglobin remained stable. Long discussion  regarding invasive versus noninvasive approach patient and family opt for noninvasive evaluation likely infarct of distal RCA territory. On BB. No angina 2. Anemia status post transfusion per primary team?  Malignancy 3.  Hypokalemia per primary team 
 
 
 
Candace Xavier MD 
9/15/2018 9:14 AM

## 2018-09-15 NOTE — PROGRESS NOTES
Brain CT scan was negative for any acute intra-cranial abnormality. UA not suggestive for UTI. Most likely her confusion is due to delirium. A low dose of IV haldol prn ordered in case she develops severe agitation. QTc checked in her EKG and considered OK.

## 2018-09-16 LAB
BACTERIA SPEC CULT: ABNORMAL
BACTERIA SPEC CULT: ABNORMAL
SERVICE CMNT-IMP: ABNORMAL

## 2018-09-17 ENCOUNTER — PATIENT OUTREACH (OUTPATIENT)
Dept: CASE MANAGEMENT | Age: 83
End: 2018-09-17

## 2018-09-17 LAB
ABO + RH BLD: NORMAL
BLD PROD TYP BPU: NORMAL
BLD PROD TYP BPU: NORMAL
BLOOD GROUP ANTIBODIES SERPL: NORMAL
BPU ID: NORMAL
BPU ID: NORMAL
CROSSMATCH RESULT,%XM: NORMAL
CROSSMATCH RESULT,%XM: NORMAL
SPECIMEN EXP DATE BLD: NORMAL
STATUS OF UNIT,%ST: NORMAL
STATUS OF UNIT,%ST: NORMAL
UNIT DIVISION, %UDIV: 0
UNIT DIVISION, %UDIV: 0

## 2018-09-17 NOTE — PROGRESS NOTES
Transition of Care Discharge Follow-up Questionnaire Date/Time of ISAÍAS Outreach: 9/17/18 3:00 PM  
What was the patient hospitalized for? Patient was hospitalized for NSTEMI Does the patient understand his/her diagnosis and/or treatment and what happened during the hospitalization? CM Assessed Risk for Readmission: 
 
 
 
Patient stated Risk for Readmission: 
 
 Spoke to Patient who consented to [facility] [place] outreach in presence of patient, and verbalized understanding of treatment and diagnosis. Risk for Readmission completed and assessed and Care Coordinator assessed risk would be due to diagnosis. Patient denies she will readmit. Did the patient receive discharge instructions? Patient states d/c instructions received. Review any discharge instructions (see notes in [facility]). Ask patient if they understand these. Do they have any questions? Patient discussed discharge plan and instruction as Patient understood it to be with Care Coordinator. Which I have documented in the pertinent areas throughout this progress note. Were home services ordered (nursing, PT, OT, ST, etc.)? No HH ordered at d/c. If so, has the first visit occurred? If not, why? (Assist with coordination of services if necessary.) N/A Was any DME ordered? No DME ordered at d/c. If so, has it been received? If not, why?  (Assist with coordination of arranging DME orders if necessary.) N/A Complete a review of all medications (new, continued and discontinued meds per the D/C instructions and medication tab in [facility]). Review of all meds completed with Patient and Care Coordinator. Cipro and Toprol XL prescribed at d/c. Were all new prescriptions filled? If not, why?  (Assist with obtainment of medications if necessary.) Yes. Does the patient understand the purpose and dosing instructions for all medications?   (If patient has questions, provide explanation and education.) Indicated by Patient to Care Coordinator, the purpose and dosing instructions for all medications were understood. Does the patient have any problems in performing ADLs? (If patient is unable to perform ADLs  what is the limiting factor(s)? Do they have a support system that can assist? If no support system is present, discuss possible assistance that they may be able to obtain.) Patient states she is independent with all ADLs. Please note patient resides with Daughter. Does the patient have all follow-up appointments scheduled? 7 day f/up with PCP? 
 
7-14 day f/up with specialist? 
 
If f/up has not been made  what actions has the care coordinator made to accomplish this? Has transportation been arranged? [facility] Pulmonary follow-up should be within 7 days of discharge; all others should have PCP follow-up within 7 days of discharge; follow-ups with other specialists as appropriate or ordered.) PCP f/u appt. 9/20. Cardiology f/u appt. 9/27. Patient denies need for transportation. Schedule next appointment with TAYLER Toth or refer to RN Case Manager/  per the workflow guidelines. Care Coordinator to f/u 9/28. Any other questions or concerns expressed by the patient? Gratitude stated and no further questions asked or needs identified. ISAÍAS Call Completed By:  
Hortencia Alvarez LPN/ Care Coordinator [email or web address]:[phone] Kumar [address] [place] [address] 
[email or web address] This note will not be viewable in [address]

## 2018-09-20 ENCOUNTER — HOSPITAL ENCOUNTER (OUTPATIENT)
Dept: GENERAL RADIOLOGY | Age: 83
Discharge: HOME | End: 2018-09-20
Attending: FAMILY MEDICINE
Payer: MEDICARE

## 2018-09-20 DIAGNOSIS — M25.552 LEFT HIP PAIN: ICD-10-CM

## 2018-09-20 PROCEDURE — 73502 X-RAY EXAM HIP UNI 2-3 VIEWS: CPT

## 2018-09-20 NOTE — PROGRESS NOTES
Please let daughter know that the xray did not show any fractures. If a week goes by and she is not any better, please let us know, may need to order a CT.

## 2018-09-27 ENCOUNTER — HOSPITAL ENCOUNTER (OUTPATIENT)
Dept: LAB | Age: 83
Discharge: HOME | End: 2018-09-27
Payer: MEDICARE

## 2018-09-27 DIAGNOSIS — N18.1 ANEMIA IN STAGE 1 CHRONIC KIDNEY DISEASE: ICD-10-CM

## 2018-09-27 DIAGNOSIS — D63.1 ANEMIA IN STAGE 1 CHRONIC KIDNEY DISEASE: ICD-10-CM

## 2018-09-27 LAB
FERRITIN SERPL-MCNC: 21 NG/ML (ref 8–388)
HCT VFR BLD AUTO: 32.3 % (ref 35.8–46.3)
HGB BLD-MCNC: 9.8 G/DL (ref 11.7–15.4)
IRON SERPL-MCNC: 18 UG/DL (ref 35–150)

## 2018-09-27 PROCEDURE — 85018 HEMOGLOBIN: CPT

## 2018-09-27 PROCEDURE — 82728 ASSAY OF FERRITIN: CPT

## 2018-09-27 PROCEDURE — 36415 COLL VENOUS BLD VENIPUNCTURE: CPT

## 2018-09-27 PROCEDURE — 83540 ASSAY OF IRON: CPT

## 2018-10-02 ENCOUNTER — HOSPITAL ENCOUNTER (OUTPATIENT)
Dept: MRI IMAGING | Age: 83
Discharge: HOME | End: 2018-10-02
Attending: FAMILY MEDICINE
Payer: MEDICARE

## 2018-10-02 DIAGNOSIS — M25.552 PAIN OF LEFT HIP JOINT: ICD-10-CM

## 2018-10-02 PROCEDURE — 73721 MRI JNT OF LWR EXTRE W/O DYE: CPT

## 2018-10-05 NOTE — PROGRESS NOTES
she needs to be on an iron supplement ferrous sulfate 325 bid if she can tolerate it and repeat her numbers with Dr David Morales in a month .   If no improvement she may need further evaluation for GI sources of blood loss and get iv iron

## 2018-10-29 ENCOUNTER — ANESTHESIA EVENT (OUTPATIENT)
Dept: ENDOSCOPY | Age: 83
End: 2018-10-29

## 2018-10-29 RX ORDER — SODIUM CHLORIDE 0.9 % (FLUSH) 0.9 %
5-10 SYRINGE (ML) INJECTION AS NEEDED
Status: CANCELLED | OUTPATIENT
Start: 2018-10-29

## 2018-10-29 RX ORDER — SODIUM CHLORIDE, SODIUM LACTATE, POTASSIUM CHLORIDE, CALCIUM CHLORIDE 600; 310; 30; 20 MG/100ML; MG/100ML; MG/100ML; MG/100ML
100 INJECTION, SOLUTION INTRAVENOUS CONTINUOUS
Status: CANCELLED | OUTPATIENT
Start: 2018-10-29

## 2018-10-30 ENCOUNTER — ANESTHESIA (OUTPATIENT)
Dept: ENDOSCOPY | Age: 83
End: 2018-10-30

## 2018-10-30 ENCOUNTER — APPOINTMENT (OUTPATIENT)
Dept: GENERAL RADIOLOGY | Age: 83
End: 2018-10-30
Attending: EMERGENCY MEDICINE
Payer: MEDICARE

## 2018-10-30 ENCOUNTER — HOSPITAL ENCOUNTER (OUTPATIENT)
Age: 83
Setting detail: OUTPATIENT SURGERY
Discharge: HOME | End: 2018-10-30
Attending: INTERNAL MEDICINE | Admitting: INTERNAL MEDICINE
Payer: MEDICARE

## 2018-10-30 ENCOUNTER — HOSPITAL ENCOUNTER (EMERGENCY)
Age: 83
Discharge: ANOTHER HOSPITAL | End: 2018-10-30
Attending: EMERGENCY MEDICINE
Payer: MEDICARE

## 2018-10-30 VITALS
TEMPERATURE: 98.6 F | OXYGEN SATURATION: 91 % | DIASTOLIC BLOOD PRESSURE: 91 MMHG | RESPIRATION RATE: 18 BRPM | HEIGHT: 59 IN | SYSTOLIC BLOOD PRESSURE: 189 MMHG | BODY MASS INDEX: 20.36 KG/M2 | WEIGHT: 101 LBS | HEART RATE: 58 BPM

## 2018-10-30 VITALS
OXYGEN SATURATION: 94 % | DIASTOLIC BLOOD PRESSURE: 74 MMHG | TEMPERATURE: 97.7 F | SYSTOLIC BLOOD PRESSURE: 168 MMHG | HEART RATE: 63 BPM | RESPIRATION RATE: 20 BRPM

## 2018-10-30 DIAGNOSIS — M87.052 AVASCULAR NECROSIS OF LEFT FEMORAL HEAD (HCC): Primary | ICD-10-CM

## 2018-10-30 LAB
ALBUMIN SERPL-MCNC: 3.3 G/DL (ref 3.2–4.6)
ALBUMIN/GLOB SERPL: 0.9 {RATIO} (ref 1.2–3.5)
ALP SERPL-CCNC: 70 U/L (ref 50–136)
ALT SERPL-CCNC: 19 U/L (ref 12–65)
ANION GAP SERPL CALC-SCNC: 10 MMOL/L (ref 7–16)
AST SERPL-CCNC: 72 U/L (ref 15–37)
ATRIAL RATE: 63 BPM
BASOPHILS # BLD: 0.1 K/UL (ref 0–0.2)
BASOPHILS NFR BLD: 1 % (ref 0–2)
BILIRUB SERPL-MCNC: 0.7 MG/DL (ref 0.2–1.1)
BNP SERPL-MCNC: 471 PG/ML
BUN SERPL-MCNC: 13 MG/DL (ref 8–23)
CALCIUM SERPL-MCNC: 9.2 MG/DL (ref 8.3–10.4)
CALCULATED P AXIS, ECG09: 51 DEGREES
CALCULATED R AXIS, ECG10: -64 DEGREES
CALCULATED T AXIS, ECG11: 90 DEGREES
CHLORIDE SERPL-SCNC: 105 MMOL/L (ref 98–107)
CO2 SERPL-SCNC: 22 MMOL/L (ref 21–32)
CREAT SERPL-MCNC: 0.68 MG/DL (ref 0.6–1)
DIAGNOSIS, 93000: NORMAL
DIFFERENTIAL METHOD BLD: ABNORMAL
EOSINOPHIL # BLD: 0.1 K/UL (ref 0–0.8)
EOSINOPHIL NFR BLD: 1 % (ref 0.5–7.8)
ERYTHROCYTE [DISTWIDTH] IN BLOOD BY AUTOMATED COUNT: 21.5 %
GLOBULIN SER CALC-MCNC: 3.5 G/DL (ref 2.3–3.5)
GLUCOSE SERPL-MCNC: 127 MG/DL (ref 65–100)
HCT VFR BLD AUTO: 34.3 % (ref 35.8–46.3)
HGB BLD-MCNC: 10.3 G/DL (ref 11.7–15.4)
IMM GRANULOCYTES # BLD: 0 K/UL (ref 0–0.5)
IMM GRANULOCYTES NFR BLD AUTO: 0 % (ref 0–5)
LYMPHOCYTES # BLD: 2 K/UL (ref 0.5–4.6)
LYMPHOCYTES NFR BLD: 23 % (ref 13–44)
MCH RBC QN AUTO: 26.6 PG (ref 26.1–32.9)
MCHC RBC AUTO-ENTMCNC: 30 G/DL (ref 31.4–35)
MCV RBC AUTO: 88.6 FL (ref 79.6–97.8)
MONOCYTES # BLD: 0.7 K/UL (ref 0.1–1.3)
MONOCYTES NFR BLD: 8 % (ref 4–12)
NEUTS SEG # BLD: 6.1 K/UL (ref 1.7–8.2)
NEUTS SEG NFR BLD: 68 % (ref 43–78)
NRBC # BLD: 0 K/UL (ref 0–0.2)
P-R INTERVAL, ECG05: 180 MS
PLATELET # BLD AUTO: 337 K/UL (ref 150–450)
PMV BLD AUTO: 10.9 FL (ref 9.4–12.3)
POTASSIUM SERPL-SCNC: 5.8 MMOL/L (ref 3.5–5.1)
PROT SERPL-MCNC: 6.8 G/DL (ref 6.3–8.2)
Q-T INTERVAL, ECG07: 406 MS
QRS DURATION, ECG06: 98 MS
QTC CALCULATION (BEZET), ECG08: 412 MS
RBC # BLD AUTO: 3.87 M/UL (ref 4.05–5.2)
SODIUM SERPL-SCNC: 137 MMOL/L (ref 136–145)
VENTRICULAR RATE, ECG03: 62 BPM
WBC # BLD AUTO: 9 K/UL (ref 4.3–11.1)

## 2018-10-30 PROCEDURE — 96374 THER/PROPH/DIAG INJ IV PUSH: CPT

## 2018-10-30 PROCEDURE — 99284 EMERGENCY DEPT VISIT MOD MDM: CPT | Performed by: EMERGENCY MEDICINE

## 2018-10-30 PROCEDURE — 74011000302 HC RX REV CODE- 302: Performed by: EMERGENCY MEDICINE

## 2018-10-30 PROCEDURE — 83880 ASSAY OF NATRIURETIC PEPTIDE: CPT

## 2018-10-30 PROCEDURE — 74011250636 HC RX REV CODE- 250/636: Performed by: ANESTHESIOLOGY

## 2018-10-30 PROCEDURE — 93005 ELECTROCARDIOGRAM TRACING: CPT | Performed by: EMERGENCY MEDICINE

## 2018-10-30 PROCEDURE — 85025 COMPLETE CBC W/AUTO DIFF WBC: CPT

## 2018-10-30 PROCEDURE — 74011250636 HC RX REV CODE- 250/636

## 2018-10-30 PROCEDURE — 86580 TB INTRADERMAL TEST: CPT | Performed by: EMERGENCY MEDICINE

## 2018-10-30 PROCEDURE — 71045 X-RAY EXAM CHEST 1 VIEW: CPT

## 2018-10-30 PROCEDURE — 74011250636 HC RX REV CODE- 250/636: Performed by: EMERGENCY MEDICINE

## 2018-10-30 PROCEDURE — 80053 COMPREHEN METABOLIC PANEL: CPT

## 2018-10-30 PROCEDURE — 96375 TX/PRO/DX INJ NEW DRUG ADDON: CPT | Performed by: EMERGENCY MEDICINE

## 2018-10-30 PROCEDURE — 73502 X-RAY EXAM HIP UNI 2-3 VIEWS: CPT

## 2018-10-30 PROCEDURE — 96374 THER/PROPH/DIAG INJ IV PUSH: CPT | Performed by: EMERGENCY MEDICINE

## 2018-10-30 RX ORDER — HYDRALAZINE HYDROCHLORIDE 20 MG/ML
10 INJECTION INTRAMUSCULAR; INTRAVENOUS
Status: COMPLETED | OUTPATIENT
Start: 2018-10-30 | End: 2018-10-30

## 2018-10-30 RX ORDER — HYDROMORPHONE HYDROCHLORIDE 2 MG/ML
0.25 INJECTION, SOLUTION INTRAMUSCULAR; INTRAVENOUS; SUBCUTANEOUS
Status: COMPLETED | OUTPATIENT
Start: 2018-10-30 | End: 2018-10-30

## 2018-10-30 RX ORDER — HYDROCODONE BITARTRATE AND ACETAMINOPHEN 5; 325 MG/1; MG/1
1 TABLET ORAL
Qty: 20 TAB | Refills: 0 | Status: SHIPPED | OUTPATIENT
Start: 2018-10-30 | End: 2018-11-15

## 2018-10-30 RX ORDER — SODIUM CHLORIDE, SODIUM LACTATE, POTASSIUM CHLORIDE, CALCIUM CHLORIDE 600; 310; 30; 20 MG/100ML; MG/100ML; MG/100ML; MG/100ML
100 INJECTION, SOLUTION INTRAVENOUS CONTINUOUS
Status: DISCONTINUED | OUTPATIENT
Start: 2018-10-30 | End: 2018-10-30 | Stop reason: HOSPADM

## 2018-10-30 RX ORDER — MORPHINE SULFATE 2 MG/ML
2 INJECTION, SOLUTION INTRAMUSCULAR; INTRAVENOUS ONCE
Status: COMPLETED | OUTPATIENT
Start: 2018-10-30 | End: 2018-10-30

## 2018-10-30 RX ORDER — HYDROMORPHONE HYDROCHLORIDE 2 MG/ML
INJECTION, SOLUTION INTRAMUSCULAR; INTRAVENOUS; SUBCUTANEOUS
Status: DISCONTINUED
Start: 2018-10-30 | End: 2018-10-30 | Stop reason: HOSPADM

## 2018-10-30 RX ADMIN — HYDRALAZINE HYDROCHLORIDE 10 MG: 20 INJECTION INTRAMUSCULAR; INTRAVENOUS at 13:44

## 2018-10-30 RX ADMIN — TUBERCULIN PURIFIED PROTEIN DERIVATIVE 5 UNITS: 5 INJECTION, SOLUTION INTRADERMAL at 16:44

## 2018-10-30 RX ADMIN — MORPHINE SULFATE 2 MG: 2 INJECTION, SOLUTION INTRAMUSCULAR; INTRAVENOUS at 15:34

## 2018-10-30 RX ADMIN — SODIUM CHLORIDE, SODIUM LACTATE, POTASSIUM CHLORIDE, AND CALCIUM CHLORIDE 100 ML/HR: 600; 310; 30; 20 INJECTION, SOLUTION INTRAVENOUS at 11:47

## 2018-10-30 RX ADMIN — HYDROMORPHONE HYDROCHLORIDE 0.26 MG: 2 INJECTION, SOLUTION INTRAMUSCULAR; INTRAVENOUS; SUBCUTANEOUS at 12:14

## 2018-10-30 NOTE — ED TRIAGE NOTES
Pt came from endoscopy for anemia, pt went to bathroom and started complaining of severe left hip pain. Pt had MRI done approximately 3 weeks ago according to family taht confirmed the fractures of the left hip. Pt was hypertensive and had low O2 sat before coming to ER post 0.25ml of dilaudid for the pain. Pt placed on 6L NC in ER for 85% O2 sat. Pt reports pain of 4/10 after dilaudid.

## 2018-10-30 NOTE — ED PROVIDER NOTES
Patient was referred to the emergency room from preoperative holding where she was awaiting to have an endoscopy performed due to history of anemia. The patient had gone to the bathroom and when she got to the bed she began complaining of acute worsening of pain in the left hip. The patient has a known left femoral head fracture which is been nondisplaced and been seen by Dr. Susan Cross of [facility] orthopedics and conservative therapy and management were underway. She denies any falls or trauma today. The fracture was also noted to be of the insufficiency type confirmed on MRI and was nondisplaced. There is no history of trauma as the original inciting event of the fracture which has been known for about 4 weeks. The history is provided by the patient. Hip Injury This is a chronic problem. The current episode started more than 1 week ago. The problem occurs constantly. Progression since onset: acutely worse within the last hour. The pain is present in the left hip. The quality of the pain is described as aching. The pain is severe. The symptoms are aggravated by contact, movement and palpation. Treatments tried: patient given a dose of IV Dilaudid for pain management by anesthesiology. The treatment provided moderate relief. There has been no history of extremity trauma. Past Medical History:  
Diagnosis Date  Acquired hypothyroidism   
 synthroid daily  Anemia   
 blood transfusion 9/2018  Gastroesophageal reflux disease 12/9/2016  GERD (gastroesophageal reflux disease)   
 nexium daily,well controlled per daughter-    hiatal hernia,   
 Giant cell arteritis ([address] 3/20/2010  Hip fracture ([address] 10/29/2018  
 left hip fracture-  followed by Dr Susan Cross @ [address] [place] [address]  Hypertension   
 managed with meds  Macular degeneration  NSTEMI (non-ST elevated myocardial infarction) ([address] 9/14/2018 9/12/18 \"mild mi, no damage, no heart cath or stents\"- medical management recommended however anemia persists, no current asa or thinners  Other ill-defined conditions(799.89)   
 back and pelvis pain  Other ill-defined conditions(799.89)   
 hip fx ;   arteritis  TIA (transient ischemic attack) 6/23/2015  Varicose veins of left lower extremity Past Surgical History:  
Procedure Laterality Date  CARDIAC SURG PROCEDURE UNLIST    
 Pt's family reports heart attack in Sept while in hospital at [place].  HX CHOLECYSTECTOMY  HX TONSILLECTOMY Family History:  
Problem Relation Age of Onset  Cancer Father   
     stomach [facility] Arthritis-osteo Mother  Hypertension Mother  Arthritis-osteo Sister Social History Socioeconomic History  Marital status:  Spouse name: Not on file  Number of children: Not on file  Years of education: Not on file  Highest education level: Not on file Social Needs  Financial resource strain: Not on file  Food insecurity - worry: Not on file  Food insecurity - inability: Not on file  Transportation needs - medical: Not on file  Transportation needs - non-medical: Not on file Occupational History  Not on file Tobacco Use  Smoking status: Never Smoker  Smokeless tobacco: Never Used Substance and Sexual Activity  Alcohol use: No  
 Drug use: No  
 Sexual activity: No  
Other Topics Concern  Not on file Social History Narrative  Not on file ALLERGIES: Calcium carbonate; Ranitidine hcl; and Guaifenesin Review of Systems All other systems reviewed and are negative. Vitals:  
 10/30/18 1233 10/30/18 1243 BP: 171/77 Pulse: 62 Resp: 20 Temp:  97.7 °F (36.5 °C) SpO2: 95% Physical Exam  
Constitutional: She is oriented to person, place, and time. She appears well-developed and well-nourished. HENT:  
Head: Normocephalic and atraumatic. Eyes: EOM are normal. Pupils are equal, round, and reactive to light. Neck: Normal range of motion. Neck supple. Cardiovascular: Normal rate and regular rhythm. Pulmonary/Chest: Effort normal and breath sounds normal.  
Abdominal: Soft. Bowel sounds are normal.  
Musculoskeletal: She exhibits tenderness and deformity. Patient exhibits tenderness with attempted range of motion of the left hip. There is no tenderness over the inguinal line however there is some tenderness laterally over the greater trochanter. There may be some slight shortening noted of the left lower extremity there is no external rotation. Neurological: She is alert and oriented to person, place, and time. Skin: Skin is warm and dry. Psychiatric: She has a normal mood and affect. Her behavior is normal.  
Nursing note and vitals reviewed. MDM Number of Diagnoses or Management Options Amount and/or Complexity of Data Reviewed Clinical lab tests: ordered and reviewed Tests in the radiology section of CPT®: ordered and reviewed Independent visualization of images, tracings, or specimens: yes Risk of Complications, Morbidity, and/or Mortality Presenting problems: high Diagnostic procedures: moderate Management options: moderate Patient Progress Patient progress: stable Procedures

## 2018-10-30 NOTE — ANESTHESIA PREPROCEDURE EVALUATION
Anesthetic History Review of Systems / Medical History Patient summary reviewed and pertinent labs reviewed Pulmonary Neuro/Psych Cardiovascular Hypertension Past MI and hyperlipidemia Exercise tolerance: <4 METS 
  
GI/Hepatic/Renal 
  
GERD Endo/Other Hypothyroidism Anemia Other Findings Anesthetic Plan Patient c/o severe hip pain after returning from the rest room. She had a hip fracture 8 weeks ago but states that the pain she feels is worse than when she fractured it. I have called her orthopedist and will send her to the ED to determine if her fracture has worsened or is unstable.

## 2018-10-30 NOTE — ED NOTES
Patient will be discharged from the hospital [facility] extended care facility with the following restrictions:  No weight bearing for 2 days then limited weight bearing as tolerated.

## 2018-11-15 ENCOUNTER — ANESTHESIA EVENT (OUTPATIENT)
Dept: ENDOSCOPY | Age: 83
End: 2018-11-15
Payer: MEDICARE

## 2018-11-15 PROBLEM — R73.01 IFG (IMPAIRED FASTING GLUCOSE): Status: ACTIVE | Noted: 2018-11-15

## 2018-11-16 ENCOUNTER — ANESTHESIA (OUTPATIENT)
Dept: ENDOSCOPY | Age: 83
End: 2018-11-16
Payer: MEDICARE

## 2018-11-16 ENCOUNTER — HOSPITAL ENCOUNTER (OUTPATIENT)
Age: 83
Setting detail: OUTPATIENT SURGERY
Discharge: HOME | End: 2018-11-16
Attending: INTERNAL MEDICINE | Admitting: INTERNAL MEDICINE
Payer: MEDICARE

## 2018-11-16 VITALS
HEART RATE: 59 BPM | SYSTOLIC BLOOD PRESSURE: 199 MMHG | BODY MASS INDEX: 20.56 KG/M2 | HEIGHT: 59 IN | TEMPERATURE: 98 F | OXYGEN SATURATION: 91 % | RESPIRATION RATE: 18 BRPM | WEIGHT: 102 LBS | DIASTOLIC BLOOD PRESSURE: 97 MMHG

## 2018-11-16 PROCEDURE — 76040000025: Performed by: INTERNAL MEDICINE

## 2018-11-16 PROCEDURE — 74011250636 HC RX REV CODE- 250/636

## 2018-11-16 PROCEDURE — 76060000031 HC ANESTHESIA FIRST 0.5 HR: Performed by: INTERNAL MEDICINE

## 2018-11-16 PROCEDURE — 74011250636 HC RX REV CODE- 250/636: Performed by: ANESTHESIOLOGY

## 2018-11-16 RX ORDER — SODIUM CHLORIDE, SODIUM LACTATE, POTASSIUM CHLORIDE, CALCIUM CHLORIDE 600; 310; 30; 20 MG/100ML; MG/100ML; MG/100ML; MG/100ML
INJECTION, SOLUTION INTRAVENOUS
Status: DISCONTINUED | OUTPATIENT
Start: 2018-11-16 | End: 2018-11-16 | Stop reason: HOSPADM

## 2018-11-16 RX ORDER — SODIUM CHLORIDE 0.9 % (FLUSH) 0.9 %
5-10 SYRINGE (ML) INJECTION AS NEEDED
Status: DISCONTINUED | OUTPATIENT
Start: 2018-11-16 | End: 2018-11-16 | Stop reason: HOSPADM

## 2018-11-16 RX ORDER — PROPOFOL 10 MG/ML
INJECTION, EMULSION INTRAVENOUS AS NEEDED
Status: DISCONTINUED | OUTPATIENT
Start: 2018-11-16 | End: 2018-11-16 | Stop reason: HOSPADM

## 2018-11-16 RX ORDER — SODIUM CHLORIDE, SODIUM LACTATE, POTASSIUM CHLORIDE, CALCIUM CHLORIDE 600; 310; 30; 20 MG/100ML; MG/100ML; MG/100ML; MG/100ML
25 INJECTION, SOLUTION INTRAVENOUS CONTINUOUS
Status: DISCONTINUED | OUTPATIENT
Start: 2018-11-16 | End: 2018-11-16 | Stop reason: HOSPADM

## 2018-11-16 RX ORDER — SODIUM CHLORIDE, SODIUM LACTATE, POTASSIUM CHLORIDE, CALCIUM CHLORIDE 600; 310; 30; 20 MG/100ML; MG/100ML; MG/100ML; MG/100ML
100 INJECTION, SOLUTION INTRAVENOUS CONTINUOUS
Status: DISCONTINUED | OUTPATIENT
Start: 2018-11-16 | End: 2018-11-16 | Stop reason: HOSPADM

## 2018-11-16 RX ORDER — LIDOCAINE HYDROCHLORIDE 20 MG/ML
INJECTION, SOLUTION EPIDURAL; INFILTRATION; INTRACAUDAL; PERINEURAL AS NEEDED
Status: DISCONTINUED | OUTPATIENT
Start: 2018-11-16 | End: 2018-11-16 | Stop reason: HOSPADM

## 2018-11-16 RX ADMIN — SODIUM CHLORIDE, SODIUM LACTATE, POTASSIUM CHLORIDE, AND CALCIUM CHLORIDE 100 ML/HR: 600; 310; 30; 20 INJECTION, SOLUTION INTRAVENOUS at 12:38

## 2018-11-16 RX ADMIN — PROPOFOL 20 MG: 10 INJECTION, EMULSION INTRAVENOUS at 12:48

## 2018-11-16 RX ADMIN — LIDOCAINE HYDROCHLORIDE 30 MG: 20 INJECTION, SOLUTION EPIDURAL; INFILTRATION; INTRACAUDAL; PERINEURAL at 12:45

## 2018-11-16 RX ADMIN — SODIUM CHLORIDE, SODIUM LACTATE, POTASSIUM CHLORIDE, CALCIUM CHLORIDE: 600; 310; 30; 20 INJECTION, SOLUTION INTRAVENOUS at 12:40

## 2018-11-16 RX ADMIN — PROPOFOL 40 MG: 10 INJECTION, EMULSION INTRAVENOUS at 12:45

## 2018-11-16 NOTE — PROGRESS NOTES
Dr. Burkett Born called and aware of /97 in recovery. He is okay for patient to be discharged to home. Patients family states she will take her tylenol arthritis when she gets home.

## 2018-11-16 NOTE — ANESTHESIA POSTPROCEDURE EVALUATION
Procedure(s): ESOPHAGOGASTRODUODENOSCOPY (EGD)/ 21. Anesthesia Post Evaluation Multimodal analgesia: multimodal analgesia not used between 6 hours prior to anesthesia start to PACU discharge Patient location during evaluation: PACU Patient participation: complete - patient participated Level of consciousness: awake and alert Pain management: adequate Airway patency: patent Anesthetic complications: no 
Cardiovascular status: hemodynamically stable Respiratory status: acceptable Hydration status: acceptable Visit Vitals BP (!) 199/97 Pulse (!) 59 Temp 36.7 °C (98 °F) Resp 18 Ht 4' 11\" (1.499 m) Wt 46.3 kg (102 lb) SpO2 91% BMI 20.60 kg/m²

## 2018-11-16 NOTE — PROCEDURES
Esophagogastroduodenoscopy DATE of PROCEDURE: 11/16/2018 MEDICATIONS ADMINISTERED: MAC INSTRUMENT: GIF 
 
PROCEDURE:  After obtaining informed consent, the patient was placed in the left lateral position and sedated. The endoscope was advanced under direct vision without difficulty. The esophagus, stomach (including retroflexed views) and duodenum were evaluated. The patient was taken to the recovery area in stable condition. FINDINGS: 
ESOPHAGUS:  There is a 3cm hiatal hernia with a non-obstructing schatzki's ring. NO esophagitis, varices, or bleeding noted. STOMACH:  There are few dimunitive fundic gland polyps noted. One of them was easily aggravated by the scope with self-limited bleeding. There are no ulcers, AVMs, or masses noted in the stomach DUODENUM:  No evidence of duodenitis or AVMs. There was extrinsic compression noted at the bulb that was easily traversed to evaluate the 2nd portion. Estimated blood loss: 0-minimal  
 
PLAN: 
1. Recommend CT Scan of abd and pelvis to evaluate duodenal bulge as well as colon in setting of anemia and altered stools prior to prepping for colonoscopy 2. CBC in 1 week to ensure stability 3. F/up in office 4-6 wks Alexandre Belle MD 
[facility] [place]

## 2018-11-16 NOTE — DISCHARGE INSTRUCTIONS
Gastrointestinal Esophagogastroduodenoscopy (EGD) - Upper Exam Discharge Instructions    1. Call Dr. Carlos Arguelles at [phone] for any problems or questions. 2. Contact the doctor's office for follow up appointment as directed. 3. Medication may cause drowsiness for several hours, therefore, do not drive or operate machinery for remainder of the day. 4. No alcohol today. 5. Ordinarily, you may resume regular diet and activity after exam unless otherwise specified by your physician. 6. For mild soreness in your throat you may use throat lozenges or warm salt-water gargles as needed. Any additional instructions:      PLAN:  1. Recommend CT Scan of abd and pelvis to evaluate duodenal bulge as well as colon in setting of anemia and altered stools prior to prepping for colonoscopy  2. CBC in 1 week to ensure stability  3.  F/up in office 4-6 wks

## 2018-11-16 NOTE — H&P
Gastroenterology Outpatient History and Physical 
 
Patient: Aubrey Bocanegra Physician: Nelia Carter MD 
 
Vital Signs: Temperature 98.3 °F (36.8 °C), height 4' 11\" (1.499 m), weight 46.3 kg (102 lb). Allergies: Allergies Allergen Reactions  Calcium Carbonate Diarrhea  Ranitidine Hcl Diarrhea  Guaifenesin Other (comments) Hallucinations Chief Complaint: weight loss and BHARATH History of Present Illness: 80 yr old female with BHARATH and weight loss who has also had black stools which are diarrhea and here for eval for PUD, esophagitis. She has been on prednisone for years and currently taking iron. She has never had EGD Justification for Procedure: eval for UGI bleeding History: 
Past Medical History:  
Diagnosis Date  Acquired hypothyroidism   
 synthroid daily  Anemia   
 blood transfusion 9/2018  Arthritis  Chronic pain  Fractured hip ([address]  Gastroesophageal reflux disease 12/9/2016  GERD (gastroesophageal reflux disease)   
 nexium daily,well controlled per daughter-    hiatal hernia,   
 Giant cell arteritis ([address] 3/20/2010  Hip fracture ([address] 10/29/2018  
 left hip fracture-  followed by Dr Dallas Hughes @ [address] [place] [address]  Hypertension   
 managed with meds  Legally blind  Macular degeneration  NSTEMI (non-ST elevated myocardial infarction) ([address] 9/14/2018 9/12/18 \"mild mi, no damage, no heart cath or stents\"- medical management recommended however anemia persists, no current asa or thinners  Other ill-defined conditions(799.89)   
 back and pelvis pain  Other ill-defined conditions(799.89)   
 hip fx ;   arteritis  TIA (transient ischemic attack) 6/23/2015  Varicose veins of left lower extremity Past Surgical History:  
Procedure Laterality Date  CARDIAC SURG PROCEDURE UNLIST    
 Pt's family reports heart attack in Sept while in hospital at [address] [place] [address].  HX CHOLECYSTECTOMY  HX HEENT    
 torn retina  HX TONSILLECTOMY Social History Socioeconomic History  Marital status:  Spouse name: Not on file  Number of children: Not on file  Years of education: Not on file  Highest education level: Not on file Social Needs  Financial resource strain: Not on file  Food insecurity - worry: Not on file  Food insecurity - inability: Not on file  Transportation needs - medical: Not on file  Transportation needs - non-medical: Not on file Occupational History  Not on file Tobacco Use  Smoking status: Never Smoker  Smokeless tobacco: Never Used Substance and Sexual Activity  Alcohol use: No  
 Drug use: No  
 Sexual activity: No  
Other Topics Concern  Not on file Social History Narrative  Not on file Family History Problem Relation Age of Onset  Cancer Father   
     stomach [facility] Arthritis-osteo Mother  Hypertension Mother  Arthritis-osteo Sister Medications:  
Prior to Admission medications Medication Sig Start Date End Date Taking? Authorizing Provider  
acetaminophen (TYLENOL) 650 mg TbER Take 650 mg by mouth every eight (8) hours. Provider, Historical  
ferrous sulfate (IRON) 325 mg (65 mg iron) tablet Take  by mouth Daily (before breakfast). Provider, Historical  
metoprolol succinate (TOPROL-XL) 25 mg XL tablet Take 1 Tab by mouth daily. 11/13/18   Ken Hillman MD  
predniSONE (DELTASONE) 1 mg tablet Take 3 mg by mouth daily. 3/22/18   Provider, Historical  
esomeprazole (NEXIUM) 40 mg capsule Take 40 mg by mouth daily. Provider, Historical  
amLODIPine (NORVASC) 5 mg tablet Take 1 Tab by mouth daily. Patient taking differently: Take 5 mg by mouth daily. 9/27/18   Lance Thayer MD  
levothyroxine (SYNTHROID) 50 mcg tablet Take one tab by mouth every other day. Alternate 50 and 25 mcg dose. Patient taking differently: Take 25-50 mcg by mouth every other day.  Take one tab by mouth every other day. Alternate 50 and 25 mcg dose. 9/24/18   Maurice Cisneros MD  
levothyroxine (SYNTHROID) 25 mcg tablet Take 1 Tab by mouth Daily (before breakfast). Patient taking differently: Take 25-50 mcg by mouth Daily (before breakfast). 25 mg alternating daily with 50 mg 9/20/18   Maurice Cisneros MD  
[facility]) 40 mg tablet Take 0.5 Tabs by mouth daily. 1/15/18   Jair Rosen MD  
atorvastatin (LIPITOR) 10 mg tablet Take 1/2 tab every other night. 12/19/17   Jair Rosen MD  
Cholecalciferol, Vitamin D3, (VITAMIN D3) 1,000 unit cap Take 1 Cap by mouth daily. Provider, Historical  
Denosumab (PROLIA) 60 mg/mL injection 60 mg by SubCUTAneous route every 6 months. Provider, Historical  
bevacizumab (AVASTIN) 25 mg/ml soln intravitreal solution by IntraVITreal route once. Provider, Historical  
 
 
Physical Exam:  
General: no distress HEENT: Head: Normocephalic, no lesions, without obvious abnormality. Heart: regular rate and rhythm, S1, S2 normal, no murmur, click, rub or gallop Lungs: chest clear, no wheezing, rales, normal symmetric air entry Abdominal: Bowel sounds are normal, liver is not enlarged, spleen is not enlarged Neurological: Grossly normal  
Extremities: extremities normal, atraumatic, no cyanosis or edema Findings/Diagnosis: Anemia and melena Plan of Care/Planned Procedure: EGD Signed By: Arthur Villafana MD   
 November 16, 2018

## 2018-11-16 NOTE — ROUTINE PROCESS
Pt. Discharged to car by Mariusz Soto with family . Vital signs stable. Able to tolerate PO fluids. Passing gas.  Seen by MD.

## 2018-11-16 NOTE — ANESTHESIA PREPROCEDURE EVALUATION
Anesthetic History Review of Systems / Medical History Patient summary reviewed and pertinent labs reviewed Pulmonary Neuro/Psych Cardiovascular Hypertension Past MI and hyperlipidemia Exercise tolerance: <4 METS 
  
GI/Hepatic/Renal 
  
GERD Endo/Other Hypothyroidism Anemia Other Findings Physical Exam 
 
Airway Mallampati: II 
TM Distance: > 6 cm Neck ROM: normal range of motion Mouth opening: Normal 
 
 Cardiovascular Rhythm: irregular Rate: normal 
 
 
 
 Dental 
No notable dental hx Pulmonary Breath sounds clear to auscultation Abdominal 
 
 
 
 Other Findings Anesthetic Plan ASA: 3 Anesthesia type: total IV anesthesia

## 2018-11-20 ENCOUNTER — HOSPITAL ENCOUNTER (OUTPATIENT)
Dept: CT IMAGING | Age: 83
Discharge: HOME | End: 2018-11-20
Attending: INTERNAL MEDICINE
Payer: MEDICARE

## 2018-11-20 DIAGNOSIS — D64.9 ANEMIA, UNSPECIFIED: ICD-10-CM

## 2018-11-20 DIAGNOSIS — R63.4 ABNORMAL WEIGHT LOSS: ICD-10-CM

## 2018-11-20 PROCEDURE — 74177 CT ABD & PELVIS W/CONTRAST: CPT

## 2018-11-20 PROCEDURE — 74011000258 HC RX REV CODE- 258: Performed by: INTERNAL MEDICINE

## 2018-11-20 PROCEDURE — 74011636320 HC RX REV CODE- 636/320: Performed by: INTERNAL MEDICINE

## 2018-11-20 RX ORDER — SODIUM CHLORIDE 0.9 % (FLUSH) 0.9 %
10 SYRINGE (ML) INJECTION
Status: COMPLETED | OUTPATIENT
Start: 2018-11-20 | End: 2018-11-20

## 2018-11-20 RX ADMIN — DIATRIZOATE MEGLUMINE AND DIATRIZOATE SODIUM 15 ML: 660; 100 LIQUID ORAL; RECTAL at 10:38

## 2018-11-20 RX ADMIN — Medication 10 ML: at 10:38

## 2018-11-20 RX ADMIN — SODIUM CHLORIDE 100 ML: 900 INJECTION, SOLUTION INTRAVENOUS at 10:38

## 2018-11-20 RX ADMIN — IOPAMIDOL 100 ML: 755 INJECTION, SOLUTION INTRAVENOUS at 10:38

## 2018-11-30 ENCOUNTER — HOSPITAL ENCOUNTER (OUTPATIENT)
Dept: SURGERY | Age: 83
Discharge: HOME | End: 2018-11-30
Attending: ORTHOPAEDIC SURGERY
Payer: MEDICARE

## 2018-11-30 VITALS
HEIGHT: 59 IN | SYSTOLIC BLOOD PRESSURE: 148 MMHG | OXYGEN SATURATION: 94 % | BODY MASS INDEX: 20.36 KG/M2 | WEIGHT: 101 LBS | TEMPERATURE: 96.8 F | DIASTOLIC BLOOD PRESSURE: 83 MMHG

## 2018-11-30 LAB
ANION GAP SERPL CALC-SCNC: 6 MMOL/L
APTT PPP: 23.9 SEC (ref 23.2–35.3)
BACTERIA SPEC CULT: NORMAL
BASOPHILS # BLD: 0.1 K/UL (ref 0–0.2)
BASOPHILS NFR BLD: 1 % (ref 0–2)
BUN SERPL-MCNC: 23 MG/DL (ref 8–23)
CALCIUM SERPL-MCNC: 9.6 MG/DL (ref 8.3–10.4)
CHLORIDE SERPL-SCNC: 107 MMOL/L (ref 98–107)
CO2 SERPL-SCNC: 26 MMOL/L (ref 21–32)
CREAT SERPL-MCNC: 0.88 MG/DL (ref 0.6–1)
DIFFERENTIAL METHOD BLD: ABNORMAL
EOSINOPHIL # BLD: 0.1 K/UL (ref 0–0.8)
EOSINOPHIL NFR BLD: 1 % (ref 0.5–7.8)
ERYTHROCYTE [DISTWIDTH] IN BLOOD BY AUTOMATED COUNT: 20 % (ref 11.9–14.6)
GLUCOSE SERPL-MCNC: 129 MG/DL (ref 65–100)
HCT VFR BLD AUTO: 34.3 % (ref 35.8–46.3)
HGB BLD-MCNC: 10.1 G/DL (ref 11.7–15.4)
IMM GRANULOCYTES # BLD: 0.1 K/UL (ref 0–0.5)
IMM GRANULOCYTES NFR BLD AUTO: 1 % (ref 0–5)
INR PPP: 1
LYMPHOCYTES # BLD: 1.2 K/UL (ref 0.5–4.6)
LYMPHOCYTES NFR BLD: 13 % (ref 13–44)
MCH RBC QN AUTO: 26.1 PG (ref 26.1–32.9)
MCHC RBC AUTO-ENTMCNC: 29.4 G/DL (ref 31.4–35)
MCV RBC AUTO: 88.6 FL (ref 79.6–97.8)
MONOCYTES # BLD: 0.9 K/UL (ref 0.1–1.3)
MONOCYTES NFR BLD: 10 % (ref 4–12)
NEUTS SEG # BLD: 7.3 K/UL (ref 1.7–8.2)
NEUTS SEG NFR BLD: 76 % (ref 43–78)
NRBC # BLD: 0 K/UL (ref 0–0.2)
PLATELET # BLD AUTO: 323 K/UL (ref 150–450)
PMV BLD AUTO: 11.1 FL (ref 9.4–12.3)
POTASSIUM SERPL-SCNC: 4.2 MMOL/L (ref 3.5–5.1)
PROTHROMBIN TIME: 12.9 SEC (ref 11.5–14.5)
RBC # BLD AUTO: 3.87 M/UL (ref 4.05–5.2)
SERVICE CMNT-IMP: NORMAL
SODIUM SERPL-SCNC: 139 MMOL/L (ref 136–145)
WBC # BLD AUTO: 9.6 K/UL (ref 4.3–11.1)

## 2018-11-30 PROCEDURE — 77030027138 HC INCENT SPIROMETER -A

## 2018-11-30 PROCEDURE — 85610 PROTHROMBIN TIME: CPT

## 2018-11-30 PROCEDURE — 80048 BASIC METABOLIC PNL TOTAL CA: CPT

## 2018-11-30 PROCEDURE — 85730 THROMBOPLASTIN TIME PARTIAL: CPT

## 2018-11-30 PROCEDURE — 87641 MR-STAPH DNA AMP PROBE: CPT

## 2018-11-30 PROCEDURE — 85025 COMPLETE CBC W/AUTO DIFF WBC: CPT

## 2018-11-30 RX ORDER — BEVACIZUMAB 100 MG/4ML
0.62 INJECTION, SOLUTION INTRAVENOUS ONCE
Status: CANCELLED | OUTPATIENT
Start: 2018-11-30 | End: 2018-11-30

## 2018-11-30 RX ORDER — HYDROMORPHONE HYDROCHLORIDE 2 MG/1
2 TABLET ORAL
Status: CANCELLED | OUTPATIENT
Start: 2018-11-30

## 2018-11-30 RX ORDER — ACETAMINOPHEN 500 MG
1000 TABLET ORAL EVERY 6 HOURS
Status: CANCELLED | OUTPATIENT
Start: 2018-12-01

## 2018-11-30 RX ORDER — CEFAZOLIN SODIUM/WATER 2 G/20 ML
2 SYRINGE (ML) INTRAVENOUS ONCE
Status: CANCELLED | OUTPATIENT
Start: 2018-12-03 | End: 2018-12-03

## 2018-11-30 RX ORDER — HYDROMORPHONE HYDROCHLORIDE 1 MG/ML
1 INJECTION, SOLUTION INTRAMUSCULAR; INTRAVENOUS; SUBCUTANEOUS
Status: CANCELLED | OUTPATIENT
Start: 2018-11-30

## 2018-11-30 RX ORDER — ASPIRIN 81 MG/1
81 TABLET ORAL EVERY 12 HOURS
Status: CANCELLED | OUTPATIENT
Start: 2018-11-30

## 2018-11-30 RX ORDER — CEFAZOLIN SODIUM/WATER 2 G/20 ML
2 SYRINGE (ML) INTRAVENOUS ONCE
Status: CANCELLED | OUTPATIENT
Start: 2018-11-30 | End: 2018-11-30

## 2018-11-30 RX ORDER — SODIUM CHLORIDE 9 MG/ML
100 INJECTION, SOLUTION INTRAVENOUS CONTINUOUS
Status: CANCELLED | OUTPATIENT
Start: 2018-11-30 | End: 2018-12-01

## 2018-11-30 RX ORDER — TRANEXAMIC ACID 650 1/1
1300 TABLET ORAL
Status: CANCELLED | OUTPATIENT
Start: 2018-12-03 | End: 2018-12-03

## 2018-11-30 RX ORDER — LEVOTHYROXINE SODIUM 50 UG/1
25-50 TABLET ORAL EVERY OTHER DAY
Status: CANCELLED | OUTPATIENT
Start: 2018-11-30

## 2018-11-30 RX ORDER — SODIUM CHLORIDE 0.9 % (FLUSH) 0.9 %
5-10 SYRINGE (ML) INJECTION EVERY 8 HOURS
Status: CANCELLED | OUTPATIENT
Start: 2018-11-30

## 2018-11-30 RX ORDER — ONDANSETRON 8 MG/1
8 TABLET, ORALLY DISINTEGRATING ORAL
Status: CANCELLED | OUTPATIENT
Start: 2018-11-30

## 2018-11-30 RX ORDER — LANOLIN ALCOHOL/MO/W.PET/CERES
1 CREAM (GRAM) TOPICAL 2 TIMES DAILY WITH MEALS
Status: CANCELLED | OUTPATIENT
Start: 2018-11-30

## 2018-11-30 RX ORDER — METOPROLOL SUCCINATE 25 MG/1
25 TABLET, EXTENDED RELEASE ORAL DAILY
Status: CANCELLED | OUTPATIENT
Start: 2018-11-30

## 2018-11-30 RX ORDER — OLMESARTAN MEDOXOMIL 20 MG/1
20 TABLET ORAL DAILY
Status: CANCELLED | OUTPATIENT
Start: 2018-11-30

## 2018-11-30 RX ORDER — AMLODIPINE BESYLATE 5 MG/1
5 TABLET ORAL DAILY
Status: CANCELLED | OUTPATIENT
Start: 2018-11-30

## 2018-11-30 RX ORDER — ACETAMINOPHEN 10 MG/ML
1000 INJECTION, SOLUTION INTRAVENOUS ONCE
Status: CANCELLED | OUTPATIENT
Start: 2018-11-30 | End: 2018-11-30

## 2018-11-30 RX ORDER — DIPHENHYDRAMINE HCL 25 MG
25 CAPSULE ORAL
Status: CANCELLED | OUTPATIENT
Start: 2018-11-30

## 2018-11-30 RX ORDER — AMOXICILLIN 250 MG
2 CAPSULE ORAL DAILY
Status: CANCELLED | OUTPATIENT
Start: 2018-12-01

## 2018-11-30 RX ORDER — PREDNISONE 1 MG/1
3 TABLET ORAL DAILY
Status: CANCELLED | OUTPATIENT
Start: 2018-11-30

## 2018-11-30 RX ORDER — LEVOTHYROXINE SODIUM 50 UG/1
25-50 TABLET ORAL
Status: CANCELLED | OUTPATIENT
Start: 2018-11-30

## 2018-11-30 RX ORDER — NALOXONE HYDROCHLORIDE 0.4 MG/ML
.2-.4 INJECTION, SOLUTION INTRAMUSCULAR; INTRAVENOUS; SUBCUTANEOUS
Status: CANCELLED | OUTPATIENT
Start: 2018-11-30

## 2018-11-30 RX ORDER — CELECOXIB 200 MG/1
200 CAPSULE ORAL EVERY 12 HOURS
Status: CANCELLED | OUTPATIENT
Start: 2018-11-30

## 2018-11-30 RX ORDER — ATORVASTATIN CALCIUM 10 MG/1
10 TABLET, FILM COATED ORAL DAILY
Status: CANCELLED | OUTPATIENT
Start: 2018-11-30

## 2018-11-30 RX ORDER — TRANEXAMIC ACID 650 1/1
650 TABLET ORAL 2 TIMES DAILY
Status: CANCELLED | OUTPATIENT
Start: 2018-12-03 | End: 2018-12-05

## 2018-11-30 RX ORDER — SODIUM CHLORIDE 0.9 % (FLUSH) 0.9 %
5-10 SYRINGE (ML) INJECTION AS NEEDED
Status: CANCELLED | OUTPATIENT
Start: 2018-11-30

## 2018-11-30 RX ORDER — DEXAMETHASONE SODIUM PHOSPHATE 100 MG/10ML
10 INJECTION INTRAMUSCULAR; INTRAVENOUS ONCE
Status: CANCELLED | OUTPATIENT
Start: 2018-12-01 | End: 2018-12-01

## 2018-11-30 RX ORDER — CEFAZOLIN SODIUM/WATER 2 G/20 ML
2 SYRINGE (ML) INTRAVENOUS EVERY 8 HOURS
Status: CANCELLED | OUTPATIENT
Start: 2018-11-30 | End: 2018-11-30

## 2018-11-30 NOTE — PERIOP NOTES
Dr Thomas Lambert, anesthesiologist met with pt and daughter, reviewed chart,reviewed all cardiology records and GI records and today's labs. She talked with Dr Hillman,cardiologist: surgery cancelled for Monday. Dr Thomas Lambert notified pt and daughter that Dr Wiley Adam office to contact them with appointment time for nuclear stress test to be done prior to surgery to make sure pt ok for ZELDA. She also notified them that hgb is lower today than recent hgb done by GI and that anemia has not resolved -pt to follow up with [facility].

## 2018-11-30 NOTE — PERIOP NOTES
Patient verified name and  Order for consent  found in EHR and matches case posting; patient verified. Anesthesia review : recent MI and anemia. Type 3 surgery, Joint assessment complete. Labs per surgeon: cbc,bmp,pt,ptt; results within anesthesia limits; urinalysis done 18-within limits. Labs per anesthesia protocol: no other needed. EKG:  Dated 10-30-18, 18, and 9-1-15 and echo dated 18 along with office cardiologist note 18 to be reviewed by anesthesiologist. 
 
Hibiclens and instructions given per hospital policy. Patient provided with and instructed on educational handouts including Guide to Surgery, Pain Management, Hand Hygiene, Blood Transfusion Education, and [facility] Anesthesia Brochure. Patient answered medical/surgical history questions at their best of ability. All prior to admission medications documented in [facility]. Original medication prescription bottle not visualized during patient appointment. Patient instructed to hold all vitamins 7 days prior to surgery and NSAIDS 5 days prior to surgery, patient verbalized understanding. Medications to be held: none. Patient instructed to continue previous medications as prescribed prior to surgery and to take the following medications the day of surgery according to anesthesia guidelines with a small sip of water: tylenol, amlodipine,nexium, synthroid, metoprollo, prednisone. [facility] Patient teach back successful and patient demonstrates knowledge of instructions.

## 2018-12-03 ENCOUNTER — HOSPITAL ENCOUNTER (INPATIENT)
Age: 83
End: 2018-12-03
Attending: ORTHOPAEDIC SURGERY | Admitting: ORTHOPAEDIC SURGERY
Payer: MEDICARE

## 2018-12-03 PROCEDURE — 77030020263 HC SOL INJ SOD CL0.9% LFCR 1000ML

## 2018-12-03 RX ORDER — LIDOCAINE HYDROCHLORIDE 10 MG/ML
0.1 INJECTION INFILTRATION; PERINEURAL AS NEEDED
Status: CANCELLED | OUTPATIENT
Start: 2018-12-03

## 2018-12-03 RX ORDER — SODIUM CHLORIDE 0.9 % (FLUSH) 0.9 %
5-10 SYRINGE (ML) INJECTION AS NEEDED
Status: CANCELLED | OUTPATIENT
Start: 2018-12-03

## 2018-12-03 RX ORDER — SODIUM CHLORIDE, SODIUM LACTATE, POTASSIUM CHLORIDE, CALCIUM CHLORIDE 600; 310; 30; 20 MG/100ML; MG/100ML; MG/100ML; MG/100ML
75 INJECTION, SOLUTION INTRAVENOUS CONTINUOUS
Status: CANCELLED | OUTPATIENT
Start: 2018-12-03 | End: 2018-12-04

## 2018-12-03 RX ORDER — MIDAZOLAM HYDROCHLORIDE 1 MG/ML
2 INJECTION, SOLUTION INTRAMUSCULAR; INTRAVENOUS
Status: CANCELLED | OUTPATIENT
Start: 2018-12-03 | End: 2018-12-04

## 2018-12-03 RX ORDER — CELECOXIB 200 MG/1
200 CAPSULE ORAL
Status: CANCELLED | OUTPATIENT
Start: 2018-12-03 | End: 2018-12-04

## 2018-12-03 RX ORDER — ALBUTEROL SULFATE 0.83 MG/ML
2.5 SOLUTION RESPIRATORY (INHALATION) AS NEEDED
Status: CANCELLED | OUTPATIENT
Start: 2018-12-03

## 2018-12-03 RX ORDER — SODIUM CHLORIDE, SODIUM LACTATE, POTASSIUM CHLORIDE, CALCIUM CHLORIDE 600; 310; 30; 20 MG/100ML; MG/100ML; MG/100ML; MG/100ML
50 INJECTION, SOLUTION INTRAVENOUS CONTINUOUS
Status: CANCELLED | OUTPATIENT
Start: 2018-12-03

## 2018-12-03 RX ORDER — SODIUM CHLORIDE 0.9 % (FLUSH) 0.9 %
5-10 SYRINGE (ML) INJECTION EVERY 8 HOURS
Status: CANCELLED | OUTPATIENT
Start: 2018-12-03

## 2018-12-03 RX ORDER — OXYCODONE HYDROCHLORIDE 5 MG/1
5 TABLET ORAL
Status: CANCELLED | OUTPATIENT
Start: 2018-12-03 | End: 2018-12-04

## 2018-12-03 RX ORDER — HYDROMORPHONE HYDROCHLORIDE 2 MG/ML
0.5 INJECTION, SOLUTION INTRAMUSCULAR; INTRAVENOUS; SUBCUTANEOUS
Status: CANCELLED | OUTPATIENT
Start: 2018-12-03

## 2018-12-03 RX ORDER — FENTANYL CITRATE 50 UG/ML
100 INJECTION, SOLUTION INTRAMUSCULAR; INTRAVENOUS ONCE
Status: CANCELLED | OUTPATIENT
Start: 2018-12-03 | End: 2018-12-03

## 2018-12-03 RX ORDER — MIDAZOLAM HYDROCHLORIDE 1 MG/ML
2 INJECTION, SOLUTION INTRAMUSCULAR; INTRAVENOUS ONCE
Status: CANCELLED | OUTPATIENT
Start: 2018-12-03 | End: 2018-12-03

## 2018-12-03 NOTE — PERIOP NOTES
Routing labs from 11/30/18 to Dr. Alex Humphreys @  fannie Early @ Dr. Merle Castro request.  
 
Results for Erickson Noonan (MRN [id number]) as of 12/3/2018 09:54 Ref. Range 11/30/2018 15:38 WBC Latest Ref Range: 4.3 - 11.1 K/uL 9.6 RBC Latest Ref Range: 4.05 - 5.2 M/uL 3.87 (L) HGB Latest Ref Range: 11.7 - 15.4 g/dL 10.1 (L) HCT Latest Ref Range: 35.8 - 46.3 % 34.3 (L) MCV Latest Ref Range: 79.6 - 97.8 FL 88.6 MCH Latest Ref Range: 26.1 - 32.9 PG 26.1 MCHC Latest Ref Range: 31.4 - 35.0 g/dL 29.4 (L) RDW Latest Ref Range: 11.9 - 14.6 % 20.0 (H) PLATELET Latest Ref Range: 150 - 450 K/uL 323 MPV Latest Ref Range: 9.4 - 12.3 FL 11.1 NEUTROPHILS Latest Ref Range: 43 - 78 % 76 LYMPHOCYTES Latest Ref Range: 13 - 44 % 13 MONOCYTES Latest Ref Range: 4.0 - 12.0 % 10 EOSINOPHILS Latest Ref Range: 0.5 - 7.8 % 1  
BASOPHILS Latest Ref Range: 0.0 - 2.0 % 1 IMMATURE GRANULOCYTES Latest Ref Range: 0.0 - 5.0 % 1 DF Latest Units:   AUTOMATED ABSOLUTE NRBC Latest Ref Range: 0.0 - 0.2 K/uL 0.00  
ABS. NEUTROPHILS Latest Ref Range: 1.7 - 8.2 K/UL 7.3 ABS. IMM. GRANS. Latest Ref Range: 0.0 - 0.5 K/UL 0.1 ABS. LYMPHOCYTES Latest Ref Range: 0.5 - 4.6 K/UL 1.2  
ABS. MONOCYTES Latest Ref Range: 0.1 - 1.3 K/UL 0.9  
ABS. EOSINOPHILS Latest Ref Range: 0.0 - 0.8 K/UL 0.1 ABS. BASOPHILS Latest Ref Range: 0.0 - 0.2 K/UL 0.1 INR Latest Units:   1.0 Prothrombin time Latest Ref Range: 11.5 - 14.5 sec 12.9  
aPTT Latest Ref Range: 23.2 - 35.3 SEC 23.9 Sodium Latest Ref Range: 136 - 145 mmol/L 139 Potassium Latest Ref Range: 3.5 - 5.1 mmol/L 4.2 Chloride Latest Ref Range: 98 - 107 mmol/L 107 CO2 Latest Ref Range: 21 - 32 mmol/L 26 Anion gap Latest Units: mmol/L 6 Glucose Latest Ref Range: 65 - 100 mg/dL 129 (H) BUN Latest Ref Range: 8 - 23 MG/DL 23 Creatinine Latest Ref Range: 0.6 - 1.0 MG/DL 0.88 Calcium Latest Ref Range: 8.3 - 10.4 MG/DL 9.6 GFR est non-AA Latest Units: ml/min/1.73m2 >60  
 GFR est AA Latest Ref Range: >60 ml/min/1.73m2 >60  
MSSA/MRSA SC BY PCR, NASAL SWAB Unknown Rpt

## 2018-12-04 NOTE — PERIOP NOTES
[facility] called and states patient scheduled 12/11/18 for nuclear stress test with f/u 12/12/18 to review results and determine patient's risk for surgery.

## 2018-12-17 VITALS — WEIGHT: 101 LBS | BODY MASS INDEX: 20.36 KG/M2 | HEIGHT: 59 IN

## 2018-12-17 RX ORDER — METOPROLOL SUCCINATE 25 MG/1
TABLET, EXTENDED RELEASE ORAL DAILY
COMMUNITY
End: 2019-01-07

## 2018-12-17 NOTE — PERIOP NOTES
Pt scheduled for walk in re-assessment for 12/18/18 - had assessment for same surgery 11/30/18 but needed card clearance per Dr. Mendel Dura (see previous notes). Pt seen and cleared by Dr. Adarsh Monroe (12/12/18) with stress (12/11/18). Discussed with [facility] @ Dr. Bhavin Garcia office - no additional labs needed prior to surgery per Dr. Nadir Vogt. OK to complete a phone update. Spoke with pt's daughter Jonathan Ascencio ([phone]). Offered phone assessment instead of walk in - [facility] agreeable and appreciative. Will be available by phone anytime this afternoon. Daughter voiced concern re: anemia. Questioned if anesthesia would want another HGB check prior to DOS. Will have anesthesia review chart after phone assessment and notify daughter if HGB needed prior to DOS. Call to OR scheduling to change appt to phone assessment.

## 2018-12-17 NOTE — PERIOP NOTES
, anesthesia, reviewed pt's chart including card note (12/12/18) and stress (12/11/18). Aware pt's hgb 10.1 (11/30/18), and 11.0 (11/26/18) - ordered HGB upon arrival DOS. Also aware card rx'd metoprolol 50 mg but pt only taking 37.5 mg. No other orders received. OK to proceed. Daughter - Yajaira Barrios - notified of above.

## 2018-12-17 NOTE — PERIOP NOTES
Patient's daughter, Janis Stinson, verified patient's name and . Order for consent found in EHR and matches case posting. Patient had walk-in PAT appointment on 18---at that time Dr. Clyde Matt (anesthesia) evaluated patient and ordered patient to obtain cardiac clearance prior to surgery. Cardiology note with surgical clearance (18) and nuclear stress test (18) placed on chart for anesthesia to review. Charge nurse to follow up. Type 3 surgery, PAT phone UPDATE  complete. Orders received. Labs per surgeon: Lab work completed at previous walk-in PAT appointment on 18. Per Karalee Severe, charge RN,she has spoken to Marii Early, assistant to Dr. Piper Rgoer, and states NO repeat lab work in required. Janis Stinson, patient's daughter, reports no changes in medical/surgical history. Patient's medication list updated; Marii Early reports once a day 25 mg Metoprolol has been changed to  50 mg of Metoprolol daily, however; Marii Early states patient is only taking 1.5 tablets of 25 mg Metoprolol daily because they want to increase her BP medication slowly. Patient daughter reminded of the importance of taking medications as prescribed. Jains Stinson has all instructions/paperwork received at previous walk-in appointment and verbalized understanding. Janis Stinson reminded of the following:      Patient's daughter, Marii Early,  instructed to have patient take the following medications the day of surgery according to anesthesia guidelines with a small sip of water: Tylenol if needed, Amlodipine, Nexium, Synthroid, Metoprolol, and Prednisone. Hold all vitamins 7 days prior to surgery and NSAIDS 5 days prior to surgery. Medications to be held: all vitamins/supplements/herbals.

## 2018-12-18 ENCOUNTER — HOSPITAL ENCOUNTER (OUTPATIENT)
Dept: SURGERY | Age: 83
Discharge: HOME | End: 2018-12-18

## 2018-12-21 ENCOUNTER — ANESTHESIA EVENT (OUTPATIENT)
Dept: SURGERY | Age: 83
DRG: 470 | End: 2018-12-21
Payer: MEDICARE

## 2018-12-21 NOTE — ANESTHESIA PREPROCEDURE EVALUATION
Anesthetic History               Review of Systems / Medical History  Patient summary reviewed    Pulmonary                   Neuro/Psych              Cardiovascular    Hypertension  Valvular problems/murmurs: tricuspid insufficiency and mitral insufficiency        Past MI (9/12/18, no intervention) and CAD    Exercise tolerance: <4 METS  Comments: Stress 11/18 - moderate area of reversible ischemia in lateral wall. Preserved EF    Echo 9/2018: -  Left ventricle: Systolic function was normal. Ejection fraction was  estimated in the range of 60 % to 65 %. There were no regional wall motion  abnormalities. -  Left atrium: The atrium was moderately dilated. -  Mitral valve: There was moderate regurgitation. -  Tricuspid valve: There was mild to moderate regurgitation   GI/Hepatic/Renal     GERD: well controlled           Endo/Other      Hypothyroidism       Other Findings            Physical Exam    Airway  Mallampati: II  TM Distance: > 6 cm  Neck ROM: normal range of motion   Mouth opening: Normal     Cardiovascular  Regular rate and rhythm,  S1 and S2 normal,  no murmur, click, rub, or gallop             Dental  No notable dental hx       Pulmonary  Breath sounds clear to auscultation               Abdominal         Other Findings            Anesthetic Plan    ASA: 4  Anesthesia type: spinal    Monitoring Plan: Arterial line        Anesthetic plan and risks discussed with: Patient and Family      Complex recent cardiac history - NSTEMI 9/18 (with trp increase to 23)but no cath due to GIB. Decision made not to proceed with cath due to patient's age. Scheduled for ZELDA (actually hemiarthroplasty) due to chronic femoral head fracture. Stress test performed to help elucidate patient's cardiac disease. Stress showed moderate area of reversible ischemia in circumflex distribution (No left main/equivalent ischemia). Dr. Melia Desir ok'd patient for surgery but considered moderate risk.   I spoke with Dr. Nidia Card about moving the case to DT to be in closer proximity to a cath lab in case the patient had a perioperative cardiac event and he agreed. No further workup.     Increased cardiac risks discussed with patient and family

## 2018-12-21 NOTE — PERIOP NOTES
Per Esthela Winsome, RN Pre op, surgery case changed from [facility] [address] to [facility]. Documents in hard chart given to Sinan to be sent via  to [address].

## 2018-12-24 ENCOUNTER — APPOINTMENT (OUTPATIENT)
Dept: GENERAL RADIOLOGY | Age: 83
DRG: 470 | End: 2018-12-24
Attending: ORTHOPAEDIC SURGERY
Payer: MEDICARE

## 2018-12-24 ENCOUNTER — ANESTHESIA (OUTPATIENT)
Dept: SURGERY | Age: 83
DRG: 470 | End: 2018-12-24
Payer: MEDICARE

## 2018-12-24 ENCOUNTER — HOSPITAL ENCOUNTER (INPATIENT)
Age: 83
LOS: 3 days | Discharge: NURSING, REHAB OR LONG-TERM CARE | DRG: 470 | End: 2018-12-27
Attending: ORTHOPAEDIC SURGERY | Admitting: ORTHOPAEDIC SURGERY
Payer: MEDICARE

## 2018-12-24 DIAGNOSIS — M25.552 PAIN OF LEFT HIP JOINT: Primary | ICD-10-CM

## 2018-12-24 PROBLEM — M25.559 HIP PAIN: Status: ACTIVE | Noted: 2018-12-24

## 2018-12-24 LAB
ABO + RH BLD: NORMAL
ANION GAP SERPL CALC-SCNC: 7 MMOL/L (ref 7–16)
ANION GAP SERPL CALC-SCNC: 7 MMOL/L (ref 7–16)
APPEARANCE UR: CLEAR
APTT PPP: 24.8 SEC (ref 24.7–39.8)
ATRIAL RATE: 56 BPM
BACTERIA SPEC CULT: NORMAL
BACTERIA URNS QL MICRO: 0 /HPF
BASOPHILS # BLD: 0.1 K/UL (ref 0–0.2)
BASOPHILS # BLD: 0.1 K/UL (ref 0–0.2)
BASOPHILS NFR BLD: 0 % (ref 0–2)
BASOPHILS NFR BLD: 1 % (ref 0–2)
BILIRUB UR QL: NEGATIVE
BLOOD GROUP ANTIBODIES SERPL: NORMAL
BUN SERPL-MCNC: 14 MG/DL (ref 8–23)
BUN SERPL-MCNC: 18 MG/DL (ref 8–23)
CALCIUM SERPL-MCNC: 9.2 MG/DL (ref 8.3–10.4)
CALCIUM SERPL-MCNC: 9.9 MG/DL (ref 8.3–10.4)
CALCULATED P AXIS, ECG09: 47 DEGREES
CALCULATED R AXIS, ECG10: -64 DEGREES
CALCULATED T AXIS, ECG11: 104 DEGREES
CHLORIDE SERPL-SCNC: 107 MMOL/L (ref 98–107)
CHLORIDE SERPL-SCNC: 108 MMOL/L (ref 98–107)
CO2 SERPL-SCNC: 26 MMOL/L (ref 21–32)
CO2 SERPL-SCNC: 26 MMOL/L (ref 21–32)
COLOR UR: YELLOW
CREAT SERPL-MCNC: 0.68 MG/DL (ref 0.6–1)
CREAT SERPL-MCNC: 0.74 MG/DL (ref 0.6–1)
DIAGNOSIS, 93000: NORMAL
DIFFERENTIAL METHOD BLD: ABNORMAL
DIFFERENTIAL METHOD BLD: ABNORMAL
EOSINOPHIL # BLD: 0.1 K/UL (ref 0–0.8)
EOSINOPHIL # BLD: 0.2 K/UL (ref 0–0.8)
EOSINOPHIL NFR BLD: 1 % (ref 0.5–7.8)
EOSINOPHIL NFR BLD: 3 % (ref 0.5–7.8)
ERYTHROCYTE [DISTWIDTH] IN BLOOD BY AUTOMATED COUNT: 17.3 % (ref 11.9–14.6)
ERYTHROCYTE [DISTWIDTH] IN BLOOD BY AUTOMATED COUNT: 17.4 % (ref 11.9–14.6)
GLUCOSE SERPL-MCNC: 133 MG/DL (ref 65–100)
GLUCOSE SERPL-MCNC: 87 MG/DL (ref 65–100)
GLUCOSE UR STRIP.AUTO-MCNC: NEGATIVE MG/DL
HCT VFR BLD AUTO: 33.9 % (ref 35.8–46.3)
HCT VFR BLD AUTO: 35.8 % (ref 35.8–46.3)
HGB BLD-MCNC: 10.3 G/DL (ref 11.7–15.4)
HGB BLD-MCNC: 10.4 G/DL (ref 11.7–15.4)
HGB BLD-MCNC: 10.7 G/DL (ref 11.7–15.4)
HGB UR QL STRIP: NEGATIVE
IMM GRANULOCYTES # BLD: 0 K/UL (ref 0–0.5)
IMM GRANULOCYTES # BLD: 0.1 K/UL (ref 0–0.5)
IMM GRANULOCYTES NFR BLD AUTO: 0 % (ref 0–5)
IMM GRANULOCYTES NFR BLD AUTO: 1 % (ref 0–5)
INR PPP: 1
KETONES UR QL STRIP.AUTO: NEGATIVE MG/DL
LEUKOCYTE ESTERASE UR QL STRIP.AUTO: NEGATIVE
LYMPHOCYTES # BLD: 1.2 K/UL (ref 0.5–4.6)
LYMPHOCYTES # BLD: 1.4 K/UL (ref 0.5–4.6)
LYMPHOCYTES NFR BLD: 12 % (ref 13–44)
LYMPHOCYTES NFR BLD: 8 % (ref 13–44)
MCH RBC QN AUTO: 26.4 PG (ref 26.1–32.9)
MCH RBC QN AUTO: 26.7 PG (ref 26.1–32.9)
MCHC RBC AUTO-ENTMCNC: 29.9 G/DL (ref 31.4–35)
MCHC RBC AUTO-ENTMCNC: 30.4 G/DL (ref 31.4–35)
MCV RBC AUTO: 87.8 FL (ref 79.6–97.8)
MCV RBC AUTO: 88.2 FL (ref 79.6–97.8)
MONOCYTES # BLD: 0.8 K/UL (ref 0.1–1.3)
MONOCYTES # BLD: 1.2 K/UL (ref 0.1–1.3)
MONOCYTES NFR BLD: 7 % (ref 4–12)
MONOCYTES NFR BLD: 9 % (ref 4–12)
NEUTS SEG # BLD: 14 K/UL (ref 1.7–8.2)
NEUTS SEG # BLD: 7.3 K/UL (ref 1.7–8.2)
NEUTS SEG NFR BLD: 75 % (ref 43–78)
NEUTS SEG NFR BLD: 83 % (ref 43–78)
NITRITE UR QL STRIP.AUTO: NEGATIVE
NRBC # BLD: 0 K/UL (ref 0–0.2)
NRBC # BLD: 0 K/UL (ref 0–0.2)
P-R INTERVAL, ECG05: 140 MS
PH UR STRIP: 8 [PH] (ref 5–9)
PLATELET # BLD AUTO: 302 K/UL (ref 150–450)
PLATELET # BLD AUTO: 303 K/UL (ref 150–450)
PMV BLD AUTO: 11.6 FL (ref 9.4–12.3)
PMV BLD AUTO: 11.8 FL (ref 9.4–12.3)
POTASSIUM SERPL-SCNC: 3.9 MMOL/L (ref 3.5–5.1)
POTASSIUM SERPL-SCNC: 4.1 MMOL/L (ref 3.5–5.1)
PROT UR STRIP-MCNC: NEGATIVE MG/DL
PROTHROMBIN TIME: 13.5 SEC (ref 11.7–14.5)
Q-T INTERVAL, ECG07: 466 MS
QRS DURATION, ECG06: 102 MS
QTC CALCULATION (BEZET), ECG08: 449 MS
RBC # BLD AUTO: 3.86 M/UL (ref 4.05–5.2)
RBC # BLD AUTO: 4.06 M/UL (ref 4.05–5.2)
SERVICE CMNT-IMP: NORMAL
SODIUM SERPL-SCNC: 140 MMOL/L (ref 136–145)
SODIUM SERPL-SCNC: 141 MMOL/L (ref 136–145)
SP GR UR REFRACTOMETRY: 1.01 (ref 1–1.02)
SPECIMEN EXP DATE BLD: NORMAL
UROBILINOGEN UR QL STRIP.AUTO: 0.2 EU/DL (ref 0.2–1)
VENTRICULAR RATE, ECG03: 56 BPM
WBC # BLD AUTO: 16.8 K/UL (ref 4.3–11.1)
WBC # BLD AUTO: 9.7 K/UL (ref 4.3–11.1)

## 2018-12-24 PROCEDURE — 86901 BLOOD TYPING SEROLOGIC RH(D): CPT

## 2018-12-24 PROCEDURE — 74011000250 HC RX REV CODE- 250

## 2018-12-24 PROCEDURE — 74011250636 HC RX REV CODE- 250/636: Performed by: ORTHOPAEDIC SURGERY

## 2018-12-24 PROCEDURE — 74011250636 HC RX REV CODE- 250/636: Performed by: ANESTHESIOLOGY

## 2018-12-24 PROCEDURE — 0SRS0J9 REPLACEMENT OF LEFT HIP JOINT, FEMORAL SURFACE WITH SYNTHETIC SUBSTITUTE, CEMENTED, OPEN APPROACH: ICD-10-PCS | Performed by: ORTHOPAEDIC SURGERY

## 2018-12-24 PROCEDURE — 74011000250 HC RX REV CODE- 250: Performed by: ORTHOPAEDIC SURGERY

## 2018-12-24 PROCEDURE — 77030018836 HC SOL IRR NACL ICUM -A: Performed by: ORTHOPAEDIC SURGERY

## 2018-12-24 PROCEDURE — 76210000016 HC OR PH I REC 1 TO 1.5 HR: Performed by: ORTHOPAEDIC SURGERY

## 2018-12-24 PROCEDURE — 77030029883 HC RETRV SUT ARTHSCP HOFFE BEAT -B: Performed by: ORTHOPAEDIC SURGERY

## 2018-12-24 PROCEDURE — C1776 JOINT DEVICE (IMPLANTABLE): HCPCS | Performed by: ORTHOPAEDIC SURGERY

## 2018-12-24 PROCEDURE — 74011250637 HC RX REV CODE- 250/637: Performed by: ANESTHESIOLOGY

## 2018-12-24 PROCEDURE — 87641 MR-STAPH DNA AMP PROBE: CPT

## 2018-12-24 PROCEDURE — 77030013794 HC KT TRNSDUC BLD EDWD -B: Performed by: ANESTHESIOLOGY

## 2018-12-24 PROCEDURE — 77030020263 HC SOL INJ SOD CL0.9% LFCR 1000ML

## 2018-12-24 PROCEDURE — 85610 PROTHROMBIN TIME: CPT

## 2018-12-24 PROCEDURE — 76060000035 HC ANESTHESIA 2 TO 2.5 HR: Performed by: ORTHOPAEDIC SURGERY

## 2018-12-24 PROCEDURE — 77030019557 HC ELECTRD VES SEAL MEDT -F: Performed by: ORTHOPAEDIC SURGERY

## 2018-12-24 PROCEDURE — 86580 TB INTRADERMAL TEST: CPT | Performed by: INTERNAL MEDICINE

## 2018-12-24 PROCEDURE — C1713 ANCHOR/SCREW BN/BN,TIS/BN: HCPCS | Performed by: ORTHOPAEDIC SURGERY

## 2018-12-24 PROCEDURE — 74011000258 HC RX REV CODE- 258: Performed by: ORTHOPAEDIC SURGERY

## 2018-12-24 PROCEDURE — 74011000302 HC RX REV CODE- 302: Performed by: INTERNAL MEDICINE

## 2018-12-24 PROCEDURE — 77030007880 HC KT SPN EPDRL BBMI -B: Performed by: ANESTHESIOLOGY

## 2018-12-24 PROCEDURE — 80048 BASIC METABOLIC PNL TOTAL CA: CPT

## 2018-12-24 PROCEDURE — 74011250636 HC RX REV CODE- 250/636: Performed by: FAMILY MEDICINE

## 2018-12-24 PROCEDURE — 77030013819 HC MX SYS CEM ZIMM -B: Performed by: ORTHOPAEDIC SURGERY

## 2018-12-24 PROCEDURE — 97161 PT EVAL LOW COMPLEX 20 MIN: CPT

## 2018-12-24 PROCEDURE — 77030033138 HC SUT PGA STRATFX J&J -B: Performed by: ORTHOPAEDIC SURGERY

## 2018-12-24 PROCEDURE — 77030020255 HC SOL INJ LR 1000ML BG

## 2018-12-24 PROCEDURE — 72170 X-RAY EXAM OF PELVIS: CPT

## 2018-12-24 PROCEDURE — 77030034849: Performed by: ORTHOPAEDIC SURGERY

## 2018-12-24 PROCEDURE — 77030002922 HC SUT FBRWRE ARTH -B: Performed by: ORTHOPAEDIC SURGERY

## 2018-12-24 PROCEDURE — 74011250637 HC RX REV CODE- 250/637: Performed by: ORTHOPAEDIC SURGERY

## 2018-12-24 PROCEDURE — 76010000171 HC OR TIME 2 TO 2.5 HR INTENSV-TIER 1: Performed by: ORTHOPAEDIC SURGERY

## 2018-12-24 PROCEDURE — 77030005401 HC CATH RAD ARRO -A: Performed by: ANESTHESIOLOGY

## 2018-12-24 PROCEDURE — 77030002933 HC SUT MCRYL J&J -A: Performed by: ORTHOPAEDIC SURGERY

## 2018-12-24 PROCEDURE — 74011250636 HC RX REV CODE- 250/636

## 2018-12-24 PROCEDURE — 81003 URINALYSIS AUTO W/O SCOPE: CPT

## 2018-12-24 PROCEDURE — 77030013727 HC IRR FAN PULSVC ZIMM -B: Performed by: ORTHOPAEDIC SURGERY

## 2018-12-24 PROCEDURE — 77030006777 HC BLD SAW OSC CNMD -B: Performed by: ORTHOPAEDIC SURGERY

## 2018-12-24 PROCEDURE — 77030012935 HC DRSG AQUACEL BMS -B: Performed by: ORTHOPAEDIC SURGERY

## 2018-12-24 PROCEDURE — 36415 COLL VENOUS BLD VENIPUNCTURE: CPT

## 2018-12-24 PROCEDURE — 85018 HEMOGLOBIN: CPT

## 2018-12-24 PROCEDURE — 77030011467 HC KT BN PREP STRY -C: Performed by: ORTHOPAEDIC SURGERY

## 2018-12-24 PROCEDURE — 77030003665 HC NDL SPN BBMI -A: Performed by: ANESTHESIOLOGY

## 2018-12-24 PROCEDURE — 97166 OT EVAL MOD COMPLEX 45 MIN: CPT

## 2018-12-24 PROCEDURE — 85730 THROMBOPLASTIN TIME PARTIAL: CPT

## 2018-12-24 PROCEDURE — 77030027138 HC INCENT SPIROMETER -A

## 2018-12-24 PROCEDURE — 77030013292 HC BOWL MX PRSM J&J -A: Performed by: ANESTHESIOLOGY

## 2018-12-24 PROCEDURE — 77030008467 HC STPLR SKN COVD -B: Performed by: ORTHOPAEDIC SURGERY

## 2018-12-24 PROCEDURE — 93005 ELECTROCARDIOGRAM TRACING: CPT | Performed by: ORTHOPAEDIC SURGERY

## 2018-12-24 PROCEDURE — 65270000029 HC RM PRIVATE

## 2018-12-24 PROCEDURE — 85025 COMPLETE CBC W/AUTO DIFF WBC: CPT

## 2018-12-24 PROCEDURE — 77030011266 HC ELECTRD BLD INSL COVD -A: Performed by: ORTHOPAEDIC SURGERY

## 2018-12-24 PROCEDURE — 77030003666 HC NDL SPINAL BD -A: Performed by: ORTHOPAEDIC SURGERY

## 2018-12-24 PROCEDURE — 77030033067 HC SUT PDO STRATFX SPIR J&J -B: Performed by: ORTHOPAEDIC SURGERY

## 2018-12-24 DEVICE — BIPOLAR COMPONENT
Type: IMPLANTABLE DEVICE | Site: HIP | Status: FUNCTIONAL
Brand: UHR

## 2018-12-24 DEVICE — LFIT V40 FEMORAL HEAD
Type: IMPLANTABLE DEVICE | Site: HIP | Status: FUNCTIONAL
Brand: V40 HEAD

## 2018-12-24 DEVICE — V40 STEM
Type: IMPLANTABLE DEVICE | Site: HIP | Status: FUNCTIONAL
Brand: EXETER

## 2018-12-24 DEVICE — CEMENT BNE FL 20ML MONMR 40GM -- SIMPLEX P: Type: IMPLANTABLE DEVICE | Site: HIP | Status: FUNCTIONAL

## 2018-12-24 RX ORDER — GUAIFENESIN 100 MG/5ML
81 LIQUID (ML) ORAL 2 TIMES DAILY
Status: DISCONTINUED | OUTPATIENT
Start: 2018-12-24 | End: 2018-12-27 | Stop reason: HOSPADM

## 2018-12-24 RX ORDER — AMOXICILLIN 250 MG
2 CAPSULE ORAL DAILY
Status: DISCONTINUED | OUTPATIENT
Start: 2018-12-25 | End: 2018-12-27 | Stop reason: HOSPADM

## 2018-12-24 RX ORDER — DIPHENHYDRAMINE HCL 25 MG
25 CAPSULE ORAL
Status: DISCONTINUED | OUTPATIENT
Start: 2018-12-24 | End: 2018-12-27 | Stop reason: HOSPADM

## 2018-12-24 RX ORDER — LIDOCAINE HYDROCHLORIDE 10 MG/ML
0.1 INJECTION INFILTRATION; PERINEURAL AS NEEDED
Status: DISCONTINUED | OUTPATIENT
Start: 2018-12-24 | End: 2018-12-24 | Stop reason: HOSPADM

## 2018-12-24 RX ORDER — DEXAMETHASONE SODIUM PHOSPHATE 100 MG/10ML
10 INJECTION INTRAMUSCULAR; INTRAVENOUS ONCE
Status: COMPLETED | OUTPATIENT
Start: 2018-12-25 | End: 2018-12-25

## 2018-12-24 RX ORDER — SODIUM CHLORIDE, SODIUM LACTATE, POTASSIUM CHLORIDE, CALCIUM CHLORIDE 600; 310; 30; 20 MG/100ML; MG/100ML; MG/100ML; MG/100ML
100 INJECTION, SOLUTION INTRAVENOUS CONTINUOUS
Status: DISCONTINUED | OUTPATIENT
Start: 2018-12-24 | End: 2018-12-24 | Stop reason: HOSPADM

## 2018-12-24 RX ORDER — FENTANYL CITRATE 50 UG/ML
100 INJECTION, SOLUTION INTRAMUSCULAR; INTRAVENOUS ONCE
Status: DISCONTINUED | OUTPATIENT
Start: 2018-12-24 | End: 2018-12-24 | Stop reason: HOSPADM

## 2018-12-24 RX ORDER — ACETAMINOPHEN 10 MG/ML
1000 INJECTION, SOLUTION INTRAVENOUS
Status: COMPLETED | OUTPATIENT
Start: 2018-12-24 | End: 2018-12-24

## 2018-12-24 RX ORDER — SODIUM CHLORIDE 0.9 % (FLUSH) 0.9 %
5-10 SYRINGE (ML) INJECTION EVERY 8 HOURS
Status: DISCONTINUED | OUTPATIENT
Start: 2018-12-24 | End: 2018-12-24

## 2018-12-24 RX ORDER — PROPOFOL 10 MG/ML
INJECTION, EMULSION INTRAVENOUS AS NEEDED
Status: DISCONTINUED | OUTPATIENT
Start: 2018-12-24 | End: 2018-12-24 | Stop reason: HOSPADM

## 2018-12-24 RX ORDER — ONDANSETRON 4 MG/1
8 TABLET, ORALLY DISINTEGRATING ORAL
Status: DISCONTINUED | OUTPATIENT
Start: 2018-12-24 | End: 2018-12-27 | Stop reason: HOSPADM

## 2018-12-24 RX ORDER — SODIUM CHLORIDE 0.9 % (FLUSH) 0.9 %
5-10 SYRINGE (ML) INJECTION AS NEEDED
Status: DISCONTINUED | OUTPATIENT
Start: 2018-12-24 | End: 2018-12-27 | Stop reason: HOSPADM

## 2018-12-24 RX ORDER — PROPOFOL 10 MG/ML
INJECTION, EMULSION INTRAVENOUS
Status: DISCONTINUED | OUTPATIENT
Start: 2018-12-24 | End: 2018-12-24 | Stop reason: HOSPADM

## 2018-12-24 RX ORDER — ACETAMINOPHEN 500 MG
1000 TABLET ORAL EVERY 6 HOURS
Status: DISCONTINUED | OUTPATIENT
Start: 2018-12-25 | End: 2018-12-24 | Stop reason: ALTCHOICE

## 2018-12-24 RX ORDER — CEFAZOLIN SODIUM/WATER 2 G/20 ML
2 SYRINGE (ML) INTRAVENOUS ONCE
Status: COMPLETED | OUTPATIENT
Start: 2018-12-24 | End: 2018-12-24

## 2018-12-24 RX ORDER — MORPHINE SULFATE 10 MG/ML
6 INJECTION, SOLUTION INTRAMUSCULAR; INTRAVENOUS
Status: DISCONTINUED | OUTPATIENT
Start: 2018-12-24 | End: 2018-12-27 | Stop reason: HOSPADM

## 2018-12-24 RX ORDER — SODIUM CHLORIDE 9 MG/ML
100 INJECTION, SOLUTION INTRAVENOUS CONTINUOUS
Status: DISPENSED | OUTPATIENT
Start: 2018-12-24 | End: 2018-12-25

## 2018-12-24 RX ORDER — AMLODIPINE BESYLATE 5 MG/1
5 TABLET ORAL DAILY
Status: DISCONTINUED | OUTPATIENT
Start: 2018-12-25 | End: 2018-12-25

## 2018-12-24 RX ORDER — HYDROCODONE BITARTRATE AND ACETAMINOPHEN 7.5; 325 MG/1; MG/1
1 TABLET ORAL AS NEEDED
Status: DISCONTINUED | OUTPATIENT
Start: 2018-12-24 | End: 2018-12-24 | Stop reason: HOSPADM

## 2018-12-24 RX ORDER — SODIUM CHLORIDE 0.9 % (FLUSH) 0.9 %
5-10 SYRINGE (ML) INJECTION EVERY 8 HOURS
Status: DISCONTINUED | OUTPATIENT
Start: 2018-12-24 | End: 2018-12-27 | Stop reason: HOSPADM

## 2018-12-24 RX ORDER — NEOMYCIN AND POLYMYXIN B SULFATES 40; 200000 MG/ML; [USP'U]/ML
SOLUTION IRRIGATION AS NEEDED
Status: DISCONTINUED | OUTPATIENT
Start: 2018-12-24 | End: 2018-12-24 | Stop reason: HOSPADM

## 2018-12-24 RX ORDER — HYDRALAZINE HYDROCHLORIDE 20 MG/ML
10 INJECTION INTRAMUSCULAR; INTRAVENOUS
Status: DISCONTINUED | OUTPATIENT
Start: 2018-12-24 | End: 2018-12-27 | Stop reason: HOSPADM

## 2018-12-24 RX ORDER — OXYCODONE HYDROCHLORIDE 5 MG/1
5 TABLET ORAL
Status: COMPLETED | OUTPATIENT
Start: 2018-12-24 | End: 2018-12-24

## 2018-12-24 RX ORDER — HYDROMORPHONE HYDROCHLORIDE 2 MG/ML
0.5 INJECTION, SOLUTION INTRAMUSCULAR; INTRAVENOUS; SUBCUTANEOUS
Status: DISCONTINUED | OUTPATIENT
Start: 2018-12-24 | End: 2018-12-24 | Stop reason: HOSPADM

## 2018-12-24 RX ORDER — MIDAZOLAM HYDROCHLORIDE 1 MG/ML
2 INJECTION, SOLUTION INTRAMUSCULAR; INTRAVENOUS
Status: DISCONTINUED | OUTPATIENT
Start: 2018-12-24 | End: 2018-12-24 | Stop reason: HOSPADM

## 2018-12-24 RX ORDER — MELATONIN
1000 DAILY
Status: DISCONTINUED | OUTPATIENT
Start: 2018-12-25 | End: 2018-12-27 | Stop reason: HOSPADM

## 2018-12-24 RX ORDER — NALOXONE HYDROCHLORIDE 0.4 MG/ML
.2-.4 INJECTION, SOLUTION INTRAMUSCULAR; INTRAVENOUS; SUBCUTANEOUS
Status: DISCONTINUED | OUTPATIENT
Start: 2018-12-24 | End: 2018-12-27 | Stop reason: HOSPADM

## 2018-12-24 RX ORDER — OLMESARTAN MEDOXOMIL 20 MG/1
20 TABLET ORAL DAILY
Status: DISCONTINUED | OUTPATIENT
Start: 2018-12-24 | End: 2018-12-26

## 2018-12-24 RX ORDER — PREDNISONE 1 MG/1
3 TABLET ORAL
Status: DISCONTINUED | OUTPATIENT
Start: 2018-12-25 | End: 2018-12-27 | Stop reason: HOSPADM

## 2018-12-24 RX ORDER — NALOXONE HYDROCHLORIDE 0.4 MG/ML
0.1 INJECTION, SOLUTION INTRAMUSCULAR; INTRAVENOUS; SUBCUTANEOUS AS NEEDED
Status: DISCONTINUED | OUTPATIENT
Start: 2018-12-24 | End: 2018-12-24 | Stop reason: HOSPADM

## 2018-12-24 RX ORDER — HYDROCODONE BITARTRATE AND ACETAMINOPHEN 7.5; 325 MG/1; MG/1
1 TABLET ORAL
Status: DISCONTINUED | OUTPATIENT
Start: 2018-12-24 | End: 2018-12-26

## 2018-12-24 RX ORDER — CELECOXIB 200 MG/1
200 CAPSULE ORAL EVERY 12 HOURS
Status: DISCONTINUED | OUTPATIENT
Start: 2018-12-24 | End: 2018-12-27 | Stop reason: HOSPADM

## 2018-12-24 RX ORDER — CEFAZOLIN SODIUM/WATER 2 G/20 ML
2 SYRINGE (ML) INTRAVENOUS ONCE
Status: DISCONTINUED | OUTPATIENT
Start: 2018-12-24 | End: 2018-12-24 | Stop reason: SDUPTHER

## 2018-12-24 RX ORDER — ATORVASTATIN CALCIUM 10 MG/1
10 TABLET, FILM COATED ORAL
Status: DISCONTINUED | OUTPATIENT
Start: 2018-12-24 | End: 2018-12-27 | Stop reason: HOSPADM

## 2018-12-24 RX ORDER — METOPROLOL SUCCINATE 25 MG/1
25 TABLET, EXTENDED RELEASE ORAL DAILY
Status: DISCONTINUED | OUTPATIENT
Start: 2018-12-25 | End: 2018-12-27 | Stop reason: HOSPADM

## 2018-12-24 RX ORDER — ACETAMINOPHEN 10 MG/ML
1000 INJECTION, SOLUTION INTRAVENOUS ONCE
Status: DISCONTINUED | OUTPATIENT
Start: 2018-12-24 | End: 2018-12-24

## 2018-12-24 RX ORDER — LEVOTHYROXINE SODIUM 50 UG/1
50 TABLET ORAL EVERY OTHER DAY
Status: DISCONTINUED | OUTPATIENT
Start: 2018-12-26 | End: 2018-12-27 | Stop reason: HOSPADM

## 2018-12-24 RX ORDER — BUPIVACAINE HYDROCHLORIDE 7.5 MG/ML
INJECTION, SOLUTION EPIDURAL; RETROBULBAR
Status: DISCONTINUED | OUTPATIENT
Start: 2018-12-24 | End: 2018-12-24 | Stop reason: HOSPADM

## 2018-12-24 RX ORDER — ROPIVACAINE HYDROCHLORIDE 5 MG/ML
INJECTION, SOLUTION EPIDURAL; INFILTRATION; PERINEURAL AS NEEDED
Status: DISCONTINUED | OUTPATIENT
Start: 2018-12-24 | End: 2018-12-24 | Stop reason: HOSPADM

## 2018-12-24 RX ORDER — SODIUM CHLORIDE 9 MG/ML
25 INJECTION, SOLUTION INTRAVENOUS CONTINUOUS
Status: DISCONTINUED | OUTPATIENT
Start: 2018-12-24 | End: 2018-12-24 | Stop reason: HOSPADM

## 2018-12-24 RX ORDER — FAMOTIDINE 20 MG/1
20 TABLET, FILM COATED ORAL ONCE
Status: COMPLETED | OUTPATIENT
Start: 2018-12-24 | End: 2018-12-24

## 2018-12-24 RX ORDER — LEVOTHYROXINE SODIUM 50 UG/1
25 TABLET ORAL EVERY OTHER DAY
Status: DISCONTINUED | OUTPATIENT
Start: 2018-12-25 | End: 2018-12-27 | Stop reason: HOSPADM

## 2018-12-24 RX ADMIN — BUPIVACAINE HYDROCHLORIDE 9 MG: 7.5 INJECTION, SOLUTION EPIDURAL; RETROBULBAR at 07:57

## 2018-12-24 RX ADMIN — SODIUM CHLORIDE 100 ML/HR: 900 INJECTION, SOLUTION INTRAVENOUS at 20:33

## 2018-12-24 RX ADMIN — Medication 2 G: at 08:11

## 2018-12-24 RX ADMIN — Medication 3 AMPULE: at 06:52

## 2018-12-24 RX ADMIN — ATORVASTATIN CALCIUM 10 MG: 10 TABLET, FILM COATED ORAL at 20:33

## 2018-12-24 RX ADMIN — HYDROCODONE BITARTRATE AND ACETAMINOPHEN 1 TABLET: 7.5; 325 TABLET ORAL at 20:28

## 2018-12-24 RX ADMIN — HYDRALAZINE HYDROCHLORIDE 10 MG: 20 INJECTION INTRAMUSCULAR; INTRAVENOUS at 20:28

## 2018-12-24 RX ADMIN — PROPOFOL 10 MG: 10 INJECTION, EMULSION INTRAVENOUS at 08:02

## 2018-12-24 RX ADMIN — PROPOFOL 20 MG: 10 INJECTION, EMULSION INTRAVENOUS at 07:56

## 2018-12-24 RX ADMIN — HYDROCODONE BITARTRATE AND ACETAMINOPHEN 1 TABLET: 7.5; 325 TABLET ORAL at 16:43

## 2018-12-24 RX ADMIN — OXYCODONE HYDROCHLORIDE 5 MG: 5 TABLET ORAL at 10:27

## 2018-12-24 RX ADMIN — HYDROMORPHONE HYDROCHLORIDE 0.25 MG: 2 INJECTION, SOLUTION INTRAMUSCULAR; INTRAVENOUS; SUBCUTANEOUS at 10:09

## 2018-12-24 RX ADMIN — FAMOTIDINE 20 MG: 20 TABLET ORAL at 06:33

## 2018-12-24 RX ADMIN — PROPOFOL 10 MG: 10 INJECTION, EMULSION INTRAVENOUS at 08:00

## 2018-12-24 RX ADMIN — PROPOFOL 25 MCG/KG/MIN: 10 INJECTION, EMULSION INTRAVENOUS at 08:05

## 2018-12-24 RX ADMIN — HYDROMORPHONE HYDROCHLORIDE 0.25 MG: 2 INJECTION, SOLUTION INTRAMUSCULAR; INTRAVENOUS; SUBCUTANEOUS at 09:51

## 2018-12-24 RX ADMIN — CELECOXIB 200 MG: 200 CAPSULE ORAL at 20:28

## 2018-12-24 RX ADMIN — HYDROMORPHONE HYDROCHLORIDE 0.25 MG: 2 INJECTION, SOLUTION INTRAMUSCULAR; INTRAVENOUS; SUBCUTANEOUS at 10:02

## 2018-12-24 RX ADMIN — SODIUM CHLORIDE 100 ML/HR: 900 INJECTION, SOLUTION INTRAVENOUS at 11:20

## 2018-12-24 RX ADMIN — Medication 5 ML: at 20:29

## 2018-12-24 RX ADMIN — SODIUM CHLORIDE, SODIUM LACTATE, POTASSIUM CHLORIDE, AND CALCIUM CHLORIDE: 600; 310; 30; 20 INJECTION, SOLUTION INTRAVENOUS at 08:58

## 2018-12-24 RX ADMIN — ACETAMINOPHEN 1000 MG: 10 INJECTION, SOLUTION INTRAVENOUS at 09:59

## 2018-12-24 RX ADMIN — OLMESARTAN MEDOXOMIL 20 MG: 20 TABLET, COATED ORAL at 11:44

## 2018-12-24 RX ADMIN — Medication 1 AMPULE: at 11:44

## 2018-12-24 RX ADMIN — PROPOFOL 20 MG: 10 INJECTION, EMULSION INTRAVENOUS at 07:58

## 2018-12-24 RX ADMIN — Medication 1 AMPULE: at 20:28

## 2018-12-24 RX ADMIN — TRANEXAMIC ACID 2 G: 1 INJECTION, SOLUTION INTRAVENOUS at 08:31

## 2018-12-24 RX ADMIN — TUBERCULIN PURIFIED PROTEIN DERIVATIVE 5 UNITS: 5 INJECTION, SOLUTION INTRADERMAL at 11:45

## 2018-12-24 RX ADMIN — SODIUM CHLORIDE, SODIUM LACTATE, POTASSIUM CHLORIDE, AND CALCIUM CHLORIDE 100 ML/HR: 600; 310; 30; 20 INJECTION, SOLUTION INTRAVENOUS at 06:34

## 2018-12-24 NOTE — ANESTHESIA PROCEDURE NOTES
Spinal Block    Start time: 12/24/2018 7:50 AM  End time: 12/24/2018 7:57 AM  Performed by: Laura Jon MD  Authorized by: Laura Jon MD     Pre-procedure:   Indications: primary anesthetic  Preanesthetic Checklist: patient identified, risks and benefits discussed, anesthesia consent, site marked, patient being monitored and timeout performed    Timeout Time: 07:50          Spinal Block:   Patient Position:  Seated  Prep Region:  Lumbar  Prep: DuraPrep      Location:  L3-4  Technique:  Single shot    Local Dose (mL):  3    Needle:   Needle Type:  Pencan  Needle Gauge:  25 G  Attempts:  1      Events: CSF confirmed, no blood with aspiration and no paresthesia        Assessment:  Insertion:  Uncomplicated  Patient tolerance:  Patient tolerated the procedure well with no immediate complications

## 2018-12-24 NOTE — PROGRESS NOTES
Problem: Falls - Risk of  Goal: *Absence of Falls  Document Kristie Fall Risk and appropriate interventions in the flowsheet.   Outcome: Progressing Towards Goal  Fall Risk Interventions:  Mobility Interventions: Bed/chair exit alarm, OT consult for ADLs, Patient to call before getting OOB, PT Consult for mobility concerns, PT Consult for assist device competence, Strengthening exercises (ROM-active/passive), Utilize walker, cane, or other assistive device         Medication Interventions: Assess postural VS orthostatic hypotension, Bed/chair exit alarm, Patient to call before getting OOB, Teach patient to arise slowly    Elimination Interventions: Bed/chair exit alarm, Call light in reach, Patient to call for help with toileting needs, Toilet paper/wipes in reach    History of Falls Interventions: Bed/chair exit alarm

## 2018-12-24 NOTE — ANESTHESIA POSTPROCEDURE EVALUATION
Procedure(s):  LEFT HIP HEMIARTHROPLASTY. Anesthesia Post Evaluation        Patient location during evaluation: PACU  Patient participation: complete - patient participated  Level of consciousness: awake and alert  Pain score: 3  Pain management: adequate  Airway patency: patent  Anesthetic complications: no  Cardiovascular status: acceptable  Respiratory status: acceptable  Hydration status: acceptable  Comments: Remains hemodynamically stable, denies CP or SOB. No acute EKG changes. Will transfer to floor.   Post anesthesia nausea and vomiting:  none      Visit Vitals  /83 (BP 1 Location: Right arm, BP Patient Position: Supine)   Pulse (!) 52   Temp 36.2 °C (97.2 °F)   Resp 16   Wt 47.3 kg (104 lb 6 oz)   SpO2 95%   BMI 21.08 kg/m²

## 2018-12-24 NOTE — PROGRESS NOTES
12/24/18 1124   Dual Skin Pressure Injury Assessment   Dual Skin Pressure Injury Assessment WDL   Second Care Provider (Based on [address] [place] [address]) Fernie Kemp RN   Skin Integumentary   Skin Integumentary (WDL) X   Skin Condition/Temp Fragile   Skin Color Ecchymosis (comment)  (scattered BUE)   Skin Integrity Incision (comment)  (dsg to L hip)

## 2018-12-24 NOTE — ANESTHESIA PROCEDURE NOTES
Arterial Line Placement    Start time: 12/24/2018 7:35 AM  End time: 12/24/2018 7:45 AM  Performed by: Kay Perez MD  Authorized by:  Kay Perez MD     Pre-Procedure  Indications:  Arterial pressure monitoring  Timeout Time: 07:35        Procedure:   Seldinger Technique?: Yes    Orientation:  Right  Location:  Radial artery  Catheter size:  20 G  Number of attempts:  2  Cont Cardiac Output Sensor: No      Assessment:   Post-procedure:  Line secured

## 2018-12-24 NOTE — PROGRESS NOTES
Problem: Mobility Impaired (Adult and Pediatric)  Goal: *Acute Goals and Plan of Care (Insert Text)  Short Term Goals:  1.  Ms. Josesito Howell will move from supine to sit and sit to supine in flat bed with CONTACT GUARD ASSIST within 3 day(s). 2.  Ms. Josesito Howell will transfer from sit to stand and stand to sit with STAND BY ASSIST  within 3 days. 3.  Ms. Josesito Howell will ambulate with STAND BY ASSIST for 150+ feet with the least restrictive device within 3 day(s). 4.  Ms. Josesito Howell will verbalize 3/3 hip precautions to ensure hip safety during functional activities within 3 days. ________________________________________________________________________      PHYSICAL THERAPY: Initial Assessment, PM 12/24/2018  INPATIENT: Hospital Day: 1  [place] / [place] / Product Type: Medicare /   Hip Precautions   WBAT L LE  Legally blind  NAME/AGE/GENDER: Amanda Torres is a 80 y.o. female   PRIMARY DIAGNOSIS: Osteoarthritis of left hip, unspecified osteoarthritis type [M16.12] <principal problem not specified> <principal problem not specified>  Procedure(s) (LRB):  LEFT HIP HEMIARTHROPLASTY (Left)  Day of Surgery  ICD-10: Treatment Diagnosis:    · Other abnormalities of gait and mobility (R26.89)   Precaution/Allergies:  Calcium carbonate; Ranitidine hcl; and Guaifenesin      ASSESSMENT:     Ms. Josesito Howell presents sitting edge of bed with daughter and son at bedside. Patient is normally independent with all functional mobility, however, suffered hip fx in August and has declined since then. She stood with CG to min assist to RW. She performed stand pivot to recliner with RW and min assist plus max cueing for sequencing, etc.  Sat with CG to min assist.  Patient tends to keep L LE internally rotated so placed pad between knees to help position L LE in neutral.  Educated patient in hip precautions.   Ms. Josesito Howell would benefit from skilled physical therapy (medically necessary) to address her deficits and maximize her function. Per son, the patient doesn't like rehab facilities and becomes more confused so may benefit from returning home at discharge. This section established at most recent assessment   PROBLEM LIST (Impairments causing functional limitations):  1. Decreased Strength  2. Decreased ADL/Functional Activities  3. Decreased Transfer Abilities  4. Decreased Ambulation Ability/Technique  5. Decreased Balance  6. Decreased Activity Tolerance  7. Decreased Knowledge of Precautions  8. Decreased [place] with Home Exercise Program   INTERVENTIONS PLANNED: (Benefits and precautions of physical therapy have been discussed with the patient.)  1. Balance Exercise  2. Bed Mobility  3. Gait Training  4. Home Exercise Program (HEP)  5. Therapeutic Activites  6. Therapeutic Exercise/Strengthening  7. Transfer Training  8. education     TREATMENT PLAN: Frequency/Duration: daily to twice daily for duration of hospital stay  Rehabilitation Potential For Stated Goals: Excellent     RECOMMENDED REHABILITATION/EQUIPMENT: (at time of discharge pending progress): Due to the probability of continued deficits (see above) this patient will likely need continued skilled physical therapy after discharge. Equipment:    None at this time              HISTORY:   History of Present Injury/Illness (Reason for Referral):  Per MD note, \" The patient has a femoral head fracture which has collapsed. We have attempted conservative treatment which has failed. The patient was evaluated and examined in the office prior to today and was found to have a history and physical exam consistent with intra-articular pathology of the left hip. Patient complains of anterior groin pain predominately. Pain occurs during activity. Patient has difficulty putting on socks/shoes and has notable pain when getting up from a chair and getting out of a car. Patient does not complain of significant lateral hip pain or radicular pain down the leg. There have been no changes to the patient's orthopedic condition since the last office visit\"  Past Medical History/Comorbidities:   Ms. Kai Khan  has a past medical history of Acquired hypothyroidism, Anemia, Arthritis, Chronic pain, Fractured hip ([address] Gastroesophageal reflux disease, GERD (gastroesophageal reflux disease), Giant cell arteritis ([address] Hip fracture ([facility] [address] Hypertension, Legally blind, Macular degeneration, Memory deficit, NSTEMI (non-ST elevated myocardial infarction) ([facility] [address] Other ill-defined conditions(799.89), Other ill-defined conditions(799.89), TIA (transient ischemic attack), and Varicose veins of left lower extremity. Ms. Kai Khan  has a past surgical history that includes hx endoscopy; pr cardiac surg procedure unlist; hx cholecystectomy; hx tonsillectomy; hx heent; and ESOPHAGOGASTRODUODENOSCOPY (EGD)/ 21 (N/A, 11/16/2018). Social History/Living Environment:   Home Environment: Private residence  # Steps to Enter: 1  One/Two Story Residence: One story  Living Alone: No  Support Systems: Child(yesi)  Patient Expects to be Discharged to[de-identified] Private residence  Current DME Used/Available at Home: Walker, rolling  Tub or Shower Type: Shower  Prior Level of Function/Work/Activity:  Lives at home with daughter. Was independent prior to hip fracture in August, has been using RW since that time and requiring assist with ADL's. Personal Factors:          Sex:  female        Age:  80 y.o. Number of Personal Factors/Comorbidities that affect the Plan of Care: 3+: HIGH COMPLEXITY   EXAMINATION:   Most Recent Physical Functioning:   Gross Assessment:                  Posture:  Posture (WDL): Exceptions to WDL  Posture Assessment:  Forward head, Kyphosis, Rounded shoulders  Balance:  Sitting: Intact  Standing: Impaired  Standing - Static: Constant support  Standing - Dynamic : Poor Bed Mobility:     Wheelchair Mobility:     Transfers:  Sit to Stand: Contact guard assistance;Minimum assistance  Stand to Sit: Contact guard assistance;Minimum assistance  Bed to Chair: Minimum assistance  Gait:  Left Side Weight Bearing: As tolerated         Body Structures Involved:  1. Eyes and Ears  2. Bones  3. Joints  4. Muscles  5. Ligaments Body Functions Affected:  1. Sensory/Pain  2. Movement Related  3. Skin Related Activities and Participation Affected:  1. Mobility  2. Self Care  3. Domestic Life  4. Community, Social and [place]   Number of elements that affect the Plan of Care: 4+: HIGH COMPLEXITY   CLINICAL PRESENTATION:   Presentation: Stable and uncomplicated: LOW COMPLEXITY   CLINICAL DECISION MAKIN [facility] Mobility Inpatient Short Form  How much difficulty does the patient currently have. .. Unable A Lot A Little None   1. Turning over in bed (including adjusting bedclothes, sheets and blankets)? [] 1   [x] 2   [] 3   [] 4   2. Sitting down on and standing up from a chair with arms ( e.g., wheelchair, bedside commode, etc.)   [] 1   [] 2   [x] 3   [] 4   3. Moving from lying on back to sitting on the side of the bed? [] 1   [x] 2   [] 3   [] 4   How much help from another person does the patient currently need. .. Total A Lot A Little None   4. Moving to and from a bed to a chair (including a wheelchair)? [] 1   [] 2   [x] 3   [] 4   5. Need to walk in hospital room? [] 1   [] 2   [x] 3   [] 4   6. Climbing 3-5 steps with a railing? [] 1   [] 2   [x] 3   [] 4   © , Trustees of [facility] [employer], under license to [employer]. All rights reserved      Score:  Initial: 16 Most Recent: X (Date: -- )    Interpretation of Tool:  Represents activities that are increasingly more difficult (i.e. Bed mobility, Transfers, Gait). Score 24 23 22-20 19-15 14-10 9-7 6     Modifier CH CI CJ CK CL CM CN      ?  Mobility - Walking and Moving Around:     - CURRENT STATUS: CK - 40%-59% impaired, limited or restricted    - GOAL STATUS: CJ - 20%-39% impaired, limited or restricted    - D/C STATUS:  ---------------To be determined---------------  [place] / [place] / Product Type: Medicare /      Medical Necessity:     · Patient is expected to demonstrate progress in strength, range of motion, balance, coordination and functional technique to decrease assistance required with all functional mobility. Reason for Services/Other Comments:  · Patient continues to require skilled intervention due to decline in functional mobility. Use of outcome tool(s) and clinical judgement create a POC that gives a: Clear prediction of patient's progress: LOW COMPLEXITY            TREATMENT:   (In addition to Assessment/Re-Assessment sessions the following treatments were rendered)   Pre-treatment Symptoms/Complaints:    Pain: Initial:   Pain Intensity 1: 0  Post Session:  0     Assessment/Reassessment only, no treatment provided today    Braces/Orthotics/Lines/Etc:   · IV  · godfrey catheter  · O2 Device: Nasal cannula  Treatment/Session Assessment:    · Response to Treatment:  Pleasant and cooperative. · Interdisciplinary Collaboration:   o Physical Therapist  o Occupational Therapist  · After treatment position/precautions:   o Up in chair  o Bed alarm/tab alert on  o Bed/Chair-wheels locked  o Call light within reach  o RN notified  o Family at bedside   · Compliance with Program/Exercises: Will assess as treatment progresses  · Recommendations/Intent for next treatment session: \"Next visit will focus on advancements to more challenging activities and reduction in assistance provided\".   Total Treatment Duration:  PT Patient Time In/Time Out  Time In: 1245  Time Out: [address], PT, DPT

## 2018-12-24 NOTE — CONSULTS
Hospitalist Consult Note     Admit Date:  2018  5:28 AM   Name:  Marlyn Mcardle   Age:  80 y.o.  :  1928   MRN:  [id number]   PCP:  Derek Campbell MD  Treatment Team: Attending Provider: Bin Judge MD; Consulting Provider: Esthela Uribe MD  Medical management  HPI:   80 yr old female with s/p left hemiarthroplasty-- known h/o htn, gerd, hyperlipidemia ,macular degenaration- legally blind . Recent egd and stress test- both normal.  RHospitalist service was consulted for medical management. Pt just came from surgery - says sleepy. Family at bedside. Mild elevated bp.    10 systems reviewed and negative except as noted in HPI.   Past Medical History:   Diagnosis Date    Acquired hypothyroidism     synthroid daily    Anemia     blood transfusion 2018; [facility]    Arthritis     Chronic pain     Fractured hip ([address]     Gastroesophageal reflux disease 2016    GERD (gastroesophageal reflux disease)     nexium daily,well controlled per daughter-    hiatal hernia,     Giant cell arteritis ([address] 2010    monitored by rheumatologist, prednisone daily    Hip fracture ([address] 10/29/2018    left hip fracture-  followed by Dr Sirisha Handy @ [facility]    Hypertension     managed with meds    Legally blind     Macular degeneration     Legally blind    Memory deficit     NSTEMI (non-ST elevated myocardial infarction) ([address] 2018 \"mild mi, no damage, no heart cath or stents\"- medical management recommended however anemia persists, no current asa or thinners    Other ill-defined conditions(799.89)     back and pelvis pain    Other ill-defined conditions(799.89)     hip fx ;   arteritis     TIA (transient ischemic attack) 2015    no residual    Varicose veins of left lower extremity       Past Surgical History:   Procedure Laterality Date    CARDIAC SURG PROCEDURE UNLIST      Pt's family reports heart attack in Sept while in [facility] at [address] [place] [address].      HX CHOLECYSTECTOMY      HX ENDOSCOPY      HX HEENT      torn retina    HX TONSILLECTOMY        Current Facility-Administered Medications   Medication Dose Route Frequency    sodium chloride (NS) flush 5-10 mL  5-10 mL IntraVENous Q8H    sodium chloride (NS) flush 5-10 mL  5-10 mL IntraVENous PRN    sodium chloride (NS) flush 5-10 mL  5-10 mL IntraVENous PRN    [START ON 12/25/2018] amLODIPine (NORVASC) tablet 5 mg  5 mg Oral DAILY    atorvastatin (LIPITOR) tablet 10 mg  10 mg Oral QHS    [START ON 12/25/2018] cholecalciferol (VITAMIN D3) tablet 1,000 Units  1,000 Units Oral DAILY    [START ON 12/25/2018] levothyroxine (SYNTHROID) tablet 25 mcg  25 mcg Oral EVERY OTHER DAY    [START ON 12/26/2018] levothyroxine (SYNTHROID) tablet 50 mcg  50 mcg Oral EVERY OTHER DAY    [START ON 12/25/2018] metoprolol succinate (TOPROL-XL) XL tablet 25 mg  25 mg Oral DAILY    olmesartan (BENICAR) tablet 20 mg  20 mg Oral DAILY    [START ON 12/25/2018] predniSONE (DELTASONE) tablet 3 mg  3 mg Oral DAILY WITH BREAKFAST    alcohol 62% (NOZIN) nasal  1 Ampule  1 Ampule Topical Q12H    0.9% sodium chloride infusion  100 mL/hr IntraVENous CONTINUOUS    sodium chloride (NS) flush 5-10 mL  5-10 mL IntraVENous PRN    celecoxib (CELEBREX) capsule 200 mg  200 mg Oral Q12H    naloxone (NARCAN) injection 0.2-0.4 mg  0.2-0.4 mg IntraVENous Q10MIN PRN    [START ON 12/25/2018] dexamethasone (DECADRON) injection 10 mg  10 mg IntraVENous ONCE    diphenhydrAMINE (BENADRYL) capsule 25 mg  25 mg Oral Q4H PRN    HYDROcodone-acetaminophen (NORCO) 7.5-325 mg per tablet 1 Tab  1 Tab Oral Q4H PRN    morphine 10 mg/ml injection 6 mg  6 mg IntraVENous Q3H PRN    [START ON 12/25/2018] senna-docusate (PERICOLACE) 8.6-50 mg per tablet 2 Tab  2 Tab Oral DAILY    ondansetron (ZOFRAN ODT) tablet 8 mg  8 mg Oral Q8H PRN    aspirin chewable tablet 81 mg  81 mg Oral BID    hydrALAZINE (APRESOLINE) 20 mg/mL injection 10 mg  10 mg IntraVENous Q6H PRN    tuberculin injection 5 Units  5 Units IntraDERMal ONCE     Allergies   Allergen Reactions    Calcium Carbonate Diarrhea    Ranitidine Hcl Diarrhea    Guaifenesin Other (comments)     Hallucinations      Social History     Tobacco Use    Smoking status: Never Smoker    Smokeless tobacco: Never Used   Substance Use Topics    Alcohol use: No      Family History   Problem Relation Age of Onset    Cancer Father         stomach    Arthritis-osteo Mother     Hypertension Mother     Arthritis-osteo Sister       Immunization History   Administered Date(s) Administered    Influenza Vaccine 10/12/2010, 09/28/2011, 10/30/2012, 10/20/2015, 10/15/2016    Pneumococcal Conjugate (PCV-13) 11/03/2015    Pneumococcal Polysaccharide (PPSV-23) 10/12/2010    TB Skin Test (PPD) Intradermal 10/30/2018, 12/24/2018    Tdap 01/06/2018       Objective:     Patient Vitals for the past 24 hrs:   Temp Pulse Resp BP SpO2   12/24/18 1447 97.5 °F (36.4 °C) (!) 53 16 179/74 98 %   12/24/18 1225  (!) 52  165/68    12/24/18 1210  (!) 50  166/67    12/24/18 1155  (!) 51  162/67    12/24/18 1140  (!) 54  169/67    12/24/18 1125 97.3 °F (36.3 °C) (!) 57 16 189/81 97 %   12/24/18 1101  (!) 59 18  93 %   12/24/18 1056  (!) 53 16  96 %   12/24/18 1053 97.3 °F (36.3 °C)    94 %   12/24/18 1051  (!) 55 16 172/71 93 %   12/24/18 1041  (!) 52 16 161/77 95 %   12/24/18 1036  (!) 51 16 169/79 96 %   12/24/18 1031  (!) 52 19 168/79 94 %   12/24/18 1026  (!) 50 16 169/74 94 %   12/24/18 1021  (!) 50 18 158/77 94 %   12/24/18 1016  (!) 49 18 164/79 95 %   12/24/18 1011  (!) 50 16 166/82 95 %   12/24/18 1006  (!) 50 16 179/84 94 %   12/24/18 1001  (!) 45 16 177/81 95 %   12/24/18 0956  (!) 49 16 195/87 95 %   12/24/18 0952  (!) 51 16  95 %   12/24/18 0946  (!) 53 16 183/85 96 %   12/24/18 0941  (!) 53 20 (!) 198/97 96 %   12/24/18 0939 97.2 °F (36.2 °C) (!) 52 16 176/83 95 %   12/24/18 0652    (!) 186/92    12/24/18 0608     97 %   12/24/18 0556 97.7 °F (36.5 °C) 65 17 (!) 205/95      Oxygen Therapy  O2 Sat (%): 98 % (12/24/18 1447)  Pulse via Oximetry: 52 beats per minute (12/24/18 1101)  O2 Device: Nasal cannula (12/24/18 1101)  O2 Flow Rate (L/min): 3 l/min (12/24/18 1101)    Intake/Output Summary (Last 24 hours) at 12/24/2018 1652  Last data filed at 12/24/2018 1620  Gross per 24 hour   Intake 1670 ml   Output 1650 ml   Net 20 ml       Physical Exam:  General:    Well nourished. Alert. On oxygen - no resp distress  Eyes:   Normal sclera. Extraocular movements intact. ENT:  Normocephalic, atraumatic. Moist mucous membranes  CV:   RRR. No murmur, rub, or gallop. Lungs:  CTAB. No wheezing, rhonchi, or rales. Abdomen: Soft, nontender, nondistended. Bowel sounds normal.   Extremities: Warm and dry. No cyanosis or edema. Dressign left hip region intact- no soaking of dressing  Neurologic: CN II-XII grossly intact. Sensation intact. Skin:     No rashes or jaundice. Psych:  Normal mood and affect. I reviewed the labs. Data Review:   Recent Results (from the past 24 hour(s))   CBC WITH AUTOMATED DIFF    Collection Time: 12/24/18  6:17 AM   Result Value Ref Range    WBC 9.7 4.3 - 11.1 K/uL    RBC 4.06 4.05 - 5.2 M/uL    HGB 10.7 (L) 11.7 - 15.4 g/dL    HCT 35.8 35.8 - 46.3 %    MCV 88.2 79.6 - 97.8 FL    MCH 26.4 26.1 - 32.9 PG    MCHC 29.9 (L) 31.4 - 35.0 g/dL    RDW 17.4 (H) 11.9 - 14.6 %    PLATELET 844 311 - 189 K/uL    MPV 11.6 9.4 - 12.3 FL    ABSOLUTE NRBC 0.00 0.0 - 0.2 K/uL    DF AUTOMATED      NEUTROPHILS 75 43 - 78 %    LYMPHOCYTES 12 (L) 13 - 44 %    MONOCYTES 9 4.0 - 12.0 %    EOSINOPHILS 3 0.5 - 7.8 %    BASOPHILS 1 0.0 - 2.0 %    IMMATURE GRANULOCYTES 0 0.0 - 5.0 %    ABS. NEUTROPHILS 7.3 1.7 - 8.2 K/UL    ABS. LYMPHOCYTES 1.2 0.5 - 4.6 K/UL    ABS. MONOCYTES 0.8 0.1 - 1.3 K/UL    ABS. EOSINOPHILS 0.2 0.0 - 0.8 K/UL    ABS.  BASOPHILS 0.1 0.0 - 0.2 K/UL    ABS. IMM.  GRANS. 0.0 0.0 - 0.5 K/UL   TYPE & SCREEN    Collection Time: 12/24/18  6:18 AM   Result Value Ref Range    Crossmatch Expiration 12/27/2018     ABO/Rh(D) O NEGATIVE     Antibody screen NEG    METABOLIC PANEL, BASIC    Collection Time: 12/24/18  6:45 AM   Result Value Ref Range    Sodium 141 136 - 145 mmol/L    Potassium 4.1 3.5 - 5.1 mmol/L    Chloride 108 (H) 98 - 107 mmol/L    CO2 26 21 - 32 mmol/L    Anion gap 7 7 - 16 mmol/L    Glucose 87 65 - 100 mg/dL    BUN 18 8 - 23 MG/DL    Creatinine 0.74 0.6 - 1.0 MG/DL    GFR est AA >60 >60 ml/min/1.73m2    GFR est non-AA >60 >60 ml/min/1.73m2    Calcium 9.9 8.3 - 10.4 MG/DL   EKG, 12 LEAD, INITIAL    Collection Time: 12/24/18 11:24 AM   Result Value Ref Range    Ventricular Rate 56 BPM    Atrial Rate 56 BPM    P-R Interval 140 ms    QRS Duration 102 ms    Q-T Interval 466 ms    QTC Calculation (Bezet) 449 ms    Calculated P Axis 47 degrees    Calculated R Axis -64 degrees    Calculated T Axis 104 degrees    Diagnosis       Sinus bradycardia with occasional Premature ventricular complexes  Left axis deviation  Left ventricular hypertrophy with repolarization abnormality  Anterior infarct (cited on or before 28-OCT-2006)  Abnormal ECG  When compared with ECG of 30-OCT-2018 12:51,  Questionable change in initial forces of Septal leads  Confirmed by EDEL ZAVALA (), Gerry Holder ([id number]) on 12/24/2018 12:28:33 PM     URINALYSIS W/ RFLX MICROSCOPIC    Collection Time: 12/24/18 11:25 AM   Result Value Ref Range    Color YELLOW      Appearance CLEAR      Specific gravity 1.014 1.001 - 1.023      pH (UA) 8.0 5.0 - 9.0      Protein NEGATIVE  NEG mg/dL    Glucose NEGATIVE  NEG mg/dL    Ketone NEGATIVE  NEG mg/dL    Bilirubin NEGATIVE  NEG      Blood NEGATIVE  NEG      Urobilinogen 0.2 0.2 - 1.0 EU/dL    Nitrites NEGATIVE  NEG      Leukocyte Esterase NEGATIVE  NEG      Bacteria 0 0 /hpf   MSSA/MRSA SC BY PCR, NASAL SWAB    Collection Time: 12/24/18 11:25 AM   Result Value Ref Range    Special Requests: NO SPECIAL REQUESTS      Culture result:        SA target not detected. A MRSA NEGATIVE, SA NEGATIVE test result does not preclude MRSA or SA nasal colonization. CBC WITH AUTOMATED DIFF    Collection Time: 12/24/18 12:22 PM   Result Value Ref Range    WBC 16.8 (H) 4.3 - 11.1 K/uL    RBC 3.86 (L) 4.05 - 5.2 M/uL    HGB 10.3 (L) 11.7 - 15.4 g/dL    HCT 33.9 (L) 35.8 - 46.3 %    MCV 87.8 79.6 - 97.8 FL    MCH 26.7 26.1 - 32.9 PG    MCHC 30.4 (L) 31.4 - 35.0 g/dL    RDW 17.3 (H) 11.9 - 14.6 %    PLATELET 656 158 - 570 K/uL    MPV 11.8 9.4 - 12.3 FL    ABSOLUTE NRBC 0.00 0.0 - 0.2 K/uL    DF AUTOMATED      NEUTROPHILS 83 (H) 43 - 78 %    LYMPHOCYTES 8 (L) 13 - 44 %    MONOCYTES 7 4.0 - 12.0 %    EOSINOPHILS 1 0.5 - 7.8 %    BASOPHILS 0 0.0 - 2.0 %    IMMATURE GRANULOCYTES 1 0.0 - 5.0 %    ABS. NEUTROPHILS 14.0 (H) 1.7 - 8.2 K/UL    ABS. LYMPHOCYTES 1.4 0.5 - 4.6 K/UL    ABS. MONOCYTES 1.2 0.1 - 1.3 K/UL    ABS. EOSINOPHILS 0.1 0.0 - 0.8 K/UL    ABS. BASOPHILS 0.1 0.0 - 0.2 K/UL    ABS. IMM.  GRANS. 0.1 0.0 - 0.5 K/UL   PROTHROMBIN TIME + INR    Collection Time: 12/24/18 12:22 PM   Result Value Ref Range    Prothrombin time 13.5 11.7 - 14.5 sec    INR 1.0     PTT    Collection Time: 12/24/18 12:22 PM   Result Value Ref Range    aPTT 24.8 24.7 - 86.4 SEC   METABOLIC PANEL, BASIC    Collection Time: 12/24/18 12:22 PM   Result Value Ref Range    Sodium 140 136 - 145 mmol/L    Potassium 3.9 3.5 - 5.1 mmol/L    Chloride 107 98 - 107 mmol/L    CO2 26 21 - 32 mmol/L    Anion gap 7 7 - 16 mmol/L    Glucose 133 (H) 65 - 100 mg/dL    BUN 14 8 - 23 MG/DL    Creatinine 0.68 0.6 - 1.0 MG/DL    GFR est AA >60 >60 ml/min/1.73m2    GFR est non-AA >60 >60 ml/min/1.73m2    Calcium 9.2 8.3 - 10.4 MG/DL       All Micro Results     Procedure Component Value Units Date/Time    MSSA/MRSA SC BY PCR, NASAL SWAB [[id number]] Collected:  12/24/18 1125    Order Status:  Completed Specimen:  Swab Updated:  12/24/18 1311     Special Requests: NO SPECIAL REQUESTS        Culture result:       SA target not detected. A MRSA NEGATIVE, SA NEGATIVE test result does not preclude MRSA or SA nasal colonization. Other Studies:  Xr Pelv Ap Only    Result Date: 12/24/2018  AP PELVIS ONE VIEW HISTORY: Hemiarthroplasty of left hip. COMPARISON: 3/19/2010 FINDINGS: Skin staples are present on the left side. A left hip prosthesis is present. Atherosclerotic vascular calcifications are present. IMPRESSION: Left hip arthroplasty. Assessment and Plan:     Hospital Problems as of 12/24/2018 Date Reviewed: 9/20/2018          Codes Class Noted - Resolved POA    Hip pain ICD-10-CM: M25.559  ICD-9-CM: 719.45  12/24/2018 - Present Unknown        Acquired hypothyroidism ICD-10-CM: E03.9  ICD-9-CM: 244.9  2/15/2017 - Present Yes        Hyperlipidemia LDL goal <100 ICD-10-CM: E78.5  ICD-9-CM: 272.4  12/9/2016 - Present Yes        Gastroesophageal reflux disease ICD-10-CM: K21.9  ICD-9-CM: 530.81  12/9/2016 - Present Yes        Hypertension (Chronic) ICD-10-CM: I10  ICD-9-CM: 401.9  3/20/2010 - Present Yes              PLAN:  S/p left hip arthroplasty- pain and dvt prophylaxis as per primary. Uncontrolled htn- prn hydralazine- cont home medications  hypothyroid  gerd  Hyperlipidemia    Will follow.       Signed:  Greer Palacios MD

## 2018-12-24 NOTE — OP NOTES
Total Hip Procedure Note    Hernandez Dsouza,  [id number],  7/12/1928    Pre-operative Diagnosis:  Osteoarthritis of left hip, unspecified osteoarthritis type [M16.12]    Post-operative Diagnosis: Osteoarthritis of left hip, unspecified osteoarthritis type [M16.12]    Procedure: Hemiarthroplasty Left Hip    BMI: Body mass index is 21.08 kg/m². Theresa Majano Location: [facility]    Surgeon: Clara Zapata MD    Assistant: None    Anesthesia: Spinal     Complications: None    EBL: 50cc    Drains: None      Intra-op Findings: Pre-operatively leg lengths were assessed using preop Xrays and with the patient in the lateral decubitus position and operative leg was felt to be similar in length compared to the contralateral leg. The operative hip showed no notable cartilage loss of the acetabulum. The femoral head was collapsed with delamination of the femoral head cartilage. No intra-operative periprosthetic fracture was encountered. Sciatic nerve was noted to be intact at the end of the procedure. We measured the distance of our resected femoral head/neck from the cut surface to the center of the femoral head to be approximately 35mm. The overall length replaced with the implanted head/neck was 37mm. Intra-operatively we felt that we restored the patients leg lengths to equal lengths using the method described later. Postop with the patient supine we assessed leg lengths and felt they were similar. Patient condition at completion of Procedure: Stable    Implants: * No implants in log *    Description of Procedure         Hernandez Dsouza was brought to the operating room. Patient was transferred from the stretcher to OR bed. Spinal anesthetic was induced. Gomes catheter was placed. IV antibiotics were administered per protocol. Hernandez Dsouza was positioned in the lateral decubitus position and the pelvis/torso stabilized with posts. The limb was prepped and draped in the usual sterile fashion.   A time out identifying the patient, procedure, operative side and surgeon was administered and charted by the OR Nurse. Prior to incision one gram of TXA was given intravenously. A standard posterior approach was utilized to expose the hip. The incision was carried through the subcutaneous tissue and underlying fascia with hemostasis obtained using the bovie cauterization and Aquamantys. A Charmley retractor was inserted. We resected any redundent bursal tissue off the external rotators. We were able to identify the piriformis tendon. The short external rotators and capsule were dissected off the posterior femur as a single layer just superior to the piriformis tendon. The sciatic nerve was palpated and protected during dissection. The femoral head was dislocated. The articular surface revealed loss of cartilage, exposed bone and bone spurring. The neck was osteotomized approximately 1cm above the top of the lesser trochanter. We were careful to protect the greater trochanter during osteotomy and protect it from iatrogenic injury. We examined the femoral head and noted collapse of the femoral head with delamination of the cartilage. Acetabular retractors were placed both anterior and posterior just superficial to the acetabular labrum. We noted no major chondral defects of the acetabular cartilage. We then turned our attention to the proximal femur. A 2-prong proximal femoral retractor was placed. We gained access to the proximal femur using a  and femoral canal finder. The canal was prepared with appropriate lateralization in which we used the initial smaller broaches to remove lateral bone to avoid varus placement of the femoral component. The canal was then broached progressively. The broach was positioned with the appropriate anatomic femoral anteversion. We broached up to a size 33 Execter stem.  A trial reduction with a size 33mm Execter stem with a +4mm neck length and 41 mm bipolar head was performed. This was found to be the most stable to flexion greater than 90 degrees and on internal and external rotation. Limb lengths were assessed using three techniques. First, the position of the tip of the operative greater trochanter was compared to the center of the femoral head component and was felt to match this same anatomic relationship as the non-operative hip based on preoperative X-rays. Next, we measured the length of our resected femoral head neck and felt that the trial components matched this resected length as closely as possible when accounting for the length provided by the femoral neck/head. Finally, we compared leg lengths by comparing the possible of the patella with the patients heels together with the patient in the lateral decubitus position. Using these methods it was felt that the patients leg lengths were equilibrated and with appropriate stability as mentioned above. All trial components were removed. On the back table two batches of simplex cement were vacuum mixed while we performed prepping of the femoral canal. A distal cement restrictor plug was placed. Cement was placed down the femoral canal and pressurized. The final implant was placed with focus to avoid placing the stem in varus or retroversion. Excess cement was removed and the stem was held in place by hand until the cement had cured. The permanent femoral head was impacted into place which was a +4mm 41mm bipolar head. Selene L Zaleski's hip was reduced and stability was as mentioned above. Dilute Betadine solution was allowed to sit in the surgical site for a 3 minute period and copious saline was used to irrigate the surgical site. The sciatic nerve was palpated and noted to be intact. A periarticular of ropivicaine was injected about the surgical site with care being taken not to inject in the vicinity of neurovascular structures.      Prior to wound closure one additional gram of TXA was given for a total of 2 grams. The capsule was repaired with a three #2 Fiberwires secured through drill holes placed in the posterior aspect of the trochanter. No drain was placed. The fascia was closed with a  #0 Bidirectional Stratafix barbed suture. The sub-Q layer was closed with 2-0 Vicryl suture and a  Staples were used to close the skin. A sterile dressing was placed. Drapes were then broken down and patient was transferred carefully back to the OR stretcher. The sponge and needle counts were correct. The patient tolerated the procedure without difficulty and left the operating room in satisfactory condition.     Signed By: Devorah Godwin MD

## 2018-12-24 NOTE — PERIOP NOTES
[facility] called to verify orders- need XR in pacu, do not need knee immobilizer. 10:34 AM  TRANSFER - OUT REPORT:    Verbal report given to [address], RN(name) on [employer]  being transferred to [facility](unit) for routine post - op       Report consisted of patients Situation, Background, Assessment and   Recommendations(SBAR). Information from the following report(s) SBAR, OR Summary, Procedure Summary, Intake/Output, MAR and Cardiac Rhythm NSR was reviewed with the receiving nurse. Lines:   Peripheral IV 12/24/18 Left Forearm (Active)   Site Assessment Clean, dry, & intact 12/24/2018  9:39 AM   Phlebitis Assessment 0 12/24/2018  9:39 AM   Infiltration Assessment 0 12/24/2018  9:39 AM   Dressing Status Clean, dry, & intact 12/24/2018  9:39 AM   Dressing Type Tape;Transparent 12/24/2018  9:39 AM   Hub Color/Line Status Infusing;Flushed;Green 12/24/2018  9:39 AM   Action Taken Blood drawn 12/24/2018  6:32 AM   Alcohol Cap Used No 12/24/2018  9:39 AM       Arterial Line 12/24/18 Right Radial artery (Active)        Opportunity for questions and clarification was provided. Patient transported with:   O2 @ 2 liters    VTE prophylaxis orders have been written for Etece. Patient and family given floor number.

## 2018-12-24 NOTE — PROGRESS NOTES
TRANSFER - IN REPORT:    Verbal report received from Conrad Sauceda RN(name) on [employer]  being received from PACU(unit) for routine post - op      Report consisted of patients Situation, Background, Assessment and   Recommendations(SBAR). Information from the following report(s) SBAR, Kardex, OR Summary, Procedure Summary, Intake/Output, MAR and Accordion was reviewed with the receiving nurse. Opportunity for questions and clarification was provided. Assessment completed upon patients arrival to unit and care assumed.

## 2018-12-24 NOTE — H&P
[id number] [facility]  Pre Operative History and Physical Exam    Patient ID:  Cirilo Sandoval  [id number]  10 y.o.  7/12/1928    Today: December 24, 2018       CC:  Left hip pain    HPI:   The patient has a femoral head fracture which has collapsed. We have attempted conservative treatment which has failed. The patient was evaluated and examined in the office prior to today and was found to have a history and physical exam consistent with intra-articular pathology of the left hip. Patient complains of anterior groin pain predominately. Pain occurs during activity. Patient has difficulty putting on socks/shoes and has notable pain when getting up from a chair and getting out of a car. Patient does not complain of significant lateral hip pain or radicular pain down the leg.  There have been no changes to the patient's orthopedic condition since the last office visit    Past Medical History:  Past Medical History:   Diagnosis Date    Acquired hypothyroidism     synthroid daily    Anemia     blood transfusion 9/2018; [facility]    Arthritis     Chronic pain     Fract[facility] [address] [address]     Gastroesophageal reflux disease 12/9/2016    GERD (gastroesophageal reflux disease)     nexium daily,well controlled per daughter-    hiatal hernia,     Giant cell arteritis ([address] 03/20/2010    monitored by rheumatologist, prednisone daily    Hip fracture ([facility] [address] 10/29/2018    left hip fracture-  followed by Dr Fiorella Scott @ [facility]    Hypertension     managed with meds    Legally blind     Macular degeneration     Legally blind    Memory deficit     NSTEMI (non-ST elevated myocardial infarction) ([address] 9/14/2018 9/12/18 \"mild mi, no damage, no heart cath or stents\"- medical management recommended however anemia persists, no current asa or thinners    Other ill-defined conditions(799.89)     back and pelvis pain    Other ill-defined conditions(799.89)     hip fx ;   arteritis     TIA (transient ischemic attack) 6/23/2015    no residual    Varicose veins of left lower extremity        Past Surgical History:  Past Surgical History:   Procedure Laterality Date    CARDIAC SURG PROCEDURE UNLIST      Pt's family reports heart attack in Sept while in hospital at [facility].  HX CHOLECYSTECTOMY      HX ENDOSCOPY      HX HEENT      torn retina    HX TONSILLECTOMY          Medications:     Prior to Admission medications    Medication Sig Start Date End Date Taking? Authorizing Provider   metoprolol succinate (TOPROL-XL) 25 mg XL tablet Take  by mouth daily. Currently prescribed 50 mg Metoprolol daily BUT patient's daughter states right now she is only taking 1.5 tablets of 25 mg daily   Yes Provider, Historical   OTHER Calcium - OTC   Yes Provider, Historical   acetaminophen (TYLENOL) 650 mg TbER Take 650 mg by mouth every eight (8) hours. Yes Provider, Historical   predniSONE (DELTASONE) 1 mg tablet Take 3 mg by mouth daily. 3/22/18  Yes Provider, Historical   esomeprazole (NEXIUM) 40 mg capsule Take 40 mg by mouth daily. Yes Provider, Historical   amLODIPine (NORVASC) 5 mg tablet Take 1 Tab by mouth daily. Patient taking differently: Take 5 mg by mouth daily. 9/27/18  Yes Ramez Hillman MD   levothyroxine (SYNTHROID) 25 mcg tablet Take 1 Tab by mouth Daily (before breakfast). Patient taking differently: Take 25-50 mcg by mouth every other day. 25 mg alternating daily with 50 mg 9/20/18  Yes Betty Sales MD   [facility]) 40 mg tablet Take 0.5 Tabs by mouth daily. 1/15/18  Yes Claude Olivo MD   atorvastatin (LIPITOR) 10 mg tablet Take 1/2 tab every other night. 12/19/17  Yes Claude Olivo MD   Cholecalciferol, Vitamin D3, (VITAMIN D3) 1,000 unit cap Take 1 Cap by mouth daily. Yes Provider, Historical   levothyroxine (SYNTHROID) 50 mcg tablet Take one tab by mouth every other day. Alternate 50 and 25 mcg dose. Patient taking differently: Take 25-50 mcg by mouth every other day.  Take one tab by mouth every other day. Alternate 50 and 25 mcg dose. 9/24/18   Daniel Mckinney MD   Denosumab (PROLIA) 60 mg/mL injection 60 mg by SubCUTAneous route every 6 months. Provider, Historical   bevacizumab (AVASTIN) 25 mg/ml soln intravitreal solution by IntraVITreal route once. Provider, Historical       Family History:     Family History   Problem Relation Age of Onset    Cancer Father         stomach   Chandu.Morgan Arthritis-osteo Mother     Hypertension Mother    Schofield.Morgan Arthritis-osteo Sister        Social History:      Social History     Tobacco Use    Smoking status: Never Smoker    Smokeless tobacco: Never Used   Substance Use Topics    Alcohol use: No         Allergies: Allergies   Allergen Reactions    Calcium Carbonate Diarrhea    Ranitidine Hcl Diarrhea    Guaifenesin Other (comments)     Hallucinations        Vitals:      Visit Vitals  BP (!) 205/95 (BP 1 Location: Left arm, BP Patient Position: Sitting) Comment: small cuff   Pulse 65   Temp 97.7 °F (36.5 °C)   Resp 17   Wt 47.3 kg (104 lb 6 oz)   SpO2 97%   BMI 21.08 kg/m²        Objective:         General: No Acute distress                   HEENT: Normocephalic/atramatic                   Lungs:  Breathing non-labored                   Heart:   RRR                    Abdomen: soft       Extremities:  Pain with ROM of the left hip which manifests as anterior groin pain. There is decreased internal and external rotation of the affected hip. No significant pain with palpation over the greater trochanteric bursa. No radicular pain with straight leg raise. Patient walks with and antalgic gait. Distally patient has no neurologic deficit.       Patient Active Problem List   Diagnosis Code    Syncope R55    Bradycardia R00.1    Giant cell arteritis ([facility] [address] M31.6    Hypertension I10    Abnormal EKG R94.31    Back pain M54.9    TIA (transient ischemic attack) G45.9    Hyperlipidemia LDL goal <100 E78.5    Diarrhea R19.7    Gastroesophageal reflux disease K21.9    Insulin resistance E88.81    Acquired hypothyroidism E03.9    Chest pain R07.9    Anemia D64.9    NSTEMI (non-ST elevated myocardial infarction) (HCC) I21.4    Delirium R41.0    Acute cystitis without hematuria N30.00    IFG (impaired fasting glucose) R73.01       Assessment:   1. Femoral head fracture of the Left hip    Plan:    The risks, benefits, alternatives and possible complications of left total hip arthroplasty versus hemiarthroplasty have been discussed with the patient including but not limited to infection, bleeding, damage to nerves and blood vessels with particular attention given to risk of damage of the femoral, obturator, lateral femoral cutaneous, superior gluteal, inferior gluteal, and sciatic nerve, risk of dislocation, fracture both intra-op and post-op, limb length inequality, polyethylene wear, implant loosening, risk for continued pain, and risk for revision surgery secondary to but not limited to all of these discussed risks. Further we discussed risk of venous thrombo-embolism including deep vein thrombosis and pulmonary embolism despite being on prophylaxis. We have also previously discussed the potential of morbidity and mortality associated with, but not limited to, the actual surgical procedure, anesthesia, prior medical conditions, and/or the administration of medications perioperatively. I have made no guarantees to the patient regarding outcomes and the patient has voiced understanding of that. The patient has been given the opportunity to ask questions all of which have been answered and the patient wishes to proceed.         Signed By: Almas Argueta MD  December 24, 2018

## 2018-12-24 NOTE — PROGRESS NOTES
Spiritual Care Visit, initial visit. Visited with patient at bedside. Prayed for patient's healing and health. Visit by Josué Soto, Staff .  Gillian., Th.B., B.A.

## 2018-12-24 NOTE — PROGRESS NOTES
Problem: Self Care Deficits Care Plan (Adult)  Goal: *Acute Goals and Plan of Care (Insert Text)  1. Patient will verbalize and demonstrate understanding of hip precautions with 100% accuracy during ADL. 2. Patient will complete functional transfers with modified independence and adaptive equipment as needed. 3. Patient will complete lower body bathing and dressing with minimal assistance and adaptive equipment as needed. 4. Patient will complete toileting with modified independence. 5. Patient will tolerate BUE therapeutic exercises to increase strength in [facility] to aid in functional transfers. 6. Patient will tolerate at least 10 minutes of OT treatment with no rest breaks to increase activity tolerance for ADLs. Timeframe: 7 visits       OCCUPATIONAL THERAPY: Initial Assessment and PM 12/24/2018  INPATIENT: Hospital Day: 1  [place] / [place] / Product Type: Medicare /      NAME/AGE/GENDER: Jovanni Garcia is a 80 y.o. female   PRIMARY DIAGNOSIS:  Osteoarthritis of left hip, unspecified osteoarthritis type [M16.12] <principal problem not specified> <principal problem not specified>  Procedure(s) (LRB):  LEFT HIP HEMIARTHROPLASTY (Left)  Day of Surgery  ICD-10: Treatment Diagnosis:    · Pain in left hip (M25.552)  · Stiffness of Left Hip, Not elsewhere classified (M25.652)  · Generalized Muscle Weakness (M62.81)  · Other lack of cordination (R27.8)   Precautions/Allergies:    Fall precautions  Hip Precautions  LLE WBAT   Calcium carbonate; Ranitidine hcl; and Guaifenesin      ASSESSMENT:     Ms. Eri Leija is a 80 y.o. female admitted with the above. Pt now s/p L hip hemiarthroplasty and WBAT in LLE with L hip precautions.  Pt lives with her daughter and reports independence with most ADLs at baseline, but that she has been needing assistance to weave legs into pants and to step into the shower since she broke her hip in August. Pt found supine in bed, drowsy but alert and agreeable to OT evaluation upon arrival. Pt educated on hip precautions. Pt is [place] and legally blind due to macular degeneration. BUE assessment revealed AROM WFL, strength generally decreased bilaterally. Pt reports 0/10 pain at rest. Pt completed bed mobility with ModA to advance LLE and bring trunk into sitting. Pt performed functional transfers with RW and CGA. Pt left for PT for PT evaluation. Inder Araiza is currently functioning below baseline for ADLs due to general weakness, increased pain in L hip, and new hip precautions, and would benefit from continued OT services to increase safety and independence. Will continue to follow. This section established at most recent assessment   PROBLEM LIST (Impairments causing functional limitations):  1. Decreased Strength  2. Decreased ADL/Functional Activities  3. Decreased Transfer Abilities  4. Decreased Ambulation Ability/Technique  5. Decreased Balance  6. Increased Pain  7. Decreased Activity Tolerance  8. Increased Fatigue  9. Decreased Flexibility/Joint Mobility  10. Decreased Knowledge of Precautions   11. Decreased Vision  12. Decreased Hearing   INTERVENTIONS PLANNED: (Benefits and precautions of occupational therapy have been discussed with the patient.)  1. Activities of daily living training  2. Adaptive equipment training  3. Balance training  4. Clothing management  5. Donning&doffing training  6. Hygiene training  7. Therapeutic activity  8. Therapeutic exercise     TREATMENT PLAN: Frequency/Duration: Follow patient 6x/week to address above goals. Rehabilitation Potential For Stated Goals: Good     RECOMMENDED REHABILITATION/EQUIPMENT: (at time of discharge pending progress): Due to the probability of continued deficits (see above) this patient will likely need continued skilled occupational therapy after discharge.   Equipment:    TBD              OCCUPATIONAL PROFILE AND HISTORY:   History of Present Injury/Illness (Reason for Referral):  See H&P. Past Medical History/Comorbidities:   Ms. Uday Rice  has a past medical history of Acquired hypothyroidism, Anemia, Arthritis, Chronic pain, Fractured hip ([address] Gastroesophageal reflux disease, GERD (gastroesophageal reflux disease), Giant cell arteritis ([facility] [address] Hip fracture ([facility] [address] Hypertension, Legally blind, Macular degeneration, Memory deficit, NSTEMI (non-ST elevated myocardial infarction) ([facility] [address] Other ill-defined conditions(799.89), Other ill-defined conditions(799.89), TIA (transient ischemic attack), and Varicose veins of left lower extremity. Ms. Uday Rice  has a past surgical history that includes hx endoscopy; pr cardiac surg procedure unlist; hx cholecystectomy; hx tonsillectomy; hx heent; and ESOPHAGOGASTRODUODENOSCOPY (EGD)/ 21 (N/A, 11/16/2018). Social History/Living Environment:   Home Environment: Private residence  # Steps to Enter: 1  One/Two Story Residence: One story  Living Alone: No  Support Systems: Child(yesi)  Patient Expects to be Discharged to[de-identified] Private residence  Current DME Used/Available at Home: Tarry Antonia or Shower Type: Shower  Prior Level of Function/Work/Activity:  Pt lives with her daughter and reports independence with most ADLs at baseline, but that she has been needing assistance to weave legs into pants and to step into the shower since she broke her hip in August.   Dominant Side:         RIGHT  Personal Factors:          Sex:  female        Age:  80 y.o. Number of Personal Factors/Comorbidities that affect the Plan of Care: Expanded review of therapy/medical records (1-2):  MODERATE COMPLEXITY   ASSESSMENT OF OCCUPATIONAL PERFORMANCE[de-identified]   Activities of Daily Living:   Basic ADLs (From Assessment) Complex ADLs (From Assessment)   Feeding: Independent  Oral Facial Hygiene/Grooming: Setup  Bathing: Moderate assistance  Upper Body Dressing: Setup  Lower Body Dressing:  Total assistance  Toileting: Maximum assistance Instrumental ADL  Meal Preparation: Total assistance  Homemaking: Total assistance   Grooming/Bathing/Dressing Activities of Daily Living     Cognitive Retraining  Safety/Judgement: Fall prevention                       Bed/Mat Mobility  Supine to Sit: Moderate assistance  Sit to Stand: Contact guard assistance  Bed to Chair: Minimum assistance  Scooting: Stand-by assistance       Most Recent Physical Functioning:   Gross Assessment:  AROM: Within functional limits  Strength: Generally decreased, functional  Coordination: Generally decreased, functional               Posture:  Posture (WDL): Exceptions to WDL  Posture Assessment:  Forward head, Kyphosis, Rounded shoulders  Balance:  Sitting: Intact  Standing: Impaired  Standing - Static: Constant support  Standing - Dynamic : Poor Bed Mobility:  Supine to Sit: Moderate assistance  Scooting: Stand-by assistance  Wheelchair Mobility:     Transfers:  Sit to Stand: Contact guard assistance  Stand to Sit: Contact guard assistance  Bed to Chair: Minimum assistance            Patient Vitals for the past 6 hrs:   BP BP Patient Position SpO2 O2 Flow Rate (L/min) Pulse   12/24/18 0939 176/83 Supine 95 % 3 l/min (!) 52   12/24/18 0941 (!) 198/97  96 %  (!) 53   12/24/18 0946 183/85  96 %  (!) 53   12/24/18 0952   95 %  (!) 51   12/24/18 0956 195/87  95 % 2 l/min (!) 49   12/24/18 1001 177/81  95 %  (!) 45   12/24/18 1006 179/84  94 %  (!) 50   12/24/18 1011 166/82  95 %  (!) 50   12/24/18 1016 164/79  95 %  (!) 49   12/24/18 1021 158/77  94 %  (!) 50   12/24/18 1026 169/74  94 %  (!) 50   12/24/18 1031 168/79  94 %  (!) 52   12/24/18 1036 169/79  96 %  (!) 51   12/24/18 1041 161/77  95 %  (!) 52   12/24/18 1051 172/71  93 %  (!) 55   12/24/18 1053   94 % 3 l/min    12/24/18 1056   96 %  (!) 53   12/24/18 1101   93 % 3 l/min (!) 59   12/24/18 1125 189/81 At rest 97 %  (!) 57   12/24/18 1140 169/67    (!) 54   12/24/18 1155 162/67    (!) 51   12/24/18 1210 166/67    (!) 50 12/24/18 1225 165/68    (!) 52       Mental Status  Neurologic State: Alert, Drowsy  Orientation Level: Oriented X4  Cognition: Follows commands  Perseveration: No perseveration noted  Safety/Judgement: Fall prevention                          Physical Skills Involved:  1. Range of Motion  2. Balance  3. Strength  4. Activity Tolerance  5. Vision  6. Pain (acute)   7. Hearing Cognitive Skills Affected (resulting in the inability to perform in a timely and safe manner):  1. None Psychosocial Skills Affected:  1. Habits/Routines  2. Environmental Adaptation  3. Self-Awareness  4. Social Roles   Number of elements that affect the Plan of Care: 5+:  HIGH COMPLEXITY   CLINICAL DECISION MAKING:   [facility] AM-PAC 6 Clicks   Daily Activity Inpatient Short Form  How much help from another person does the patient currently need. .. Total A Lot A Little None   1. Putting on and taking off regular lower body clothing? [x] 1   [] 2   [] 3   [] 4   2. Bathing (including washing, rinsing, drying)? [] 1   [x] 2   [] 3   [] 4   3. Toileting, which includes using toilet, bedpan or urinal?   [] 1   [x] 2   [] 3   [] 4   4. Putting on and taking off regular upper body clothing? [] 1   [] 2   [x] 3   [] 4   5. Taking care of personal grooming such as brushing teeth? [] 1   [] 2   [x] 3   [] 4   6. Eating meals? [] 1   [] 2   [] 3   [x] 4   © 2007, Trustees of [facility], under license to [employer]. All rights reserved      Score:  Initial: 15  12/24/2018  Most Recent: X (Date: -- )    Interpretation of Tool:  Represents activities that are increasingly more difficult (i.e. Bed mobility, Transfers, Gait). Score 24 23 22-20 19-15 14-10 9-7 6     Modifier CH CI CJ CK CL CM CN      ?  Self Care:     - CURRENT STATUS: CK - 40%-59% impaired, limited or restricted    - GOAL STATUS: CJ - 20%-39% impaired, limited or restricted    - D/C STATUS:  ---------------To be determined---------------  [place] / Product Type: Medicare /      Medical Necessity:     · Patient demonstrates good rehab potential due to higher previous functional level. Reason for Services/Other Comments:  · Patient continues to require skilled intervention due to inability to complete ADLs at prior level of independence. Use of outcome tool(s) and clinical judgement create a POC that gives a: MODERATE COMPLEXITY         TREATMENT:   (In addition to Assessment/Re-Assessment sessions the following treatments were rendered)     Pre-treatment Symptoms/Complaints:    Pain: Initial:   Pain Intensity 1: 0  Post Session:  same     Assessment/Reassessment only, no treatment provided today    Braces/Orthotics/Lines/Etc:   · IV  · godfrey catheter  · O2 Device: Nasal cannula  Treatment/Session Assessment:    · Response to Treatment:  Assessment only  · Interdisciplinary Collaboration:   o Physical Therapist  o Occupational Therapist  o Registered Nurse  · After treatment position/precautions:   o Bed/Chair-wheels locked  o Bed in low position  o With PT for PT assessment   · Compliance with Program/Exercises: Compliant all of the time. · Recommendations/Intent for next treatment session: \"Next visit will focus on advancements to more challenging activities and reduction in assistance provided\".   Total Treatment Duration:  OT Patient Time In/Time Out  Time In: 1228  Time Out: [address], OTR/L

## 2018-12-25 LAB
ANION GAP SERPL CALC-SCNC: 9 MMOL/L (ref 7–16)
BASOPHILS # BLD: 0.1 K/UL (ref 0–0.2)
BASOPHILS NFR BLD: 1 % (ref 0–2)
BUN SERPL-MCNC: 7 MG/DL (ref 8–23)
CALCIUM SERPL-MCNC: 8.6 MG/DL (ref 8.3–10.4)
CHLORIDE SERPL-SCNC: 105 MMOL/L (ref 98–107)
CO2 SERPL-SCNC: 24 MMOL/L (ref 21–32)
CREAT SERPL-MCNC: 0.55 MG/DL (ref 0.6–1)
DIFFERENTIAL METHOD BLD: ABNORMAL
EOSINOPHIL # BLD: 0.1 K/UL (ref 0–0.8)
EOSINOPHIL NFR BLD: 1 % (ref 0.5–7.8)
ERYTHROCYTE [DISTWIDTH] IN BLOOD BY AUTOMATED COUNT: 17.2 % (ref 11.9–14.6)
GLUCOSE SERPL-MCNC: 95 MG/DL (ref 65–100)
HCT VFR BLD AUTO: 28.8 % (ref 35.8–46.3)
HGB BLD-MCNC: 8.7 G/DL (ref 11.7–15.4)
HGB BLD-MCNC: 8.8 G/DL (ref 11.7–15.4)
IMM GRANULOCYTES # BLD: 0 K/UL (ref 0–0.5)
IMM GRANULOCYTES NFR BLD AUTO: 0 % (ref 0–5)
LYMPHOCYTES # BLD: 1.3 K/UL (ref 0.5–4.6)
LYMPHOCYTES NFR BLD: 12 % (ref 13–44)
MCH RBC QN AUTO: 26.4 PG (ref 26.1–32.9)
MCHC RBC AUTO-ENTMCNC: 30.2 G/DL (ref 31.4–35)
MCV RBC AUTO: 87.5 FL (ref 79.6–97.8)
MM INDURATION POC: 0 MM (ref 0–5)
MONOCYTES # BLD: 1.1 K/UL (ref 0.1–1.3)
MONOCYTES NFR BLD: 10 % (ref 4–12)
NEUTS SEG # BLD: 8.1 K/UL (ref 1.7–8.2)
NEUTS SEG NFR BLD: 76 % (ref 43–78)
NRBC # BLD: 0 K/UL (ref 0–0.2)
PLATELET # BLD AUTO: 259 K/UL (ref 150–450)
PMV BLD AUTO: 11.8 FL (ref 9.4–12.3)
POTASSIUM SERPL-SCNC: 3.4 MMOL/L (ref 3.5–5.1)
PPD POC: NORMAL NEGATIVE
RBC # BLD AUTO: 3.29 M/UL (ref 4.05–5.2)
SODIUM SERPL-SCNC: 138 MMOL/L (ref 136–145)
WBC # BLD AUTO: 10.7 K/UL (ref 4.3–11.1)

## 2018-12-25 PROCEDURE — 85018 HEMOGLOBIN: CPT

## 2018-12-25 PROCEDURE — 74011250636 HC RX REV CODE- 250/636: Performed by: ORTHOPAEDIC SURGERY

## 2018-12-25 PROCEDURE — 74011250637 HC RX REV CODE- 250/637: Performed by: NURSE PRACTITIONER

## 2018-12-25 PROCEDURE — 65270000029 HC RM PRIVATE

## 2018-12-25 PROCEDURE — 77010033678 HC OXYGEN DAILY

## 2018-12-25 PROCEDURE — 94760 N-INVAS EAR/PLS OXIMETRY 1: CPT

## 2018-12-25 PROCEDURE — 74011250637 HC RX REV CODE- 250/637: Performed by: ORTHOPAEDIC SURGERY

## 2018-12-25 PROCEDURE — 74011250636 HC RX REV CODE- 250/636: Performed by: FAMILY MEDICINE

## 2018-12-25 PROCEDURE — 80048 BASIC METABOLIC PNL TOTAL CA: CPT

## 2018-12-25 PROCEDURE — 85025 COMPLETE CBC W/AUTO DIFF WBC: CPT

## 2018-12-25 PROCEDURE — 36415 COLL VENOUS BLD VENIPUNCTURE: CPT

## 2018-12-25 PROCEDURE — 74011636637 HC RX REV CODE- 636/637: Performed by: ORTHOPAEDIC SURGERY

## 2018-12-25 PROCEDURE — 97530 THERAPEUTIC ACTIVITIES: CPT

## 2018-12-25 RX ORDER — AMLODIPINE BESYLATE 10 MG/1
10 TABLET ORAL DAILY
Status: DISCONTINUED | OUTPATIENT
Start: 2018-12-26 | End: 2018-12-27 | Stop reason: HOSPADM

## 2018-12-25 RX ORDER — QUETIAPINE FUMARATE 25 MG/1
12.5 TABLET, FILM COATED ORAL
Status: DISCONTINUED | OUTPATIENT
Start: 2018-12-25 | End: 2018-12-27 | Stop reason: HOSPADM

## 2018-12-25 RX ORDER — AMLODIPINE BESYLATE 5 MG/1
5 TABLET ORAL ONCE
Status: COMPLETED | OUTPATIENT
Start: 2018-12-25 | End: 2018-12-25

## 2018-12-25 RX ADMIN — HYDROCODONE BITARTRATE AND ACETAMINOPHEN 1 TABLET: 7.5; 325 TABLET ORAL at 18:35

## 2018-12-25 RX ADMIN — Medication 1 AMPULE: at 21:50

## 2018-12-25 RX ADMIN — CELECOXIB 200 MG: 200 CAPSULE ORAL at 11:10

## 2018-12-25 RX ADMIN — VITAMIN D, TAB 1000IU (100/BT) 1000 UNITS: 25 TAB at 11:10

## 2018-12-25 RX ADMIN — LEVOTHYROXINE SODIUM 25 MCG: 50 TABLET ORAL at 11:10

## 2018-12-25 RX ADMIN — HYDRALAZINE HYDROCHLORIDE 10 MG: 20 INJECTION INTRAMUSCULAR; INTRAVENOUS at 18:36

## 2018-12-25 RX ADMIN — Medication 10 ML: at 22:00

## 2018-12-25 RX ADMIN — HYDROCODONE BITARTRATE AND ACETAMINOPHEN 1 TABLET: 7.5; 325 TABLET ORAL at 22:15

## 2018-12-25 RX ADMIN — QUETIAPINE FUMARATE 12.5 MG: 25 TABLET ORAL at 21:51

## 2018-12-25 RX ADMIN — HYDROCODONE BITARTRATE AND ACETAMINOPHEN 1 TABLET: 7.5; 325 TABLET ORAL at 11:10

## 2018-12-25 RX ADMIN — AMLODIPINE BESYLATE 5 MG: 5 TABLET ORAL at 18:35

## 2018-12-25 RX ADMIN — ASPIRIN 81 MG 81 MG: 81 TABLET ORAL at 11:10

## 2018-12-25 RX ADMIN — Medication 10 ML: at 05:28

## 2018-12-25 RX ADMIN — ATORVASTATIN CALCIUM 10 MG: 10 TABLET, FILM COATED ORAL at 21:51

## 2018-12-25 RX ADMIN — HYDROCODONE BITARTRATE AND ACETAMINOPHEN 1 TABLET: 7.5; 325 TABLET ORAL at 05:28

## 2018-12-25 RX ADMIN — DEXAMETHASONE SODIUM PHOSPHATE 10 MG: 10 INJECTION INTRAMUSCULAR; INTRAVENOUS at 14:04

## 2018-12-25 RX ADMIN — Medication 5 ML: at 14:06

## 2018-12-25 RX ADMIN — STANDARDIZED SENNA CONCENTRATE AND DOCUSATE SODIUM 2 TABLET: 8.6; 5 TABLET, FILM COATED ORAL at 11:10

## 2018-12-25 RX ADMIN — PREDNISONE 3 MG: 1 TABLET ORAL at 11:10

## 2018-12-25 RX ADMIN — CELECOXIB 200 MG: 200 CAPSULE ORAL at 21:51

## 2018-12-25 RX ADMIN — Medication 1 AMPULE: at 11:10

## 2018-12-25 RX ADMIN — HYDROCODONE BITARTRATE AND ACETAMINOPHEN 1 TABLET: 7.5; 325 TABLET ORAL at 00:56

## 2018-12-25 RX ADMIN — METOPROLOL SUCCINATE 25 MG: 25 TABLET, EXTENDED RELEASE ORAL at 11:10

## 2018-12-25 RX ADMIN — OLMESARTAN MEDOXOMIL 20 MG: 20 TABLET, COATED ORAL at 11:09

## 2018-12-25 RX ADMIN — AMLODIPINE BESYLATE 5 MG: 5 TABLET ORAL at 11:10

## 2018-12-25 NOTE — PROGRESS NOTES
2018         Post Op day: 1 Day Post-Op     Admit Date: 2018  Admit Diagnosis: Osteoarthritis of left hip, unspecified osteoarthritis type [M16.12]    Subjective: Doing well, No complaints, No SOB, No Chest Pain, No Nausea or Vomitting. Weight Bearing Status:   WBAT  BMP:  Recent Labs     18  0421 18  1222 18  0645   CREA 0.55* 0.68 0.74   BUN 7* 14 18    140 141   K 3.4* 3.9 4.1    107 108*   CO2 24 26 26   AGAP 9 7 7   GLU 95 133* 87     Patient Vitals for the past 8 hrs:   BP Temp Pulse Resp SpO2   18 0732 166/70 98.6 °F (37 °C) 66 18 92 %   18 0325 157/71 97.2 °F (36.2 °C) 67 16 94 %     Temp (24hrs), Av.6 °F (36.4 °C), Min:97.2 °F (36.2 °C), Max:98.6 °F (37 °C)    CBC:  Recent Labs     18  0421 18  1932 18  1222 18  0617   WBC 10.7  --  16.8* 9.7   GRANS 76  --  83* 75   MONOS 10  --  7 9   EOS 1  --  1 3   ANEU 8.1  --  14.0* 7.3   ABL 1.3  --  1.4 1.2   HGB 8.7*  8.8* 10.4* 10.3* 10.7*   HCT 28.8*  --  33.9* 35.8     --  303 302       Microbiology:     All Micro Results     Procedure Component Value Units Date/Time    MSSA/MRSA SC BY PCR, NASAL SWAB [[id number]] Collected:  18 1125    Order Status:  Completed Specimen:  Swab Updated:  18 1311     Special Requests: NO SPECIAL REQUESTS        Culture result:       SA target not detected. A MRSA NEGATIVE, SA NEGATIVE test result does not preclude MRSA or SA nasal colonization.               Objective: Vital Signs are Stable, No Acute Distress, Alert and Oriented            Dressing is Dry,  Neurovascular exam is normal.    Assessment:  Patient Active Problem List   Diagnosis Code    Syncope R55    Bradycardia R00.1    Giant cell arteritis ([facility] [address] M31.6    Hypertension I10    Abnormal EKG R94.31    Back pain M54.9    TIA (transient ischemic attack) G45.9    Hyperlipidemia LDL goal <100 E78.5    Diarrhea R19.7    Gastroesophageal reflux disease K21.9    Insulin resistance E88.81    Acquired hypothyroidism E03.9    Chest pain R07.9    Anemia D64.9    NSTEMI (non-ST elevated myocardial infarction) (HCC) I21.4    Delirium R41.0    Acute cystitis without hematuria N30.00    IFG (impaired fasting glucose) R73.01    Hip pain M25.559       Plan: Continue Physical Therapy  Monitor Hbg and labs.    Dispo soon    Signed By: Maryanne Goodman MD

## 2018-12-25 NOTE — PROGRESS NOTES
Problem: Falls - Risk of  Goal: *Absence of Falls  Document Kristie Fall Risk and appropriate interventions in the flowsheet.   Outcome: Progressing Towards Goal  Fall Risk Interventions:  Mobility Interventions: Bed/chair exit alarm, Patient to call before getting OOB, OT consult for ADLs, PT Consult for mobility concerns    Mentation Interventions: Bed/chair exit alarm, Adequate sleep, hydration, pain control, Reorient patient, Update white board, Family/sitter at bedside    Medication Interventions: Evaluate medications/consider consulting pharmacy, Bed/chair exit alarm, Patient to call before getting OOB    Elimination Interventions: Call light in reach, Bed/chair exit alarm, Patient to call for help with toileting needs    History of Falls Interventions: Bed/chair exit alarm, Door open when patient unattended, Investigate reason for fall

## 2018-12-25 NOTE — PROGRESS NOTES
Problem: Mobility Impaired (Adult and Pediatric)  Goal: *Acute Goals and Plan of Care (Insert Text)  Short Term Goals:  1.  Ms. Kai Khan will move from supine to sit and sit to supine in flat bed with CONTACT GUARD ASSIST within 3 day(s). 2.  Ms. Kai Khan will transfer from sit to stand and stand to sit with STAND BY ASSIST  within 3 days. 3.  Ms. Kai Khan will ambulate with STAND BY ASSIST for 150+ feet with the least restrictive device within 3 day(s). 4.  Ms. Kai Khan will verbalize 3/3 hip precautions to ensure hip safety during functional activities within 3 days. ________________________________________________________________________      PHYSICAL THERAPY: Daily Note, Treatment Day: 1st, AM 12/25/2018  INPATIENT: Hospital Day: 2  [place] / Product Type: Medicare /   Hip Precautions   WBAT L LE  Legally blind  NAME/AGE/GENDER: Gareth Cabrales is a 80 y.o. female   PRIMARY DIAGNOSIS: Osteoarthritis of left hip, unspecified osteoarthritis type [M16.12] <principal problem not specified> <principal problem not specified>  Procedure(s) (LRB):  LEFT HIP HEMIARTHROPLASTY (Left)  1 Day Post-Op  ICD-10: Treatment Diagnosis:    · Other abnormalities of gait and mobility (R26.89)   Precaution/Allergies:  Calcium carbonate; Ranitidine hcl; and Guaifenesin      ASSESSMENT:     Ms. Kai Khan presents supine in bed with daughter at bedside. Pt eager to sit in chair and agreeable to therapy. Patient is normally independent with all functional mobility, however, suffered hip fx in August and has declined since then. She stood with min-modA assist to RW. She performed stand pivot to recliner with RW and min-modA assist plus max cueing for sequencing, etc. Pt required assistance with RWW due to her poor command following today. Sat with CG to min assist. Reviewed patient in hip precautions.   Ms. Kai Khan would benefit from skilled physical therapy (medically necessary) to address her deficits and maximize her function. Pt left sitting in chair with daughter at bedside and all needs met. This section established at most recent assessment   PROBLEM LIST (Impairments causing functional limitations):  1. Decreased Strength  2. Decreased ADL/Functional Activities  3. Decreased Transfer Abilities  4. Decreased Ambulation Ability/Technique  5. Decreased Balance  6. Decreased Activity Tolerance  7. Decreased Knowledge of Precautions  8. Decreased [place] with Home Exercise Program   INTERVENTIONS PLANNED: (Benefits and precautions of physical therapy have been discussed with the patient.)  1. Balance Exercise  2. Bed Mobility  3. Gait Training  4. Home Exercise Program (HEP)  5. Therapeutic Activites  6. Therapeutic Exercise/Strengthening  7. Transfer Training  8. education     TREATMENT PLAN: Frequency/Duration: daily to twice daily for duration of hospital stay  Rehabilitation Potential For Stated Goals: Excellent     RECOMMENDED REHABILITATION/EQUIPMENT: (at time of discharge pending progress): Due to the probability of continued deficits (see above) this patient will likely need continued skilled physical therapy after discharge. Equipment:    None at this time              HISTORY:   History of Present Injury/Illness (Reason for Referral):  Per MD note, \" The patient has a femoral head fracture which has collapsed. We have attempted conservative treatment which has failed. The patient was evaluated and examined in the office prior to today and was found to have a history and physical exam consistent with intra-articular pathology of the left hip. Patient complains of anterior groin pain predominately. Pain occurs during activity. Patient has difficulty putting on socks/shoes and has notable pain when getting up from a chair and getting out of a car. Patient does not complain of significant lateral hip pain or radicular pain down the leg.  There have been no changes to the patient's orthopedic condition since the last office visit\"  Past Medical History/Comorbidities:   Ms. Michael Moran  has a past medical history of Acquired hypothyroidism, Anemia, Arthritis, Chronic pain, Fractured hip ([facility] [address] Gastroesophageal reflux disease, GERD (gastroesophageal reflux disease), Giant cell arteritis ([facility] [address] Hip fracture ([facility] [address] Hypertension, Legally blind, Macular degeneration, Memory deficit, NSTEMI (non-ST elevated myocardial infarction) ([facility] [address] Other ill-defined conditions(799.89), Other ill-defined conditions(799.89), TIA (transient ischemic attack), and Varicose veins of left lower extremity. Ms. Michael Moran  has a past surgical history that includes hx endoscopy; pr cardiac surg procedure unlist; hx cholecystectomy; hx tonsillectomy; hx heent; and ESOPHAGOGASTRODUODENOSCOPY (EGD)/ 21 (N/A, 11/16/2018). Social History/Living Environment:   Home Environment: Private residence  # Steps to Enter: 1  One/Two Story Residence: One story  Living Alone: No  Support Systems: Child(yesi)  Patient Expects to be Discharged to[de-identified] Private residence  Current DME Used/Available at Home: [address] or Shower Type: Shower  Prior Level of Function/Work/Activity:  Lives at home with daughter. Was independent prior to hip fracture in August, has been using RW since that time and requiring assist with ADL's. Personal Factors:          Sex:  female        Age:  80 y.o. Number of Personal Factors/Comorbidities that affect the Plan of Care: 3+: HIGH COMPLEXITY   EXAMINATION:   Most Recent Physical Functioning:   Gross Assessment:                  Posture:     Balance:  Sitting: Intact  Standing: Impaired  Standing - Static: Constant support  Standing - Dynamic : Poor Bed Mobility:  Rolling: Minimum assistance  Supine to Sit: Moderate assistance  Scooting: Stand-by assistance  Wheelchair Mobility:     Transfers:  Sit to Stand: Minimum assistance  Stand to Sit: Minimum assistance  Bed to Chair: Minimum assistance; Moderate assistance  Gait:            Body Structures Involved:  1. Eyes and Ears  2. Bones  3. Joints  4. Muscles  5. Ligaments Body Functions Affected:  1. Sensory/Pain  2. Movement Related  3. Skin Related Activities and Participation Affected:  1. Mobility  2. Self Care  3. Domestic Life  4. Community, Social and [place]   Number of elements that affect the Plan of Care: 4+: HIGH COMPLEXITY   CLINICAL PRESENTATION:   Presentation: Stable and uncomplicated: LOW COMPLEXITY   CLINICAL DECISION MAKIN [facility] Mobility Inpatient Short Form  How much difficulty does the patient currently have. .. Unable A Lot A Little None   1. Turning over in bed (including adjusting bedclothes, sheets and blankets)? [] 1   [x] 2   [] 3   [] 4   2. Sitting down on and standing up from a chair with arms ( e.g., wheelchair, bedside commode, etc.)   [] 1   [] 2   [x] 3   [] 4   3. Moving from lying on back to sitting on the side of the bed? [] 1   [x] 2   [] 3   [] 4   How much help from another person does the patient currently need. .. Total A Lot A Little None   4. Moving to and from a bed to a chair (including a wheelchair)? [] 1   [] 2   [x] 3   [] 4   5. Need to walk in hospital room? [] 1   [] 2   [x] 3   [] 4   6. Climbing 3-5 steps with a railing? [] 1   [] 2   [x] 3   [] 4   © , Trustees of [facility] [employer], under license to [employer]. All rights reserved      Score:  Initial: 16 Most Recent: X (Date: -- )    Interpretation of Tool:  Represents activities that are increasingly more difficult (i.e. Bed mobility, Transfers, Gait). Score 24 23 22-20 19-15 14-10 9-7 6     Modifier CH CI CJ CK CL CM CN      ?  Mobility - Walking and Moving Around:     - CURRENT STATUS: CK - 40%-59% impaired, limited or restricted    - GOAL STATUS: CJ - 20%-39% impaired, limited or restricted    - D/C STATUS:  ---------------To be determined---------------  [place] / [place] / Product Type: Medicare /      Medical Necessity:     · Patient is expected to demonstrate progress in strength, range of motion, balance, coordination and functional technique to decrease assistance required with all functional mobility. Reason for Services/Other Comments:  · Patient continues to require skilled intervention due to decline in functional mobility. Use of outcome tool(s) and clinical judgement create a POC that gives a: Clear prediction of patient's progress: LOW COMPLEXITY            TREATMENT:   (In addition to Assessment/Re-Assessment sessions the following treatments were rendered)   Pre-treatment Symptoms/Complaints: Pt notes soreness in B LEs from hips to ankles. Pain: Initial: 3/10     Post Session:  3/10     Assessment/Reassessment only, no treatment provided today    Braces/Orthotics/Lines/Etc:   · IV  · godfrey catheter  · O2 Device: Nasal cannula  Treatment/Session Assessment:  Pt tolerated transfer well but noted feeling fatigued and having soreness in B LEs. · Response to Treatment:  Pleasant and cooperative. · Interdisciplinary Collaboration:   o Physical Therapist  o Occupational Therapist  · After treatment position/precautions:   o Up in chair  o Bed alarm/tab alert on  o Bed/Chair-wheels locked  o Call light within reach  o RN notified  o Family at bedside   · Compliance with Program/Exercises: Will assess as treatment progresses  · Recommendations/Intent for next treatment session: \"Next visit will focus on advancements to more challenging activities and reduction in assistance provided\".   Total Treatment Duration:  PT Patient Time In/Time Out  Time In: 0955  Time Out: [address] Nae PT, DPT

## 2018-12-25 NOTE — PROGRESS NOTES
Hospitalist Consult Note     Admit Date:  2018  5:28 AM   Name:  Bryce Garcia   Age:  80 y.o.  :  1928   MRN:  [id number]   PCP:  Machelle Camacho MD  Treatment Team: Attending Provider: Mauricio Coronel MD; Consulting Provider: Yeny Sanchez MD; Utilization Review: Elliot Petit RN; Charge Nurse: Ashly Bonner  Medical management  HPI:   Patient is a 79 yo female with PMH HTN, GERD, HLD,  macular degeneration - legally blind, and recent egd and stress test- both normal. who is  s/p left hemiarthroplasty . Hospitalist service was consulted for medical management. Blood pressure 166/70 this morning. Patient to resume home mediations this morning. Will monitor. Patient alert and oriented only to self. Daughter at bedside and states this is not her baseline. Reports her brother said she had a \"rough night\". She reports patient had previous hospitalization and suffered from delirium. Patient resting comfortably. Denies pain. On 3LNC (no home O2). Hgb 8.7.     10 systems reviewed and negative except as noted in HPI.   Past Medical History:   Diagnosis Date    Acquired hypothyroidism     synthroid daily    Anemia     blood transfusion 2018; [facility]    Arthritis     Chronic pain     Fractured hip ([address]     Gastroesophageal reflux disease 2016    GERD (gastroesophageal reflux disease)     nexium daily,well controlled per daughter-    hiatal hernia,     Giant cell arteritis ([address] 2010    monitored by rheumatologist, prednisone daily    Hip fracture ([address] 10/29/2018    left hip fracture-  followed by Dr Alexandra Moreau @ [facility]    Hypertension     managed with meds    Legally blind     Macular degeneration     Legally blind    Memory deficit     NSTEMI (non-ST elevated myocardial infarction) ([address] 2018 \"mild mi, no damage, no heart cath or stents\"- medical management recommended however anemia persists, no current asa or thinners    Other ill-defined conditions(799.89)     back and pelvis pain    Other ill-defined conditions(799.89)     hip fx ;   arteritis     TIA (transient ischemic attack) 6/23/2015    no residual    Varicose veins of left lower extremity       Past Surgical History:   Procedure Laterality Date    CARDIAC SURG PROCEDURE UNLIST      Pt's family reports heart attack in Sept while in hospital at [facility].      HX CHOLECYSTECTOMY      HX ENDOSCOPY      HX HEENT      torn retina    HX TONSILLECTOMY        Current Facility-Administered Medications   Medication Dose Route Frequency    sodium chloride (NS) flush 5-10 mL  5-10 mL IntraVENous Q8H    sodium chloride (NS) flush 5-10 mL  5-10 mL IntraVENous PRN    sodium chloride (NS) flush 5-10 mL  5-10 mL IntraVENous PRN    amLODIPine (NORVASC) tablet 5 mg  5 mg Oral DAILY    atorvastatin (LIPITOR) tablet 10 mg  10 mg Oral QHS    cholecalciferol (VITAMIN D3) tablet 1,000 Units  1,000 Units Oral DAILY    levothyroxine (SYNTHROID) tablet 25 mcg  25 mcg Oral EVERY OTHER DAY    [START ON 12/26/2018] levothyroxine (SYNTHROID) tablet 50 mcg  50 mcg Oral EVERY OTHER DAY    metoprolol succinate (TOPROL-XL) XL tablet 25 mg  25 mg Oral DAILY    olmesartan (BENICAR) tablet 20 mg  20 mg Oral DAILY    predniSONE (DELTASONE) tablet 3 mg  3 mg Oral DAILY WITH BREAKFAST    alcohol 62% (NOZIN) nasal  1 Ampule  1 Ampule Topical Q12H    0.9% sodium chloride infusion  100 mL/hr IntraVENous CONTINUOUS    sodium chloride (NS) flush 5-10 mL  5-10 mL IntraVENous PRN    celecoxib (CELEBREX) capsule 200 mg  200 mg Oral Q12H    naloxone (NARCAN) injection 0.2-0.4 mg  0.2-0.4 mg IntraVENous Q10MIN PRN    dexamethasone (DECADRON) injection 10 mg  10 mg IntraVENous ONCE    diphenhydrAMINE (BENADRYL) capsule 25 mg  25 mg Oral Q4H PRN    HYDROcodone-acetaminophen (NORCO) 7.5-325 mg per tablet 1 Tab  1 Tab Oral Q4H PRN    morphine 10 mg/ml injection 6 mg  6 mg IntraVENous Q3H PRN    senna-docusate (PERICOLACE) 8.6-50 mg per tablet 2 Tab  2 Tab Oral DAILY    ondansetron (ZOFRAN ODT) tablet 8 mg  8 mg Oral Q8H PRN    aspirin chewable tablet 81 mg  81 mg Oral BID    hydrALAZINE (APRESOLINE) 20 mg/mL injection 10 mg  10 mg IntraVENous Q6H PRN    tuberculin injection 5 Units  5 Units IntraDERMal ONCE     Allergies   Allergen Reactions    Calcium Carbonate Diarrhea    Ranitidine Hcl Diarrhea    Guaifenesin Other (comments)     Hallucinations      Social History     Tobacco Use    Smoking status: Never Smoker    Smokeless tobacco: Never Used   Substance Use Topics    Alcohol use: No      Family History   Problem Relation Age of Onset    Cancer Father         stomach    Arthritis-osteo Mother     Hypertension Mother     Arthritis-osteo Sister       Immunization History   Administered Date(s) Administered    Influenza Vaccine 10/12/2010, 09/28/2011, 10/30/2012, 10/20/2015, 10/15/2016    Pneumococcal Conjugate (PCV-13) 11/03/2015    Pneumococcal Polysaccharide (PPSV-23) 10/12/2010    TB Skin Test (PPD) Intradermal 10/30/2018, 12/24/2018    Tdap 01/06/2018       Objective:     Patient Vitals for the past 24 hrs:   Temp Pulse Resp BP SpO2   12/25/18 0732 98.6 °F (37 °C) 66 18 166/70 92 %   12/25/18 0325 97.2 °F (36.2 °C) 67 16 157/71 94 %   12/24/18 2337 97.7 °F (36.5 °C) 74 16 158/74 94 %   12/24/18 2001 97.6 °F (36.4 °C) 66 16 191/87 95 %   12/24/18 1447 97.5 °F (36.4 °C) (!) 53 16 179/74 98 %   12/24/18 1225  (!) 52  165/68    12/24/18 1210  (!) 50  166/67    12/24/18 1155  (!) 51  162/67    12/24/18 1140  (!) 54  169/67    12/24/18 1125 97.3 °F (36.3 °C) (!) 57 16 189/81 97 %   12/24/18 1101  (!) 59 18  93 %   12/24/18 1056  (!) 53 16  96 %   12/24/18 1053 97.3 °F (36.3 °C)    94 %   12/24/18 1051  (!) 55 16 172/71 93 %   12/24/18 1041  (!) 52 16 161/77 95 %   12/24/18 1036  (!) 51 16 169/79 96 %   12/24/18 1031  (!) 52 19 168/79 94 % 12/24/18 1026  (!) 50 16 169/74 94 %   12/24/18 1021  (!) 50 18 158/77 94 %   12/24/18 1016  (!) 49 18 164/79 95 %   12/24/18 1011  (!) 50 16 166/82 95 %   12/24/18 1006  (!) 50 16 179/84 94 %   12/24/18 1001  (!) 45 16 177/81 95 %   12/24/18 0956  (!) 49 16 195/87 95 %   12/24/18 0952  (!) 51 16  95 %   12/24/18 0946  (!) 53 16 183/85 96 %   12/24/18 0941  (!) 53 20 (!) 198/97 96 %   12/24/18 0939 97.2 °F (36.2 °C) (!) 52 16 176/83 95 %     Oxygen Therapy  O2 Sat (%): 92 % (12/25/18 0732)  Pulse via Oximetry: 52 beats per minute (12/24/18 1101)  O2 Device: Nasal cannula (12/24/18 1101)  O2 Flow Rate (L/min): 3 l/min (12/24/18 1101)    Intake/Output Summary (Last 24 hours) at 12/25/2018 0934  Last data filed at 12/24/2018 2338  Gross per 24 hour   Intake 670 ml   Output 1600 ml   Net -930 ml       Physical Exam:  General:    Elderly. Well noursighed. Alert. No distress noted. Eyes:   Normal sclera. Extraocular movements intact. ENT:  Normocephalic, atraumatic. Moist mucous membranes  CV:   RRR. No murmur, rub, or gallop. Lungs:  CTAB. No wheezing, rhonchi, or rales. diminished. 3LNC  Abdomen: Soft, nontender, nondistended. Bowel sounds normal.   Extremities: Warm and dry. No cyanosis or edema. Dressign left hip region intact- no soaking of dressing  Neurologic: CN II-XII grossly intact. Sensation intact. Skin:     No rashes or jaundice. Psych:  Normal mood and affect. I reviewed the labs.   Data Review:   Recent Results (from the past 24 hour(s))   EKG, 12 LEAD, INITIAL    Collection Time: 12/24/18 11:24 AM   Result Value Ref Range    Ventricular Rate 56 BPM    Atrial Rate 56 BPM    P-R Interval 140 ms    QRS Duration 102 ms    Q-T Interval 466 ms    QTC Calculation (Bezet) 449 ms    Calculated P Axis 47 degrees    Calculated R Axis -64 degrees    Calculated T Axis 104 degrees    Diagnosis       Sinus bradycardia with occasional Premature ventricular complexes  Left axis deviation  Left ventricular hypertrophy with repolarization abnormality  Anterior infarct (cited on or before 28-OCT-2006)  Abnormal ECG  When compared with ECG of 30-OCT-2018 12:51,  Questionable change in initial forces of Septal leads  Confirmed by [facility] [employer] [facility]  MD (), Evonnie Libman ([id number]) on 12/24/2018 12:28:33 PM     URINALYSIS W/ RFLX MICROSCOPIC    Collection Time: 12/24/18 11:25 AM   Result Value Ref Range    Color YELLOW      Appearance CLEAR      Specific gravity 1.014 1.001 - 1.023      pH (UA) 8.0 5.0 - 9.0      Protein NEGATIVE  NEG mg/dL    Glucose NEGATIVE  NEG mg/dL    Ketone NEGATIVE  NEG mg/dL    Bilirubin NEGATIVE  NEG      Blood NEGATIVE  NEG      Urobilinogen 0.2 0.2 - 1.0 EU/dL    Nitrites NEGATIVE  NEG      Leukocyte Esterase NEGATIVE  NEG      Bacteria 0 0 /hpf   MSSA/MRSA SC BY PCR, NASAL SWAB    Collection Time: 12/24/18 11:25 AM   Result Value Ref Range    Special Requests: NO SPECIAL REQUESTS      Culture result:        SA target not detected. A MRSA NEGATIVE, SA NEGATIVE test result does not preclude MRSA or SA nasal colonization. CBC WITH AUTOMATED DIFF    Collection Time: 12/24/18 12:22 PM   Result Value Ref Range    WBC 16.8 (H) 4.3 - 11.1 K/uL    RBC 3.86 (L) 4.05 - 5.2 M/uL    HGB 10.3 (L) 11.7 - 15.4 g/dL    HCT 33.9 (L) 35.8 - 46.3 %    MCV 87.8 79.6 - 97.8 FL    MCH 26.7 26.1 - 32.9 PG    MCHC 30.4 (L) 31.4 - 35.0 g/dL    RDW 17.3 (H) 11.9 - 14.6 %    PLATELET 543 109 - 081 K/uL    MPV 11.8 9.4 - 12.3 FL    ABSOLUTE NRBC 0.00 0.0 - 0.2 K/uL    DF AUTOMATED      NEUTROPHILS 83 (H) 43 - 78 %    LYMPHOCYTES 8 (L) 13 - 44 %    MONOCYTES 7 4.0 - 12.0 %    EOSINOPHILS 1 0.5 - 7.8 %    BASOPHILS 0 0.0 - 2.0 %    IMMATURE GRANULOCYTES 1 0.0 - 5.0 %    ABS. NEUTROPHILS 14.0 (H) 1.7 - 8.2 K/UL    ABS. LYMPHOCYTES 1.4 0.5 - 4.6 K/UL    ABS. MONOCYTES 1.2 0.1 - 1.3 K/UL    ABS. EOSINOPHILS 0.1 0.0 - 0.8 K/UL    ABS. BASOPHILS 0.1 0.0 - 0.2 K/UL    ABS. IMM.  GRANS. 0.1 0.0 - 0.5 K/UL PROTHROMBIN TIME + INR    Collection Time: 12/24/18 12:22 PM   Result Value Ref Range    Prothrombin time 13.5 11.7 - 14.5 sec    INR 1.0     PTT    Collection Time: 12/24/18 12:22 PM   Result Value Ref Range    aPTT 24.8 24.7 - 08.2 SEC   METABOLIC PANEL, BASIC    Collection Time: 12/24/18 12:22 PM   Result Value Ref Range    Sodium 140 136 - 145 mmol/L    Potassium 3.9 3.5 - 5.1 mmol/L    Chloride 107 98 - 107 mmol/L    CO2 26 21 - 32 mmol/L    Anion gap 7 7 - 16 mmol/L    Glucose 133 (H) 65 - 100 mg/dL    BUN 14 8 - 23 MG/DL    Creatinine 0.68 0.6 - 1.0 MG/DL    GFR est AA >60 >60 ml/min/1.73m2    GFR est non-AA >60 >60 ml/min/1.73m2    Calcium 9.2 8.3 - 10.4 MG/DL   HEMOGLOBIN    Collection Time: 12/24/18  7:32 PM   Result Value Ref Range    HGB 10.4 (L) 11.7 - 83.9 g/dL   METABOLIC PANEL, BASIC    Collection Time: 12/25/18  4:21 AM   Result Value Ref Range    Sodium 138 136 - 145 mmol/L    Potassium 3.4 (L) 3.5 - 5.1 mmol/L    Chloride 105 98 - 107 mmol/L    CO2 24 21 - 32 mmol/L    Anion gap 9 7 - 16 mmol/L    Glucose 95 65 - 100 mg/dL    BUN 7 (L) 8 - 23 MG/DL    Creatinine 0.55 (L) 0.6 - 1.0 MG/DL    GFR est AA >60 >60 ml/min/1.73m2    GFR est non-AA >60 >60 ml/min/1.73m2    Calcium 8.6 8.3 - 10.4 MG/DL   HEMOGLOBIN    Collection Time: 12/25/18  4:21 AM   Result Value Ref Range    HGB 8.8 (L) 11.7 - 15.4 g/dL   CBC WITH AUTOMATED DIFF    Collection Time: 12/25/18  4:21 AM   Result Value Ref Range    WBC 10.7 4.3 - 11.1 K/uL    RBC 3.29 (L) 4.05 - 5.2 M/uL    HGB 8.7 (L) 11.7 - 15.4 g/dL    HCT 28.8 (L) 35.8 - 46.3 %    MCV 87.5 79.6 - 97.8 FL    MCH 26.4 26.1 - 32.9 PG    MCHC 30.2 (L) 31.4 - 35.0 g/dL    RDW 17.2 (H) 11.9 - 14.6 %    PLATELET 168 010 - 040 K/uL    MPV 11.8 9.4 - 12.3 FL    ABSOLUTE NRBC 0.00 0.0 - 0.2 K/uL    DF AUTOMATED      NEUTROPHILS 76 43 - 78 %    LYMPHOCYTES 12 (L) 13 - 44 %    MONOCYTES 10 4.0 - 12.0 %    EOSINOPHILS 1 0.5 - 7.8 %    BASOPHILS 1 0.0 - 2.0 %    IMMATURE GRANULOCYTES 0 0.0 - 5.0 %    ABS. NEUTROPHILS 8.1 1.7 - 8.2 K/UL    ABS. LYMPHOCYTES 1.3 0.5 - 4.6 K/UL    ABS. MONOCYTES 1.1 0.1 - 1.3 K/UL    ABS. EOSINOPHILS 0.1 0.0 - 0.8 K/UL    ABS. BASOPHILS 0.1 0.0 - 0.2 K/UL    ABS. IMM. GRANS. 0.0 0.0 - 0.5 K/UL       All Micro Results     Procedure Component Value Units Date/Time    MSSA/MRSA SC BY PCR, NASAL SWAB [[id number]] Collected:  12/24/18 1125    Order Status:  Completed Specimen:  Swab Updated:  12/24/18 1311     Special Requests: NO SPECIAL REQUESTS        Culture result:       SA target not detected. A MRSA NEGATIVE, SA NEGATIVE test result does not preclude MRSA or SA nasal colonization. Other Studies:  Xr Pelv Ap Only    Result Date: 12/24/2018  AP PELVIS ONE VIEW HISTORY: Hemiarthroplasty of left hip. COMPARISON: 3/19/2010 FINDINGS: Skin staples are present on the left side. A left hip prosthesis is present. Atherosclerotic vascular calcifications are present. IMPRESSION: Left hip arthroplasty. Assessment and Plan:     Hospital Problems as of 12/25/2018 Date Reviewed: 9/20/2018          Codes Class Noted - Resolved POA    Hip pain ICD-10-CM: M25.559  ICD-9-CM: 719.45  12/24/2018 - Present Unknown        Acquired hypothyroidism ICD-10-CM: E03.9  ICD-9-CM: 244.9  2/15/2017 - Present Yes        Hyperlipidemia LDL goal <100 ICD-10-CM: E78.5  ICD-9-CM: 272.4  12/9/2016 - Present Yes        Gastroesophageal reflux disease ICD-10-CM: K21.9  ICD-9-CM: 530.81  12/9/2016 - Present Yes        Hypertension (Chronic) ICD-10-CM: I10  ICD-9-CM: 401.9  3/20/2010 - Present Yes              PLAN:  S/p left hip arthroplasty  - pain and dvt prophylaxis as per primary.     Uncontrolled HTN  - prn hydralazine  - cont home medications - Increase norvasc to 10mg  -Pain control    Delirium  -Add seroquel hs    Hypothyroid  -Continue home med    Saxon  -Continue home med    Hyperlipidemia  -Continue home meds    Will follow    Plan of care discussed with Dr. Neema Sahu,     Signed:  Asif Lambert NP

## 2018-12-26 LAB
BASOPHILS # BLD: 0 K/UL (ref 0–0.2)
BASOPHILS NFR BLD: 0 % (ref 0–2)
DIFFERENTIAL METHOD BLD: ABNORMAL
EOSINOPHIL # BLD: 0 K/UL (ref 0–0.8)
EOSINOPHIL NFR BLD: 0 % (ref 0.5–7.8)
ERYTHROCYTE [DISTWIDTH] IN BLOOD BY AUTOMATED COUNT: 17.2 % (ref 11.9–14.6)
HCT VFR BLD AUTO: 32.1 % (ref 35.8–46.3)
HGB BLD-MCNC: 9.5 G/DL (ref 11.7–15.4)
IMM GRANULOCYTES # BLD: 0.1 K/UL (ref 0–0.5)
IMM GRANULOCYTES NFR BLD AUTO: 0 % (ref 0–5)
LYMPHOCYTES # BLD: 0.7 K/UL (ref 0.5–4.6)
LYMPHOCYTES NFR BLD: 5 % (ref 13–44)
MCH RBC QN AUTO: 26.5 PG (ref 26.1–32.9)
MCHC RBC AUTO-ENTMCNC: 29.6 G/DL (ref 31.4–35)
MCV RBC AUTO: 89.4 FL (ref 79.6–97.8)
MONOCYTES # BLD: 1.1 K/UL (ref 0.1–1.3)
MONOCYTES NFR BLD: 8 % (ref 4–12)
NEUTS SEG # BLD: 12 K/UL (ref 1.7–8.2)
NEUTS SEG NFR BLD: 87 % (ref 43–78)
NRBC # BLD: 0 K/UL (ref 0–0.2)
PLATELET # BLD AUTO: 229 K/UL (ref 150–450)
PMV BLD AUTO: 12 FL (ref 9.4–12.3)
RBC # BLD AUTO: 3.59 M/UL (ref 4.05–5.2)
WBC # BLD AUTO: 13.9 K/UL (ref 4.3–11.1)

## 2018-12-26 PROCEDURE — 97110 THERAPEUTIC EXERCISES: CPT

## 2018-12-26 PROCEDURE — 97530 THERAPEUTIC ACTIVITIES: CPT

## 2018-12-26 PROCEDURE — 74011636637 HC RX REV CODE- 636/637: Performed by: ORTHOPAEDIC SURGERY

## 2018-12-26 PROCEDURE — 36415 COLL VENOUS BLD VENIPUNCTURE: CPT

## 2018-12-26 PROCEDURE — 85025 COMPLETE CBC W/AUTO DIFF WBC: CPT

## 2018-12-26 PROCEDURE — 74011250637 HC RX REV CODE- 250/637: Performed by: NURSE PRACTITIONER

## 2018-12-26 PROCEDURE — 65270000029 HC RM PRIVATE

## 2018-12-26 PROCEDURE — 97535 SELF CARE MNGMENT TRAINING: CPT

## 2018-12-26 PROCEDURE — 74011250637 HC RX REV CODE- 250/637: Performed by: ORTHOPAEDIC SURGERY

## 2018-12-26 RX ORDER — HYDROCODONE BITARTRATE AND ACETAMINOPHEN 7.5; 325 MG/1; MG/1
0.5 TABLET ORAL
Status: DISCONTINUED | OUTPATIENT
Start: 2018-12-26 | End: 2018-12-27 | Stop reason: HOSPADM

## 2018-12-26 RX ORDER — OLMESARTAN MEDOXOMIL 40 MG/1
40 TABLET ORAL DAILY
Status: DISCONTINUED | OUTPATIENT
Start: 2018-12-27 | End: 2018-12-27 | Stop reason: HOSPADM

## 2018-12-26 RX ADMIN — OLMESARTAN MEDOXOMIL 20 MG: 20 TABLET, COATED ORAL at 10:55

## 2018-12-26 RX ADMIN — PREDNISONE 3 MG: 1 TABLET ORAL at 10:55

## 2018-12-26 RX ADMIN — HYDROCODONE BITARTRATE AND ACETAMINOPHEN 1 TABLET: 7.5; 325 TABLET ORAL at 06:44

## 2018-12-26 RX ADMIN — LEVOTHYROXINE SODIUM 50 MCG: 50 TABLET ORAL at 10:56

## 2018-12-26 RX ADMIN — AMLODIPINE BESYLATE 10 MG: 10 TABLET ORAL at 10:56

## 2018-12-26 RX ADMIN — STANDARDIZED SENNA CONCENTRATE AND DOCUSATE SODIUM 2 TABLET: 8.6; 5 TABLET, FILM COATED ORAL at 10:55

## 2018-12-26 RX ADMIN — METOPROLOL SUCCINATE 25 MG: 25 TABLET, EXTENDED RELEASE ORAL at 10:56

## 2018-12-26 RX ADMIN — CELECOXIB 200 MG: 200 CAPSULE ORAL at 10:55

## 2018-12-26 RX ADMIN — Medication 5 ML: at 20:32

## 2018-12-26 RX ADMIN — Medication 10 ML: at 12:18

## 2018-12-26 RX ADMIN — HYDROCODONE BITARTRATE AND ACETAMINOPHEN 0.5 TABLET: 7.5; 325 TABLET ORAL at 20:28

## 2018-12-26 RX ADMIN — VITAMIN D, TAB 1000IU (100/BT) 1000 UNITS: 25 TAB at 10:56

## 2018-12-26 RX ADMIN — Medication 1 AMPULE: at 20:27

## 2018-12-26 RX ADMIN — Medication 10 ML: at 05:14

## 2018-12-26 RX ADMIN — CELECOXIB 200 MG: 200 CAPSULE ORAL at 20:28

## 2018-12-26 RX ADMIN — QUETIAPINE FUMARATE 12.5 MG: 25 TABLET ORAL at 20:29

## 2018-12-26 RX ADMIN — ATORVASTATIN CALCIUM 10 MG: 10 TABLET, FILM COATED ORAL at 20:28

## 2018-12-26 RX ADMIN — ASPIRIN 81 MG 81 MG: 81 TABLET ORAL at 10:55

## 2018-12-26 RX ADMIN — Medication 1 AMPULE: at 10:56

## 2018-12-26 NOTE — PROGRESS NOTES
Problem: Mobility Impaired (Adult and Pediatric)  Goal: *Acute Goals and Plan of Care (Insert Text)  Short Term Goals:  1.  Ms. Jeevan Orellana will move from supine to sit and sit to supine in flat bed with CONTACT GUARD ASSIST within 3 day(s). 2.  Ms. Jeevan Orellana will transfer from sit to stand and stand to sit with STAND BY ASSIST  within 3 days. 3.  Ms. Jeevan Orellana will ambulate with STAND BY ASSIST for 150+ feet with the least restrictive device within 3 day(s). 4.  Ms. Jeevan Orellana will verbalize 3/3 hip precautions to ensure hip safety during functional activities within 3 days. ________________________________________________________________________      PHYSICAL THERAPY: Daily Note, Treatment Day: 2nd, PM 12/26/2018  INPATIENT: Hospital Day: 3  [place] / [place] / Product Type: Medicare /   Hip Precautions   WBAT L LE  Legally blind  NAME/AGE/GENDER: Saurabh Maza is a 80 y.o. female   PRIMARY DIAGNOSIS: Osteoarthritis of left hip, unspecified osteoarthritis type [M16.12] <principal problem not specified> <principal problem not specified>  Procedure(s) (LRB):  LEFT HIP HEMIARTHROPLASTY (Left)  2 Days Post-Op  ICD-10: Treatment Diagnosis:    · Other abnormalities of gait and mobility (R26.89)   Precaution/Allergies:  Calcium carbonate; Ranitidine hcl; and Guaifenesin      ASSESSMENT:     Ms. Jeevan Orellana presents standing up with PCT to go to the bathroom. Assisted pt with ambulating to [facility]. pt stood again and ambulated about 80' x 2 with rolling walker and mod assist.  Pt with decreased balance. pt does require verbal cues to keep L foot straight forward, she IR frequently. Pt also requires verbal and manual cues for walker negotiation, upright posture, and safety awareness due to being legally blind. Pt returned to bed and performed sit to supine with min/mod assist.  Pt is making progress towards goals with ambulation today. Will continue with POC.     This section established at most recent assessment   PROBLEM LIST (Impairments causing functional limitations):  1. Decreased Strength  2. Decreased ADL/Functional Activities  3. Decreased Transfer Abilities  4. Decreased Ambulation Ability/Technique  5. Decreased Balance  6. Decreased Activity Tolerance  7. Decreased Knowledge of Precautions  8. Decreased [place] with Home Exercise Program   INTERVENTIONS PLANNED: (Benefits and precautions of physical therapy have been discussed with the patient.)  1. Balance Exercise  2. Bed Mobility  3. Gait Training  4. Home Exercise Program (HEP)  5. Therapeutic Activites  6. Therapeutic Exercise/Strengthening  7. Transfer Training  8. education     TREATMENT PLAN: Frequency/Duration: daily to twice daily for duration of hospital stay  Rehabilitation Potential For Stated Goals: Excellent     RECOMMENDED REHABILITATION/EQUIPMENT: (at time of discharge pending progress): Due to the probability of continued deficits (see above) this patient will likely need continued skilled physical therapy after discharge. Equipment:    None at this time              HISTORY:   History of Present Injury/Illness (Reason for Referral):  Per MD note, \" The patient has a femoral head fracture which has collapsed. We have attempted conservative treatment which has failed. The patient was evaluated and examined in the office prior to today and was found to have a history and physical exam consistent with intra-articular pathology of the left hip. Patient complains of anterior groin pain predominately. Pain occurs during activity. Patient has difficulty putting on socks/shoes and has notable pain when getting up from a chair and getting out of a car. Patient does not complain of significant lateral hip pain or radicular pain down the leg.  There have been no changes to the patient's orthopedic condition since the last office visit\"  Past Medical History/Comorbidities:   Ms. Polo Vaca  has a past medical history of Acquired hypothyroidism, Anemia, Arthritis, Chronic pain, Fractured hip ([facility] [address] Gastroesophageal reflux disease, GERD (gastroesophageal reflux disease), Giant cell arteritis ([facility] [address] Hip fracture ([facility] [address] Hypertension, Legally blind, Macular degeneration, Memory deficit, NSTEMI (non-ST elevated myocardial infarction) ([facility] [address] Other ill-defined conditions(799.89), Other ill-defined conditions(799.89), TIA (transient ischemic attack), and Varicose veins of left lower extremity. Ms. Josesito Howell  has a past surgical history that includes hx endoscopy; pr cardiac surg procedure unlist; hx cholecystectomy; hx tonsillectomy; hx heent; LEFT HIP HEMIARTHROPLASTY (Left, 12/24/2018); and ESOPHAGOGASTRODUODENOSCOPY (EGD)/ 21 (N/A, 11/16/2018). Social History/Living Environment:   Home Environment: Private residence  # Steps to Enter: 1  One/Two Story Residence: One story  Living Alone: No  Support Systems: Child(yesi)  Patient Expects to be Discharged to[de-identified] Private residence  Current DME Used/Available at Home: [employer] [address] or Shower Type: Shower  Prior Level of Function/Work/Activity:  Lives at home with daughter. Was independent prior to hip fracture in August, has been using RW since that time and requiring assist with ADL's. Personal Factors:          Sex:  female        Age:  80 y.o.    Number of Personal Factors/Comorbidities that affect the Plan of Care: 3+: HIGH COMPLEXITY   EXAMINATION:   Most Recent Physical Functioning:   Gross Assessment:                  Posture:     Balance:  Sitting: Intact  Standing - Static: Fair  Standing - Dynamic : Fair Bed Mobility:  Supine to Sit: Moderate assistance  Scooting: Stand-by assistance;Contact guard assistance  Wheelchair Mobility:     Transfers:  Sit to Stand: Minimum assistance  Stand to Sit: Minimum assistance  Gait:  Left Side Weight Bearing: As tolerated  Base of Support: Center of gravity altered;Narrowed  Speed/Carla: Pace decreased (<100 feet/min)  Step Length: Left shortened;Right shortened  Gait Abnormalities: Decreased step clearance; Step to gait;Trunk sway increased(IR of B LE)  Distance (ft): 80 Feet (ft)(x 2)  Assistive Device: Walker, rolling  Ambulation - Level of Assistance: Moderate assistance      Body Structures Involved:  1. Eyes and Ears  2. Bones  3. Joints  4. Muscles  5. Ligaments Body Functions Affected:  1. Sensory/Pain  2. Movement Related  3. Skin Related Activities and Participation Affected:  1. Mobility  2. Self Care  3. Domestic Life  4. Community, Social and [place]   Number of elements that affect the Plan of Care: 4+: HIGH COMPLEXITY   CLINICAL PRESENTATION:   Presentation: Stable and uncomplicated: LOW COMPLEXITY   CLINICAL DECISION MAKIN [facility] Inpatient Short Form  How much difficulty does the patient currently have. .. Unable A Lot A Little None   1. Turning over in bed (including adjusting bedclothes, sheets and blankets)? [] 1   [x] 2   [] 3   [] 4   2. Sitting down on and standing up from a chair with arms ( e.g., wheelchair, bedside commode, etc.)   [] 1   [] 2   [x] 3   [] 4   3. Moving from lying on back to sitting on the side of the bed? [] 1   [x] 2   [] 3   [] 4   How much help from another person does the patient currently need. .. Total A Lot A Little None   4. Moving to and from a bed to a chair (including a wheelchair)? [] 1   [] 2   [x] 3   [] 4   5. Need to walk in hospital room? [] 1   [] 2   [x] 3   [] 4   6. Climbing 3-5 steps with a railing? [] 1   [] 2   [x] 3   [] 4   © , Trustees of [address] [place] [address] [address] [address], under license to [employer]. All rights reserved      Score:  Initial: 16 Most Recent: X (Date: -- )    Interpretation of Tool:  Represents activities that are increasingly more difficult (i.e. Bed mobility, Transfers, Gait). Score 24 23 22-20 19-15 14-10 9-7 6     Modifier CH CI CJ CK CL CM CN      ?  Mobility - Walking and Moving Around:     - CURRENT STATUS: CK - 40%-59% impaired, limited or restricted    - GOAL STATUS:  - 20%-39% impaired, limited or restricted    - D/C STATUS:  ---------------To be determined---------------  [place] / [place] / Product Type: Medicare /      Medical Necessity:     · Patient is expected to demonstrate progress in strength, range of motion, balance, coordination and functional technique to decrease assistance required with all functional mobility. Reason for Services/Other Comments:  · Patient continues to require skilled intervention due to decline in functional mobility. Use of outcome tool(s) and clinical judgement create a POC that gives a: Clear prediction of patient's progress: LOW COMPLEXITY            TREATMENT:      Pre-treatment Symptoms/Complaints:  Pt feeling better today. Pain: Initial: 3/10     Post Session:  3/10     Therapeutic Activity: (    23 minutes): Therapeutic activities including Bed transfers, Chair transfers and Ambulation on level ground to improve mobility, strength, balance, coordination and assistance with walker negotiation. Required minimal   to promote dynamic balance in standing and promote coordination of bilateral, lower extremity(s). Therapeutic Exercise: (  0 minutes):  Exercises per grid below to improve mobility, strength, balance and coordination. Required minimal visual, verbal and manual cues to promote proper body posture and promote proper body mechanics. Progressed range and repetitions as indicated.      Date:  12/26/18 Date:   Date:     ACTIVITY/EXERCISE AM PM AM PM AM PM   Ambulation:           Distance  Device  Duration         Seated Heel Raises X 10 B        Seated Toe Raises X 10 B        Seated Long Arc Quads X 10 B        Seated Marching X 10 B, AA-L        Seated Hip Abduction X 10 B, AA-L                 B = bilateral; AA = active assistive; A = active; P = passive        Braces/Orthotics/Lines/Etc:   · IV  · godfrey catheter  · O2 Device: Nasal cannula  Treatment/Session Assessment:    · Response to Treatment:  Pleasant and cooperative. · Interdisciplinary Collaboration:   o Physical Therapy Assistant  o Registered Nurse  o Certified Nursing Assistant/Patient Care Technician  · After treatment position/precautions:   o Supine in bed  o Bed alarm/tab alert on  o Bed/Chair-wheels locked  o Bed in low position  o Call light within reach  o RN notified  o Family at bedside   · Compliance with Program/Exercises: Will assess as treatment progresses  · Recommendations/Intent for next treatment session: \"Next visit will focus on advancements to more challenging activities and reduction in assistance provided\".   Total Treatment Duration:  PT Patient Time In/Time Out  Time In: 1337  Time Out: [address], PTA

## 2018-12-26 NOTE — PROGRESS NOTES
Problem: Falls - Risk of  Goal: *Absence of Falls  Document Kristie Fall Risk and appropriate interventions in the flowsheet.   Outcome: Progressing Towards Goal  Fall Risk Interventions:  Mobility Interventions: Bed/chair exit alarm, Patient to call before getting OOB    Mentation Interventions: Bed/chair exit alarm, Adequate sleep, hydration, pain control    Medication Interventions: Bed/chair exit alarm, Patient to call before getting OOB, Teach patient to arise slowly    Elimination Interventions: Bed/chair exit alarm, Call light in reach, Patient to call for help with toileting needs    History of Falls Interventions: Bed/chair exit alarm

## 2018-12-26 NOTE — PROGRESS NOTES
Problem: Self Care Deficits Care Plan (Adult)  Goal: *Acute Goals and Plan of Care (Insert Text)  1. Patient will verbalize and demonstrate understanding of hip precautions with 100% accuracy during ADL. 2. Patient will complete functional transfers with modified independence and adaptive equipment as needed. PROGRESSING 12/26/2018   3. Patient will complete lower body bathing and dressing with minimal assistance and adaptive equipment as needed. PROGRESSING 12/26/2018   4. Patient will complete toileting with modified independence. 5. Patient will tolerate BUE therapeutic exercises to increase strength in BUE to [facility] to aid in functional transfers. 6. Patient will tolerate at least 10 minutes of OT treatment with no rest breaks to increase activity tolerance for ADLs. PROGRESSING 12/26/2018     Timeframe: 7 visits       OCCUPATIONAL THERAPY: Initial Assessment and PM 12/26/2018  INPATIENT: Hospital Day: 3  [place] / [place] / Product Type: Medicare /      NAME/AGE/GENDER: Amanda Torres is a 80 y.o. female   PRIMARY DIAGNOSIS:  Osteoarthritis of left hip, unspecified osteoarthritis type [M16.12] <principal problem not specified> <principal problem not specified>  Procedure(s) (LRB):  LEFT HIP HEMIARTHROPLASTY (Left)  2 Days Post-Op  ICD-10: Treatment Diagnosis:    · Pain in left hip (M25.552)  · Stiffness of Left Hip, Not elsewhere classified (M25.652)  · Generalized Muscle Weakness (M62.81)  · Other lack of cordination (R27.8)   Precautions/Allergies:    Fall precautions  Hip Precautions  LLE WBAT   Calcium carbonate; Ranitidine hcl; and Guaifenesin      ASSESSMENT:   Per initial evaluation:  Ms. Josesito Howell is a 80 y.o. female admitted with the above. Pt now s/p L hip hemiarthroplasty and WBAT in LLE with L hip precautions.  Pt lives with her daughter and reports independence with most ADLs at baseline, but that she has been needing assistance to weave legs into pants and to step into the shower since she broke her hip in August. Pt found supine in bed, drowsy but alert and agreeable to OT evaluation upon arrival. Pt educated on hip precautions. Pt is [place] and legally blind due to macular degeneration. BUE assessment revealed AROM WFL, strength generally decreased bilaterally. 12/26/2018 : Pt found supine in bed, pleasantly confused, agreeable to OT treatment. O2 Sats 91% on 2L NC. Pt practiced bed mobility with ModA and STS with CGA. Pt practiced functional mobility to sink with CGA and verbal cueing for RW management. Pt practiced grooming in standing at sink with Ashish to put toothpaste on toothbrush due to confusion and poor vision. Pt demonstrated fatigue. O2 Sats 91% on 2L NC. Pt practiced UB bathing and dressing in sitting with Ashish to button shirt. Pt practiced LB bathing/dressing in sitting with ModA for lower legs. Pt required mod cueing for adaptive technique using long handled sponge and TA for adaptive technique to doff socks with reacher. Pt required max cueing to maintain hip precautions throughout. Pt left seated up in chair with call bell within reach and daughter at bedside. Pt made good progress towards goals today. Hernandez Dsouza is stillfunctioning below baseline for ADLs due to general weakness, increased pain in L hip, confusion, and new hip precautions, and would benefit from continued OT services to increase safety and independence. Will continue to follow. Continue OT POC. This section established at most recent assessment   PROBLEM LIST (Impairments causing functional limitations):  1. Decreased Strength  2. Decreased ADL/Functional Activities  3. Decreased Transfer Abilities  4. Decreased Ambulation Ability/Technique  5. Decreased Balance  6. Increased Pain  7. Decreased Activity Tolerance  8. Increased Fatigue  9. Decreased Flexibility/Joint Mobility  10. Decreased Knowledge of Precautions   11. Decreased Vision  12.  Decreased Hearing INTERVENTIONS PLANNED: (Benefits and precautions of occupational therapy have been discussed with the patient.)  1. Activities of daily living training  2. Adaptive equipment training  3. Balance training  4. Clothing management  5. Donning&doffing training  6. Hygiene training  7. Therapeutic activity  8. Therapeutic exercise     TREATMENT PLAN: Frequency/Duration: Follow patient 6x/week to address above goals. Rehabilitation Potential For Stated Goals: Good     RECOMMENDED REHABILITATION/EQUIPMENT: (at time of discharge pending progress): Due to the probability of continued deficits (see above) this patient will likely need continued skilled occupational therapy after discharge. Equipment:    TBD              OCCUPATIONAL PROFILE AND HISTORY:   History of Present Injury/Illness (Reason for Referral):  See H&P. Past Medical History/Comorbidities:   Ms. Eri Leija  has a past medical history of Acquired hypothyroidism, Anemia, Arthritis, Chronic pain, Fractured hip ([address] Gastroesophageal reflux disease, GERD (gastroesophageal reflux disease), Giant cell arteritis ([address] Hip fracture ([address] Hypertension, Legally blind, Macular degeneration, Memory deficit, NSTEMI (non-ST elevated myocardial infarction) ([address] Other ill-defined conditions(799.89), Other ill-defined conditions(799.89), TIA (transient ischemic attack), and Varicose veins of left lower extremity. Ms. Eri Leija  has a past surgical history that includes hx endoscopy; pr cardiac surg procedure unlist; hx cholecystectomy; hx tonsillectomy; hx heent; LEFT HIP HEMIARTHROPLASTY (Left, 12/24/2018); and ESOPHAGOGASTRODUODENOSCOPY (EGD)/ 21 (N/A, 11/16/2018).   Social History/Living Environment:   Home Environment: Private residence  # Steps to Enter: 1  One/Two Story Residence: One story  Living Alone: No  Support Systems: Child(yesi)  Patient Expects to be Discharged to[de-identified] Private residence  Current DME Used/Available at Home: [facility] [employer] or Shower Type: Shower  Prior Level of Function/Work/Activity:  Pt lives with her daughter and reports independence with most ADLs at baseline, but that she has been needing assistance to weave legs into pants and to step into the shower since she broke her hip in August.   Dominant Side:         RIGHT  Personal Factors:          Sex:  female        Age:  80 y.o. Number of Personal Factors/Comorbidities that affect the Plan of Care: Expanded review of therapy/medical records (1-2):  MODERATE COMPLEXITY   ASSESSMENT OF OCCUPATIONAL PERFORMANCE[de-identified]   Activities of Daily Living:   Basic ADLs (From Assessment) Complex ADLs (From Assessment)   Feeding: Independent  Oral Facial Hygiene/Grooming: Setup  Bathing: Moderate assistance  Upper Body Dressing: Setup  Lower Body Dressing: Total assistance  Toileting: Maximum assistance Instrumental ADL  Meal Preparation: Total assistance  Homemaking: Total assistance   Grooming/Bathing/Dressing Activities of Daily Living   Grooming  Washing Face: Stand-by assistance  Washing Hands: Stand-by assistance  Brushing Teeth: Minimum assistance Cognitive Retraining  Safety/Judgement: Decreased awareness of environment;Decreased insight into deficits; Fall prevention   Upper Body Bathing  Bathing Assistance: Stand-by assistance  Position Performed: Seated in chair  Cues: Verbal cues provided; Tactile cues provided     Lower Body Bathing  Bathing Assistance: Moderate assistance  Perineal  : Stand-by assistance  Position Performed: Seated in chair  Cues: Physical assistance pants up; Tactile cues provided;Verbal cues provided  Lower Body : Moderate assistance  Position Performed: Seated in chair  Cues: Verbal cues provided; Tactile cues provided;Physical assistance  Adaptive Equipment: Long handled sponge;Walker;Reacher     Upper Body Dressing Assistance  Dressing Assistance: Minimum assistance  Front Opened Shirt: Minimum assistance     Lower Body Dressing Assistance  Underpants: Moderate assistance  Pants With Elastic Waist: Moderate assistance  Socks: Total assistance (dependent) Bed/Mat Mobility  Supine to Sit: Moderate assistance  Sit to Stand: Contact guard assistance  Bed to Chair: Contact guard assistance  Scooting: Stand-by assistance       Most Recent Physical Functioning:   Gross Assessment:  AROM: Within functional limits  Strength: Generally decreased, functional  Coordination: Generally decreased, functional               Posture:  Posture (WDL): Exceptions to WDL  Posture Assessment: Forward head, Kyphosis, Rounded shoulders  Balance:  Sitting: Intact  Standing: Impaired  Standing - Static: Fair  Standing - Dynamic : Fair Bed Mobility:  Supine to Sit: Moderate assistance  Scooting: Stand-by assistance  Wheelchair Mobility:     Transfers:  Sit to Stand: Contact guard assistance  Stand to Sit: Contact guard assistance  Bed to Chair: Contact guard assistance            Patient Vitals for the past 6 hrs:   BP BP Patient Position SpO2 O2 Flow Rate (L/min) Pulse   18 1146 130/49 At rest 96 % 3 l/min 62       Mental Status  Neurologic State: Alert, Confused  Orientation Level: Oriented to person  Cognition: Decreased attention/concentration, Decreased command following, Memory loss  Perseveration: No perseveration noted  Safety/Judgement: Decreased awareness of environment, Decreased insight into deficits, Fall prevention                          Physical Skills Involved:  1. Range of Motion  2. Balance  3. Strength  4. Activity Tolerance  5. Vision  6. Pain (acute)   7. Hearing Cognitive Skills Affected (resulting in the inability to perform in a timely and safe manner):  1. None Psychosocial Skills Affected:  1. Habits/Routines  2. Environmental Adaptation  3. Self-Awareness  4.  Social Roles   Number of elements that affect the Plan of Care: 5+:  HIGH COMPLEXITY   CLINICAL DECISION MAKIN [facility] [address] [address] AM-PAC 6 Clicks   Daily Activity Inpatient Short Form  How much help from another person does the patient currently need. .. Total A Lot A Little None   1. Putting on and taking off regular lower body clothing? [x] 1   [] 2   [] 3   [] 4   2. Bathing (including washing, rinsing, drying)? [] 1   [x] 2   [] 3   [] 4   3. Toileting, which includes using toilet, bedpan or urinal?   [] 1   [x] 2   [] 3   [] 4   4. Putting on and taking off regular upper body clothing? [] 1   [] 2   [x] 3   [] 4   5. Taking care of personal grooming such as brushing teeth? [] 1   [] 2   [x] 3   [] 4   6. Eating meals? [] 1   [] 2   [] 3   [x] 4   © 2007, Trustees of [facility] [employer], under license to [employer]. All rights reserved      Score:  Initial: 15  12/26/2018  Most Recent: X (Date: -- )    Interpretation of Tool:  Represents activities that are increasingly more difficult (i.e. Bed mobility, Transfers, Gait). Score 24 23 22-20 19-15 14-10 9-7 6     Modifier CH CI CJ CK CL CM CN      ? Self Care:     - CURRENT STATUS: CK - 40%-59% impaired, limited or restricted    - GOAL STATUS: CJ - 20%-39% impaired, limited or restricted    - D/C STATUS:  ---------------To be determined---------------  [place] / Product Type: Medicare /      Medical Necessity:     · Patient demonstrates good rehab potential due to higher previous functional level. Reason for Services/Other Comments:  · Patient continues to require skilled intervention due to inability to complete ADLs at prior level of independence.    Use of outcome tool(s) and clinical judgement create a POC that gives a: MODERATE COMPLEXITY         TREATMENT:   (In addition to Assessment/Re-Assessment sessions the following treatments were rendered)     Pre-treatment Symptoms/Complaints:    Pain: Initial:   Pain Intensity 1: 0  Post Session:  same     Self Care: (33 mintues): Procedure(s) (per grid) utilized to improve and/or restore self-care/home management as related to dressing, bathing and grooming. Required minimal verbal, manual and tactile cueing to facilitate activities of daily living skills and compensatory activities. Pt practiced UB bathing and dressing in sitting with Ashish to button shirt. Pt practiced LB bathing/dressing in sitting with ModA for lower legs. Pt required mod cueing for adaptive technique using long handled sponge and TA for adaptive technique to doff socks with reacher. Pt required max cueing to maintain hip precautions throughout. Therapeutic Activity: (    33 minutes): Therapeutic activities including Bed transfers, Chair transfers, Ambulation on level ground and grooming in standing to improve mobility, strength, balance, coordination and activity tolerance. Required minimal   to promote dynamic balance in standing. Pt practiced bed mobility with ModA and STS with CGA. Pt practiced functional mobility to sink with CGA and verbal cueing for RW management. Pt practiced grooming in standing at sink with Ashish to put toothpaste on toothbrush due to confusion and poor vision. Pt demonstrated fatigue. O2 Sats 91% on 2L NC. Braces/Orthotics/Lines/Etc:   · O2 Device: Nasal cannula  Treatment/Session Assessment:    Response to Treatment:  Good participation. Tolerated well  · Interdisciplinary Collaboration:   o Occupational Therapist  o Certified Nursing Assistant/Patient Care Technician  · After treatment position/precautions:   o Bed/Chair-wheels locked  o Bed in low position  o Call light within reach  o Family at bedside   · Compliance with Program/Exercises: Compliant all of the time. · Recommendations/Intent for next treatment session: \"Next visit will focus on advancements to more challenging activities and reduction in assistance provided\".   Total Treatment Duration:  OT Patient Time In/Time Out  Time In: 1424  Time Out: [address], OTR/L

## 2018-12-26 NOTE — PROGRESS NOTES
2018         Post Op day: 2 Days Post-Op     Admit Date: 2018  Admit Diagnosis: Osteoarthritis of left hip, unspecified osteoarthritis type [M16.12]        Subjective: Patient stable. No acute events. Objective:   Visit Vitals  /79 (BP 1 Location: Right arm, BP Patient Position: At rest)   Pulse 62   Temp 97.7 °F (36.5 °C)   Resp 17   Wt 47.5 kg (104 lb 11.2 oz)   SpO2 92%   Breastfeeding? No   BMI 21.15 kg/m²    Temp (24hrs), Av.2 °F (36.8 °C), Min:97.7 °F (36.5 °C), Max:98.6 °F (37 °C)    Lab Results   Component Value Date/Time    HGB 9.5 (L) 2018 05:01 AM     Extremity Exam  Dressing clean and dry   Tibialis Anterior and Gastroc-Soleus functioning normally left lower extremity  Sensation intact to light touch on operative limb  Extremity perfused  TEDS/SCDS in place  No sign of DVT     Assessment / Plan :  WBAT LLE  Posterior hip precautions  Continue PT/OT  Continue current DVT prophylaxis in house. Discharge on ASA BID  DIspo-SNF  Patient Active Problem List   Diagnosis Code    Syncope R55    Bradycardia R00.1    Giant cell arteritis ([facility] M31.6    Hypertension I10    Abnormal EKG R94.31    Back pain M54.9    TIA (transient ischemic attack) G45.9    Hyperlipidemia LDL goal <100 E78.5    Diarrhea R19.7    Gastroesophageal reflux disease K21.9    Insulin resistance E88.81    Acquired hypothyroidism E03.9    Chest pain R07.9    Anemia D64.9    NSTEMI (non-ST elevated myocardial infarction) ([facility] I21.4    Delirium R41.0    Acute cystitis without hematuria N30.00    IFG (impaired fasting glucose) R73.01    Hip pain M25.559          Signed By: Almas Argueta MD     I have reviewed the patients controlled substance prescription history, as maintained in the [place] prescription monitoring program, so that the prescription(s) for a  controlled substance can be given.

## 2018-12-26 NOTE — PROGRESS NOTES
Problem: Mobility Impaired (Adult and Pediatric)  Goal: *Acute Goals and Plan of Care (Insert Text)  Short Term Goals:  1.  Ms. Amanda Bales will move from supine to sit and sit to supine in flat bed with CONTACT GUARD ASSIST within 3 day(s). 2.  Ms. Amanda Bales will transfer from sit to stand and stand to sit with STAND BY ASSIST  within 3 days. 3.  Ms. Amanda Bales will ambulate with STAND BY ASSIST for 150+ feet with the least restrictive device within 3 day(s). 4.  Ms. Amanda Bales will verbalize 3/3 hip precautions to ensure hip safety during functional activities within 3 days. ________________________________________________________________________      PHYSICAL THERAPY: Daily Note, Treatment Day: 2nd, AM 12/26/2018  INPATIENT: Hospital Day: 3  [place] / Product Type: Medicare /   Hip Precautions   WBAT L LE  Legally blind  NAME/AGE/GENDER: Hernandez Dsouza is a 80 y.o. female   PRIMARY DIAGNOSIS: Osteoarthritis of left hip, unspecified osteoarthritis type [M16.12] <principal problem not specified> <principal problem not specified>  Procedure(s) (LRB):  LEFT HIP HEMIARTHROPLASTY (Left)  2 Days Post-Op  ICD-10: Treatment Diagnosis:    · Other abnormalities of gait and mobility (R26.89)   Precaution/Allergies:  Calcium carbonate; Ranitidine hcl; and Guaifenesin      ASSESSMENT:     Ms. Amanda Bales presents supine in bed with daughter and son at bedside. Pt performed supine to sit with min/mod assist.  Pt then stood and ambulated about [de-identified]' with rolling walker and mod assist.  Pt does require frequent cues to keep L foot straight forward, she IR frequently. Pt also requires verbal and manual cues for walker negotiation and safety awareness due to being legally blind. Pt returned to chair in room and performed below LE exercises. Pt following commands better today but still requires increased verbal and manual cues to complete tasks correctly.   Pt was left up in chair with family attending. Pt is making good progress towards goals with ambulation today. Will continue with POC. This section established at most recent assessment   PROBLEM LIST (Impairments causing functional limitations):  1. Decreased Strength  2. Decreased ADL/Functional Activities  3. Decreased Transfer Abilities  4. Decreased Ambulation Ability/Technique  5. Decreased Balance  6. Decreased Activity Tolerance  7. Decreased Knowledge of Precautions  8. Decreased House with Home Exercise Program   INTERVENTIONS PLANNED: (Benefits and precautions of physical therapy have been discussed with the patient.)  1. Balance Exercise  2. Bed Mobility  3. Gait Training  4. Home Exercise Program (HEP)  5. Therapeutic Activites  6. Therapeutic Exercise/Strengthening  7. Transfer Training  8. education     TREATMENT PLAN: Frequency/Duration: daily to twice daily for duration of hospital stay  Rehabilitation Potential For Stated Goals: Excellent     RECOMMENDED REHABILITATION/EQUIPMENT: (at time of discharge pending progress): Due to the probability of continued deficits (see above) this patient will likely need continued skilled physical therapy after discharge. Equipment:    None at this time              HISTORY:   History of Present Injury/Illness (Reason for Referral):  Per MD note, \" The patient has a femoral head fracture which has collapsed. We have attempted conservative treatment which has failed. The patient was evaluated and examined in the office prior to today and was found to have a history and physical exam consistent with intra-articular pathology of the left hip. Patient complains of anterior groin pain predominately. Pain occurs during activity. Patient has difficulty putting on socks/shoes and has notable pain when getting up from a chair and getting out of a car. Patient does not complain of significant lateral hip pain or radicular pain down the leg.  There have been no changes to the patient's orthopedic condition since the last office visit\"  Past Medical History/Comorbidities:   Ms. Lay Cai  has a past medical history of Acquired hypothyroidism, Anemia, Arthritis, Chronic pain, Fractured hip ([facility] [address] Gastroesophageal reflux disease, GERD (gastroesophageal reflux disease), Giant cell arteritis ([facility] [address] Hip fracture ([facility] [address] Hypertension, Legally blind, Macular degeneration, Memory deficit, NSTEMI (non-ST elevated myocardial infarction) ([facility] [address] Other ill-defined conditions(799.89), Other ill-defined conditions(799.89), TIA (transient ischemic attack), and Varicose veins of left lower extremity. Ms. Lay Cai  has a past surgical history that includes hx endoscopy; pr cardiac surg procedure unlist; hx cholecystectomy; hx tonsillectomy; hx heent; and ESOPHAGOGASTRODUODENOSCOPY (EGD)/ 21 (N/A, 11/16/2018). Social History/Living Environment:   Home Environment: Private residence  # Steps to Enter: 1  One/Two Story Residence: One story  Living Alone: No  Support Systems: Child(yesi)  Patient Expects to be Discharged to[de-identified] Private residence  Current DME Used/Available at Home: [address] or Shower Type: Shower  Prior Level of Function/Work/Activity:  Lives at home with daughter. Was independent prior to hip fracture in August, has been using RW since that time and requiring assist with ADL's. Personal Factors:          Sex:  female        Age:  80 y.o.    Number of Personal Factors/Comorbidities that affect the Plan of Care: 3+: HIGH COMPLEXITY   EXAMINATION:   Most Recent Physical Functioning:   Gross Assessment:                  Posture:     Balance:  Sitting: Intact  Standing - Static: Fair  Standing - Dynamic : Fair Bed Mobility:  Supine to Sit: Moderate assistance  Scooting: Stand-by assistance;Contact guard assistance  Wheelchair Mobility:     Transfers:  Sit to Stand: Minimum assistance  Stand to Sit: Minimum assistance  Gait:  Left Side Weight Bearing: As tolerated  Base of Support: Center of gravity altered;Narrowed  Speed/Carla: Pace decreased (<100 feet/min)  Step Length: Left shortened;Right shortened  Gait Abnormalities: Decreased step clearance; Step to gait;Trunk sway increased(IR of B LE)  Distance (ft): 80 Feet (ft)  Assistive Device: Walker, rolling  Ambulation - Level of Assistance: Moderate assistance      Body Structures Involved:  1. Eyes and Ears  2. Bones  3. Joints  4. Muscles  5. Ligaments Body Functions Affected:  1. Sensory/Pain  2. Movement Related  3. Skin Related Activities and Participation Affected:  1. Mobility  2. Self Care  3. Domestic Life  4. Community, Social and [place]   Number of elements that affect the Plan of Care: 4+: HIGH COMPLEXITY   CLINICAL PRESENTATION:   Presentation: Stable and uncomplicated: LOW COMPLEXITY   CLINICAL DECISION MAKIN [facility] Inpatient Short Form  How much difficulty does the patient currently have. .. Unable A Lot A Little None   1. Turning over in bed (including adjusting bedclothes, sheets and blankets)? [] 1   [x] 2   [] 3   [] 4   2. Sitting down on and standing up from a chair with arms ( e.g., wheelchair, bedside commode, etc.)   [] 1   [] 2   [x] 3   [] 4   3. Moving from lying on back to sitting on the side of the bed? [] 1   [x] 2   [] 3   [] 4   How much help from another person does the patient currently need. .. Total A Lot A Little None   4. Moving to and from a bed to a chair (including a wheelchair)? [] 1   [] 2   [x] 3   [] 4   5. Need to walk in hospital room? [] 1   [] 2   [x] 3   [] 4   6. Climbing 3-5 steps with a railing? [] 1   [] 2   [x] 3   [] 4   © , Trustees of [address] [place] [address] [address] [id number], under license to [employer]. All rights reserved      Score:  Initial: 16 Most Recent: X (Date: -- )    Interpretation of Tool:  Represents activities that are increasingly more difficult (i.e. Bed mobility, Transfers, Gait).    Score 24 23 22-20 19-15 14-10 9-7 6     Modifier CH CI CJ CK CL CM CN      ? Mobility - Walking and Moving Around:     - CURRENT STATUS: CK - 40%-59% impaired, limited or restricted    - GOAL STATUS: CJ - 20%-39% impaired, limited or restricted    - D/C STATUS:  ---------------To be determined---------------  [place] / Product Type: Medicare /      Medical Necessity:     · Patient is expected to demonstrate progress in strength, range of motion, balance, coordination and functional technique to decrease assistance required with all functional mobility. Reason for Services/Other Comments:  · Patient continues to require skilled intervention due to decline in functional mobility. Use of outcome tool(s) and clinical judgement create a POC that gives a: Clear prediction of patient's progress: LOW COMPLEXITY            TREATMENT:      Pre-treatment Symptoms/Complaints:  Pt feeling better today. Pain: Initial: 3/10     Post Session:  3/10     Therapeutic Activity: (    15 minutes): Therapeutic activities including Bed transfers, Chair transfers and Ambulation on level ground to improve mobility, strength, balance, coordination and assistance with walker negotiation. Required minimal   to promote dynamic balance in standing and promote coordination of bilateral, lower extremity(s). Therapeutic Exercise: (  9 minutes):  Exercises per grid below to improve mobility, strength, balance and coordination. Required minimal visual, verbal and manual cues to promote proper body posture and promote proper body mechanics. Progressed range and repetitions as indicated.      Date:  12/26/18 Date:   Date:     ACTIVITY/EXERCISE AM PM AM PM AM PM   Ambulation:           Distance  Device  Duration         Seated Heel Raises X 10 B        Seated Toe Raises X 10 B        Seated Long Arc Quads X 10 B        Seated Marching X 10 B, AA-L        Seated Hip Abduction X 10 B, AA-L                 B = bilateral; AA = active assistive; A = active; P = passive        Braces/Orthotics/Lines/Etc:   · IV  · godfrey catheter  · O2 Device: Nasal cannula  Treatment/Session Assessment:  Pt tolerated transfer well but noted feeling fatigued and having soreness in B LEs. · Response to Treatment:  Pleasant and cooperative. · Interdisciplinary Collaboration:   o Physical Therapy Assistant  o Registered Nurse  o Certified Nursing Assistant/Patient Care Technician  · After treatment position/precautions:   o Up in chair  o Bed alarm/tab alert on  o Bed/Chair-wheels locked  o Call light within reach  o RN notified  o Family at bedside   · Compliance with Program/Exercises: Will assess as treatment progresses  · Recommendations/Intent for next treatment session: \"Next visit will focus on advancements to more challenging activities and reduction in assistance provided\".   Total Treatment Duration:  PT Patient Time In/Time Out  Time In: 0932  Time Out: [address] [facility]

## 2018-12-26 NOTE — PROGRESS NOTES
Accepted at [facility] for STR. Patient is agreeable to facility. Once medically cleared will proceed with transport.

## 2018-12-26 NOTE — PROGRESS NOTES
Hospitalist Progress Note    Subjective:   Daily Progress Note: 12/26/2018 2:30 PM    Mr. Lay Cai is a 81 yo WF, with a pmh of HTN and macular degeneration, who presented 12/24 after a fall where she sustained a left hip fracture for which she is post op fixation with orthopedic surgery. Course complicating by acute delirium. She has become delirious when previously hospitalized per daughter. seroquel 12.5 mg qhs started last night. She is in her bedside chair and requires frequent reorientation but overall can mostly hold a conversation with me. She denies nausea, uncontrolled pain, sob, chest pain. Daughter at bedside.     Current Facility-Administered Medications   Medication Dose Route Frequency    HYDROcodone-acetaminophen (NORCO) 7.5-325 mg per tablet 0.5 Tab  0.5 Tab Oral Q4H PRN    [START ON 12/27/2018] olmesartan (BENICAR) tablet 40 mg  40 mg Oral DAILY    QUEtiapine (SEROquel) tablet 12.5 mg  12.5 mg Oral QHS    amLODIPine (NORVASC) tablet 10 mg  10 mg Oral DAILY    sodium chloride (NS) flush 5-10 mL  5-10 mL IntraVENous Q8H    sodium chloride (NS) flush 5-10 mL  5-10 mL IntraVENous PRN    sodium chloride (NS) flush 5-10 mL  5-10 mL IntraVENous PRN    atorvastatin (LIPITOR) tablet 10 mg  10 mg Oral QHS    cholecalciferol (VITAMIN D3) tablet 1,000 Units  1,000 Units Oral DAILY    levothyroxine (SYNTHROID) tablet 25 mcg  25 mcg Oral EVERY OTHER DAY    levothyroxine (SYNTHROID) tablet 50 mcg  50 mcg Oral EVERY OTHER DAY    metoprolol succinate (TOPROL-XL) XL tablet 25 mg  25 mg Oral DAILY    predniSONE (DELTASONE) tablet 3 mg  3 mg Oral DAILY WITH BREAKFAST    alcohol 62% (NOZIN) nasal  1 Ampule  1 Ampule Topical Q12H    sodium chloride (NS) flush 5-10 mL  5-10 mL IntraVENous PRN    celecoxib (CELEBREX) capsule 200 mg  200 mg Oral Q12H    naloxone (NARCAN) injection 0.2-0.4 mg  0.2-0.4 mg IntraVENous Q10MIN PRN    diphenhydrAMINE (BENADRYL) capsule 25 mg  25 mg Oral Q4H PRN    morphine 10 mg/ml injection 6 mg  6 mg IntraVENous Q3H PRN    senna-docusate (PERICOLACE) 8.6-50 mg per tablet 2 Tab  2 Tab Oral DAILY    ondansetron (ZOFRAN ODT) tablet 8 mg  8 mg Oral Q8H PRN    aspirin chewable tablet 81 mg  81 mg Oral BID    hydrALAZINE (APRESOLINE) 20 mg/mL injection 10 mg  10 mg IntraVENous Q6H PRN        Review of Systems  A comprehensive review of systems was negative except for that written in the HPI. Objective:     Visit Vitals  /49 (BP 1 Location: Right arm, BP Patient Position: At rest)   Pulse 62   Temp 98.4 °F (36.9 °C)   Resp 17   Wt 32.7 kg (72 lb 3.2 oz)   SpO2 96%   Breastfeeding? No   BMI 14.58 kg/m²    O2 Flow Rate (L/min): 3 l/min O2 Device: Nasal cannula    Temp (24hrs), Av.2 °F (36.8 °C), Min:97.7 °F (36.5 °C), Max:98.4 °F (36.9 °C)      701 - 1900  In: 100 [P.O.:100]  Out: -   1901 -  0700  In: -   Out: 900 [Urine:900]    General: awake, alert, oriented to person and place but not time  Eyes; non icteric, EOMI  Neck; supple  CV: RRR  PULM: non labored, no accessory muscle use    Additional comments:I reviewed the patient's new clinical lab test results. j    Data Review    Recent Results (from the past 24 hour(s))   CBC WITH AUTOMATED DIFF    Collection Time: 18  5:01 AM   Result Value Ref Range    WBC 13.9 (H) 4.3 - 11.1 K/uL    RBC 3.59 (L) 4.05 - 5.2 M/uL    HGB 9.5 (L) 11.7 - 15.4 g/dL    HCT 32.1 (L) 35.8 - 46.3 %    MCV 89.4 79.6 - 97.8 FL    MCH 26.5 26.1 - 32.9 PG    MCHC 29.6 (L) 31.4 - 35.0 g/dL    RDW 17.2 (H) 11.9 - 14.6 %    PLATELET 873 351 - 628 K/uL    MPV 12.0 9.4 - 12.3 FL    ABSOLUTE NRBC 0.00 0.0 - 0.2 K/uL    DF AUTOMATED      NEUTROPHILS 87 (H) 43 - 78 %    LYMPHOCYTES 5 (L) 13 - 44 %    MONOCYTES 8 4.0 - 12.0 %    EOSINOPHILS 0 (L) 0.5 - 7.8 %    BASOPHILS 0 0.0 - 2.0 %    IMMATURE GRANULOCYTES 0 0.0 - 5.0 %    ABS. NEUTROPHILS 12.0 (H) 1.7 - 8.2 K/UL    ABS. LYMPHOCYTES 0.7 0.5 - 4.6 K/UL    ABS. MONOCYTES 1.1 0.1 - 1.3 K/UL    ABS. EOSINOPHILS 0.0 0.0 - 0.8 K/UL    ABS. BASOPHILS 0.0 0.0 - 0.2 K/UL    ABS. IMM. GRANS. 0.1 0.0 - 0.5 K/UL         Assessment/Plan:     Active Problems:    Hypertension (3/20/2010)      Hyperlipidemia LDL goal <100 (12/9/2016)      Gastroesophageal reflux disease (12/9/2016)      Acquired hypothyroidism (2/15/2017)      Hip pain (12/24/2018)    no signs of infection at this time. Continue seroquel 12.5 mg qhs for her delirium. Will increase benicar from 20 to 40 mg daily for more optimal bp control. PT/OT.     Care Plan discussed with: Patient/Family      Signed By: Isabel Sanford MD     December 26, 2018

## 2018-12-26 NOTE — PROGRESS NOTES
Pt admitted to 7th floor for osteoarthritis of L hip. CM met with pt & daughter to discuss CM needs & DCP. Pt is A&Ox4. Pt is partially dependent at home with all ADLS. Pt lives with daughter. Pt has walker;no further DME needs. Pt has no difficulty with obtaining medications. Pt relies on daughter for transport. Pt is legally blind. Pt is agreeable to rehab, requested [facility] as 1st choice & [address] 2nd. Referral sent. PT/OT following. PPD placed. DCP STR. CM to continue to monitor for dc to rehab once medically ready. Care Management Interventions  PCP Verified by CM: Yes  Last Visit to PCP: 11/14/18  Mode of Transport at Discharge:  Other (see comment)(Daughter Yajaira Barrios [phone])  Transition of Care Consult (CM Consult): Discharge Planning, SNF  Discharge Durable Medical Equipment: No  Physical Therapy Consult: Yes  Occupational Therapy Consult: Yes  Current Support Network: Own Home, Lives with Caregiver  Confirm Follow Up Transport: Family  Plan discussed with Pt/Family/Caregiver: Yes  Freedom of Choice Offered: Yes  Discharge Location  Discharge Placement: Rehab hospital/unit acute

## 2018-12-27 VITALS
TEMPERATURE: 98.5 F | OXYGEN SATURATION: 98 % | SYSTOLIC BLOOD PRESSURE: 133 MMHG | RESPIRATION RATE: 18 BRPM | DIASTOLIC BLOOD PRESSURE: 71 MMHG | BODY MASS INDEX: 21.25 KG/M2 | WEIGHT: 105.2 LBS | HEART RATE: 72 BPM

## 2018-12-27 LAB
ANION GAP SERPL CALC-SCNC: 7 MMOL/L (ref 7–16)
BASOPHILS # BLD: 0 K/UL (ref 0–0.2)
BASOPHILS NFR BLD: 0 % (ref 0–2)
BUN SERPL-MCNC: 21 MG/DL (ref 8–23)
CALCIUM SERPL-MCNC: 8.7 MG/DL (ref 8.3–10.4)
CHLORIDE SERPL-SCNC: 108 MMOL/L (ref 98–107)
CO2 SERPL-SCNC: 25 MMOL/L (ref 21–32)
CREAT SERPL-MCNC: 0.58 MG/DL (ref 0.6–1)
DIFFERENTIAL METHOD BLD: ABNORMAL
EOSINOPHIL # BLD: 0.3 K/UL (ref 0–0.8)
EOSINOPHIL NFR BLD: 3 % (ref 0.5–7.8)
ERYTHROCYTE [DISTWIDTH] IN BLOOD BY AUTOMATED COUNT: 17.2 % (ref 11.9–14.6)
GLUCOSE SERPL-MCNC: 80 MG/DL (ref 65–100)
HCT VFR BLD AUTO: 28.7 % (ref 35.8–46.3)
HGB BLD-MCNC: 8.9 G/DL (ref 11.7–15.4)
IMM GRANULOCYTES # BLD: 0.1 K/UL (ref 0–0.5)
IMM GRANULOCYTES NFR BLD AUTO: 1 % (ref 0–5)
LYMPHOCYTES # BLD: 1.4 K/UL (ref 0.5–4.6)
LYMPHOCYTES NFR BLD: 14 % (ref 13–44)
MAGNESIUM SERPL-MCNC: 1.8 MG/DL (ref 1.8–2.4)
MCH RBC QN AUTO: 26.9 PG (ref 26.1–32.9)
MCHC RBC AUTO-ENTMCNC: 31 G/DL (ref 31.4–35)
MCV RBC AUTO: 86.7 FL (ref 79.6–97.8)
MM INDURATION POC: 0 MM (ref 0–5)
MONOCYTES # BLD: 1.2 K/UL (ref 0.1–1.3)
MONOCYTES NFR BLD: 11 % (ref 4–12)
NEUTS SEG # BLD: 7.6 K/UL (ref 1.7–8.2)
NEUTS SEG NFR BLD: 72 % (ref 43–78)
NRBC # BLD: 0 K/UL (ref 0–0.2)
PHOSPHATE SERPL-MCNC: 1.5 MG/DL (ref 2.3–3.7)
PLATELET # BLD AUTO: 246 K/UL (ref 150–450)
PMV BLD AUTO: 11.5 FL (ref 9.4–12.3)
POTASSIUM SERPL-SCNC: 3.6 MMOL/L (ref 3.5–5.1)
PPD POC: NEGATIVE NEGATIVE
RBC # BLD AUTO: 3.31 M/UL (ref 4.05–5.2)
SODIUM SERPL-SCNC: 140 MMOL/L (ref 136–145)
WBC # BLD AUTO: 10.6 K/UL (ref 4.3–11.1)

## 2018-12-27 PROCEDURE — 97530 THERAPEUTIC ACTIVITIES: CPT

## 2018-12-27 PROCEDURE — 74011250637 HC RX REV CODE- 250/637: Performed by: INTERNAL MEDICINE

## 2018-12-27 PROCEDURE — 97535 SELF CARE MNGMENT TRAINING: CPT

## 2018-12-27 PROCEDURE — 84100 ASSAY OF PHOSPHORUS: CPT

## 2018-12-27 PROCEDURE — 74011250637 HC RX REV CODE- 250/637: Performed by: ORTHOPAEDIC SURGERY

## 2018-12-27 PROCEDURE — 80048 BASIC METABOLIC PNL TOTAL CA: CPT

## 2018-12-27 PROCEDURE — 83735 ASSAY OF MAGNESIUM: CPT

## 2018-12-27 PROCEDURE — 36415 COLL VENOUS BLD VENIPUNCTURE: CPT

## 2018-12-27 PROCEDURE — 74011250637 HC RX REV CODE- 250/637: Performed by: NURSE PRACTITIONER

## 2018-12-27 PROCEDURE — 74011636637 HC RX REV CODE- 636/637: Performed by: ORTHOPAEDIC SURGERY

## 2018-12-27 PROCEDURE — 85025 COMPLETE CBC W/AUTO DIFF WBC: CPT

## 2018-12-27 RX ORDER — ONDANSETRON 8 MG/1
8 TABLET, ORALLY DISINTEGRATING ORAL
Qty: 30 TAB | Refills: 0 | Status: SHIPPED | OUTPATIENT
Start: 2018-12-27 | End: 2019-01-24

## 2018-12-27 RX ORDER — ACETAMINOPHEN 325 MG/1
650 TABLET ORAL
Status: DISCONTINUED | OUTPATIENT
Start: 2018-12-27 | End: 2018-12-27 | Stop reason: HOSPADM

## 2018-12-27 RX ORDER — GUAIFENESIN 100 MG/5ML
81 LIQUID (ML) ORAL 2 TIMES DAILY
Qty: 60 TAB | Refills: 0 | Status: SHIPPED | OUTPATIENT
Start: 2018-12-27 | End: 2019-01-24

## 2018-12-27 RX ORDER — HYDROCODONE BITARTRATE AND ACETAMINOPHEN 7.5; 325 MG/1; MG/1
0.5 TABLET ORAL
Qty: 42 TAB | Refills: 0 | Status: ON HOLD | OUTPATIENT
Start: 2018-12-27 | End: 2019-01-07 | Stop reason: SDUPTHER

## 2018-12-27 RX ORDER — CELECOXIB 200 MG/1
200 CAPSULE ORAL EVERY 12 HOURS
Qty: 60 CAP | Refills: 2 | Status: SHIPPED | OUTPATIENT
Start: 2018-12-27 | End: 2019-01-24

## 2018-12-27 RX ADMIN — OLMESARTAN MEDOXOMIL 40 MG: 40 TABLET, COATED ORAL at 08:00

## 2018-12-27 RX ADMIN — CELECOXIB 200 MG: 200 CAPSULE ORAL at 07:59

## 2018-12-27 RX ADMIN — Medication 5 ML: at 05:22

## 2018-12-27 RX ADMIN — ACETAMINOPHEN 650 MG: 325 TABLET ORAL at 10:10

## 2018-12-27 RX ADMIN — PREDNISONE 3 MG: 1 TABLET ORAL at 07:58

## 2018-12-27 RX ADMIN — METOPROLOL SUCCINATE 25 MG: 25 TABLET, EXTENDED RELEASE ORAL at 07:57

## 2018-12-27 RX ADMIN — HYDROCODONE BITARTRATE AND ACETAMINOPHEN 0.5 TABLET: 7.5; 325 TABLET ORAL at 03:58

## 2018-12-27 RX ADMIN — AMLODIPINE BESYLATE 10 MG: 10 TABLET ORAL at 07:57

## 2018-12-27 RX ADMIN — STANDARDIZED SENNA CONCENTRATE AND DOCUSATE SODIUM 2 TABLET: 8.6; 5 TABLET, FILM COATED ORAL at 07:59

## 2018-12-27 RX ADMIN — VITAMIN D, TAB 1000IU (100/BT) 1000 UNITS: 25 TAB at 07:58

## 2018-12-27 RX ADMIN — ASPIRIN 81 MG 81 MG: 81 TABLET ORAL at 07:58

## 2018-12-27 RX ADMIN — LEVOTHYROXINE SODIUM 50 MCG: 50 TABLET ORAL at 07:58

## 2018-12-27 RX ADMIN — Medication 1 AMPULE: at 08:00

## 2018-12-27 NOTE — DISCHARGE SUMMARY
[facility]  Total Joint Discharge Summary      Patient ID:  Issa Bullock  [id number]  89 y.o.  7/12/1928    Admit date: 12/24/2018  Discharge date: 12/27/18  Admitting Physician: Aydee Mederos MD  Surgeon: Same  Admission Diagnoses: Osteoarthritis of left hip, unspecified osteoarthritis type [M16.12]  Discharge Diagnoses: Active Problems:    Hypertension (3/20/2010)      Hyperlipidemia LDL goal <100 (12/9/2016)      Gastroesophageal reflux disease (12/9/2016)      Acquired hypothyroidism (2/15/2017)      Hip pain (12/24/2018)      Procedure: Procedure(s) (LRB):  LEFT HIP HEMIARTHROPLASTY (Left)    Brief History: 80year old patient with history of left hip femoral head fracture. Was admitted 12/24/2018 for left hip hemiarthroplasty. Hospital Course: Patient underwent surgery 12/24/2018. Patient did well postop. Pain was well controlled postop. Physical therapy and occupational therapy were initiated the day of surgery. The patient was placed on  aspirin BID and SCDs for VTE prophylaxis after surgery. The patient progressed well with therapy postop and was deemed safe and appropriate for discharge on POD#3 after surgery. Patient was discharged to home with home health. Complications in Hospital: None                             Perioperative Antibiotics: Antibiotics per administered per protocol based on weight and prior drug allergies. If the patient was undergoing revision surgery or had a positive MRSA nasal swab the patient also received vancomycin.       Hospital Medications given:   Current Facility-Administered Medications   Medication Dose Route Frequency    acetaminophen (TYLENOL) tablet 650 mg  650 mg Oral Q4H PRN    HYDROcodone-acetaminophen (NORCO) 7.5-325 mg per tablet 0.5 Tab  0.5 Tab Oral Q4H PRN    olmesartan (BENICAR) tablet 40 mg  40 mg Oral DAILY    QUEtiapine (SEROquel) tablet 12.5 mg  12.5 mg Oral QHS    amLODIPine (NORVASC) tablet 10 mg  10 mg Oral DAILY    sodium chloride (NS) flush 5-10 mL  5-10 mL IntraVENous Q8H    sodium chloride (NS) flush 5-10 mL  5-10 mL IntraVENous PRN    sodium chloride (NS) flush 5-10 mL  5-10 mL IntraVENous PRN    atorvastatin (LIPITOR) tablet 10 mg  10 mg Oral QHS    cholecalciferol (VITAMIN D3) tablet 1,000 Units  1,000 Units Oral DAILY    levothyroxine (SYNTHROID) tablet 25 mcg  25 mcg Oral EVERY OTHER DAY    levothyroxine (SYNTHROID) tablet 50 mcg  50 mcg Oral EVERY OTHER DAY    metoprolol succinate (TOPROL-XL) XL tablet 25 mg  25 mg Oral DAILY    predniSONE (DELTASONE) tablet 3 mg  3 mg Oral DAILY WITH BREAKFAST    alcohol 62% (NOZIN) nasal  1 Ampule  1 Ampule Topical Q12H    sodium chloride (NS) flush 5-10 mL  5-10 mL IntraVENous PRN    celecoxib (CELEBREX) capsule 200 mg  200 mg Oral Q12H    naloxone (NARCAN) injection 0.2-0.4 mg  0.2-0.4 mg IntraVENous Q10MIN PRN    diphenhydrAMINE (BENADRYL) capsule 25 mg  25 mg Oral Q4H PRN    morphine 10 mg/ml injection 6 mg  6 mg IntraVENous Q3H PRN    senna-docusate (PERICOLACE) 8.6-50 mg per tablet 2 Tab  2 Tab Oral DAILY    ondansetron (ZOFRAN ODT) tablet 8 mg  8 mg Oral Q8H PRN    aspirin chewable tablet 81 mg  81 mg Oral BID    hydrALAZINE (APRESOLINE) 20 mg/mL injection 10 mg  10 mg IntraVENous Q6H PRN         Postoperative transfusions: None    Objective    Hemoglobin at discharge:   Lab Results   Component Value Date/Time    HGB 8.9 (L) 12/27/2018 04:47 AM       Extremity Exam  Dressing Clean, dry intact. Positive tibialis anterior and gastroc-soleus function left lower extremity  Sensation grossly intact  Positive PT pulse  No sign of DVT  TEDS/SCDS in place    Physical Therapy started on the day of surgery and progressed. PT/OT:       Activity Response:  Tolerated well  Assistive Device: Fall prevention device                Discharged to: SNF    Discharge instructions:  -WBAT left lower extremity  -Posterior hip precautions left lower extremity for 6weeks - Anticoagulate with: Aspirin PO BID x 4 weeks.   -Resume pre hospital diet             -Resume home medications per medical continuation form     -Ambulate with walker, appropriate total joint protocol  -Follow up in office as scheduled     IF YOU HAVE ANY PROBLEMS ONCE YOU ARE  [facility] [address]:   Main office number: [phone]    Take Home Medications     Current Discharge Medication List      START taking these medications    Details   aspirin 81 mg chewable tablet Take 1 Tab by mouth two (2) times a day. Qty: 60 Tab, Refills: 0      celecoxib (CELEBREX) 200 mg capsule Take 1 Cap by mouth every twelve (12) hours every twelve (12) hours for 90 days. Qty: 60 Cap, Refills: 2      HYDROcodone-acetaminophen (NORCO) 7.5-325 mg per tablet Take 0.5 Tabs by mouth every four (4) hours as needed. Max Daily Amount: 3 Tabs. Qty: 42 Tab, Refills: 0    Comments: Take 1/2 tab every 4 hours prn pain  Associated Diagnoses: Pain of left hip joint      ondansetron (ZOFRAN ODT) 8 mg disintegrating tablet Take 1 Tab by mouth every eight (8) hours as needed for Nausea. Qty: 30 Tab, Refills: 0         CONTINUE these medications which have NOT CHANGED    Details   metoprolol succinate (TOPROL-XL) 25 mg XL tablet Take  by mouth daily. Currently prescribed 50 mg Metoprolol daily BUT patient's daughter states right now she is only taking 1.5 tablets of 25 mg daily      OTHER Calcium - OTC      acetaminophen (TYLENOL) 650 mg TbER Take 650 mg by mouth every eight (8) hours. predniSONE (DELTASONE) 1 mg tablet Take 3 mg by mouth daily. esomeprazole (NEXIUM) 40 mg capsule Take 40 mg by mouth daily. amLODIPine (NORVASC) 5 mg tablet Take 1 Tab by mouth daily. Qty: 30 Tab, Refills: 11    Associated Diagnoses: Essential hypertension      !! levothyroxine (SYNTHROID) 25 mcg tablet Take 1 Tab by mouth Daily (before breakfast).   Qty: 30 Tab, Refills: 5    Associated Diagnoses: Acquired hypothyroidism olmesartan (BENICAR) 40 mg tablet Take 0.5 Tabs by mouth daily. Qty: 30 Tab, Refills: 11      atorvastatin (LIPITOR) 10 mg tablet Take 1/2 tab every other night. Qty: 30 Tab, Refills: 11      Cholecalciferol, Vitamin D3, (VITAMIN D3) 1,000 unit cap Take 1 Cap by mouth daily. !! levothyroxine (SYNTHROID) 50 mcg tablet Take one tab by mouth every other day. Alternate 50 and 25 mcg dose. Qty: 30 Tab, Refills: 2      Denosumab (PROLIA) 60 mg/mL injection 60 mg by SubCUTAneous route every 6 months. bevacizumab (AVASTIN) 25 mg/ml soln intravitreal solution by IntraVITreal route once. !! - Potential duplicate medications found. Please discuss with provider. · It is important that you take the medication exactly as they are prescribed. · Keep your medication in the bottles provided by the pharmacist and keep a list of the medication names, dosages, and times to be taken in your wallet. · Do not take other medications without consulting your doctor. What to do at [address] [place] [address] Ave your prehospital diet. If you have excessive nausea or vomiting call your doctor's office. Home Physical Therapy is arranged. Use rolling walker when walking. Use ROMAIN  stockings until we see you in the office for your follow up appointment with Dr. Beena Montemayor. Patients who have had a joint replacement should not drive until you are seen for your follow up appointment by Dr. Beena Montemayor. Total Knee patients keep knee elevated above heart level to prevent and resolve swelling. Continue to ice the surgical site a few times a day until your follow-up appointment    Leave dressing alone until checked by home health. If your wound was closed with staples Home Health or the nursing facility will assist with dressing changes. Take it easy, you have just had major surgery. Too much activity will cause additional soreness and swelling.      When to Call    - Call if you have a temperature greater then 101  - Have increased wound drainage or wound drainage that persists after 7 days.  - Begin to notice increased redness, swelling, or pus coming from the incision  - Unable to keep food down  - Lose control of your bladder or bowel function  - Are unable to bear any weight on operative leg  - Need a pain medication refill - please have pharmacy phone number available. I have reviewed the patients controlled substance prescription history, as maintained in the [place] prescription monitoring program, so that the prescription(s) for a  controlled substance can be given.     Signed:  Vaishali Kaminski MD  12/27/2018  11:32 AM

## 2018-12-27 NOTE — PROGRESS NOTES
2018         Post Op day: 3 Days Post-Op     Admit Date: 2018  Admit Diagnosis: Osteoarthritis of left hip, unspecified osteoarthritis type [M16.12]        Subjective: Patient stable. No acute events. Objective:   Visit Vitals  BP (!) 193/92 (BP 1 Location: Left arm, BP Patient Position: At rest)   Pulse 73   Temp 98.6 °F (37 °C)   Resp 17   Wt 47.7 kg (105 lb 3.2 oz)   SpO2 96%   Breastfeeding? No   BMI 21.25 kg/m²    Temp (24hrs), Av.3 °F (36.8 °C), Min:97.7 °F (36.5 °C), Max:98.8 °F (37.1 °C)    Lab Results   Component Value Date/Time    HGB 8.9 (L) 2018 04:47 AM     Extremity Exam  Dressing clean and dry   Tibialis Anterior and Gastroc-Soleus functioning normally left lower extremity  Sensation intact to light touch on operative limb  Extremity perfused  TEDS/SCDS in place  No sign of DVT     Assessment / Plan :  WBAT LLE  Posterio hip precautions  Continue PT/OT  Continue current DVT prophylaxis in house. Discharge on ASA BID  DIspo-SNF  Patient Active Problem List   Diagnosis Code    Syncope R55    Bradycardia R00.1    Giant cell arteritis ([facility] [address] M31.6    Hypertension I10    Abnormal EKG R94.31    Back pain M54.9    TIA (transient ischemic attack) G45.9    Hyperlipidemia LDL goal <100 E78.5    Diarrhea R19.7    Gastroesophageal reflux disease K21.9    Insulin resistance E88.81    Acquired hypothyroidism E03.9    Chest pain R07.9    Anemia D64.9    NSTEMI (non-ST elevated myocardial infarction) ([facility] [address] I21.4    Delirium R41.0    Acute cystitis without hematuria N30.00    IFG (impaired fasting glucose) R73.01    Hip pain M25.559          Signed By: Gianfranco Garcia MD     I have reviewed the patients controlled substance prescription history, as maintained in the [facility] prescription monitoring program, so that the prescription(s) for a  controlled substance can be given.

## 2018-12-27 NOTE — PROGRESS NOTES
Problem: Mobility Impaired (Adult and Pediatric)  Goal: *Acute Goals and Plan of Care (Insert Text)  Short Term Goals:  1.  Ms. Radha Benson will move from supine to sit and sit to supine in flat bed with CONTACT GUARD ASSIST within 3 day(s). 2.  Ms. Radha Benson will transfer from sit to stand and stand to sit with STAND BY ASSIST  within 3 days. 3.  Ms. Radha Benson will ambulate with STAND BY ASSIST for 150+ feet with the least restrictive device within 3 day(s). 4.  Ms. Radha Benson will verbalize 3/3 hip precautions to ensure hip safety during functional activities within 3 days. ________________________________________________________________________      PHYSICAL THERAPY: Daily Note, Treatment Day: 3rd, AM 12/27/2018  INPATIENT: Hospital Day: 4  [place] / Product Type: Medicare /   Hip Precautions   WBAT L LE  Legally blind  NAME/AGE/GENDER: Jeramy Lyle is a 80 y.o. female   PRIMARY DIAGNOSIS: Osteoarthritis of left hip, unspecified osteoarthritis type [M16.12] <principal problem not specified> <principal problem not specified>  Procedure(s) (LRB):  LEFT HIP HEMIARTHROPLASTY (Left)  3 Days Post-Op  ICD-10: Treatment Diagnosis:    · Other abnormalities of gait and mobility (R26.89)   Precaution/Allergies:  Calcium carbonate; Ranitidine hcl; and Guaifenesin      ASSESSMENT:     Ms. Radha Benson presents sitting up in chair with family present on contact. Pt was able to perform below LE exercises with increased verbal and manual cues to stay on task and to complete exercises correctly. Reviewed hip precautions with pt and family. Pt then stood with CGA and ambulated about 90' x 2 using rolling walker and min/mod assist.  Pt did better staying closer to rolling walker and with upright posture today. Pt continues to require frequent cues to keep L foot forward. Pt internally rotates her R foot.   Pt also requires assistance to steer walker, she pushes walker to L side, and she is legally blind and requires cues for guidance. Pt returned to chair and was left up with needs in reach and family present. Pt is making good progress towards goals with ambulation and overall mobility. Will continue with POC. This section established at most recent assessment   PROBLEM LIST (Impairments causing functional limitations):  1. Decreased Strength  2. Decreased ADL/Functional Activities  3. Decreased Transfer Abilities  4. Decreased Ambulation Ability/Technique  5. Decreased Balance  6. Decreased Activity Tolerance  7. Decreased Knowledge of Precautions  8. Decreased [place] with Home Exercise Program   INTERVENTIONS PLANNED: (Benefits and precautions of physical therapy have been discussed with the patient.)  1. Balance Exercise  2. Bed Mobility  3. Gait Training  4. Home Exercise Program (HEP)  5. Therapeutic Activites  6. Therapeutic Exercise/Strengthening  7. Transfer Training  8. education     TREATMENT PLAN: Frequency/Duration: daily to twice daily for duration of hospital stay  Rehabilitation Potential For Stated Goals: Excellent     RECOMMENDED REHABILITATION/EQUIPMENT: (at time of discharge pending progress): Due to the probability of continued deficits (see above) this patient will likely need continued skilled physical therapy after discharge. Equipment:    None at this time              HISTORY:   History of Present Injury/Illness (Reason for Referral):  Per MD note, \" The patient has a femoral head fracture which has collapsed. We have attempted conservative treatment which has failed. The patient was evaluated and examined in the office prior to today and was found to have a history and physical exam consistent with intra-articular pathology of the left hip. Patient complains of anterior groin pain predominately. Pain occurs during activity. Patient has difficulty putting on socks/shoes and has notable pain when getting up from a chair and getting out of a car.  Patient does not complain of significant lateral hip pain or radicular pain down the leg. There have been no changes to the patient's orthopedic condition since the last office visit\"  Past Medical History/Comorbidities:   Ms. Polo Vaca  has a past medical history of Acquired hypothyroidism, Anemia, Arthritis, Chronic pain, Fractured hip ([address] Gastroesophageal reflux disease, GERD (gastroesophageal reflux disease), Giant cell arteritis ([facility] [address] Hip fracture ([facility] [address] Hypertension, Legally blind, Macular degeneration, Memory deficit, NSTEMI (non-ST elevated myocardial infarction) ([facility] [address] Other ill-defined conditions(799.89), Other ill-defined conditions(799.89), TIA (transient ischemic attack), and Varicose veins of left lower extremity. Ms. Polo Vaca  has a past surgical history that includes hx endoscopy; pr cardiac surg procedure unlist; hx cholecystectomy; hx tonsillectomy; hx heent; LEFT HIP HEMIARTHROPLASTY (Left, 12/24/2018); and ESOPHAGOGASTRODUODENOSCOPY (EGD)/ 21 (N/A, 11/16/2018). Social History/Living Environment:   Home Environment: Private residence  # Steps to Enter: 1  One/Two Story Residence: One story  Living Alone: No  Support Systems: Child(yesi)  Patient Expects to be Discharged to[de-identified] Private residence  Current DME Used/Available at Home: [address] [place] [address] Street or Shower Type: Shower  Prior Level of Function/Work/Activity:  Lives at home with daughter. Was independent prior to hip fracture in August, has been using RW since that time and requiring assist with ADL's. Personal Factors:          Sex:  female        Age:  80 y.o.    Number of Personal Factors/Comorbidities that affect the Plan of Care: 3+: HIGH COMPLEXITY   EXAMINATION:   Most Recent Physical Functioning:   Gross Assessment:                  Posture:     Balance:  Sitting: Intact  Standing - Static: Fair  Standing - Dynamic : Fair Bed Mobility:     Wheelchair Mobility:     Transfers:  Sit to Stand: Contact guard assistance  Stand to Sit: Contact guard assistance  Gait:  Left Side Weight Bearing: As tolerated  Base of Support: Narrowed; Center of gravity altered  Speed/Carla: Pace decreased (<100 feet/min)  Step Length: Left shortened;Right shortened  Gait Abnormalities: Decreased step clearance;Trunk sway increased(IR L LE)  Distance (ft): 90 Feet (ft)(x 2)  Assistive Device: Walker, rolling  Ambulation - Level of Assistance: Moderate assistance;Minimal assistance      Body Structures Involved:  1. Eyes and Ears  2. Bones  3. Joints  4. Muscles  5. Ligaments Body Functions Affected:  1. Sensory/Pain  2. Movement Related  3. Skin Related Activities and Participation Affected:  1. Mobility  2. Self Care  3. Domestic Life  4. Community, Social and [place]   Number of elements that affect the Plan of Care: 4+: HIGH COMPLEXITY   CLINICAL PRESENTATION:   Presentation: Stable and uncomplicated: LOW COMPLEXITY   CLINICAL DECISION MAKIN [facility] Mobility Inpatient Short Form  How much difficulty does the patient currently have. .. Unable A Lot A Little None   1. Turning over in bed (including adjusting bedclothes, sheets and blankets)? [] 1   [x] 2   [] 3   [] 4   2. Sitting down on and standing up from a chair with arms ( e.g., wheelchair, bedside commode, etc.)   [] 1   [] 2   [x] 3   [] 4   3. Moving from lying on back to sitting on the side of the bed? [] 1   [x] 2   [] 3   [] 4   How much help from another person does the patient currently need. .. Total A Lot A Little None   4. Moving to and from a bed to a chair (including a wheelchair)? [] 1   [] 2   [x] 3   [] 4   5. Need to walk in hospital room? [] 1   [] 2   [x] 3   [] 4   6. Climbing 3-5 steps with a railing? [] 1   [] 2   [x] 3   [] 4   © , Trustees of [address] [place] [address] [address] [address], under license to [employer].  All rights reserved      Score:  Initial: 16 Most Recent: X (Date: -- )    Interpretation of Tool:  Represents activities that are increasingly more difficult (i.e. Bed mobility, Transfers, Gait). Score 24 23 22-20 19-15 14-10 9-7 6     Modifier CH CI CJ CK CL CM CN      ? Mobility - Walking and Moving Around:     - CURRENT STATUS: CK - 40%-59% impaired, limited or restricted    - GOAL STATUS: CJ - 20%-39% impaired, limited or restricted    - D/C STATUS:  ---------------To be determined---------------  [place] / [place] / Product Type: Medicare /      Medical Necessity:     · Patient is expected to demonstrate progress in strength, range of motion, balance, coordination and functional technique to decrease assistance required with all functional mobility. Reason for Services/Other Comments:  · Patient continues to require skilled intervention due to decline in functional mobility. Use of outcome tool(s) and clinical judgement create a POC that gives a: Clear prediction of patient's progress: LOW COMPLEXITY            TREATMENT:      Pre-treatment Symptoms/Complaints:  Pt feeling better today. Pain: Initial: 3/10     Post Session:  3/10     Therapeutic Activity: (    19 minutes): Therapeutic activities including Chair transfers, LE exercises as below, and Ambulation on level ground to improve mobility, strength, balance, coordination and assistance with walker negotiation. Required minimal   to promote dynamic balance in standing and promote coordination of bilateral, lower extremity(s). Therapeutic Exercise: (  0 minutes):  Exercises per grid below to improve mobility, strength, balance and coordination. Required minimal visual, verbal and manual cues to promote proper body posture and promote proper body mechanics. Progressed range and repetitions as indicated.      Date:  12/26/18 Date:  12/27/18 Date:     ACTIVITY/EXERCISE AM PM AM PM AM PM   Ambulation:           Distance  Device  Duration         Seated Heel Raises X 10 B  X 15 B      Seated Toe Raises X 10 B  X 15 B      Seated Long Arc Quads X 10 B  X 15 B      Seated Marching X 10 B, AA-L  X 15 B, AA-L      Seated Hip Abduction X 10 B, AA-L  X 15 B, AA-L               B = bilateral; AA = active assistive; A = active; P = passive        Braces/Orthotics/Lines/Etc:   · IV  · godfrey catheter  · O2 Device: Nasal cannula  Treatment/Session Assessment:    · Response to Treatment:  Pleasant and cooperative. · Interdisciplinary Collaboration:   o Physical Therapy Assistant  o Registered Nurse  · After treatment position/precautions:   o Up in chair  o Bed alarm/tab alert on  o Bed/Chair-wheels locked  o Bed in low position  o Call light within reach  o RN notified  o Family at bedside   · Compliance with Program/Exercises: Will assess as treatment progresses  · Recommendations/Intent for next treatment session: \"Next visit will focus on advancements to more challenging activities and reduction in assistance provided\".   Total Treatment Duration:  PT Patient Time In/Time Out  Time In: 1030  Time Out: 1700 Alan Damon PTA

## 2018-12-27 NOTE — PROGRESS NOTES
Problem: Falls - Risk of  Goal: *Absence of Falls  Document Kristie Fall Risk and appropriate interventions in the flowsheet.   Outcome: Progressing Towards Goal  Fall Risk Interventions:  Mobility Interventions: Bed/chair exit alarm, Communicate number of staff needed for ambulation/transfer, Patient to call before getting OOB    Mentation Interventions: Bed/chair exit alarm, Door open when patient unattended, Reorient patient    Medication Interventions: Bed/chair exit alarm, Evaluate medications/consider consulting pharmacy, Patient to call before getting OOB    Elimination Interventions: Bed/chair exit alarm, Call light in reach, Patient to call for help with toileting needs    History of Falls Interventions: Bed/chair exit alarm, Consult care management for discharge planning, Door open when patient unattended

## 2018-12-27 NOTE — PROGRESS NOTES
TRANSFER - OUT REPORT:    Verbal report given to Zabrina Medina rN(name) on Hernandez Dsouza  being transferred to [facility](unit) for routine progression of care       Report consisted of patients Situation, Background, Assessment and   Recommendations(SBAR). Information from the following report(s) SBAR, MAR and Recent Results was reviewed with the receiving nurse. Lines:   Peripheral IV 12/24/18 Left Forearm (Active)   Site Assessment Clean, dry, & intact 12/27/2018  8:07 AM   Phlebitis Assessment 0 12/27/2018  8:07 AM   Infiltration Assessment 0 12/27/2018  8:07 AM   Dressing Status Clean, dry, & intact 12/27/2018  8:07 AM   Dressing Type Tape;Transparent 12/27/2018  8:07 AM   Hub Color/Line Status Flushed 12/27/2018  8:07 AM   Action Taken Blood drawn 12/24/2018  6:32 AM   Alcohol Cap Used No 12/24/2018 10:53 AM        Opportunity for questions and clarification was provided.

## 2018-12-27 NOTE — PROGRESS NOTES
Patient scheduled for transport to [facility] room #202A today at 1330 via stretcher by [facility]. Patient and daughter Reina Starks notified. Care Management Interventions  PCP Verified by CM: Yes  Last Visit to PCP: 11/14/18  Mode of Transport at Discharge:  Other (see comment)(Daughter Reina Starks [phone])  Transition of Care Consult (CM Consult): Discharge Planning, SNF  Discharge Durable Medical Equipment: No  Physical Therapy Consult: Yes  Occupational Therapy Consult: Yes  Current Support Network: Own Home, Lives with Caregiver  Confirm Follow Up Transport: Family  Plan discussed with Pt/Family/Caregiver: Yes  Freedom of Choice Offered: Yes  Discharge Location  Discharge Placement: Rehab Unit Subacute([facility]

## 2018-12-28 ENCOUNTER — PATIENT OUTREACH (OUTPATIENT)
Dept: CASE MANAGEMENT | Age: 83
End: 2018-12-28

## 2018-12-28 NOTE — PROGRESS NOTES
Please note this patient was IP d/c to [facility] a Preferred SNF and will be followed by Rogelio Howell RN and included in weekly Care Coordination calls until d/c. Mike Pacheco LPN/ Care Coordinator JULES:[phone] Kumar [address] [place] [address] 
[email or web address] [facility] Carehis note will not be viewable in [address]

## 2019-01-01 ENCOUNTER — APPOINTMENT (RX ONLY)
Dept: URBAN - METROPOLITAN AREA CLINIC 23 | Facility: CLINIC | Age: 84
Setting detail: DERMATOLOGY
End: 2019-01-01

## 2019-01-01 ENCOUNTER — HOME HEALTH ADMISSION (OUTPATIENT)
Dept: HOME HEALTH SERVICES | Facility: HOME HEALTH | Age: 84
End: 2019-01-01
Payer: MEDICARE

## 2019-01-01 DIAGNOSIS — L82.1 OTHER SEBORRHEIC KERATOSIS: ICD-10-CM

## 2019-01-01 DIAGNOSIS — L81.8 OTHER SPECIFIED DISORDERS OF PIGMENTATION: ICD-10-CM

## 2019-01-01 DIAGNOSIS — Z85.828 PERSONAL HISTORY OF OTHER MALIGNANT NEOPLASM OF SKIN: ICD-10-CM

## 2019-01-01 DIAGNOSIS — Z79.01 LONG TERM (CURRENT) USE OF ANTICOAGULANTS: ICD-10-CM

## 2019-01-01 PROCEDURE — ? COUNSELING

## 2019-01-01 PROCEDURE — 99214 OFFICE O/P EST MOD 30 MIN: CPT

## 2019-01-01 ASSESSMENT — LOCATION SIMPLE DESCRIPTION DERM
LOCATION SIMPLE: LEFT ANTERIOR NECK
LOCATION SIMPLE: LEFT HAND
LOCATION SIMPLE: LEFT SHOULDER
LOCATION SIMPLE: RIGHT TEMPLE
LOCATION SIMPLE: LEFT FOREARM
LOCATION SIMPLE: RIGHT PRETIBIAL REGION
LOCATION SIMPLE: LEFT POPLITEAL SKIN
LOCATION SIMPLE: RIGHT CHEEK
LOCATION SIMPLE: LEFT THIGH
LOCATION SIMPLE: LEFT PRETIBIAL REGION

## 2019-01-01 ASSESSMENT — LOCATION DETAILED DESCRIPTION DERM
LOCATION DETAILED: LEFT MEDIAL POPLITEAL SKIN
LOCATION DETAILED: LEFT RADIAL DORSAL HAND
LOCATION DETAILED: LEFT DISTAL PRETIBIAL REGION
LOCATION DETAILED: RIGHT SUPERIOR CENTRAL MALAR CHEEK
LOCATION DETAILED: RIGHT LATERAL TEMPLE
LOCATION DETAILED: LEFT ANTERIOR PROXIMAL THIGH
LOCATION DETAILED: RIGHT DISTAL PRETIBIAL REGION
LOCATION DETAILED: LEFT ANTERIOR SHOULDER
LOCATION DETAILED: LEFT INFERIOR ANTERIOR NECK
LOCATION DETAILED: LEFT VENTRAL LATERAL PROXIMAL FOREARM

## 2019-01-01 ASSESSMENT — LOCATION ZONE DERM
LOCATION ZONE: ARM
LOCATION ZONE: HAND
LOCATION ZONE: FACE
LOCATION ZONE: NECK
LOCATION ZONE: LEG

## 2019-01-04 NOTE — PROGRESS NOTES
rec'd call from Annika Bermudez with Dr. Letty Adams office asking me to fax standard dressing orders over to [facility]. I spoke to Hesham Alegre at [facility] and faxed orders to the [facility]. Stewart Turner

## 2019-01-06 ENCOUNTER — APPOINTMENT (OUTPATIENT)
Dept: CT IMAGING | Age: 84
End: 2019-01-06
Attending: EMERGENCY MEDICINE
Payer: MEDICARE

## 2019-01-06 ENCOUNTER — HOSPITAL ENCOUNTER (OUTPATIENT)
Age: 84
Setting detail: OBSERVATION
Discharge: NURSING, REHAB OR LONG-TERM CARE | End: 2019-01-07
Attending: EMERGENCY MEDICINE | Admitting: INTERNAL MEDICINE
Payer: MEDICARE

## 2019-01-06 ENCOUNTER — APPOINTMENT (OUTPATIENT)
Dept: GENERAL RADIOLOGY | Age: 84
End: 2019-01-06
Attending: EMERGENCY MEDICINE
Payer: MEDICARE

## 2019-01-06 DIAGNOSIS — R77.8 ELEVATED TROPONIN LEVEL: Primary | ICD-10-CM

## 2019-01-06 DIAGNOSIS — I82.4Y2 DEEP VEIN THROMBOSIS (DVT) OF PROXIMAL VEIN OF LEFT LOWER EXTREMITY, UNSPECIFIED CHRONICITY (HCC): ICD-10-CM

## 2019-01-06 DIAGNOSIS — M25.552 PAIN OF LEFT HIP JOINT: ICD-10-CM

## 2019-01-06 DIAGNOSIS — R07.9 ACUTE CHEST PAIN: ICD-10-CM

## 2019-01-06 PROBLEM — R07.89 ATYPICAL CHEST PAIN: Status: ACTIVE | Noted: 2019-01-06

## 2019-01-06 LAB
ALBUMIN SERPL-MCNC: 2.7 G/DL (ref 3.2–4.6)
ALBUMIN/GLOB SERPL: 0.9 {RATIO} (ref 1.2–3.5)
ALP SERPL-CCNC: 91 U/L (ref 50–136)
ALT SERPL-CCNC: 14 U/L (ref 12–65)
ANION GAP SERPL CALC-SCNC: 9 MMOL/L (ref 7–16)
AST SERPL-CCNC: 19 U/L (ref 15–37)
ATRIAL RATE: 67 BPM
BASOPHILS # BLD: 0.1 K/UL (ref 0–0.2)
BASOPHILS NFR BLD: 1 % (ref 0–2)
BILIRUB SERPL-MCNC: 0.5 MG/DL (ref 0.2–1.1)
BNP SERPL-MCNC: 547 PG/ML
BUN SERPL-MCNC: 15 MG/DL (ref 8–23)
CALCIUM SERPL-MCNC: 8.8 MG/DL (ref 8.3–10.4)
CALCULATED P AXIS, ECG09: 57 DEGREES
CALCULATED R AXIS, ECG10: -61 DEGREES
CALCULATED T AXIS, ECG11: 106 DEGREES
CHLORIDE SERPL-SCNC: 109 MMOL/L (ref 98–107)
CO2 SERPL-SCNC: 25 MMOL/L (ref 21–32)
CREAT SERPL-MCNC: 0.7 MG/DL (ref 0.6–1)
DIAGNOSIS, 93000: NORMAL
DIFFERENTIAL METHOD BLD: ABNORMAL
EOSINOPHIL # BLD: 0.4 K/UL (ref 0–0.8)
EOSINOPHIL NFR BLD: 4 % (ref 0.5–7.8)
ERYTHROCYTE [DISTWIDTH] IN BLOOD BY AUTOMATED COUNT: 17.3 % (ref 11.9–14.6)
GLOBULIN SER CALC-MCNC: 3 G/DL (ref 2.3–3.5)
GLUCOSE SERPL-MCNC: 94 MG/DL (ref 65–100)
HCT VFR BLD AUTO: 29 % (ref 35.8–46.3)
HGB BLD-MCNC: 8.5 G/DL (ref 11.7–15.4)
IMM GRANULOCYTES # BLD: 0.1 K/UL (ref 0–0.5)
IMM GRANULOCYTES NFR BLD AUTO: 1 % (ref 0–5)
LYMPHOCYTES # BLD: 0.9 K/UL (ref 0.5–4.6)
LYMPHOCYTES NFR BLD: 9 % (ref 13–44)
MCH RBC QN AUTO: 26.2 PG (ref 26.1–32.9)
MCHC RBC AUTO-ENTMCNC: 29.3 G/DL (ref 31.4–35)
MCV RBC AUTO: 89.5 FL (ref 79.6–97.8)
MONOCYTES # BLD: 0.9 K/UL (ref 0.1–1.3)
MONOCYTES NFR BLD: 8 % (ref 4–12)
NEUTS SEG # BLD: 7.8 K/UL (ref 1.7–8.2)
NEUTS SEG NFR BLD: 77 % (ref 43–78)
NRBC # BLD: 0 K/UL (ref 0–0.2)
P-R INTERVAL, ECG05: 162 MS
PLATELET # BLD AUTO: 405 K/UL (ref 150–450)
PMV BLD AUTO: 11 FL (ref 9.4–12.3)
POTASSIUM SERPL-SCNC: 3.4 MMOL/L (ref 3.5–5.1)
PROT SERPL-MCNC: 5.7 G/DL (ref 6.3–8.2)
Q-T INTERVAL, ECG07: 390 MS
QRS DURATION, ECG06: 94 MS
QTC CALCULATION (BEZET), ECG08: 412 MS
RBC # BLD AUTO: 3.24 M/UL (ref 4.05–5.2)
SODIUM SERPL-SCNC: 143 MMOL/L (ref 136–145)
TROPONIN I BLD-MCNC: 0.01 NG/ML (ref 0.02–0.05)
TROPONIN I BLD-MCNC: 0.06 NG/ML (ref 0.02–0.05)
TROPONIN I SERPL-MCNC: 0.08 NG/ML (ref 0.02–0.05)
TROPONIN I SERPL-MCNC: 0.08 NG/ML (ref 0.02–0.05)
VENTRICULAR RATE, ECG03: 67 BPM
WBC # BLD AUTO: 10.1 K/UL (ref 4.3–11.1)

## 2019-01-06 PROCEDURE — 74011250637 HC RX REV CODE- 250/637: Performed by: EMERGENCY MEDICINE

## 2019-01-06 PROCEDURE — 93005 ELECTROCARDIOGRAM TRACING: CPT | Performed by: EMERGENCY MEDICINE

## 2019-01-06 PROCEDURE — 74011250637 HC RX REV CODE- 250/637: Performed by: INTERNAL MEDICINE

## 2019-01-06 PROCEDURE — 99285 EMERGENCY DEPT VISIT HI MDM: CPT | Performed by: EMERGENCY MEDICINE

## 2019-01-06 PROCEDURE — 96374 THER/PROPH/DIAG INJ IV PUSH: CPT

## 2019-01-06 PROCEDURE — 99218 HC RM OBSERVATION: CPT

## 2019-01-06 PROCEDURE — 74011636320 HC RX REV CODE- 636/320: Performed by: EMERGENCY MEDICINE

## 2019-01-06 PROCEDURE — 84484 ASSAY OF TROPONIN QUANT: CPT

## 2019-01-06 PROCEDURE — 93005 ELECTROCARDIOGRAM TRACING: CPT | Performed by: FAMILY MEDICINE

## 2019-01-06 PROCEDURE — 71260 CT THORAX DX C+: CPT

## 2019-01-06 PROCEDURE — 74011000258 HC RX REV CODE- 258: Performed by: EMERGENCY MEDICINE

## 2019-01-06 PROCEDURE — 36415 COLL VENOUS BLD VENIPUNCTURE: CPT

## 2019-01-06 PROCEDURE — 83880 ASSAY OF NATRIURETIC PEPTIDE: CPT

## 2019-01-06 PROCEDURE — 71045 X-RAY EXAM CHEST 1 VIEW: CPT

## 2019-01-06 PROCEDURE — 74011000250 HC RX REV CODE- 250: Performed by: FAMILY MEDICINE

## 2019-01-06 PROCEDURE — 74011250637 HC RX REV CODE- 250/637: Performed by: NURSE PRACTITIONER

## 2019-01-06 PROCEDURE — 80053 COMPREHEN METABOLIC PANEL: CPT

## 2019-01-06 PROCEDURE — 85025 COMPLETE CBC W/AUTO DIFF WBC: CPT

## 2019-01-06 RX ORDER — ONDANSETRON 4 MG/1
4 TABLET, ORALLY DISINTEGRATING ORAL
Status: DISCONTINUED | OUTPATIENT
Start: 2019-01-06 | End: 2019-01-07 | Stop reason: HOSPADM

## 2019-01-06 RX ORDER — ATORVASTATIN CALCIUM 10 MG/1
5 TABLET, FILM COATED ORAL
Status: DISCONTINUED | OUTPATIENT
Start: 2019-01-06 | End: 2019-01-07 | Stop reason: HOSPADM

## 2019-01-06 RX ORDER — OLMESARTAN MEDOXOMIL 20 MG/1
20 TABLET ORAL DAILY
Status: DISCONTINUED | OUTPATIENT
Start: 2019-01-07 | End: 2019-01-07 | Stop reason: HOSPADM

## 2019-01-06 RX ORDER — LEVOTHYROXINE SODIUM 50 UG/1
25-50 TABLET ORAL EVERY OTHER DAY
Status: DISCONTINUED | OUTPATIENT
Start: 2019-01-06 | End: 2019-01-06

## 2019-01-06 RX ORDER — MORPHINE SULFATE 2 MG/ML
2 INJECTION, SOLUTION INTRAMUSCULAR; INTRAVENOUS
Status: DISCONTINUED | OUTPATIENT
Start: 2019-01-06 | End: 2019-01-07 | Stop reason: HOSPADM

## 2019-01-06 RX ORDER — LEVOTHYROXINE SODIUM 50 UG/1
50 TABLET ORAL SEE ADMIN INSTRUCTIONS
Status: DISCONTINUED | OUTPATIENT
Start: 2019-01-06 | End: 2019-01-06

## 2019-01-06 RX ORDER — METOPROLOL TARTRATE 5 MG/5ML
10 INJECTION INTRAVENOUS ONCE
Status: COMPLETED | OUTPATIENT
Start: 2019-01-06 | End: 2019-01-06

## 2019-01-06 RX ORDER — NITROGLYCERIN 0.4 MG/1
0.4 TABLET SUBLINGUAL
Status: DISCONTINUED | OUTPATIENT
Start: 2019-01-06 | End: 2019-01-07 | Stop reason: HOSPADM

## 2019-01-06 RX ORDER — AMLODIPINE BESYLATE 10 MG/1
5 TABLET ORAL DAILY
Status: DISCONTINUED | OUTPATIENT
Start: 2019-01-07 | End: 2019-01-06

## 2019-01-06 RX ORDER — LEVOTHYROXINE SODIUM 50 UG/1
50 TABLET ORAL
Status: DISCONTINUED | OUTPATIENT
Start: 2019-01-08 | End: 2019-01-07 | Stop reason: HOSPADM

## 2019-01-06 RX ORDER — PANTOPRAZOLE SODIUM 40 MG/1
40 TABLET, DELAYED RELEASE ORAL
Status: DISCONTINUED | OUTPATIENT
Start: 2019-01-07 | End: 2019-01-07 | Stop reason: HOSPADM

## 2019-01-06 RX ORDER — AMLODIPINE BESYLATE 5 MG/1
5 TABLET ORAL DAILY
Status: DISCONTINUED | OUTPATIENT
Start: 2019-01-06 | End: 2019-01-07 | Stop reason: HOSPADM

## 2019-01-06 RX ORDER — PREDNISONE 1 MG/1
3 TABLET ORAL DAILY
Status: DISCONTINUED | OUTPATIENT
Start: 2019-01-07 | End: 2019-01-07 | Stop reason: HOSPADM

## 2019-01-06 RX ORDER — SODIUM CHLORIDE 0.9 % (FLUSH) 0.9 %
5-10 SYRINGE (ML) INJECTION AS NEEDED
Status: DISCONTINUED | OUTPATIENT
Start: 2019-01-06 | End: 2019-01-07 | Stop reason: HOSPADM

## 2019-01-06 RX ORDER — LEVOTHYROXINE SODIUM 50 UG/1
25 TABLET ORAL
Status: DISCONTINUED | OUTPATIENT
Start: 2019-01-07 | End: 2019-01-07 | Stop reason: HOSPADM

## 2019-01-06 RX ORDER — SODIUM CHLORIDE 0.9 % (FLUSH) 0.9 %
10 SYRINGE (ML) INJECTION
Status: COMPLETED | OUTPATIENT
Start: 2019-01-06 | End: 2019-01-06

## 2019-01-06 RX ORDER — ACETAMINOPHEN 325 MG/1
650 TABLET ORAL
Status: DISCONTINUED | OUTPATIENT
Start: 2019-01-06 | End: 2019-01-07 | Stop reason: HOSPADM

## 2019-01-06 RX ORDER — SODIUM CHLORIDE 0.9 % (FLUSH) 0.9 %
5-10 SYRINGE (ML) INJECTION EVERY 8 HOURS
Status: DISCONTINUED | OUTPATIENT
Start: 2019-01-06 | End: 2019-01-07 | Stop reason: HOSPADM

## 2019-01-06 RX ORDER — LEVOTHYROXINE SODIUM 50 UG/1
50 TABLET ORAL
Status: DISCONTINUED | OUTPATIENT
Start: 2019-01-07 | End: 2019-01-06

## 2019-01-06 RX ORDER — NALOXONE HYDROCHLORIDE 0.4 MG/ML
0.4 INJECTION, SOLUTION INTRAMUSCULAR; INTRAVENOUS; SUBCUTANEOUS AS NEEDED
Status: DISCONTINUED | OUTPATIENT
Start: 2019-01-06 | End: 2019-01-07 | Stop reason: HOSPADM

## 2019-01-06 RX ORDER — METOPROLOL SUCCINATE 25 MG/1
25 TABLET, EXTENDED RELEASE ORAL DAILY
Status: DISCONTINUED | OUTPATIENT
Start: 2019-01-07 | End: 2019-01-07 | Stop reason: HOSPADM

## 2019-01-06 RX ORDER — LEVOTHYROXINE SODIUM 50 UG/1
25 TABLET ORAL SEE ADMIN INSTRUCTIONS
Status: DISCONTINUED | OUTPATIENT
Start: 2019-01-06 | End: 2019-01-06

## 2019-01-06 RX ORDER — ACETAMINOPHEN 325 MG/1
650 TABLET ORAL
Status: COMPLETED | OUTPATIENT
Start: 2019-01-06 | End: 2019-01-06

## 2019-01-06 RX ORDER — GUAIFENESIN 100 MG/5ML
81 LIQUID (ML) ORAL DAILY
Status: DISCONTINUED | OUTPATIENT
Start: 2019-01-07 | End: 2019-01-07 | Stop reason: HOSPADM

## 2019-01-06 RX ADMIN — ATORVASTATIN CALCIUM 5 MG: 10 TABLET, FILM COATED ORAL at 21:19

## 2019-01-06 RX ADMIN — METOPROLOL TARTRATE 10 MG: 5 INJECTION, SOLUTION INTRAVENOUS at 19:11

## 2019-01-06 RX ADMIN — NITROGLYCERIN 0.4 MG: 0.4 TABLET, ORALLY DISINTEGRATING SUBLINGUAL at 19:02

## 2019-01-06 RX ADMIN — SODIUM CHLORIDE 100 ML: 900 INJECTION, SOLUTION INTRAVENOUS at 15:09

## 2019-01-06 RX ADMIN — Medication 5 ML: at 21:19

## 2019-01-06 RX ADMIN — ACETAMINOPHEN 650 MG: 325 TABLET ORAL at 16:29

## 2019-01-06 RX ADMIN — IOPAMIDOL 100 ML: 755 INJECTION, SOLUTION INTRAVENOUS at 15:09

## 2019-01-06 RX ADMIN — AMLODIPINE BESYLATE 5 MG: 5 TABLET ORAL at 19:06

## 2019-01-06 RX ADMIN — Medication 10 ML: at 15:09

## 2019-01-06 NOTE — PROGRESS NOTES
Patient being admitted to floor from ER Reviewed notes Will follow Leroy Mccabe, staff Gertrude spivey [address], [address] [facility]  /   [email or web address]

## 2019-01-06 NOTE — ED NOTES
TRANSFER - OUT REPORT: 
 
Verbal report given to Amadeo RN (name) on Yeny Arms  being transferred to [facility] (unit) for routine progression of care Report consisted of patients Situation, Background, Assessment and  
Recommendations(SBAR). Information from the following report(s) SBAR was reviewed with the receiving nurse. Lines:  
Peripheral IV 01/06/19 Right Antecubital (Active) Site Assessment Clean, dry, & intact 1/6/2019 10:11 AM  
Phlebitis Assessment 0 1/6/2019 10:11 AM  
Infiltration Assessment 0 1/6/2019 10:11 AM  
Dressing Status Clean, dry, & intact 1/6/2019 10:11 AM  
  
 
Opportunity for questions and clarification was provided. Patient transported with: 
 [employer]

## 2019-01-06 NOTE — ED PROVIDER NOTES
Patient is a 80-year-old female who recently broke her left hip. She has been in [facility]. Family who is bedside reports that they did a duplex 5 days ago which noted a left leg DVT. She was started on the Eliquis and has been doing well. Today the son was visiting her. Son states while he was visiting at approximately 080 she became very anxious because she was somewhat confused. She is also legally blind. During this time she developed bilateral chest pain radiating to her back. No shortness of breath, diaphoresis nausea or vomiting. She was given a nitroglycerin and symptoms resolved in about 15 minutes. Patient has history of an STEMI. She had a Nuc med stress study done on 12/11 which was deemed  moderate risk. Currently chest pain free and resting comfortably. She states overall her left leg swelling has gone down considerably sine 1/1 when DVT was diagnosed. The history is provided by the patient. No  was used. Chest Pain (Angina) Pertinent negatives include no abdominal pain, no back pain, no cough, no fever, no headaches, no nausea, no shortness of breath and no vomiting. Past Medical History:  
Diagnosis Date  Acquired hypothyroidism   
 synthroid daily  Anemia   
 blood transfusion 9/2018; monitored by [facility]  Arthritis  Chronic pain  Fractured hip ([address]  Gastroesophageal reflux disease 12/9/2016  GERD (gastroesophageal reflux disease)   
 nexium daily,well controlled per daughter-    hiatal hernia,   
 Giant cell arteritis ([address] 03/20/2010  
 monitored by rheumatologist, prednisone daily  Hip fracture ([address] 10/29/2018  
 left hip fracture-  followed by Dr Carol Newton @ [address] [place] [address]  Hypertension   
 managed with meds  Legally blind  Macular degeneration Legally blind  Memory deficit  NSTEMI (non-ST elevated myocardial infarction) ([address] 9/14/2018 9/12/18 \"mild mi, no damage, no heart cath or stents\"- medical management recommended however anemia persists, no current asa or thinners  Other ill-defined conditions(799.89)   
 back and pelvis pain  Other ill-defined conditions(799.89)   
 hip fx ;   arteritis  TIA (transient ischemic attack) 6/23/2015  
 no residual  
 Varicose veins of left lower extremity Past Surgical History:  
Procedure Laterality Date  CARDIAC SURG PROCEDURE UNLIST    
 Pt's family reports heart attack in Sept while in hospital at [facility].  HX CHOLECYSTECTOMY  HX ENDOSCOPY    
 HX HEENT    
 torn retina  HX TONSILLECTOMY Family History:  
Problem Relation Age of Onset  Cancer Father   
     stomach Oleta.Jesus Arthritis-osteo Mother  Hypertension Mother  Arthritis-osteo Sister Social History Socioeconomic History  Marital status:  Spouse name: Not on file  Number of children: Not on file  Years of education: Not on file  Highest education level: Not on file Social Needs  Financial resource strain: Not on file  Food insecurity - worry: Not on file  Food insecurity - inability: Not on file  Transportation needs - medical: Not on file  Transportation needs - non-medical: Not on file Occupational History  Not on file Tobacco Use  Smoking status: Never Smoker  Smokeless tobacco: Never Used Substance and Sexual Activity  Alcohol use: No  
 Drug use: No  
 Sexual activity: No  
Other Topics Concern  Not on file Social History Narrative  Not on file ALLERGIES: Calcium carbonate; Ranitidine hcl; and Guaifenesin Review of Systems Constitutional: Negative for fever. HENT: Negative for sore throat. Respiratory: Negative for cough, chest tightness and shortness of breath. Cardiovascular: Positive for chest pain. Negative for leg swelling. Gastrointestinal: Negative for abdominal pain, nausea and vomiting. Genitourinary: Negative for dysuria. Musculoskeletal: Negative for back pain. Skin: Negative for rash. Neurological: Negative for syncope and headaches. Psychiatric/Behavioral: Negative for confusion. Vitals:  
 01/06/19 1000 BP: 167/76 Pulse: 65 Resp: 18 Temp: 98.4 °F (36.9 °C) Weight: 45.4 kg (100 lb) Height: 5' 2\" (1.575 m) Physical Exam  
Constitutional: She is oriented to person, place, and time. She appears well-developed and well-nourished. No distress. HENT:  
Head: Normocephalic and atraumatic. Eyes: Conjunctivae and EOM are normal. Pupils are equal, round, and reactive to light. Neck: Normal range of motion. Neck supple. Cardiovascular: Normal rate, regular rhythm and normal heart sounds. Pulmonary/Chest: Effort normal and breath sounds normal. No respiratory distress. She has no wheezes. She has no rales. Abdominal: Soft. She exhibits no distension. There is no tenderness. There is no rebound. Musculoskeletal: Normal range of motion. She exhibits no edema or tenderness. Left lower extremity demonstrates no significant swelling or edema. She does have an anterior left shin skin tear which family states has been present since left hip surgery. Surgical site is adequately dressed without any evidence of significant drainage or surrounding erythema. Neurological: She is alert and oriented to person, place, and time. Skin: Skin is warm and dry. No rash noted. She is not diaphoretic. Psychiatric: She has a normal mood and affect. Her behavior is normal.  
Nursing note and vitals reviewed. MDM Number of Diagnoses or Management Options Acute chest pain: new and requires workup Deep vein thrombosis (DVT) of proximal vein of left lower extremity, unspecified chronicity ([address]): new and requires workup Elevated troponin level: new and requires workup Diagnosis management comments: Second troponin is upgoing. Confirmed with laboratory troponin. CT negative for pulmonary embolism. Discussed case with  Hospitalist for trending of cardiac enzymes and possible cardiology consultation. I updated the patient and family. No chest pain in the emergency department. Katy Sutherland MD; 1/6/2019 @5:49 PM Voice dictation software was used during the making of this note. This software is not perfect and grammatical and other typographical errors may be present. This note has not been proofread for errors. 
=================================================================== Amount and/or Complexity of Data Reviewed Clinical lab tests: ordered and reviewed (Results for orders placed or performed during the hospital encounter of 01/06/19 
-CBC WITH AUTOMATED DIFF Result                      Value             Ref Range WBC                         10.1              4.3 - 11.1 K* 
     RBC                         3.24 (L)          4.05 - 5.2 M* HGB                         8.5 (L)           11.7 - 15.4 * HCT                         29.0 (L)          35.8 - 46.3 % MCV                         89.5              79.6 - 97.8 * MCH                         26.2              26.1 - 32.9 * MCHC                        29.3 (L)          31.4 - 35.0 * RDW                         17.3 (H)          11.9 - 14.6 % PLATELET                    405               150 - 450 K/* MPV                         11.0              9.4 - 12.3 FL ABSOLUTE NRBC               0.00              0.0 - 0.2 K/* DF                          AUTOMATED NEUTROPHILS                 77                43 - 78 % LYMPHOCYTES                 9 (L)             13 - 44 %      MONOCYTES                   8                 4.0 - 12.0 %  
 EOSINOPHILS                 4                 0.5 - 7.8 % BASOPHILS                   1                 0.0 - 2.0 % IMMATURE GRANULOCYTES       1                 0.0 - 5.0 %   
     ABS. NEUTROPHILS            7.8               1.7 - 8.2 K/* ABS. LYMPHOCYTES            0.9               0.5 - 4.6 K/* ABS. MONOCYTES              0.9               0.1 - 1.3 K/* ABS. EOSINOPHILS            0.4               0.0 - 0.8 K/* ABS. BASOPHILS              0.1               0.0 - 0.2 K/* ABS. IMM. GRANS.            0.1               0.0 - 0.5 K/* 
-METABOLIC PANEL, COMPREHENSIVE Result                      Value             Ref Range Sodium                      143               136 - 145 mm* Potassium                   3.4 (L)           3.5 - 5.1 mm* Chloride                    109 (H)           98 - 107 mmo* CO2                         25                21 - 32 mmol* Anion gap                   9                 7 - 16 mmol/L Glucose                     94                65 - 100 mg/* BUN                         15                8 - 23 MG/DL Creatinine                  0.70              0.6 - 1.0 MG* 
     GFR est AA                  >60               >60 ml/min/1* GFR est non-AA              >60               >60 ml/min/1* Calcium                     8.8               8.3 - 10.4 M* Bilirubin, total            0.5               0.2 - 1.1 MG* ALT (SGPT)                  14                12 - 65 U/L   
     AST (SGOT)                  19                15 - 37 U/L Alk. phosphatase            91                50 - 136 U/L Protein, total              5.7 (L)           6.3 - 8.2 g/* Albumin                     2.7 (L)           3.2 - 4.6 g/* Globulin                    3.0               2.3 - 3.5 g/* A-G Ratio                   0.9 (L)           1.2 - 3.5 -TROPONIN I Result                      Value             Ref Range Troponin-I, Qt.             0.08 (H)          0.02 - 0.05 * 
-BNP Result                      Value             Ref Range BNP                         547 (H)           0 pg/mL       
-POC TROPONIN-I Result                      Value             Ref Range Troponin-I (POC)            0.01 (L)          0.02 - 0.05 * 
-POC TROPONIN-I Result                      Value             Ref Range Troponin-I (POC)            0.06 (H)          0.02 - 0.05 * 
-EKG, 12 LEAD, INITIAL Result                      Value             Ref Range Ventricular Rate            67                BPM           
     Atrial Rate                 67                BPM           
     P-R Interval                162               ms            
     QRS Duration                94                ms Q-T Interval                390               ms            
     QTC Calculation (Bezet)     412               ms            
     Calculated P Axis           57                degrees Calculated R Axis           -61               degrees Calculated T Axis           106               degrees Diagnosis                                                   
 !! AGE AND GENDER SPECIFIC ECG ANALYSIS !! Normal sinus rhythm Left axis deviation Incomplete right bundle branch block Left ventricular hypertrophy with repolarization abnormality Anterior infarct (cited on or before 28-OCT-2006) Abnormal ECG When compared with ECG of 24-DEC-2018 11:24, 
 Premature ventricular complexes are no longer Present Confirmed by Damir Chatman ([id number]) on 1/6/2019 12:21:37 PM 
 ) Tests in the radiology section of CPT®: ordered and reviewed (Ct Chest W Cont Result Date: 1/6/2019 CHEST CT ANGIOGRAPHY WITH ADDITIONAL REFORMATS:  CLINICAL HISTORY: Shortness of breath this morning with known DVT. TECHNIQUE:  During bolus injection of nonionic intravenous contrast, the chest was scanned with spiral technique, and coronal reformats were produced. Radiation dose reduction was achieved using one or all of the following techniques: automated exposure control, weight-based dosing, iterative reconstruction. COMPARISON:  Portable chest today. FINDINGS:  There is expected opacification of the pulmonary arterial tree with no intraluminal soft tissue density to suggest acute pulmonary embolism. There is no definite pneumothorax. There is focal atelectasis medially at the left lung base adjacent to a moderate hiatal hernia. No pathologically enlarged lymph nodes or abnormal fluid collection is seen. There are extensive atherosclerotic calcifications with borderline dilatation of the ascending aorta at 4.0 cm. The epigastrium appears unremarkable as imaged status post cholecystectomy. IMPRESSION:  1. NO DEFINITE ACUTE PULMONARY WAS OBTAINED. 2.  BORDERLINE DILATATION OF THE ASCENDING AORTA. Xr Chest [facility] Result Date: 1/6/2019 PORTABLE CHEST, January 6, 2019 at 0957 hours CLINICAL HISTORY:  CHEST pain. COMPARISON:  October 30, 2018 FINDINGS:  AP semi-erect image demonstrates no confluent infiltrate or significant pleural fluid. The heart size is at the upper limits of normal without evidence of congestive heart failure or pneumothorax. The bony thorax appears intact on this view. There are overlying radiopaque support devices. IMPRESSION:  NO ACUTE CARDIOPULMONARY DISEASE IDENTIFIED. ) Review and summarize past medical records: yes Discuss the patient with other providers: yes Independent visualization of images, tracings, or specimens: yes Risk of Complications, Morbidity, and/or Mortality Presenting problems: high Diagnostic procedures: moderate Management options: moderate Patient Progress Patient progress: stable ED Course as of Jan 06 1748 Murleen Nissen Jan 06, 2019  
1216 Patient remains chest pain-free. Good oxygen saturation. No tachycardia. I do not feel strongly about getting a CT to evaluate for pulmonary embolism. She is already on Eliquis  [JL] 1346 Point-of-care troponin slightly elevated. We'll perform CT of chest and this point to evaluate for pulmonary embolism. [JL] ED Course User Index [JL] Zeina Hill MD  
 
 
Procedures

## 2019-01-06 NOTE — H&P
Hospitalist H&P Note Admit Date:  2019  9:53 AM  
Name:  Sanjiv Diop Age:  80 y.o. 
:  1928 MRN:  [id number] PCP:  Evonne Jolly MD 
Treatment Team: Attending Provider: Pablo Myers MD; Primary Nurse: Alexandra Pereyra 
 
HPI:  
Patient history was obtained from the ER provider prior to seeing the patient. Pt is a 81 yo female with PMH of NSTEMI, HTN, GERD, HLD, hypothyroidism, fracture left hip with repair in December. Pt presented to the ED via EMS form [facility] with report of chest pain across her chest, both arms, and radiating to her back. Pt was given a NTG and her pain was relieved. Pt received a full strength ASA by EMS, her BG was 181. Pt was dx with a blood clot in her LLE on , she was started on eliquis at that time. Pt denies any fever, n/v/d, cough, SOB. Per pt's son she was sitting on the side of bed, upset that she was having trouble remembering things when the chest pain developed. Nothing made it better or worse. At time of exam pt was resting on a gurney, denies any chest pain. Initial lab work remarkable for hgb 8.5 (she was 8.9 at time of d/c in December), K+ 3.4, , Trop POC 0.01 and 0.06, by lab drawn 0.08. CT chest negative for PE. EKG with NSR incomplete RBBB. Will trend troponins tonight, if positive will start heparin drip and consult cardiology. 10 systems reviewed and negative except as noted in HPI. Past Medical History:  
Diagnosis Date  Acquired hypothyroidism   
 synthroid daily  Anemia   
 blood transfusion 2018; monitored by [facility]  Arthritis  Chronic pain  Fractured hip ([facility] [address]  Gastroesophageal reflux disease 2016  GERD (gastroesophageal reflux disease)   
 nexium daily,well controlled per daughter-    hiatal hernia,   
 Giant cell arteritis ([address] 2010  
 monitored by rheumatologist, prednisone daily  Hip fracture ([facility] [address] 10/29/2018 left hip fracture-  followed by Dr Kiersten Corcoran @ [address] [facility]  Hypertension   
 managed with meds  Legally blind  Macular degeneration Legally blind  Memory deficit  NSTEMI (non-ST elevated myocardial infarction) ([facility] [address] 9/14/2018 9/12/18 \"mild mi, no damage, no heart cath or stents\"- medical management recommended however anemia persists, no current asa or thinners  Other ill-defined conditions(799.89)   
 back and pelvis pain  Other ill-defined conditions(799.89)   
 hip fx ;   arteritis  TIA (transient ischemic attack) 6/23/2015  
 no residual  
 Varicose veins of left lower extremity Past Surgical History:  
Procedure Laterality Date  CARDIAC SURG PROCEDURE UNLIST    
 Pt's family reports heart attack in Sept while in hospital at [address] [place] [address] DrOsmel  HX CHOLECYSTECTOMY  HX ENDOSCOPY    
 HX HEENT    
 torn retina  HX TONSILLECTOMY Allergies Allergen Reactions  Calcium Carbonate Diarrhea  Ranitidine Hcl Diarrhea  Guaifenesin Other (comments) Hallucinations Social History Tobacco Use  Smoking status: Never Smoker  Smokeless tobacco: Never Used Substance Use Topics  Alcohol use: No  
  
Family History Problem Relation Age of Onset  Cancer Father   
     stomach [facility] Arthritis-osteo Mother  Hypertension Mother  Arthritis-osteo Sister Immunization History Administered Date(s) Administered  Influenza Vaccine 10/12/2010, 09/28/2011, 10/30/2012, 10/20/2015, 10/15/2016  Pneumococcal Conjugate (PCV-13) 11/03/2015  Pneumococcal Polysaccharide (PPSV-23) 10/12/2010  TB Skin Test (PPD) Intradermal 10/30/2018, 12/24/2018  Tdap 01/06/2018 PTA Medications: 
Prior to Admission Medications Prescriptions Last Dose Informant Patient Reported? Taking? Cholecalciferol, Vitamin D3, (VITAMIN D3) 1,000 unit cap   Yes No  
Sig: Take 1 Cap by mouth daily.   
Denosumab (PROLIA) 60 mg/mL injection   Yes No  
 Si mg by SubCUTAneous route every 6 months. HYDROcodone-acetaminophen (NORCO) 7.5-325 mg per tablet   No No  
Sig: Take 0.5 Tabs by mouth every four (4) hours as needed. Max Daily Amount: 3 Tabs. OTHER   Yes No  
Sig: Calcium - OTC  
acetaminophen (TYLENOL) 650 mg TbER   Yes No  
Sig: Take 650 mg by mouth every eight (8) hours. amLODIPine (NORVASC) 5 mg tablet   No No  
Sig: Take 1 Tab by mouth daily. Patient taking differently: Take 5 mg by mouth daily. aspirin 81 mg chewable tablet   No No  
Sig: Take 1 Tab by mouth two (2) times a day. atorvastatin (LIPITOR) 10 mg tablet   No No  
Sig: Take 1/2 tab every other night. bevacizumab (AVASTIN) 25 mg/ml soln intravitreal solution   Yes No  
Sig: by IntraVITreal route once. celecoxib (CELEBREX) 200 mg capsule   No No  
Sig: Take 1 Cap by mouth every twelve (12) hours every twelve (12) hours for 90 days. esomeprazole (NEXIUM) 40 mg capsule   Yes No  
Sig: Take 40 mg by mouth daily. levothyroxine (SYNTHROID) 25 mcg tablet   No No  
Sig: Take 1 Tab by mouth Daily (before breakfast). Patient taking differently: Take 25-50 mcg by mouth every other day. 25 mg alternating daily with 50 mg  
levothyroxine (SYNTHROID) 50 mcg tablet   No No  
Sig: Take one tab by mouth every other day. Alternate 50 and 25 mcg dose. Patient taking differently: Take 25-50 mcg by mouth every other day. Take one tab by mouth every other day. Alternate 50 and 25 mcg dose. metoprolol succinate (TOPROL-XL) 25 mg XL tablet   Yes No  
Sig: Take  by mouth daily. Currently prescribed 50 mg Metoprolol daily BUT patient's daughter states right now she is only taking 1.5 tablets of 25 mg daily  
olmesartan (BENICAR) 40 mg tablet   No No  
Sig: Take 0.5 Tabs by mouth daily. ondansetron (ZOFRAN ODT) 8 mg disintegrating tablet   No No  
Sig: Take 1 Tab by mouth every eight (8) hours as needed for Nausea.   
predniSONE (DELTASONE) 1 mg tablet   Yes No  
 Sig: Take 3 mg by mouth daily. Facility-Administered Medications: None Objective:  
 
Patient Vitals for the past 24 hrs: 
 Temp Pulse Resp BP SpO2  
01/06/19 1712  63 14 182/81 94 % 01/06/19 1631  72 18 163/76 94 % 01/06/19 1527  73 17  96 % 01/06/19 1525  61  158/68 95 % 01/06/19 1345  62 26 159/74 95 % 01/06/19 1330  64 (!) 31 175/73 95 % 01/06/19 1316  61 18 175/73 95 % 01/06/19 1030  64  163/82 93 % 01/06/19 1000 98.4 °F (36.9 °C) 65 18 167/76  Oxygen Therapy O2 Sat (%): 94 % (01/06/19 1712) Pulse via Oximetry: 62 beats per minute (01/06/19 1712) O2 Device: Room air (01/06/19 1712) No intake or output data in the 24 hours ending 01/06/19 1747 Physical Exam: 
General:    Thin and frail. Alert. Oriented x 3 Eyes:   Normal sclera. Extraocular movements intact. ENT:  Normocephalic, atraumatic. Moist mucous membranes CV:   RRR. No m/r/g. Peripheral pulses 2+. Lungs:  CTAB. No wheezing, rhonchi, or rales. Abdomen: Soft, nontender, nondistended. +BS Extremities: Warm and dry. Neurologic: No focal deficits Skin:     No rashes or jaundice. LLE with large ecchymotic area on shin, dressing in place with xerofoam.  
Psych:  Normal mood and affect. I reviewed the labs, imaging, EKGs, telemetry, and other studies done this admission. Data Review:  
Recent Results (from the past 24 hour(s)) EKG, 12 LEAD, INITIAL Collection Time: 01/06/19 10:00 AM  
Result Value Ref Range Ventricular Rate 67 BPM  
 Atrial Rate 67 BPM  
 P-R Interval 162 ms QRS Duration 94 ms Q-T Interval 390 ms QTC Calculation (Bezet) 412 ms Calculated P Axis 57 degrees Calculated R Axis -61 degrees Calculated T Axis 106 degrees Diagnosis    
  !! AGE AND GENDER SPECIFIC ECG ANALYSIS !! Normal sinus rhythm Left axis deviation Incomplete right bundle branch block Left ventricular hypertrophy with repolarization abnormality Anterior infarct (cited on or before 28-OCT-2006) Abnormal ECG When compared with ECG of 24-DEC-2018 11:24, 
Premature ventricular complexes are no longer Present Confirmed by Stefania Henry ([id number]) on 1/6/2019 12:21:37 PM 
  
POC TROPONIN-I Collection Time: 01/06/19 10:07 AM  
Result Value Ref Range Troponin-I (POC) 0.01 (L) 0.02 - 0.05 ng/ml CBC WITH AUTOMATED DIFF Collection Time: 01/06/19 11:04 AM  
Result Value Ref Range WBC 10.1 4.3 - 11.1 K/uL  
 RBC 3.24 (L) 4.05 - 5.2 M/uL HGB 8.5 (L) 11.7 - 15.4 g/dL HCT 29.0 (L) 35.8 - 46.3 % MCV 89.5 79.6 - 97.8 FL  
 MCH 26.2 26.1 - 32.9 PG  
 MCHC 29.3 (L) 31.4 - 35.0 g/dL  
 RDW 17.3 (H) 11.9 - 14.6 % PLATELET 361 493 - 773 K/uL MPV 11.0 9.4 - 12.3 FL ABSOLUTE NRBC 0.00 0.0 - 0.2 K/uL  
 DF AUTOMATED NEUTROPHILS 77 43 - 78 % LYMPHOCYTES 9 (L) 13 - 44 % MONOCYTES 8 4.0 - 12.0 % EOSINOPHILS 4 0.5 - 7.8 % BASOPHILS 1 0.0 - 2.0 % IMMATURE GRANULOCYTES 1 0.0 - 5.0 %  
 ABS. NEUTROPHILS 7.8 1.7 - 8.2 K/UL  
 ABS. LYMPHOCYTES 0.9 0.5 - 4.6 K/UL  
 ABS. MONOCYTES 0.9 0.1 - 1.3 K/UL  
 ABS. EOSINOPHILS 0.4 0.0 - 0.8 K/UL  
 ABS. BASOPHILS 0.1 0.0 - 0.2 K/UL  
 ABS. IMM. GRANS. 0.1 0.0 - 0.5 K/UL METABOLIC PANEL, COMPREHENSIVE Collection Time: 01/06/19 11:04 AM  
Result Value Ref Range Sodium 143 136 - 145 mmol/L Potassium 3.4 (L) 3.5 - 5.1 mmol/L Chloride 109 (H) 98 - 107 mmol/L  
 CO2 25 21 - 32 mmol/L Anion gap 9 7 - 16 mmol/L Glucose 94 65 - 100 mg/dL BUN 15 8 - 23 MG/DL Creatinine 0.70 0.6 - 1.0 MG/DL  
 GFR est AA >60 >60 ml/min/1.73m2 GFR est non-AA >60 >60 ml/min/1.73m2 Calcium 8.8 8.3 - 10.4 MG/DL Bilirubin, total 0.5 0.2 - 1.1 MG/DL  
 ALT (SGPT) 14 12 - 65 U/L  
 AST (SGOT) 19 15 - 37 U/L Alk. phosphatase 91 50 - 136 U/L Protein, total 5.7 (L) 6.3 - 8.2 g/dL Albumin 2.7 (L) 3.2 - 4.6 g/dL Globulin 3.0 2.3 - 3.5 g/dL  A-G Ratio 0.9 (L) 1.2 - 3.5    
TROPONIN I  
 Collection Time: 01/06/19 11:04 AM  
Result Value Ref Range Troponin-I, Qt. 0.08 (H) 0.02 - 0.05 NG/ML  
BNP Collection Time: 01/06/19 11:04 AM  
Result Value Ref Range  (H) 0 pg/mL POC TROPONIN-I Collection Time: 01/06/19  1:15 PM  
Result Value Ref Range Troponin-I (POC) 0.06 (H) 0.02 - 0.05 ng/ml All Micro Results None Other Studies: 
Ct Chest W Cont Result Date: 1/6/2019 CHEST CT ANGIOGRAPHY WITH ADDITIONAL REFORMATS:  CLINICAL HISTORY:  Shortness of breath this morning with known DVT. TECHNIQUE:  During bolus injection of nonionic intravenous contrast, the chest was scanned with spiral technique, and coronal reformats were produced. Radiation dose reduction was achieved using one or all of the following techniques: automated exposure control, weight-based dosing, iterative reconstruction. COMPARISON:  Portable chest today. FINDINGS:  There is expected opacification of the pulmonary arterial tree with no intraluminal soft tissue density to suggest acute pulmonary embolism. There is no definite pneumothorax. There is focal atelectasis medially at the left lung base adjacent to a moderate hiatal hernia. No pathologically enlarged lymph nodes or abnormal fluid collection is seen. There are extensive atherosclerotic calcifications with borderline dilatation of the ascending aorta at 4.0 cm. The epigastrium appears unremarkable as imaged status post cholecystectomy. IMPRESSION:  1. NO DEFINITE ACUTE PULMONARY WAS OBTAINED. 2.  BORDERLINE DILATATION OF THE ASCENDING AORTA. Xr Chest [facility] Result Date: 1/6/2019 PORTABLE CHEST, January 6, 2019 at 0957 hours CLINICAL HISTORY:  CHEST pain. COMPARISON:  October 30, 2018 FINDINGS:  AP semi-erect image demonstrates no confluent infiltrate or significant pleural fluid.   The heart size is at the upper limits of normal without evidence of congestive heart failure or pneumothorax. The bony thorax appears intact on this view. There are overlying radiopaque support devices. IMPRESSION:  NO ACUTE CARDIOPULMONARY DISEASE IDENTIFIED. Assessment and Plan:  
 
Hospital Problems as of 1/6/2019 Date Reviewed: 9/20/2018 Codes Class Noted - Resolved POA * (Principal) Atypical chest pain ICD-10-CM: R07.89 ICD-9-CM: 786.59  1/6/2019 - Present Yes Acquired hypothyroidism ICD-10-CM: E03.9 ICD-9-CM: 244.9  2/15/2017 - Present Yes Hyperlipidemia LDL goal <100 ICD-10-CM: E78.5 ICD-9-CM: 272.4  12/9/2016 - Present Yes Gastroesophageal reflux disease ICD-10-CM: K21.9 ICD-9-CM: 530.81  12/9/2016 - Present Yes Hypertension (Chronic) ICD-10-CM: I10 
ICD-9-CM: 401.9  3/20/2010 - Present Yes PLAN: 
1. Atypical chest pain 
-Monitor 
-Trend troponins 
-If positive trop will start heparin gtt and consult cardiology 2. HTN 
-Cont home med 
-Monitor 3. GERD 
-Protonix 4. HLD 
-Cont home med 5. NSTEMI 
-Cont home med 5. Hypothyroidism 
-Cont home med 6. S/p Left hip fracture with repair 12/2018 
-Stable Discharge planning:  Return to [facility] to complete rehab DVT ppx: On eliquis Code status: Full code Decision Maker: Patient states that her daughter is the decision maker Admit status: Observation Estimated LOS:  Less than 2 midnights Risk:  High Case reviewed with supervising MD - Kristian Spaulding MD 
 
 
Signed: 
Aron Gutiérrez NP

## 2019-01-07 VITALS
DIASTOLIC BLOOD PRESSURE: 67 MMHG | HEART RATE: 66 BPM | SYSTOLIC BLOOD PRESSURE: 132 MMHG | TEMPERATURE: 97.9 F | BODY MASS INDEX: 18.62 KG/M2 | RESPIRATION RATE: 17 BRPM | HEIGHT: 62 IN | OXYGEN SATURATION: 91 % | WEIGHT: 101.2 LBS

## 2019-01-07 LAB
ATRIAL RATE: 58 BPM
CALCULATED P AXIS, ECG09: 42 DEGREES
CALCULATED R AXIS, ECG10: -50 DEGREES
CALCULATED T AXIS, ECG11: 44 DEGREES
DIAGNOSIS, 93000: NORMAL
P-R INTERVAL, ECG05: 156 MS
Q-T INTERVAL, ECG07: 442 MS
QRS DURATION, ECG06: 108 MS
QTC CALCULATION (BEZET), ECG08: 433 MS
TROPONIN I SERPL-MCNC: 0.11 NG/ML (ref 0.02–0.05)
TROPONIN I SERPL-MCNC: 0.21 NG/ML (ref 0.02–0.05)
VENTRICULAR RATE, ECG03: 58 BPM

## 2019-01-07 PROCEDURE — 94760 N-INVAS EAR/PLS OXIMETRY 1: CPT

## 2019-01-07 PROCEDURE — 74011250637 HC RX REV CODE- 250/637: Performed by: INTERNAL MEDICINE

## 2019-01-07 PROCEDURE — 99218 HC RM OBSERVATION: CPT

## 2019-01-07 PROCEDURE — 77010033678 HC OXYGEN DAILY

## 2019-01-07 PROCEDURE — 84484 ASSAY OF TROPONIN QUANT: CPT

## 2019-01-07 PROCEDURE — 36415 COLL VENOUS BLD VENIPUNCTURE: CPT

## 2019-01-07 PROCEDURE — 74011636637 HC RX REV CODE- 636/637: Performed by: NURSE PRACTITIONER

## 2019-01-07 PROCEDURE — 74011250637 HC RX REV CODE- 250/637: Performed by: EMERGENCY MEDICINE

## 2019-01-07 PROCEDURE — 74011250637 HC RX REV CODE- 250/637: Performed by: NURSE PRACTITIONER

## 2019-01-07 PROCEDURE — A9270 NON-COVERED ITEM OR SERVICE: HCPCS | Performed by: NURSE PRACTITIONER

## 2019-01-07 RX ORDER — ATORVASTATIN CALCIUM 10 MG/1
5 TABLET, FILM COATED ORAL
Qty: 15 TAB | Refills: 0 | Status: SHIPPED
Start: 2019-01-07 | End: 2019-02-18 | Stop reason: SDUPTHER

## 2019-01-07 RX ORDER — METOPROLOL SUCCINATE 25 MG/1
25 TABLET, EXTENDED RELEASE ORAL DAILY
Qty: 10 TAB | Refills: 0 | Status: SHIPPED
Start: 2019-01-08 | End: 2019-01-31 | Stop reason: SDUPTHER

## 2019-01-07 RX ORDER — HYDROCODONE BITARTRATE AND ACETAMINOPHEN 7.5; 325 MG/1; MG/1
0.5 TABLET ORAL
Qty: 30 TAB | Refills: 0 | Status: SHIPPED | OUTPATIENT
Start: 2019-01-07 | End: 2019-01-24

## 2019-01-07 RX ORDER — NITROGLYCERIN 0.4 MG/1
0.4 TABLET SUBLINGUAL
Qty: 1 BOTTLE | Refills: 0 | Status: SHIPPED | OUTPATIENT
Start: 2019-01-07 | End: 2019-01-31

## 2019-01-07 RX ORDER — LEVOTHYROXINE SODIUM 50 UG/1
TABLET ORAL
Qty: 30 TAB | Refills: 2 | Status: SHIPPED
Start: 2019-01-07 | End: 2019-03-15 | Stop reason: SDUPTHER

## 2019-01-07 RX ADMIN — PREDNISONE 3 MG: 1 TABLET ORAL at 08:30

## 2019-01-07 RX ADMIN — PANTOPRAZOLE SODIUM 40 MG: 40 TABLET, DELAYED RELEASE ORAL at 04:26

## 2019-01-07 RX ADMIN — METOPROLOL SUCCINATE 25 MG: 25 TABLET, EXTENDED RELEASE ORAL at 08:31

## 2019-01-07 RX ADMIN — ACETAMINOPHEN 650 MG: 325 TABLET ORAL at 14:00

## 2019-01-07 RX ADMIN — Medication 5 ML: at 04:26

## 2019-01-07 RX ADMIN — AMLODIPINE BESYLATE 5 MG: 5 TABLET ORAL at 08:30

## 2019-01-07 RX ADMIN — ASPIRIN 81 MG: 81 TABLET, CHEWABLE ORAL at 08:30

## 2019-01-07 RX ADMIN — LEVOTHYROXINE SODIUM 25 MCG: 50 TABLET ORAL at 04:26

## 2019-01-07 RX ADMIN — ACETAMINOPHEN 650 MG: 325 TABLET ORAL at 07:42

## 2019-01-07 RX ADMIN — OLMESARTAN MEDOXOMIL 20 MG: 20 TABLET, FILM COATED ORAL at 08:31

## 2019-01-07 NOTE — PROGRESS NOTES
In accordance with Medicare guidelines, a copy of the Medicare Outpatient Observation Notice was provided to the patient/patient's son. Oral explanation was provided and all questions answered. This MOON document was signed by patient's son & placed in the medical record under media tab. Copy provided to patient's son.  notified.

## 2019-01-07 NOTE — PROGRESS NOTES
Troponin . 21 Dr. Jin Burns notified. Patient in bed. Denies pain. All VS stable WNL. New orders received to draw troponin at 0400 and 0800. Call MD with any changes. Will continue to monitor.

## 2019-01-07 NOTE — DISCHARGE INSTRUCTIONS

## 2019-01-07 NOTE — PROGRESS NOTES
Noted patient in SVT, 's. Received order per PALOMO Carrasco for 10 mg lopressor. Administered. HR dropped to SB 56. Stable HR 56-62. BP stable, see flowsheet. Pt has no discomfort Oncoming RN will continue to monitor

## 2019-01-07 NOTE — PROGRESS NOTES
Discharge instructions reviewed with patient. Opportunity for questions provided. Patient voiced understanding of all discharge instructions. IV(s) and heart monitor removed by primary RN. Packet provided by social work.

## 2019-01-07 NOTE — PROGRESS NOTES
Called to room patient c/o  left shoulder discomfort. 1 nitro given and patient placed on 2 L O2. Pt states discomfort resolved

## 2019-01-07 NOTE — DISCHARGE SUMMARY
Hospitalist Discharge Summary Admit Date:  2019  9:53 AM  
Name:  Daniel Salcedo Age:  80 y.o. 
:  1928 MRN:  [id number] PCP:  Boneta Blizzard, MD 
Treatment Team: Attending Provider: Nelson Silverman NP; Utilization Review: Heather Torres RN 
 
Problem List for this Hospitalization: 
Hospital Problems as of 2019 Date Reviewed: 2018 Codes Class Noted - Resolved POA * (Principal) Atypical chest pain ICD-10-CM: R07.89 ICD-9-CM: 786.59  2019 - Present Yes NSTEMI (non-ST elevated myocardial infarction) ([facility] [address] ICD-10-CM: I21.4 ICD-9-CM: 410.70  2018 - Present Yes Acquired hypothyroidism ICD-10-CM: E03.9 ICD-9-CM: 244.9  2/15/2017 - Present Yes Hyperlipidemia LDL goal <100 ICD-10-CM: E78.5 ICD-9-CM: 272.4  2016 - Present Yes Gastroesophageal reflux disease ICD-10-CM: K21.9 ICD-9-CM: 530.81  2016 - Present Yes Hypertension (Chronic) ICD-10-CM: I10 
ICD-9-CM: 401.9  3/20/2010 - Present Yes Admission HPI from 2019:   
Pt is a 81 yo female with PMH of NSTEMI, HTN, GERD, HLD, hypothyroidism, fracture left hip with repair in December. Pt presented to the ED via EMS form [facility] with report of chest pain across her chest, both arms, and radiating to her back. Pt was given a NTG and her pain was relieved. Pt received a full strength ASA by EMS, her BG was 181. Pt was dx with a blood clot in her LLE on , she was started on eliquis at that time. Pt denies any fever, n/v/d, cough, SOB. Per pt's son she was sitting on the side of bed, upset that she was having trouble remembering things when the chest pain developed. Nothing made it better or worse. 
  
At time of exam pt was resting on a gurney, denies any chest pain. Initial lab work remarkable for hgb 8.5 (she was 8.9 at time of d/c in December), K+ 3.4, , Trop POC 0.01 and 0.06, by lab drawn 0.08.   CT chest negative for PE. EKG with NSR incomplete RBBB. Will trend troponins tonight, if positive will start heparin drip and consult cardiology.   
  
Hospital Course: As noted in the H&P, Pt is a 81 yo female with PMH of NSTEMI, HTN, GERD, HLD, hypothyroidism, fracture left hip with repair in December. Pt presented to the ED via EMS form [facility] with report of chest pain across her chest, both arms, and radiating to her back. Pt was given a NTG and her pain was relieved. Pt received a full strength ASA by EMS, her BG was 181. Pt was dx with a blood clot in her LLE on 1/1, she was started on eliquis at that time. Pt denies any fever, n/v/d, cough, SOB. Per pt's son she was sitting on the side of bed, upset that she was having trouble remembering things when the chest pain developed. Nothing made it better or worse. 
  
At time of exam pt was resting on a gurney, denies any chest pain. Initial lab work remarkable for hgb 8.5 (she was 8.9 at time of d/c in December), K+ 3.4, , Trop POC 0.01 and 0.06, by lab drawn 0.08. CT chest negative for PE. EKG with NSR incomplete RBBB. Patient's troponin peaked at 2300 at 0.21, next trop from 0800 down at  0.11. Pt had an episode of \"shoulder pain\" when she got to her room last night that was relieved by NTG. No further pain. At time of exam today pt up in chair, states that she is eager to get back to rehab so she can get home sooner. In light of the recent, very thorough workup pt had done prior to her hip surgery will d/c her and have her follow up with Cardiology in the office. Follow up instructions and discharge meds at bottom of this note. Plan was discussed with patient, family, and care team.  All questions answered. Patient was stable at time of discharge. 10 systems reviewed and negative except as noted in HPI. Diagnostic Imaging/Tests:  
Ct Chest W Cont Result Date: 1/6/2019 CHEST CT ANGIOGRAPHY WITH ADDITIONAL REFORMATS:  CLINICAL HISTORY:  Shortness of breath this morning with known DVT. TECHNIQUE:  During bolus injection of nonionic intravenous contrast, the chest was scanned with spiral technique, and coronal reformats were produced. Radiation dose reduction was achieved using one or all of the following techniques: automated exposure control, weight-based dosing, iterative reconstruction. COMPARISON:  Portable chest today. FINDINGS:  There is expected opacification of the pulmonary arterial tree with no intraluminal soft tissue density to suggest acute pulmonary embolism. There is no definite pneumothorax. There is focal atelectasis medially at the left lung base adjacent to a moderate hiatal hernia. No pathologically enlarged lymph nodes or abnormal fluid collection is seen. There are extensive atherosclerotic calcifications with borderline dilatation of the ascending aorta at 4.0 cm. The epigastrium appears unremarkable as imaged status post cholecystectomy. IMPRESSION:  1. NO DEFINITE ACUTE PULMONARY WAS OBTAINED. 2.  BORDERLINE DILATATION OF THE ASCENDING AORTA. Xr Chest Corinne  Result Date: 1/6/2019 PORTABLE CHEST, January 6, 2019 at 0957 hours CLINICAL HISTORY:  CHEST pain. COMPARISON:  October 30, 2018 FINDINGS:  AP semi-erect image demonstrates no confluent infiltrate or significant pleural fluid. The heart size is at the upper limits of normal without evidence of congestive heart failure or pneumothorax. The bony thorax appears intact on this view. There are overlying radiopaque support devices. IMPRESSION:  NO ACUTE CARDIOPULMONARY DISEASE IDENTIFIED. Echocardiogram results: No results found for this visit on 01/06/19. All Micro Results None Labs: Results:  
   
BMP, Mg, Phos Recent Labs 01/06/19 
1104   
K 3.4*  
* CO2 25 AGAP 9 BUN 15  
CREA 0.70 CA 8.8 GLU 94 CBC Recent Labs 01/06/19 [facility] WBC 10.1 RBC 3.24* HGB 8.5* HCT 29.0*  
 GRANS 77 LYMPH 9*  
EOS 4 MONOS 8  
BASOS 1 IG 1 ANEU 7.8 ABL 0.9 CHRISTIAN 0.4 ABM 0.9 ABB 0.1 AIG 0.1 LFT Recent Labs 01/06/19 
1104 SGOT 19 ALT 14  
AP 91  
TP 5.7* ALB 2.7*  
GLOB 3.0 AGRAT 0.9* Cardiac Testing Lab Results Component Value Date/Time  (H) 01/06/2019 11:04 AM  
  10/30/2018 12:43 PM  
 CK 63 09/01/2015 09:15 AM  
 CK 15 (L) 03/21/2010 12:01 AM  
 CK 18 (L) 03/20/2010 05:05 PM  
 Troponin-I <0.05 12/09/2010 08:28 AM  
 Troponin-I, Qt. 0.11 (HH) 01/07/2019 10:06 AM  
 Troponin-I, Qt. 0.21 (HH) 01/06/2019 11:39 PM  
 Troponin-I, Qt. 0.08 (H) 01/06/2019 06:59 PM  
  
Coagulation Tests Lab Results Component Value Date/Time Prothrombin time 13.5 12/24/2018 12:22 PM  
 Prothrombin time 12.9 11/30/2018 03:38 PM  
 Prothrombin time 13.5 09/13/2018 01:46 AM  
 INR 1.0 12/24/2018 12:22 PM  
 INR 1.0 11/30/2018 03:38 PM  
 INR 1.1 09/13/2018 01:46 AM  
 aPTT 24.8 12/24/2018 12:22 PM  
 aPTT 23.9 11/30/2018 03:38 PM  
 aPTT 116.5 (HH) 09/15/2018 06:20 AM  
  
A1c Lab Results Component Value Date/Time Hemoglobin A1c 6.6 (H) 02/02/2017 08:28 AM  
 Hemoglobin A1c 6.3 (H) 06/24/2015 04:00 AM  
  
Lipid Panel Lab Results Component Value Date/Time Cholesterol, total 183 12/12/2017 09:17 AM  
 HDL Cholesterol 59 12/12/2017 09:17 AM  
 LDL, calculated 93 12/12/2017 09:17 AM  
 VLDL, calculated 31 12/12/2017 09:17 AM  
 Triglyceride 154 (H) 12/12/2017 09:17 AM  
 CHOL/HDL Ratio 4.6 06/24/2015 04:00 AM  
  
Thyroid Panel Lab Results Component Value Date/Time TSH 4.290 (H) 09/15/2018 06:02 AM  
 TSH 9.830 (H) 12/12/2017 09:17 AM  
 T4, Free 1.25 12/12/2017 09:17 AM  
 T4, Free 1.24 02/02/2017 08:28 AM  
    
Most Recent UA Lab Results Component Value Date/Time  Color YELLOW 12/24/2018 11:25 AM  
 Appearance CLEAR 12/24/2018 11:25 AM  
 Specific gravity 1.019 09/14/2018 07:50 PM  
 pH (UA) 8.0 12/24/2018 11:25 AM  
 Protein NEGATIVE  12/24/2018 11:25 AM  
 Glucose NEGATIVE  12/24/2018 11:25 AM  
 Ketone NEGATIVE  12/24/2018 11:25 AM  
 Bilirubin NEGATIVE  12/24/2018 11:25 AM  
 Blood NEGATIVE  12/24/2018 11:25 AM  
 Urobilinogen 0.2 12/24/2018 11:25 AM  
 Nitrites NEGATIVE  12/24/2018 11:25 AM  
 Leukocyte Esterase NEGATIVE  12/24/2018 11:25 AM  
  
 
Allergies Allergen Reactions  Calcium Carbonate Diarrhea  Ranitidine Hcl Diarrhea  Guaifenesin Other (comments) Hallucinations Immunization History Administered Date(s) Administered  Influenza Vaccine 10/12/2010, 09/28/2011, 10/30/2012, 10/20/2015, 10/15/2016  Pneumococcal Conjugate (PCV-13) 11/03/2015  Pneumococcal Polysaccharide (PPSV-23) 10/12/2010  TB Skin Test (PPD) Intradermal 10/30/2018, 12/24/2018  Tdap 01/06/2018 All Labs from Last 24 Hrs: 
Recent Results (from the past 24 hour(s)) POC TROPONIN-I Collection Time: 01/06/19  1:15 PM  
Result Value Ref Range Troponin-I (POC) 0.06 (H) 0.02 - 0.05 ng/ml TROPONIN I Collection Time: 01/06/19  6:59 PM  
Result Value Ref Range Troponin-I, Qt. 0.08 (H) 0.02 - 0.05 NG/ML  
EKG, 12 LEAD, INITIAL Collection Time: 01/06/19  7:27 PM  
Result Value Ref Range Ventricular Rate 58 BPM  
 Atrial Rate 58 BPM  
 P-R Interval 156 ms QRS Duration 108 ms Q-T Interval 442 ms QTC Calculation (Bezet) 433 ms Calculated P Axis 42 degrees Calculated R Axis -50 degrees Calculated T Axis 44 degrees Diagnosis Sinus bradycardia Left axis deviation Left ventricular hypertrophy with repolarization abnormality Abnormal ECG When compared with ECG of 06-JAN-2019 10:00, Incomplete right bundle branch block is no longer Present Confirmed by Sabiha Rangel ([id number]) on 1/7/2019 6:50:28 AM 
  
TROPONIN I Collection Time: 01/06/19 11:39 PM  
Result Value Ref Range  Troponin-I, Qt. 0.21 (HH) 0.02 - 0.05 NG/ML  
TROPONIN I  
 Collection Time: 01/07/19 10:06 AM  
Result Value Ref Range Troponin-I, Qt. 0.11 (HH) 0.02 - 0.05 NG/ML Discharge Exam: 
Patient Vitals for the past 24 hrs: 
 Temp Pulse Resp BP SpO2  
01/07/19 0937 97.6 °F (36.4 °C) 66 18 137/62 96 % 01/07/19 0523 98.2 °F (36.8 °C) 65 18 171/82 96 % 01/07/19 0113 98 °F (36.7 °C) 62 18 173/73 91 % 01/07/19 0000  (!) 56     
01/06/19 2030 98.1 °F (36.7 °C) 72 16 140/73 92 % 01/06/19 1905  (!) 166  117/79   
01/06/19 1902  (!) 161  (!) 143/99   
01/06/19 1847 97.7 °F (36.5 °C) (!) 150 16 (!) 141/94 94 % 01/06/19 1712  63 14 182/81 94 % 01/06/19 1631  72 18 163/76 94 % 01/06/19 1527  73 17  96 % 01/06/19 1525  61  158/68 95 % 01/06/19 1345  62 26 159/74 95 % 01/06/19 1330  64 (!) 31 175/73 95 % 01/06/19 1316  61 18 175/73 95 % Oxygen Therapy O2 Sat (%): 96 % (01/07/19 0937) Pulse via Oximetry: 62 beats per minute (01/06/19 1712) O2 Device: Room air (01/07/19 7658) O2 Flow Rate (L/min): 2 l/min (01/06/19 1902) Intake/Output Summary (Last 24 hours) at 1/7/2019 1237 Last data filed at 1/7/2019 2144 Gross per 24 hour Intake 120 ml Output 1100 ml Net -980 ml Physical exam: 
General:    Well nourished. Alert. No distress. Eyes:   Normal sclera. Extraocular movements intact. ENT:  Normocephalic, atraumatic. Moist mucous membranes CV:   Regular rate and rhythm. No murmur, rub, or gallop. Lungs:  Clear to auscultation bilaterally. No wheezing, rhonchi, or rales. Abdomen: Soft, nontender, nondistended. Bowel sounds normal.  
Extremities: Warm and dry. No cyanosis or edema. Neurologic: No focal deficits Skin:     No rashes or jaundice. Psych:  Normal mood and affect. Discharge Info:  
Current Discharge Medication List  
  
START taking these medications Details  
nitroglycerin (NITROSTAT) 0.4 mg SL tablet 1 Tab by SubLINGual route every five (5) minutes as needed for Chest Pain. Up to 3 doses. Qty: 1 Bottle, Refills: 0 CONTINUE these medications which have CHANGED Details  
atorvastatin (LIPITOR) 10 mg tablet Take 0.5 Tabs by mouth nightly. Qty: 15 Tab, Refills: 0  
  
!! levothyroxine (SYNTHROID) 50 mcg tablet Take one tab by mouth every other day. On even number days take 50mcg. On odd number days take 25mcg. Qty: 30 Tab, Refills: 2  
  
metoprolol succinate (TOPROL-XL) 25 mg XL tablet Take 1 Tab by mouth daily. Qty: 10 Tab, Refills: 0  
  
 !! - Potential duplicate medications found. Please discuss with provider. CONTINUE these medications which have NOT CHANGED Details  
aspirin 81 mg chewable tablet Take 1 Tab by mouth two (2) times a day. Qty: 60 Tab, Refills: 0  
  
celecoxib (CELEBREX) 200 mg capsule Take 1 Cap by mouth every twelve (12) hours every twelve (12) hours for 90 days. Qty: 60 Cap, Refills: 2 HYDROcodone-acetaminophen (NORCO) 7.5-325 mg per tablet Take 0.5 Tabs by mouth every four (4) hours as needed. Max Daily Amount: 3 Tabs. Qty: 42 Tab, Refills: 0 Comments: Take 1/2 tab every 4 hours prn pain Associated Diagnoses: Pain of left hip joint  
  
ondansetron (ZOFRAN ODT) 8 mg disintegrating tablet Take 1 Tab by mouth every eight (8) hours as needed for Nausea. Qty: 30 Tab, Refills: 0  
  
OTHER Calcium - OTC  
  
acetaminophen (TYLENOL) 650 mg TbER Take 650 mg by mouth every eight (8) hours. predniSONE (DELTASONE) 1 mg tablet Take 3 mg by mouth daily. esomeprazole (NEXIUM) 40 mg capsule Take 40 mg by mouth daily. amLODIPine (NORVASC) 5 mg tablet Take 1 Tab by mouth daily. Qty: 30 Tab, Refills: 11  
 Associated Diagnoses: Essential hypertension  
  
!! levothyroxine (SYNTHROID) 25 mcg tablet Take 1 Tab by mouth Daily (before breakfast). Qty: 30 Tab, Refills: 5 Associated Diagnoses: Acquired hypothyroidism  
  
olmesartan (BENICAR) 40 mg tablet Take 0.5 Tabs by mouth daily.  
Qty: 30 Tab, Refills: 11  
  
 Cholecalciferol, Vitamin D3, (VITAMIN D3) 1,000 unit cap Take 1 Cap by mouth daily. Denosumab (PROLIA) 60 mg/mL injection 60 mg by SubCUTAneous route every 6 months. !! - Potential duplicate medications found. Please discuss with provider. STOP taking these medications  
  
 bevacizumab (AVASTIN) 25 mg/ml soln intravitreal solution Comments:  
Reason for Stopping:   
   
  
 
 
 
Disposition: SNF Activity: PT/OT Eval and Treat Diet: DIET CARDIAC Regular Follow-up Appointments Procedures  FOLLOW UP VISIT Appointment in: Other (Specify) As instructed As instructed Standing Status:   Standing Number of Occurrences:   1 Order Specific Question:   Appointment in Answer: Other (Specify) Follow-up Information Follow up With Specialties Details Why Contact Info Doris [address]   [address] [facility]. [address] [facility] [facility] 
[phone] [address] [place] [address]  Call in 2 weeks For a follow up appointment [address] Marty Skinner [phone] [facility] 
[phone] Precious Duarte MD Family Practice Schedule an appointment as soon as possible for a visit For a follow up appointment when you go home from the rehab facility [address] [place] [address]  [facility] [address] [phone] Case reviewed with supervising physician Roger Mina MD 
 
Time spent in patient discharge planning and coordination 35 minutes.  
 
Signed: 
MILI Tamayo

## 2019-01-07 NOTE — PROGRESS NOTES
TRANSFER - IN REPORT: 
 
Verbal report received from Zuleima Odom RN on Key Lerma being received from ER for routine progression of care. Report consisted of patients Situation, Background, Assessment and Recommendations(SBAR). Information from the following report(s) SBAR, Kardex, [facility] and Recent Results was reviewed. Opportunity for questions and clarification was provided. Assessment completed upon patients arrival to unit and care assumed. Patient received to room 314. Patient connected to monitor and assessment completed. Plan of care reviewed. Patient oriented to room and call light. Patient aware to use call light to communicate any chest pain or needs. Admission skin assessment completed with second RN and reveals the following: Sacrum is intact without breakdown; deep between buttocks pink however blanchable. Left upper leg to the left side with large dressing C/D/I s/p recent hip surgery. Left lower leg has two skin tears with xeroform and small foam dressing intact. Heels and toes intact. Multiple moles noted abdomen chest and arms. Skin overall is thin and fragile.

## 2019-01-07 NOTE — PROGRESS NOTES
Care Management Interventions PCP Verified by CM: Yes 
Palliative Care Criteria Met (RRAT>21 & CHF Dx)?: No(RRAT 38 Dx Chest pain ) Mode of Transport at Discharge: Other (see comment)(family) Transition of Care Consult (CM Consult): Discharge Planning Discharge Durable Medical Equipment: No 
Physical Therapy Consult: No 
Occupational Therapy Consult: No 
Speech Therapy Consult: No 
Current Support Network: Relative's Home(lives with her daughter) Confirm Follow Up Transport: Family Plan discussed with Pt/Family/Caregiver: Yes Freedom of Choice Offered: Yes Discharge Location Discharge Placement: Skilled nursing facility Met with patient and son for d/c planning. Patient alert and oriented x 3, independent of ADL's and lives with her daughter. Patient recently was d/c 12/27/18 after hip surgery to [address] [place] [address] and plans to return at d/c. Spoke with Berta Irby at [address] [place] [address] and patient can come back and go to room 202 A. Patient and son in agreement with d/c plan to [facility]. They would like to go by car and spoke with Rickey Wu NP and she stated okay to go by car if they would like to transport to [facility]. Patient and family will be given packet to give to nursing home staff at d/c. Patient to d/c [address] [place] [address] by family.

## 2019-03-15 ENCOUNTER — HOSPITAL ENCOUNTER (OUTPATIENT)
Dept: PHYSICAL THERAPY | Age: 84
Discharge: HOME | End: 2019-03-15
Payer: MEDICARE

## 2019-03-15 PROCEDURE — 97162 PT EVAL MOD COMPLEX 30 MIN: CPT

## 2019-03-15 PROCEDURE — 97110 THERAPEUTIC EXERCISES: CPT

## 2019-03-15 NOTE — PROGRESS NOTES
Jered Mcguire  : 1928  [place]  Secondary: [address] at [facility]  [address] [facility], [address] [place] [address],[address], [facility] [address]  [phone]   [phone]         OUTPATIENT PHYSICAL THERAPY: Daily Treatment Note 3/15/2019  Pre-treatment Symptoms/Complaints:  Decreased mobility and balance, weak  Pain: Initial: Pain Intensity 1: 0  Post Session:  0/10   Medications Last Reviewed:  3/15/19  Updated Objective Findings:  See evaluation note from today   TREATMENT:   THERAPEUTIC EXERCISE: (15 minutes):  Exercises per grid below to improve mobility, strength, balance and coordination. Required moderate verbal cues to promote proper body alignment, promote proper body posture and promote proper body mechanics. Progressed resistance, repetitions and complexity of movement as indicated. Date:  3/15/19 Date:   Date:     Activity/Exercise Parameters Parameters Parameters   Nustep Level 3 x 5 minutes     Sit to stand From arm chair x 10 reps with UE assist, from mat table with no UE assist x 10 reps     walking With and without RW, working on increasing step length and heel strike for safety. [employer]  Treatment/Session Summary:    · Response to Treatment:  Patient tolerated session well. She will need verbal cuing from daughter to remember to walk with heel strike during gait. .  · Communication/Consultation:  Blue completed. See note. · Equipment provided today:  None today  · Recommendations/Intent for next treatment session: Next visit will focus on strengthening, walking, balance. .  Treatment Plan of Care Effective Dates:  3/15/19 to 19  Total Treatment Billable Duration:  15 minutes  PT Patient Time In/Time Out  Time In: 9497  Time Out: [address], PT    Future Appointments   Date Time Provider Brandon Barnes   3/19/2019  9:00 AM Berenice Carmichael, PT [facility]DONAD[facility] 3/21/2019  2:00 PM Vola Old, PT [facility]   3/26/2019  8:15 AM Vola Old, PT [facility]   3/29/2019  8:45 AM Vola Old, PT [facility]   4/2/2019  9:00 AM Vola Old, PT [facility]   4/4/2019  9:00 AM Vola Old, PT [facility]   4/9/2019  9:00 AM Vola Old, PT [facility]   4/11/2019  9:00 AM Vola Old, PT [facility]   5/6/2019  1:15 PM Braydon Hillman MD [facility]

## 2019-03-15 NOTE — THERAPY EVALUATION
Arnoldo Arreaga  : 1928  [place]  Secondary: [address] at [facility]  [address], [address] [address],[address], Everardo Diaz.  [phone]   [phone]           OUTPATIENT PHYSICAL THERAPY:Initial Assessment 3/15/2019   ICD-10: Treatment Diagnosis: Other abnormalities of gait and mobility R26.89; Pain in left hip (M25.552); Presence of left artificial hip joint (R29.601); Stiffness of left hip, not elsewhere classified (X44.875)  Precautions/Allergies:   Calcium carbonate; Ranitidine hcl; and Guaifenesin   MD Orders: 2-3x/week for 4 weeks for rehab and strengthening MEDICAL/REFERRING DIAGNOSIS:  Presence of left artificial hip joint [Z96.642]  Unspecified fracture of head of left femur, initial encounter for closed fracture [S72.052A]   DATE OF ONSET: 18  REFERRING PHYSICIAN: Burak Rincon MD  RETURN PHYSICIAN APPOINTMENT: end      INITIAL ASSESSMENT:  Ms. Farheen Hernandez presents with decreased mobility, decreased strength, abnormal gait pattern, and impaired balance, s/p L hip hemiarthroplasty on 18. Recommended that patient continue to use her RW at this time due to fall risk, imbalance, weakness and impaired vision. Patient would benefit from PT to address these problems to improve patient's independence and safety with mobility and daily activities. Thank you. PROBLEM LIST (Impacting functional limitations):  1. Decreased Strength  2. Decreased Transfer Abilities  3. Decreased Ambulation Ability/Technique  4. Decreased Balance  5. Increased Pain  6. Decreased Activity Tolerance  7. Decreased Flexibility/Joint Mobility  8. Decreased [place] with Home Exercise Program INTERVENTIONS PLANNED: (Treatment may consist of any combination of the following)  1. Balance Exercise  2. Gait Training  3. Home Exercise Program (HEP)  4. Neuromuscular Re-education/Strengthening  5.  Therapeutic Activites  6. Therapeutic Exercise/Strengthening  7. Transfer Training   TREATMENT PLAN:  Effective Dates: 3/15/2019 TO 5/14/2019 (60 days). Frequency/Duration: 2-3 times a week for 30- 60 Day(s)  GOALS: (Goals have been discussed and agreed upon with patient.)  Short-Term Functional Goals: Time Frame: 2 weeks  1. Patient will demonstrate independence and compliance with home exercise program to improve mobility and strength for daily activities. 2.   Discharge Goals: Time Frame: 4-8 weeks  1. Patient will report decreased pain to less than or equal to 1/10 with standing and walking to improve patient's tolerance of daily activities. 2. Patient will ambulate with least assistive device over level and unlevel surfaces without evidence of imbalance to improve safety for daily activities. 3. Patient will increase her score on the Oliva Balance Scale to greater than or equal to 49/56 indicating improved safety and decreased fall risk for daily activities. 4. Patient will increase bilateral lower extremity strength to greater than or equal to 4+/5 to improve strength for functional mobility activities. 5. Patient will demonstrate improved score on the Lower Extremity Functional Scale to greater than or equal to 50 indicating improved mobility for daily activities. Patient will demonstrate improved score to less than or equal to 20 seconds on the Timed Up and Go Test indicating improved mobility for daily activities. OUTCOME MEASURE:   Tool Used: Oliva Balance Scale  Score:  Initial: 44/56 Most Recent: X/56 (Date: -- )   Interpretation of Score: Each section is scored on a 0-4 scale, 0 representing the patients inability to perform the task and 4 representing independence. The scores of each section are added together for a total score of 56. The higher the patients score, the more independent the patient is. Any score below 45 indicates increased risk for falls.       Tool Used: Lower Extremity Functional Scale (LEFS)  Score:  Initial: 25/80 Most Recent: X/80 (Date: -- )   Interpretation of Score: 20 questions each scored on a 5 point scale with 0 representing \"extreme difficulty or unable to perform\" and 4 representing \"no difficulty\". The lower the score, the greater the functional disability. 80/80 represents no disability. Minimal detectable change is 9 points. Tool Used: Timed Up and Go (TUG) Manual  Score:  Initial: 32.16 seconds, no AD, CGA Most Recent: X seconds (Date: -- )   Interpretation of Score: The test measures, in seconds, the time taken by an individual to stand up from a standard arm chair (seat height 46 cm [18 in], arm height 65 cm [25.6 in]), walk a distance of 3 meters (118 in, approx 10 ft), turn, walk back to the chair and sit down while carrying a full cup of water. The test time is strongly correlated to the level of functional mobility, i.e. more time taken indicates more dependent in activities of daily living. If the individual takes longer than 14.5 seconds to complete TUG, this indicates increased risk for falls. MEDICAL NECESSITY:   · Patient is expected to demonstrate progress in strength, balance and functional technique to improve safety during daily activities. REASON FOR SERVICES/OTHER COMMENTS:  · Patient continues to demonstrate capacity to improve strength, mobility, balance which will increase independence and increase safety. Total Duration:       Rehabilitation Potential For Stated Goals: Good  Regarding Selene Lee's therapy, I certify that the treatment plan above will be carried out by a therapist or under their direction.   Thank you for this referral,  Peace Schneider PT     Referring Physician Signature: Mikey Fleischer, MD _______________________________ Date _____________     PAIN/SUBJECTIVE:   Initial:   0 Post Session:  0/10   HISTORY:   History of Injury/Illness (Reason for Referral):  Got out of bed and was walking and felt a pop, no fall, fracture in ball of L hip. Ended up walking on it for months having testing done to make sure she could have the surgery. Has had a few falls since the surgery. Finished HHPT last week, had [facility] for rehab prior. Daughter helping her in and out. Has a chair in shower but won't use it. I feel like I want to tip backwards with my balance. Left hip femoral head fracture, admitted 12/24/2018 for left hip hemiarthroplasty. Past Medical History/Comorbidities:   Ms. Mechele Spurling  has a past medical history of Acquired hypothyroidism, Anemia, Arthritis, Chronic pain, Fractured hip ([facility] [address] Gastroesophageal reflux disease (12/9/2016), GERD (gastroesophageal reflux disease), Giant cell arteritis ([facility] [address] (03/20/2010), Hip fracture ([facility] (10/29/2018), History of heart attack (09/12/2018), History of hip fracture (2018), Hypertension, Legally blind, Macular degeneration, Memory deficit, NSTEMI (non-ST elevated myocardial infarction) ([facility] [address] (9/14/2018), Other ill-defined conditions(799.89), Other ill-defined conditions(799.89), TIA (transient ischemic attack) (6/23/2015), and Varicose veins of left lower extremity. Ms. Mechele Spurling  has a past surgical history that includes hx endoscopy; pr cardiac surg procedure unlist; hx cholecystectomy; hx tonsillectomy; hx heent; and hx orthopaedic (12/24/2018). osteoporosis. Social History/Living Environment:     lives with daughter. Prior Level of Function/Work/Activity:  Independent in home with RW, daughter helping  Dominant Side:         RIGHT  Previous Treatment Approaches:          PT  Personal Factors:          Sex:  female        Age:  80 y.o. Ambulatory/Rehab Services H2 Model Falls Risk Assessment   Risk Factors:       (1)  Visual Impairment [specify:  legally blind]       (5)  History of Recent Falls [w/in 3 months] Ability to Rise from Chair:       (1)  Pushes up, successful in one attempt   Falls Prevention Plan:        Mobility Assistance Device (specify):  use RW Total: (5 or greater = High Risk): 7   ©2010 [facility] . [facility] Patent #[id number]. Federal Law prohibits the replication, distribution or use without written permission from [facility] [employer]   Current Medications:       Current Outpatient Medications:     atorvastatin (LIPITOR) 10 mg tablet, Take 0.5 Tabs by mouth nightly., Disp: 15 Tab, Rfl: 0    metoprolol succinate (TOPROL-XL) 25 mg XL tablet, Take 1 Tab by mouth daily. , Disp: 30 Tab, Rfl: 5    olmesartan (BENICAR) 40 mg tablet, Take 0.5 Tabs by mouth daily. , Disp: 15 Tab, Rfl: 5    apixaban (ELIQUIS) 2.5 mg tablet, Take 1 Tab by mouth two (2) times a day., Disp: 60 Tab, Rfl: 6    nitroglycerin (NITROSTAT) 0.4 mg SL tablet, 1 Tab by SubLINGual route every five (5) minutes as needed for Chest Pain. Up to 3 doses. , Disp: 25 Tab, Rfl: 11    magnesium oxide (MAG-OX) 400 mg tablet, Take 400 mg by mouth daily. , Disp: , Rfl:     melatonin 3 mg tablet, Take  by mouth., Disp: , Rfl:     sertraline (ZOLOFT) 25 mg tablet, Take 1 Tab by mouth daily. (Patient taking differently: Take 25 mg by mouth daily.), Disp: 90 Tab, Rfl: 3    levothyroxine (SYNTHROID) 50 mcg tablet, Take one tab by mouth every other day. On even number days take 50mcg. On odd number days take 25mcg., Disp: 30 Tab, Rfl: 2    OTHER, Calcium - OTC, Disp: , Rfl:     acetaminophen (TYLENOL) 650 mg TbER, Take 650 mg by mouth every eight (8) hours. 500 mg in morning daily, Disp: , Rfl:     predniSONE (DELTASONE) 1 mg tablet, Take 3 mg by mouth daily. , Disp: , Rfl:     esomeprazole (NEXIUM) 40 mg capsule, Take 40 mg by mouth daily. , Disp: , Rfl:     amLODIPine (NORVASC) 5 mg tablet, Take 1 Tab by mouth daily. (Patient taking differently: Take 5 mg by mouth daily.), Disp: 30 Tab, Rfl: 11    levothyroxine (SYNTHROID) 25 mcg tablet, Take 1 Tab by mouth Daily (before breakfast). (Patient taking differently: Take 25-50 mcg by mouth every other day.  25 mg alternating daily with 50 mg), Disp: 30 Tab, Rfl: 5    Cholecalciferol, Vitamin D3, (VITAMIN D3) 1,000 unit cap, Take 1 Cap by mouth daily. , Disp: , Rfl:     Denosumab (PROLIA) 60 mg/mL injection, 60 mg by SubCUTAneous route every 6 months., Disp: , Rfl:    Date Last Reviewed:  3/15/19   Number of Personal Factors/Comorbidities that affect the Plan of Care: 1-2: MODERATE COMPLEXITY   EXAMINATION:   Observation/Orthostatic Postural Assessment: Forward head posture, thoracic kyphosis    Strength:          LE STRENGTH:  4/5 hip flexion, 4/5 hip abduction, 4/5 hip adduction, 4/5 hip extension, 4/5 knee extension, 4/5 knee flexion, 4/5 ankle dorsiflexion, 4/5 ankle plantarflexion  Functional Mobility:         Gait/Ambulation:  Patient ambulates with RW with narrow MARK, toe in gait, decreased step clearance and foot flat initial contact. Transfers:  With UE assist        Bed Mobility:  independent        Stairs:  With rails, step to gait  Sensation:         intact  Postural Control & Balance:  · Oliva Balance Scale:  44/56.   (A score less than 45/56 indicates high risk of falls)     · Dynamic Gait Index:  NT/24.   (A score less than or equal to19/24 is abnormal and predictive of falls)     ·        Body Structures Involved:  1. Joints  2. Muscles Body Functions Affected:  1. Sensory/Pain  2. Neuromusculoskeletal  3. Movement Related Activities and Participation Affected:  1. General Tasks and Demands  2. Mobility  3. Domestic Life  4.  Community, Social and [place]   Number of elements (examined above) that affect the Plan of Care: 3: MODERATE COMPLEXITY   CLINICAL PRESENTATION:   Presentation: Evolving clinical presentation with changing clinical characteristics: MODERATE COMPLEXITY   CLINICAL DECISION MAKING:   Use of outcome tool(s) and clinical judgement create a POC that gives a: Questionable prediction of patient's progress: MODERATE COMPLEXITY

## 2019-03-16 PROCEDURE — 99285 EMERGENCY DEPT VISIT HI MDM: CPT | Performed by: EMERGENCY MEDICINE

## 2019-03-17 ENCOUNTER — APPOINTMENT (OUTPATIENT)
Dept: GENERAL RADIOLOGY | Age: 84
DRG: 560 | End: 2019-03-17
Attending: EMERGENCY MEDICINE
Payer: MEDICARE

## 2019-03-17 ENCOUNTER — APPOINTMENT (OUTPATIENT)
Dept: CT IMAGING | Age: 84
DRG: 560 | End: 2019-03-17
Attending: EMERGENCY MEDICINE
Payer: MEDICARE

## 2019-03-17 ENCOUNTER — HOSPITAL ENCOUNTER (EMERGENCY)
Age: 84
Discharge: HOME | DRG: 560 | End: 2019-03-17
Attending: EMERGENCY MEDICINE
Payer: MEDICARE

## 2019-03-17 VITALS
WEIGHT: 103 LBS | DIASTOLIC BLOOD PRESSURE: 77 MMHG | HEIGHT: 59 IN | BODY MASS INDEX: 20.76 KG/M2 | RESPIRATION RATE: 17 BRPM | HEART RATE: 67 BPM | TEMPERATURE: 97.7 F | SYSTOLIC BLOOD PRESSURE: 189 MMHG | OXYGEN SATURATION: 96 %
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
## 2019-03-17 DIAGNOSIS — S20.212A CONTUSION OF LEFT CHEST WALL, INITIAL ENCOUNTER: ICD-10-CM

## 2019-03-17 DIAGNOSIS — S70.02XA CONTUSION OF LEFT HIP, INITIAL ENCOUNTER: ICD-10-CM

## 2019-03-17 LAB
ANION GAP SERPL CALC-SCNC: 10 MMOL/L (ref 7–16)
ATRIAL RATE: 64 BPM
BASOPHILS # BLD: 0.1 K/UL (ref 0–0.2)
BASOPHILS NFR BLD: 1 % (ref 0–2)
BUN SERPL-MCNC: 22 MG/DL (ref 8–23)
CALCIUM SERPL-MCNC: 9.6 MG/DL (ref 8.3–10.4)
CALCULATED P AXIS, ECG09: 73 DEGREES
CALCULATED R AXIS, ECG10: -65 DEGREES
CALCULATED T AXIS, ECG11: 104 DEGREES
CHLORIDE SERPL-SCNC: 106 MMOL/L (ref 98–107)
CO2 SERPL-SCNC: 24 MMOL/L (ref 21–32)
CREAT SERPL-MCNC: 0.72 MG/DL (ref 0.6–1)
DIAGNOSIS, 93000: NORMAL
DIFFERENTIAL METHOD BLD: ABNORMAL
EOSINOPHIL # BLD: 0.2 K/UL (ref 0–0.8)
EOSINOPHIL NFR BLD: 2 % (ref 0.5–7.8)
ERYTHROCYTE [DISTWIDTH] IN BLOOD BY AUTOMATED COUNT: 19.1 % (ref 11.9–14.6)
GLUCOSE SERPL-MCNC: 111 MG/DL (ref 65–100)
HCT VFR BLD AUTO: 33.5 % (ref 35.8–46.3)
HGB BLD-MCNC: 9.8 G/DL (ref 11.7–15.4)
IMM GRANULOCYTES # BLD AUTO: 0.1 K/UL (ref 0–0.5)
IMM GRANULOCYTES NFR BLD AUTO: 1 % (ref 0–5)
LYMPHOCYTES # BLD: 1.7 K/UL (ref 0.5–4.6)
LYMPHOCYTES NFR BLD: 18 % (ref 13–44)
MCH RBC QN AUTO: 25.6 PG (ref 26.1–32.9)
MCHC RBC AUTO-ENTMCNC: 29.3 G/DL (ref 31.4–35)
MCV RBC AUTO: 87.5 FL (ref 79.6–97.8)
MONOCYTES # BLD: 1 K/UL (ref 0.1–1.3)
MONOCYTES NFR BLD: 11 % (ref 4–12)
NEUTS SEG # BLD: 6.3 K/UL (ref 1.7–8.2)
NEUTS SEG NFR BLD: 67 % (ref 43–78)
NRBC # BLD: 0 K/UL (ref 0–0.2)
P-R INTERVAL, ECG05: 170 MS
PLATELET # BLD AUTO: 324 K/UL (ref 150–450)
PMV BLD AUTO: 11.4 FL (ref 9.4–12.3)
POTASSIUM SERPL-SCNC: 3.9 MMOL/L (ref 3.5–5.1)
Q-T INTERVAL, ECG07: 426 MS
QRS DURATION, ECG06: 96 MS
QTC CALCULATION (BEZET), ECG08: 439 MS
RBC # BLD AUTO: 3.83 M/UL (ref 4.05–5.2)
SODIUM SERPL-SCNC: 140 MMOL/L (ref 136–145)
TROPONIN I SERPL-MCNC: 0.02 NG/ML (ref 0.02–0.05)
VENTRICULAR RATE, ECG03: 64 BPM
WBC # BLD AUTO: 9.4 K/UL (ref 4.3–11.1)

## 2019-03-17 PROCEDURE — 96376 TX/PRO/DX INJ SAME DRUG ADON: CPT | Performed by: EMERGENCY MEDICINE

## 2019-03-17 PROCEDURE — 85025 COMPLETE CBC W/AUTO DIFF WBC: CPT

## 2019-03-17 PROCEDURE — 71250 CT THORAX DX C-: CPT

## 2019-03-17 PROCEDURE — 81003 URINALYSIS AUTO W/O SCOPE: CPT | Performed by: EMERGENCY MEDICINE

## 2019-03-17 PROCEDURE — 96374 THER/PROPH/DIAG INJ IV PUSH: CPT | Performed by: EMERGENCY MEDICINE

## 2019-03-17 PROCEDURE — 93005 ELECTROCARDIOGRAM TRACING: CPT | Performed by: EMERGENCY MEDICINE

## 2019-03-17 PROCEDURE — 74011250636 HC RX REV CODE- 250/636: Performed by: EMERGENCY MEDICINE

## 2019-03-17 PROCEDURE — 84484 ASSAY OF TROPONIN QUANT: CPT

## 2019-03-17 PROCEDURE — 80048 BASIC METABOLIC PNL TOTAL CA: CPT

## 2019-03-17 PROCEDURE — 73030 X-RAY EXAM OF SHOULDER: CPT

## 2019-03-17 PROCEDURE — 96375 TX/PRO/DX INJ NEW DRUG ADDON: CPT | Performed by: EMERGENCY MEDICINE

## 2019-03-17 PROCEDURE — 71045 X-RAY EXAM CHEST 1 VIEW: CPT

## 2019-03-17 PROCEDURE — 73502 X-RAY EXAM HIP UNI 2-3 VIEWS: CPT

## 2019-03-17 RX ORDER — FENTANYL CITRATE 50 UG/ML
50 INJECTION, SOLUTION INTRAMUSCULAR; INTRAVENOUS ONCE
Status: COMPLETED | OUTPATIENT
Start: 2019-03-17 | End: 2019-03-17

## 2019-03-17 RX ORDER — ONDANSETRON 2 MG/ML
4 INJECTION INTRAMUSCULAR; INTRAVENOUS
Status: COMPLETED | OUTPATIENT
Start: 2019-03-17 | End: 2019-03-17

## 2019-03-17 RX ORDER — DIAZEPAM 2 MG/1
1 TABLET ORAL
Qty: 10 TAB | Refills: 0 | Status: SHIPPED | OUTPATIENT
Start: 2019-03-17 | End: 2019-06-03

## 2019-03-17 RX ADMIN — ONDANSETRON 4 MG: 2 INJECTION INTRAMUSCULAR; INTRAVENOUS at 02:30

## 2019-03-17 RX ADMIN — FENTANYL CITRATE 50 MCG: 50 INJECTION INTRAMUSCULAR; INTRAVENOUS at 01:12

## 2019-03-17 RX ADMIN — ONDANSETRON 4 MG: 2 INJECTION INTRAMUSCULAR; INTRAVENOUS at 00:11

## 2019-03-17 NOTE — ED NOTES
I have reviewed discharge instructions with the patient. The patient verbalized understanding. Patient left ED via Discharge Method: ambulatory to Home with family. Opportunity for questions and clarification provided. Patient given 1 scripts. Pt in no acute distress at time of discharge. Pt given instructions for incentive spirometer         To continue your aftercare when you leave the hospital, you may receive an automated call from our care team to check in on how you are doing. This is a free service and part of our promise to provide the best care and service to meet your aftercare needs.  If you have questions, or wish to unsubscribe from this service please call [phone]. Thank you for Choosing our [facility] [employer] [facility] Emergency Department.

## 2019-03-17 NOTE — ED PROVIDER NOTES
Presents with complaint of fall. Patient is unsure why she fell. States she just went down. Is a history of a left hip fracture and replacement. She's been having pain in her left groin. She went to a concert this evening, no standing in her kitchen while her daughter went out to get her walker when it occurred. She complains of some left-sided chest pain and shoulder pain after fall. No difficulty breathing or movement. Most of her pain is in her left groin. The history is provided by the patient. Fall   The accident occurred less than 1 hour ago. The fall occurred while standing. She fell from a height of ground level. She landed on hard floor. There was no blood loss. The point of impact was the left shoulder and left hip. The pain is present in the left hip and left shoulder. The pain is mild. She was not ambulatory at the scene. There was no entrapment after the fall. There was no drug use involved in the accident. There was no alcohol use involved in the accident. Associated symptoms include nausea (with pain) and extremity weakness. Pertinent negatives include no fever, no abdominal pain, no vomiting, no loss of consciousness and no laceration. The symptoms are aggravated by activity and standing. She has tried nothing for the symptoms.         Past Medical History:   Diagnosis Date    Acquired hypothyroidism     synthroid daily    Anemia     blood transfusion 9/2018; monitored by [facility]    Arthritis     Chronic pain     Fractured hip ([address]     Gastroesophageal reflux disease 12/9/2016    GERD (gastroesophageal reflux disease)     nexium daily,well controlled per daughter-    hiatal hernia,     Giant cell arteritis ([address] 03/20/2010    monitored by rheumatologist, prednisone daily    Hip fracture ([address] 10/29/2018    left hip fracture-  follow[facility] Alannah Beverly @ [facility]    History of heart attack 09/12/2018    History of hip fracture 2018    left hip     Hypertension     managed with meds  Legally blind     Macular degeneration     Legally blind    Memory deficit     NSTEMI (non-ST elevated myocardial infarction) ([facility] 9/14/2018 9/12/18 \"mild mi, no damage, no heart cath or stents\"- medical management recommended however anemia persists, no current asa or thinners    Other ill-defined conditions(799.89)     back and pelvis pain    Other ill-defined conditions(799.89)     hip fx ;   arteritis     TIA (transient ischemic attack) 6/23/2015    no residual    Varicose veins of left lower extremity        Past Surgical History:   Procedure Laterality Date    CARDIAC SURG PROCEDURE UNLIST      Pt's family reports heart attack in Sept while in hospital at [address] [place] [address].  HX CHOLECYSTECTOMY      HX ENDOSCOPY      HX HEENT      torn retina    HX ORTHOPAEDIC  12/24/2018    left hip repair    HX TONSILLECTOMY           Family History:   Problem Relation Age of Onset    Cancer Father         stomach    Arthritis-osteo Mother     Hypertension Mother     Arthritis-osteo Sister        Social History     Socioeconomic History    Marital status:      Spouse name: Not on file    Number of children: Not on file    Years of education: Not on file    Highest education level: Not on file   Social Needs    Financial resource strain: Not on file    Food insecurity - worry: Not on file    Food insecurity - inability: Not on file   [place] needs - medical: Not on file   [place] needs - non-medical: Not on file   Occupational History    Not on file   Tobacco Use    Smoking status: Never Smoker    Smokeless tobacco: Never Used   Substance and Sexual Activity    Alcohol use: No    Drug use: No    Sexual activity: No   Other Topics Concern    Not on file   Social History Narrative    Not on file         ALLERGIES: Calcium carbonate; Ranitidine hcl; and Guaifenesin    Review of Systems   Constitutional: Negative for chills and fever.    Gastrointestinal: Positive for nausea (with pain). Negative for abdominal pain and vomiting. Musculoskeletal: Positive for extremity weakness. Neurological: Negative for loss of consciousness. All other systems reviewed and are negative. Vitals:    03/17/19 0011   BP: 186/87   Pulse: 65   Resp: 18   Temp: 97.7 °F (36.5 °C)   SpO2: 93%   Weight: 46.7 kg (103 lb)   Height: 4' 11\" (1.499 m)            Physical Exam   Constitutional: She is oriented to person, place, and time. She appears well-developed and well-nourished. No distress. HENT:   Head: Normocephalic and atraumatic. Eyes: Conjunctivae are normal. Right eye exhibits no discharge. Left eye exhibits no discharge. Neck: Normal range of motion. Neck supple. Cardiovascular: Normal rate and regular rhythm. Mild chest wall tenderness     Pulmonary/Chest: Effort normal and breath sounds normal. No respiratory distress. Abdominal: Soft. She exhibits no distension. There is no tenderness. Musculoskeletal: She exhibits tenderness (L groin). She exhibits no edema or deformity. No difficulty with ROM L shoulder. Neurological: She is alert and oriented to person, place, and time. No cranial nerve deficit. Skin: Skin is warm and dry. Capillary refill takes less than 2 seconds. No laceration noted. She is not diaphoretic. Psychiatric: She has a normal mood and affect. Her behavior is normal.   Nursing note and vitals reviewed. MDM  Number of Diagnoses or Management Options  Contusion of left chest wall, initial encounter:   Contusion of left hip, initial encounter:   Fall, initial encounter:   Diagnosis management comments: Pt can bear wgt but does not ambulate well. No new fx. Referral to case management. Home with valium for spasm in L groin. Pt has had difficult time and multiple falls since hip fx.          Amount and/or Complexity of Data Reviewed  Clinical lab tests: ordered and reviewed  Review and summarize past medical records: yes  Independent visualization of images, tracings, or specimens: yes    Risk of Complications, Morbidity, and/or Mortality  Presenting problems: high  Diagnostic procedures: moderate  Management options: moderate    Patient Progress  Patient progress: stable         Procedures

## 2019-03-17 NOTE — ED NOTES
Pain medication given and pt stating she is pain free, 02 sats dropped to the 89% pt placed on nc 4 l/min and o2 sat increased to 100%

## 2019-03-17 NOTE — ED TRIAGE NOTES
Pt arrives via EMS. EMS states they were called out for a fall. Per EMS pt fell at home. Pt c/o left hip pain, ribs and groin. Pt had hip replacement December 24th. Vitals: 160/90, pluse 60, 97% RA. Pts daughter states they just got home from a play and was walking in the door when her legs gave out. Pt usually walks with a walker since surgery but was not using it tonight when she fell.

## 2019-03-17 NOTE — ED NOTES
MD called to bedside to speak to family about what the pt can do at home to get around and how to get out of the car.  MD will order home health

## 2019-03-17 NOTE — DISCHARGE INSTRUCTIONS
Patient Education        Preventing Falls: Care Instructions  Your Care Instructions    Getting around your home safely can be a challenge if you have injuries or health problems that make it easy for you to fall. Loose rugs and furniture in walkways are among the dangers for many older people who have problems walking or who have poor eyesight. People who have conditions such as arthritis, osteoporosis, or dementia also have to be careful not to fall. You can make your home safer with a few simple measures. Follow-up care is a key part of your treatment and safety. Be sure to make and go to all appointments, and call your doctor if you are having problems. It's also a good idea to know your test results and keep a list of the medicines you take. How can you care for yourself at home? Taking care of yourself  · You may get dizzy if you do not drink enough water. To prevent dehydration, drink plenty of fluids, enough so that your urine is light yellow or clear like water. Choose water and other caffeine-free clear liquids. If you have kidney, heart, or liver disease and have to limit fluids, talk with your doctor before you increase the amount of fluids you drink. · Exercise regularly to improve your strength, muscle tone, and balance. Walk if you can. Swimming may be a good choice if you cannot walk easily. · Have your vision and hearing checked each year or any time you notice a change. If you have trouble seeing and hearing, you might not be able to avoid objects and could lose your balance. · Know the side effects of the medicines you take. Ask your doctor or pharmacist whether the medicines you take can affect your balance. Sleeping pills or sedatives can affect your balance. · Limit the amount of alcohol you drink. Alcohol can impair your balance and other senses. · Ask your doctor whether calluses or corns on your feet need to be removed.  If you wear loose-fitting shoes because of calluses or corns, you can lose your balance and fall. · Talk to your doctor if you have numbness in your feet. Preventing falls at home  · Remove raised doorway thresholds, throw rugs, and clutter. Repair loose carpet or raised areas in the floor. · Move furniture and electrical cords to keep them out of walking paths. · Use nonskid floor wax, and wipe up spills right away, especially on ceramic tile floors. · If you use a walker or cane, put rubber tips on it. If you use crutches, clean the bottoms of them regularly with an abrasive pad, such as steel wool. · Keep your house well lit, especially [facility], and outside walkways. Use night-lights in areas such as hallways and bathrooms. Add extra light switches or use remote switches (such as switches that go on or off when you clap your hands) to make it easier to turn lights on if you have to get up during the night. · Install sturdy handrails on stairways. · Move items in your cabinets so that the things you use a lot are on the lower shelves (about waist level). · Keep a cordless phone and a flashlight with new batteries by your bed. If possible, put a phone in each of the main rooms of your house, or carry a cell phone in case you fall and cannot reach a phone. Or, you can wear a device around your neck or wrist. You push a button that sends a signal for help. · Wear low-heeled shoes that fit well and give your feet good support. Use footwear with nonskid soles. Check the heels and soles of your shoes for wear. Repair or replace worn heels or soles. · Do not wear socks without shoes on wood floors. · Walk on the grass when the sidewalks are slippery. If you live in an area that gets snow and ice in the winter, sprinkle salt on slippery steps and sidewalks. Preventing falls in the bath  · Install grab bars and nonskid mats inside and outside your shower or tub and near the toilet and sinks. · Use shower chairs and bath benches.   · Use a hand-held shower head that will allow you to sit while showering. · Get into a tub or shower by putting the weaker leg in first. Get out of a tub or shower with your strong side first.  · Repair loose toilet seats and consider installing a raised toilet seat to make getting on and off the toilet easier. · Keep your bathroom door unlocked while you are in the shower. Where can you learn more? Go to http://ronda-shen.info/. Enter [id number] [id number] in the search box to learn more about \"Preventing Falls: Care Instructions. \"  Current as of: March 15, 2018  Content Version: 11.9  © 4256-3144 [employer]. Care instructions adapted under license by [employer] (which disclaims liability or warranty for this information). If you have questions about a medical condition or this instruction, always ask your healthcare professional. [place] [address] any warranty or liability for your use of this information. Patient Education        Bruised Rib: Care Instructions  Overview    You can get a bruised rib if you fall or get hit, such as in an accident or while playing sports. The medical term for a bruise is \"contusion. \" Small blood vessels get torn and leak blood under the skin. Most people think of a bruise as a black-and-blue area. But bones and muscles can also get bruised. An injury may damage the rib but not cause a bruise that you can see. Sometimes it can be hard to tell if a rib is bruised or broken. The symptoms may be the same. And a broken bone can't always be seen on an X-ray. But the treatment for a bruised rib is often the same as treatment for a broken one. An injury to the ribs can cause pain. The pain may be worse when you breathe deeply, cough, or sneeze. In most cases, a bruised rib will heal on its own. You can take pain medicine while the rib mends. Pain relief allows you to take deep breaths. Follow-up care is a key part of your treatment and safety.  Be sure to make and go to all appointments, and call your doctor if you are having problems. It's also a good idea to know your test results and keep a list of the medicines you take. How can you care for yourself at home? · Rest and protect the injured or sore area. Stop, change, or take a break from any activity that causes pain. · Put ice or a cold pack on the area for 10 to 20 minutes at a time. Put a thin cloth between the ice and your skin. · After 2 or 3 days, if your swelling is gone, put a heating pad set on low or a warm cloth on your chest. Some doctors suggest that you go back and forth between hot and cold. Put a thin cloth between the heating pad and your skin. · Ask your doctor if you can take an over-the-counter pain medicine, such as acetaminophen (Tylenol), ibuprofen (Advil, Motrin), or naproxen (Aleve). Be safe with medicines. Read and follow all instructions on the label. · As your pain gets better, slowly return to your normal activities. Be patient. Rib bruises can take weeks or months to heal. If the pain gets worse, it may be a sign that you need to rest a while longer. When should you call for help? [id number] anytime you think you may need emergency care. For example, call if:    · You have severe trouble breathing.     Call your doctor now or seek immediate medical care if:    · You have trouble breathing.     · You have a fever.     · You have a new or worse cough.     · You have new or worse pain.    Watch closely for changes in your health, and be sure to contact your doctor if:    · You do not get better as expected. Where can you learn more? Go to http://ronda-shen.info/. Enter R125 in the search box to learn more about \"Bruised Rib: Care Instructions. \"  Current as of: September 23, 2018  Content Version: 11.9  © 3829-0998 [employer].  Care instructions adapted under license by [employer] (which disclaims liability or warranty for this information). If you have questions about a medical condition or this instruction, always ask your healthcare professional. [address] any warranty or liability for your use of this information. Patient Education        Hip Pain: Care Instructions  Your Care Instructions    Hip pain may be caused by many things, including overuse, a fall, or a twisting movement. Another cause of hip pain is arthritis. Your pain may increase when you stand up, walk, or squat. The pain may come and go or may be constant. Home treatment can help relieve hip pain, swelling, and stiffness. If your pain is ongoing, you may need more tests and treatment. Follow-up care is a key part of your treatment and safety. Be sure to make and go to all appointments, and call your doctor if you are having problems. It's also a good idea to know your test results and keep a list of the medicines you take. How can you care for yourself at home? · Take pain medicines exactly as directed. ? If the doctor gave you a prescription medicine for pain, take it as prescribed. ? If you are not taking a prescription pain medicine, ask your doctor if you can take an over-the-counter medicine. · Rest and protect your hip. Take a break from any activity, including standing or walking, that may cause pain. · Put ice or a cold pack against your hip for 10 to 20 minutes at a time. Try to do this every 1 to 2 hours for the next 3 days (when you are awake) or until the swelling goes down. Put a thin cloth between the ice and your skin. · Sleep on your healthy side with a pillow between your knees, or sleep on your back with pillows under your knees. · If there is no swelling, you can put moist heat, a heating pad, or a warm cloth on your hip. Do gentle stretching exercises to help keep your hip flexible. · Learn how to prevent falls. Have your vision and hearing checked regularly. Wear slippers or shoes with a nonskid sole.   · Stay at a healthy weight. · Wear comfortable shoes. When should you call for help? Call 911 anytime you think you may need emergency care. For example, call if:    · You have sudden chest pain and shortness of breath, or you cough up blood.     · You are not able to stand or walk or bear weight.     · Your buttocks, legs, or feet feel numb or tingly.     · Your leg or foot is cool or pale or changes color.     · You have severe pain.    Call your doctor now or seek immediate medical care if:    · You have signs of infection, such as:  ? Increased pain, swelling, warmth, or redness in the hip area. ? Red streaks leading from the hip area. ? Pus draining from the hip area. ? A fever.     · You have signs of a blood clot, such as:  ? Pain in your calf, back of the knee, thigh, or groin. ? Redness and swelling in your leg or groin.     · You are not able to bend, straighten, or move your leg normally.     · You have trouble urinating or having bowel movements.    Watch closely for changes in your health, and be sure to contact your doctor if:    · You do not get better as expected. Where can you learn more? Go to http://ronda-shen.info/. Enter O875 in the search box to learn more about \"Hip Pain: Care Instructions. \"  Current as of: September 23, 2018  Content Version: 11.9  © 0776-2089 [employer]. Care instructions adapted under license by [employer] (which disclaims liability or warranty for this information). If you have questions about a medical condition or this instruction, always ask your healthcare professional. [place] [address] any warranty or liability for your use of this information.

## 2019-03-18 NOTE — PROGRESS NOTES
Spoke with patient's daughter who states that they will follow-up with Dr. Marlen Gonzales (orthopaedist)  recommendation first before arranging any home health. She agreed to call case management if she has any additional needs.

## 2019-03-19 ENCOUNTER — HOSPITAL ENCOUNTER (INPATIENT)
Age: 84
LOS: 3 days | Discharge: NURSING, REHAB OR LONG-TERM CARE | DRG: 560 | End: 2019-03-22
Attending: ORTHOPAEDIC SURGERY | Admitting: ORTHOPAEDIC SURGERY
Payer: MEDICARE

## 2019-03-19 DIAGNOSIS — E03.9 ACQUIRED HYPOTHYROIDISM: ICD-10-CM

## 2019-03-19 DIAGNOSIS — E88.81 INSULIN RESISTANCE: ICD-10-CM

## 2019-03-19 DIAGNOSIS — M25.552 LEFT HIP PAIN: Primary | ICD-10-CM

## 2019-03-19 DIAGNOSIS — R94.31 ABNORMAL EKG: ICD-10-CM

## 2019-03-19 LAB
ANION GAP SERPL CALC-SCNC: 9 MMOL/L
BUN SERPL-MCNC: 16 MG/DL (ref 8–23)
CALCIUM SERPL-MCNC: 9.2 MG/DL (ref 8.3–10.4)
CHLORIDE SERPL-SCNC: 108 MMOL/L (ref 98–107)
CO2 SERPL-SCNC: 25 MMOL/L (ref 21–32)
CREAT SERPL-MCNC: 0.7 MG/DL (ref 0.6–1)
ERYTHROCYTE [DISTWIDTH] IN BLOOD BY AUTOMATED COUNT: 20 % (ref 11.9–14.6)
GLUCOSE SERPL-MCNC: 188 MG/DL (ref 65–100)
HCT VFR BLD AUTO: 31.8 % (ref 35.8–46.3)
HGB BLD-MCNC: 9.3 G/DL (ref 11.7–15.4)
INR PPP: 1.1
MCH RBC QN AUTO: 25.8 PG (ref 26.1–32.9)
MCHC RBC AUTO-ENTMCNC: 29.2 G/DL (ref 31.4–35)
MCV RBC AUTO: 88.1 FL (ref 79.6–97.8)
NRBC # BLD: 0 K/UL (ref 0–0.2)
PLATELET # BLD AUTO: 294 K/UL (ref 150–450)
PMV BLD AUTO: 11.3 FL (ref 9.4–12.3)
POTASSIUM SERPL-SCNC: 3.4 MMOL/L (ref 3.5–5.1)
PROTHROMBIN TIME: 14.4 SEC (ref 11.7–14.5)
RBC # BLD AUTO: 3.61 M/UL (ref 4.05–5.2)
SODIUM SERPL-SCNC: 142 MMOL/L (ref 136–145)
WBC # BLD AUTO: 9.8 K/UL (ref 4.3–11.1)

## 2019-03-19 PROCEDURE — 74011250637 HC RX REV CODE- 250/637: Performed by: ORTHOPAEDIC SURGERY

## 2019-03-19 PROCEDURE — 85027 COMPLETE CBC AUTOMATED: CPT

## 2019-03-19 PROCEDURE — 85610 PROTHROMBIN TIME: CPT

## 2019-03-19 PROCEDURE — 65270000029 HC RM PRIVATE

## 2019-03-19 PROCEDURE — 36415 COLL VENOUS BLD VENIPUNCTURE: CPT

## 2019-03-19 PROCEDURE — 80048 BASIC METABOLIC PNL TOTAL CA: CPT

## 2019-03-19 PROCEDURE — 74011250636 HC RX REV CODE- 250/636: Performed by: ORTHOPAEDIC SURGERY

## 2019-03-19 RX ORDER — GUAIFENESIN 100 MG/5ML
81 LIQUID (ML) ORAL 2 TIMES DAILY
Status: CANCELLED | OUTPATIENT
Start: 2019-03-20 | Stop reason: CLARIF

## 2019-03-19 RX ORDER — HYDROCODONE BITARTRATE AND ACETAMINOPHEN 5; 325 MG/1; MG/1
1 TABLET ORAL
Status: DISCONTINUED | OUTPATIENT
Start: 2019-03-19 | End: 2019-03-22 | Stop reason: HOSPADM

## 2019-03-19 RX ORDER — PANTOPRAZOLE SODIUM 40 MG/1
40 TABLET, DELAYED RELEASE ORAL
Status: DISCONTINUED | OUTPATIENT
Start: 2019-03-20 | End: 2019-03-22 | Stop reason: HOSPADM

## 2019-03-19 RX ORDER — DIAZEPAM 2 MG/1
1 TABLET ORAL
Status: DISCONTINUED | OUTPATIENT
Start: 2019-03-19 | End: 2019-03-22 | Stop reason: HOSPADM

## 2019-03-19 RX ORDER — PREDNISONE 1 MG/1
3 TABLET ORAL DAILY
Status: DISCONTINUED | OUTPATIENT
Start: 2019-03-20 | End: 2019-03-22 | Stop reason: HOSPADM

## 2019-03-19 RX ORDER — HYDROMORPHONE HYDROCHLORIDE 2 MG/ML
0.5 INJECTION, SOLUTION INTRAMUSCULAR; INTRAVENOUS; SUBCUTANEOUS
Status: DISCONTINUED | OUTPATIENT
Start: 2019-03-19 | End: 2019-03-22 | Stop reason: HOSPADM

## 2019-03-19 RX ORDER — ATORVASTATIN CALCIUM 10 MG/1
5 TABLET, FILM COATED ORAL
Status: DISCONTINUED | OUTPATIENT
Start: 2019-03-19 | End: 2019-03-22 | Stop reason: HOSPADM

## 2019-03-19 RX ORDER — OLMESARTAN MEDOXOMIL 20 MG/1
20 TABLET ORAL DAILY
Status: DISCONTINUED | OUTPATIENT
Start: 2019-03-20 | End: 2019-03-22 | Stop reason: HOSPADM

## 2019-03-19 RX ORDER — GUAIFENESIN 100 MG/5ML
81 LIQUID (ML) ORAL 2 TIMES DAILY
Status: DISCONTINUED | OUTPATIENT
Start: 2019-03-20 | End: 2019-03-20 | Stop reason: CLARIF

## 2019-03-19 RX ORDER — SERTRALINE HYDROCHLORIDE 25 MG/1
25 TABLET, FILM COATED ORAL DAILY
Status: DISCONTINUED | OUTPATIENT
Start: 2019-03-20 | End: 2019-03-22 | Stop reason: HOSPADM

## 2019-03-19 RX ORDER — NITROGLYCERIN 0.4 MG/1
0.4 TABLET SUBLINGUAL
Status: DISCONTINUED | OUTPATIENT
Start: 2019-03-19 | End: 2019-03-22 | Stop reason: HOSPADM

## 2019-03-19 RX ORDER — AMLODIPINE BESYLATE 5 MG/1
5 TABLET ORAL DAILY
Status: DISCONTINUED | OUTPATIENT
Start: 2019-03-20 | End: 2019-03-22 | Stop reason: HOSPADM

## 2019-03-19 RX ORDER — LEVOTHYROXINE SODIUM 50 UG/1
25 TABLET ORAL
Status: DISCONTINUED | OUTPATIENT
Start: 2019-03-21 | End: 2019-03-22 | Stop reason: HOSPADM

## 2019-03-19 RX ORDER — MELATONIN
1000 DAILY
Status: DISCONTINUED | OUTPATIENT
Start: 2019-03-20 | End: 2019-03-22 | Stop reason: HOSPADM

## 2019-03-19 RX ORDER — ACETAMINOPHEN 325 MG/1
650 TABLET ORAL
Status: DISCONTINUED | OUTPATIENT
Start: 2019-03-19 | End: 2019-03-22 | Stop reason: HOSPADM

## 2019-03-19 RX ORDER — METOPROLOL SUCCINATE 25 MG/1
25 TABLET, EXTENDED RELEASE ORAL DAILY
Status: DISCONTINUED | OUTPATIENT
Start: 2019-03-20 | End: 2019-03-22 | Stop reason: HOSPADM

## 2019-03-19 RX ADMIN — HYDROCODONE BITARTRATE AND ACETAMINOPHEN 0.5 TABLET: 5; 325 TABLET ORAL at 20:16

## 2019-03-19 NOTE — CONSULTS
Hospitalist Consult Note Admit Date:  3/19/2019  3:26 PM  
Name:  Will Kearney Age:  80 y.o. 
:  1928 MRN:  [id number] PCP:  Dylon Rodriguez MD 
Treatment Team: Attending Provider: Sharyle Heath, MD; Consulting Provider: Erin Caraballo MD 
 
HPI:  
 
The patient is a 81 y/o F with HTN, Hypothyroidism, GERD, Giant Cell Arteritis on Prednisone, h/o L Hip Fracture s/p L Hip Hemiarthroplasty in Dec 2018, h/o recent DVT on Eliquis, Chronic Normocytic Anemia, Legal Blindness, was actually sent from Dr Leigha Trujillo office for a direct admission to the Orthopedics floor at [facility]. The patient actually fell about 3 days ago and has been having some pain in the left shoulder, left lower ribcage and left hip. She was seen in the ED at [facility] 2 days ago, XRs were obtained and did not show any acute fracture, except for questionable nondisplaced fractures in the left seventh and eighth 
ribs. The patient was then discharged home. As per her son and daughter, she has been hurting all night long last night and decided to visit her Orthopedic Surgeon, Dr Aashish Garibay, today. XR obtained at Dr Leigha Trujillo office noted periprosthetic fracture of the left hip. The patient was then sent for direct admission under Orthopedics service for Periprosthetic fracture of the left hip. A conservative, nonsurgical approach, was discussed and decided between the surgeon, the patient and the family. A Hospitalist consultation was requested for assistance in the management of her comorbidities. The patient was seen at the bedside accompanied by her son and daughter. She appears relatively comfortable, except for intermittent pain in the left hip, especially when she moves the left limb. Some bruises were also noted on the left arm. ROS: All systems reviewed and negative except as noted in HPI. Past Medical History:  
Diagnosis Date  Acquired hypothyroidism   
 synthroid daily  Anemia blood transfusion 9/2018; [facility]  Arthritis  Chronic pain  Fract[facility] hip [address] [address]  Gastroesophageal reflux disease 12/9/2016  GERD (gastroesophageal reflux disease)   
 nexium daily,well controlled per daughter-    hiatal hernia,   
 Giant cell arteritis ([address] 03/20/2010  
 monitored by rheumatologist, prednisone daily  Hip fracture ([facility] [address] 10/29/2018  
 left hip fracture-  followed by Dr Merry Benitez @ [address] [place] [address]  History of heart attack 09/12/2018  History of hip fracture 2018  
 left hip  Hypertension   
 managed with meds  Legally blind  Macular degeneration Legally blind  Memory deficit  NSTEMI (non-ST elevated myocardial infarction) ([facility] [address] 9/14/2018 9/12/18 \"mild mi, no damage, no heart cath or stents\"- medical management recommended however anemia persists, no current asa or thinners  Other ill-defined conditions(799.89)   
 back and pelvis pain  Other ill-defined conditions(799.89)   
 hip fx ;   arteritis  TIA (transient ischemic attack) 6/23/2015  
 no residual  
 Varicose veins of left lower extremity Past Surgical History:  
Procedure Laterality Date  CARDIAC SURG PROCEDURE UNLIST    
 Pt's family reports heart attack in Sept while in hospital at [facility].  HX CHOLECYSTECTOMY  HX ENDOSCOPY    
 HX HEENT    
 torn retina  HX ORTHOPAEDIC  12/24/2018  
 left hip repair  HX TONSILLECTOMY Current Facility-Administered Medications Medication Dose Route Frequency  HYDROmorphone (PF) (DILAUDID) injection 0.5 mg  0.5 mg IntraVENous Q4H PRN  
 HYDROcodone-acetaminophen (NORCO) 5-325 mg per tablet 1 Tab  1 Tab Oral Q4H PRN Allergies Allergen Reactions  Calcium Carbonate Diarrhea  Ranitidine Hcl Diarrhea  Guaifenesin Other (comments) Hallucinations Social History Tobacco Use  Smoking status: Never Smoker  Smokeless tobacco: Never Used Substance Use Topics  Alcohol use:  No  
  
 Family History Problem Relation Age of Onset  Cancer Father   
     stomach Clove.Goldberg Arthritis-osteo Mother  Hypertension Mother  Arthritis-osteo Sister Immunization History Administered Date(s) Administered  Influenza Vaccine 10/12/2010, 09/28/2011, 10/30/2012, 10/20/2015, 10/15/2016  Pneumococcal Conjugate (PCV-13) 11/03/2015  Pneumococcal Polysaccharide (PPSV-23) 10/12/2010  TB Skin Test (PPD) Intradermal 10/30/2018, 12/24/2018  Tdap 01/06/2018 Objective:  
 
Patient Vitals for the past 24 hrs: 
 Temp Pulse Resp BP SpO2  
03/19/19 1604 97.6 °F (36.4 °C) 65 16 199/62 94 % Oxygen Therapy O2 Sat (%): 94 % (03/19/19 1604) No intake or output data in the 24 hours ending 03/19/19 1938 Physical Exam: 
General:    Well nourished. Alert. Eyes:   Normal sclera. Extraocular movements intact. ENT:  Normocephalic, atraumatic. Moist mucous membranes CV:   RRR. No murmur, rub, or gallop. Lungs:  CTAB. No wheezing, rhonchi, or rales. Abdomen: Soft, nontender, nondistended. Bowel sounds normal.  
Extremities: Warm and dry. No cyanosis or edema. Tenderness on palpation of the left hip. Pain is worse with movements. Lower extremities appear to be approximately the same length. Neurologic: CN II-XII grossly intact. Sensation intact. Skin:     No rashes or jaundice, except for bruises noted on the left upper extremity. Psych:  Normal mood and affect. I reviewed the labs, imaging, EKGs, telemetry, and other studies done this admission. Data Review: No results found for this or any previous visit (from the past 24 hour(s)). All Micro Results None Other Studies: No results found. Assessment and Plan:  
 
Hospital Problems as of 3/19/2019 Date Reviewed: 2/18/2019 Codes Class Noted - Resolved POA Left hip pain ICD-10-CM: M25.552 ICD-9-CM: 719.45  3/19/2019 - Present Unknown 1 - Periprosthetic Fracture of the Left Hip 2 - HTN, uncontrolled 3 - Hypothyroidism 4 - GERD 
5 - Giant Cell Arteritis on Prednisone 6 - h/o recent DVT on Eliquis 7 - Legal Blindness/ Macular Degeneration PLAN: 
· As per the family, a conservative nonsurgical approach was opted for further management of the periprosthetic fracture · Agree with Norco and Dilaudid as needed for pain control. Will also add Tylenol, as the family would like to minimize the use of narcotics · Physical Therapy · Resume home antihypertensive medications, namely olmesartan, metoprolol and amlodipine · Continue PPI for GERD · Continue Prednisone for giant cell arteritis · Continue Eliquis for recent DVT of LLE · Resume all other home medications for chronic conditions · It seems like the patient will be heading to SNF for rehabilitation. Will leave the discharge disposition to the Orthopedic Surgeon Thank you very much, Dr Liyah Johnson, for allowing us to participate in the care of your patient. Will follow along with you. Signed: Aisha Frost MD

## 2019-03-19 NOTE — PROGRESS NOTES
Pt resting well in bed with family at bedside. Pt tolerated dinner well. Denies pain at present lying in bed. Pt voiding well. inst pt/ family to call for any needs.

## 2019-03-19 NOTE — H&P
[facility] [employer] Pre Operative History and Physical Exam 
 
Patient ID: 
Shannan Mcintosh [id number] 
3 y.o. 
7/12/1928 Today: March 19, 2019 CC:  Left hip pain HPI:   The patient is s/ left hip hemiarthroplasty for femoral head fracture at the end of December. The patient was done at [facility] as she has a significant cardiac history. She did fairly well postop. Preop she was in a wheelchair. Postop she eventually transitioned to using a rolling walker/cane. Over the weekend she experieinced a fall. Patient and patient's family report she was been prone to falling 2/2 to vision problems. Patient seen in office today and found to have periprosthetic fracture of the left hip. Past Medical History: 
Past Medical History:  
Diagnosis Date  Acquired hypothyroidism   
 synthroid daily  Anemia   
 blood transfusion 9/2018; monitored by [facility]  Arthritis  Chronic pain  Fractured hip ([address]  Gastroesophageal reflux disease 12/9/2016  GERD (gastroesophageal reflux disease)   
 nexium daily,well controlled per daughter-    hiatal hernia,   
 Giant cell arteritis ([address] 03/20/2010  
 monitored by rheumatologist, prednisone daily  Hip fracture ([address] 10/29/2018  
 left hip fracture-  followed by Dr Memo Winston @ [address] [place] [address]  History of heart attack 09/12/2018  History of hip fracture 2018  
 left hip  Hypertension   
 managed with meds  Legally blind  Macular degeneration Legally blind  Memory deficit  NSTEMI (non-ST elevated myocardial infarction) ([facility] 9/14/2018 9/12/18 \"mild mi, no damage, no heart cath or stents\"- medical management recommended however anemia persists, no current asa or thinners  Other ill-defined conditions(799.89)   
 back and pelvis pain  Other ill-defined conditions(799.89)   
 hip fx ;   arteritis  TIA (transient ischemic attack) 6/23/2015  
 no residual  
 Varicose veins of left lower extremity Past Surgical History: 
Past Surgical History:  
Procedure Laterality Date  CARDIAC SURG PROCEDURE UNLIST    
 Pt's family reports heart attack in Sept while in hospital at [facility].  HX CHOLECYSTECTOMY  HX ENDOSCOPY    
 HX HEENT    
 torn retina  HX ORTHOPAEDIC  12/24/2018  
 left hip repair  HX TONSILLECTOMY Medications: 
  
Prior to Admission medications Medication Sig Start Date End Date Taking? Authorizing Provider  
levothyroxine (SYNTHROID) 50 mcg tablet TAKE 1 TABLET BY MOUTH EVERY OTHER DAY (ALTERNATE BETWEEN 50MCG & 25 MCG) 3/18/19   Tawanda Chadwick MD  
diazePAM (VALIUM) 2 mg tablet Take 0.5 Tabs by mouth every eight (8) hours as needed (spasm). Max Daily Amount: 3 mg. 3/17/19   Carlos Rosales MD  
atorvastatin (LIPITOR) 10 mg tablet Take 0.5 Tabs by mouth nightly. 2/18/19   Becky Acuña NP  
metoprolol succinate (TOPROL-XL) 25 mg XL tablet Take 1 Tab by mouth daily. 1/31/19   Shelby Cobb MD  
olmesartan (BENICAR) 40 mg tablet Take 0.5 Tabs by mouth daily. 1/31/19   Sidney Hillman MD  
apixaban (ELIQUIS) 2.5 mg tablet Take 1 Tab by mouth two (2) times a day. 1/31/19   Sidney Hillman MD  
nitroglycerin (NITROSTAT) 0.4 mg SL tablet 1 Tab by SubLINGual route every five (5) minutes as needed for Chest Pain. Up to 3 doses. 1/31/19   Shelby Cobb MD  
melatonin 3 mg tablet Take  by mouth. Provider, Historical  
sertraline (ZOLOFT) 25 mg tablet Take 1 Tab by mouth daily. Patient taking differently: Take 25 mg by mouth daily. 1/24/19   Tawanda Chadwick MD  
OTHER Calcium - OTC    Provider, Historical  
acetaminophen (TYLENOL) 650 mg TbER Take 650 mg by mouth every eight (8) hours. 500 mg in morning daily    Provider, Historical  
predniSONE (DELTASONE) 1 mg tablet Take 3 mg by mouth daily. 3/22/18   Provider, Historical  
esomeprazole (NEXIUM) 40 mg capsule Take 40 mg by mouth daily.     Provider, Historical  
 amLODIPine (NORVASC) 5 mg tablet Take 1 Tab by mouth daily. Patient taking differently: Take 5 mg by mouth daily. 9/27/18   Rolly Essex, MD  
levothyroxine (SYNTHROID) 25 mcg tablet Take 1 Tab by mouth Daily (before breakfast). Patient taking differently: Take 25-50 mcg by mouth every other day. 25 mg alternating daily with 50 mg 9/20/18   Frank Early MD  
Cholecalciferol, Vitamin D3, (VITAMIN D3) 1,000 unit cap Take 1 Cap by mouth daily. Provider, Historical  
Denosumab (PROLIA) 60 mg/mL injection 60 mg by SubCUTAneous route every 6 months. Provider, Historical  
 
 
Family History: 
  
Family History Problem Relation Age of Onset  Cancer Father   
     stomach Kevin Awe Arthritis-osteo Mother  Hypertension Mother  Arthritis-osteo Sister Social History:  
  
Social History Tobacco Use  Smoking status: Never Smoker  Smokeless tobacco: Never Used Substance Use Topics  Alcohol use: No  
   
 
Allergies: Allergies Allergen Reactions  Calcium Carbonate Diarrhea  Ranitidine Hcl Diarrhea  Guaifenesin Other (comments) Hallucinations Vitals:  
  
Visit Vitals /62 (BP 1 Location: Right arm, BP Patient Position: At rest) Pulse 65 Temp 97.6 °F (36.4 °C) Resp 16 SpO2 94% Breastfeeding? No  
  
 
Objective:  
      General: No Acute distress HEENT: Normocephalic/atramatic Lungs:  Breathing non-labored Heart:   RRR Abdomen: soft Extremities:  Prior incision well healed. Leg resting in some externaly rotation. Pain with any movement of the left limb. Distally patient has no neurologic deficit. Patient Active Problem List  
Diagnosis Code  Syncope R55  Bradycardia R00.1  Giant cell arteritis ([facility] M31.6  Hypertension I10  Abnormal EKG R94.31  
 Back pain M54.9  TIA (transient ischemic attack) G45.9  Hyperlipidemia LDL goal <100 E78.5  Diarrhea R19.7  Gastroesophageal reflux disease K21.9  Insulin resistance E88.81  
 Acquired hypothyroidism E03.9  Chest pain R07.9  Anemia D64.9  
 NSTEMI (non-ST elevated myocardial infarction) (HCC) I21.4  Delirium R41.0  Acute cystitis without hematuria N30.00  
 IFG (impaired fasting glucose) R73.01  
 Hip pain M25.559  Atypical chest pain R07.89  Left hip pain M25.552 Assessment: 1. Periprosthetic fracture left hip Plan: 
 
Patient is admitted for pain control and for eventual placement as the patient and patient's family is unable to manage alone at home. We had a very long discussion in office today regarding treatment. At this point to fix her periprosthetic fracture it would require a lengthy procedure with much more blood loss than we experieinced with her primary hemiarthroplasty. Given her significant cardiac history I would be very worried that her heart would tolerate this type of stres and blood loss. We had an honest and straight forward discussion that fixing this periprosthetic fracture could carry a significant risk of morbidity and/or mortality. Patient and patient's family voiced understanding of this and I believe are leaning toward nonop management with nonweightbearing and protected/ assisted transfers and long term nursing home placement. At this point patient is admitted for pain control and at least to get the ball rolling on placement. Signed By: Ashanti Davidson MD 
March 19, 2019

## 2019-03-20 PROCEDURE — 97165 OT EVAL LOW COMPLEX 30 MIN: CPT

## 2019-03-20 PROCEDURE — 74011250637 HC RX REV CODE- 250/637: Performed by: INTERNAL MEDICINE

## 2019-03-20 PROCEDURE — 74011636637 HC RX REV CODE- 636/637: Performed by: INTERNAL MEDICINE

## 2019-03-20 PROCEDURE — 74011000302 HC RX REV CODE- 302: Performed by: ORTHOPAEDIC SURGERY

## 2019-03-20 PROCEDURE — 97535 SELF CARE MNGMENT TRAINING: CPT

## 2019-03-20 PROCEDURE — 99221 1ST HOSP IP/OBS SF/LOW 40: CPT | Performed by: PHYSICAL MEDICINE & REHABILITATION

## 2019-03-20 PROCEDURE — 65270000029 HC RM PRIVATE

## 2019-03-20 PROCEDURE — 97161 PT EVAL LOW COMPLEX 20 MIN: CPT

## 2019-03-20 PROCEDURE — 97530 THERAPEUTIC ACTIVITIES: CPT

## 2019-03-20 PROCEDURE — 74011250636 HC RX REV CODE- 250/636: Performed by: INTERNAL MEDICINE

## 2019-03-20 PROCEDURE — 86580 TB INTRADERMAL TEST: CPT | Performed by: ORTHOPAEDIC SURGERY

## 2019-03-20 PROCEDURE — 97162 PT EVAL MOD COMPLEX 30 MIN: CPT

## 2019-03-20 RX ORDER — CLONIDINE HYDROCHLORIDE 0.1 MG/1
0.1 TABLET ORAL
Status: DISCONTINUED | OUTPATIENT
Start: 2019-03-20 | End: 2019-03-22 | Stop reason: HOSPADM

## 2019-03-20 RX ORDER — HYDRALAZINE HYDROCHLORIDE 20 MG/ML
20 INJECTION INTRAMUSCULAR; INTRAVENOUS
Status: DISCONTINUED | OUTPATIENT
Start: 2019-03-20 | End: 2019-03-22 | Stop reason: HOSPADM

## 2019-03-20 RX ORDER — HYDRALAZINE HYDROCHLORIDE 25 MG/1
25 TABLET, FILM COATED ORAL
Status: DISCONTINUED | OUTPATIENT
Start: 2019-03-20 | End: 2019-03-22 | Stop reason: HOSPADM

## 2019-03-20 RX ORDER — LEVOTHYROXINE SODIUM 50 UG/1
50 TABLET ORAL EVERY OTHER DAY
Status: DISCONTINUED | OUTPATIENT
Start: 2019-03-22 | End: 2019-03-22 | Stop reason: HOSPADM

## 2019-03-20 RX ORDER — HYDROCODONE BITARTRATE AND ACETAMINOPHEN 5; 325 MG/1; MG/1
1 TABLET ORAL
Qty: 42 TAB | Refills: 0 | Status: SHIPPED | OUTPATIENT
Start: 2019-03-20 | End: 2019-03-27

## 2019-03-20 RX ADMIN — PANTOPRAZOLE SODIUM 40 MG: 40 TABLET, DELAYED RELEASE ORAL at 07:00

## 2019-03-20 RX ADMIN — HYDRALAZINE HYDROCHLORIDE 20 MG: 20 INJECTION INTRAMUSCULAR; INTRAVENOUS at 02:15

## 2019-03-20 RX ADMIN — CLONIDINE HYDROCHLORIDE 0.1 MG: 0.1 TABLET ORAL at 12:24

## 2019-03-20 RX ADMIN — SERTRALINE HYDROCHLORIDE 25 MG: 25 TABLET ORAL at 09:11

## 2019-03-20 RX ADMIN — TUBERCULIN PURIFIED PROTEIN DERIVATIVE 5 UNITS: 5 INJECTION, SOLUTION INTRADERMAL at 09:11

## 2019-03-20 RX ADMIN — ATORVASTATIN CALCIUM 5 MG: 10 TABLET, FILM COATED ORAL at 02:15

## 2019-03-20 RX ADMIN — APIXABAN 2.5 MG: 2.5 TABLET, FILM COATED ORAL at 17:19

## 2019-03-20 RX ADMIN — PREDNISONE 3 MG: 1 TABLET ORAL at 09:11

## 2019-03-20 RX ADMIN — ACETAMINOPHEN 650 MG: 325 TABLET, FILM COATED ORAL at 15:32

## 2019-03-20 RX ADMIN — APIXABAN 2.5 MG: 2.5 TABLET, FILM COATED ORAL at 09:11

## 2019-03-20 RX ADMIN — HYDRALAZINE HYDROCHLORIDE 25 MG: 25 TABLET, FILM COATED ORAL at 12:24

## 2019-03-20 RX ADMIN — AMLODIPINE BESYLATE 5 MG: 5 TABLET ORAL at 09:11

## 2019-03-20 RX ADMIN — METOPROLOL SUCCINATE 25 MG: 25 TABLET, EXTENDED RELEASE ORAL at 09:11

## 2019-03-20 RX ADMIN — OLMESARTAN MEDOXOMIL 20 MG: 20 TABLET, COATED ORAL at 09:11

## 2019-03-20 NOTE — PROGRESS NOTES
Nutrition Reason for assessment: Referral received from nursing admission Malnutrition Screening Tool for unsure of weight loss /amount. Assessment:  
Diet order(s): Regular Food/Nutrition Patient History:  Pt presented following a fall at home; periprosthetic fracture of Left hip; admitted for pain control. Past medical / surgical history notable for left hemiarthroplasty for femoral head fracture at the end of December 2018, legal blindness / macular degeneration, HTN. Food history obtained from patient's daughter; pt sleeping. Pt lives with her daughter and daughter does the meal preparation. Daughter endorses that patient had been eating well at home; may have had some recent weight loss past few days r/t poor po intake-pain issues. Pt drinks 1 Ensure Plus/day at home. Anthropometrics:Height: 4' 11\" (149.9 cm),  Weight: 45.4 kg (100 lb),  weight source not specified, Body mass index is 20.2 kg/m². BMI class of underweight for Older American Women. Noted weight of 106# 1 year ago. Macronutrient needs: EER:  0547-0369 kcal /day (25-30 kcal/kg BW) EPR:  54-68 grams protein/day (1.2-1.5 grams/kg IBW) GFR >60 Intake/Comparative Standards: No recorded intake; per daughter, pt ate ~25% grilled cheese sandwich at lunch today. Based on limited information, pt potentially meets < 25% estimated kcal and protein needs. Nutrition Diagnosis: Inadequate oral intake r/t decreased ability to consume sufficient oral intake, pain issues as evidenced by estimates of insufficient intake of energy or high quality protein from diet when compared to requirements. Intervention: 
Meals and snacks: Continue current diet. Daughter assisting with menu selections. Nutrition Supplement Therapy: Initiated Ensure Enlive on all meal trays; flavor preferences noted. Discharge nutrition plan: Too soon to determine. Leobardo Irwin, MPH, [address] [place] [address],  
[phone]

## 2019-03-20 NOTE — PROGRESS NOTES
Problem: Self Care Deficits Care Plan (Adult) Goal: *Acute Goals and Plan of Care (Insert Text) Description 1. Patient will perform grooming with supervision siting on EOB. 2. Patient will perform Upper body dressing with supervision sitting on EOB 3. Patient will perform lower body dressing with max assist 
4. Patient will perform upper and lower body bathing with min assist using adaptive techniques and equipment PRN. 5. Patient will perform toilet transfers with moderate assistance 6. Patient will participate in 30 + minutes of ADL/ therapeutic exercise/therapeutic activity with min rest breaks to increase activity tolerance for self care. 7. Patient will perform sliding board transfers with min assist  And NWB L LE. 8. Patient will stand NWB L LE with mod assist for 1 minute to assist caregiver with self care. Goals to be achieved in 7 days. 3/20/2019 1233 by Kt Handy OT Outcome: Progressing Towards Goal 
3/20/2019 1229 by Kt Handy OT Outcome: Progressing Towards Goal 
 
OCCUPATIONAL THERAPY: Initial Assessment, Daily Note and AM 3/20/2019 INPATIENT:   
[place] / [place] / Product Type: Medicare /  
  
NAME/AGE/GENDER: Catherine Medel is a 80 y.o. female PRIMARY DIAGNOSIS:  Left hip pain [M25.552] <principal problem not specified> 
 <principal problem not specified> 
 
  
  
ICD-10: Treatment Diagnosis:  
 Pain in left hip (M25.552) Stiffness of Left Hip, Not elsewhere classified (M25.652) Generalized Muscle Weakness (M62.81) Other lack of cordination (R27.8) Difficulty in walking, Not elsewhere classified (R26.2) Precautions/Allergies: 
   Calcium carbonate; Ranitidine hcl; and Guaifenesin ASSESSMENT:  
Ms. Tasneem Hdz presents supine in bed with above diagnosis. She is cooperative and motivated with decreased knowledge of NWB OF L LE with rib fractures. Her son was present initially then her daughter. SHe completed a sponge bath, dressing and grooming while long sitting in bed. She transferred to EOB with max x 2. She sits on the EOB leaning constantly to the right side. She attempted sit to stand 3 times with mod x 2. SHe used a sliding board to transfer to bedside chair with mod assist. She plans to go to rehab after this hospital stay. She is unable to remember that she is NWB on her L LE when asked throughout the session. She  is sitting up in the recliner, adaptive call button in hand as she is leagally blind. She would continue to benefit from skilled OT will follow. Prior to admit and this fall  the patient was supervision level using her rollator at home. SHe was grossly min to SBA for ADLS, and supervision for bathing. Nursing and CNA aware of patient location and needs. Will follow. This section established at most recent assessment PROBLEM LIST (Impairments causing functional limitations): 
Decreased Strength Decreased ADL/Functional Activities Decreased Transfer Abilities Decreased Ambulation Ability/Technique Decreased Balance Increased Pain Decreased Activity Tolerance Decreased Pacing Skills Decreased Work Simplification/Energy Conservation Techniques Decreased Flexibility/Joint Mobility INTERVENTIONS PLANNED: (Benefits and precautions of occupational therapy have been discussed with the patient.) Activities of daily living training Adaptive equipment training Balance training Cognitive training Donning&doffing training Hygiene training Neuromuscular re-eduation Therapeutic activity Therapeutic exercise TREATMENT PLAN: Frequency/Duration: Follow patient 3 times to address above goals. Rehabilitation Potential For Stated Goals: Good RECOMMENDED REHABILITATION/EQUIPMENT: (at time of discharge pending progress): Due to the probability of continued deficits (see above) this patient will likely need continued skilled occupational therapy after discharge. Equipment:  
None at this time OCCUPATIONAL PROFILE AND HISTORY:  
History of Present Injury/Illness (Reason for Referral): 
See H and P Past Medical History/Comorbidities: Ms. Julianna Veliz  has a past medical history of Acquired hypothyroidism, Anemia, Arthritis, Chronic pain, Fractured hip ([facility] [address] Gastroesophageal reflux disease (12/9/2016), GERD (gastroesophageal reflux disease), Giant cell arteritis ([facility] [address] (03/20/2010), Hip fracture ([facility] (10/29/2018), History of heart attack (09/12/2018), History of hip fracture (2018), Hypertension, Legally blind, Macular degeneration, Memory deficit, NSTEMI (non-ST elevated myocardial infarction) ([facility] [address] (9/14/2018), Other ill-defined conditions(799.89), Other ill-defined conditions(799.89), TIA (transient ischemic attack) (6/23/2015), and Varicose veins of left lower extremity. Ms. Julianna Veliz  has a past surgical history that includes hx endoscopy; pr cardiac surg procedure unlist; hx cholecystectomy; hx tonsillectomy; hx heent; and hx orthopaedic (12/24/2018). Social History/Living Environment:  
Home Environment: Private residence # Steps to Enter: 1 One/Two Story Residence: One story Living Alone: No 
Support Systems: Child(yesi) Patient Expects to be Discharged to[de-identified] Rehabilitation facility Current DME Used/Available at Home: Shower chair, Walker, rollator, Walker, rolling Tub or Shower Type: Shower(built in seat) Prior Level of Function/Work/Activity: 
Supervision with rollator, min assist with ADLS, supervision with showering lives with daughter Number of Personal Factors/Comorbidities that affect the Plan of Care: Brief history (0):  LOW COMPLEXITY ASSESSMENT OF OCCUPATIONAL PERFORMANCE[de-identified]  
Activities of Daily Living:  
Basic ADLs (From Assessment) Complex ADLs (From Assessment) Oral Facial Hygiene/Grooming: Supervision Bathing: Moderate assistance Upper Body Dressing: Stand-by assistance(changed gown) Lower Body Dressing: Total assistance Toileting: Total assistance Grooming/Bathing/Dressing Activities of Daily Living Grooming Grooming Assistance: Supervision Washing Face: Supervision/set-up Washing Hands: Supervision/set-up Brushing/Combing Hair: Supervision/set-up Cognitive Retraining Safety/Judgement: Decreased awareness of environment;Decreased awareness of need for assistance;Decreased awareness of need for safety;Decreased insight into deficits; Fall prevention Upper Body Bathing Bathing Assistance: Supervision Position Performed: Long sitting on bed Lower Body Bathing Bathing Assistance: Moderate assistance Perineal  : Supervision/set-up(long sitting in bed) Position Performed: Supine Lower Body : Maximum assistance Position Performed: Long sitting on bed Upper Body Dressing Assistance Dressing Assistance: Contact guard assistance Hospital Gown: Contact guard assistance Lower Body Dressing Assistance Dressing Assistance: Total assistance(dependent) Socks: Total assistance (dependent) Bed/Mat Mobility Supine to Sit: Maximum assistance;Assist x2 Sit to Supine: Maximum assistance;Assist x2 Sit to Stand: Moderate assistance;Assist x2(plus one for support) Stand to Sit: Assist x2; Moderate assistance(plus one for support) Bed to Chair: Moderate assistance(using sliding board) Scooting: Maximum assistance;Assist x2 Most Recent Physical Functioning:  
Gross Assessment: 
  
         
  
Posture: 
Posture (WDL): Within defined limits Balance: 
Sitting: Intact Standing: Pull to stand; With support Bed Mobility: 
Supine to Sit: Maximum assistance;Assist x2 Sit to Supine: Maximum assistance;Assist x2 Scooting: Maximum assistance;Assist x2 Wheelchair Mobility: 
  
Transfers: 
Sit to Stand: Moderate assistance;Assist x2(plus one for support) Stand to Sit: Assist x2; Moderate assistance(plus one for support) Bed to Chair: Moderate assistance(using sliding board) Patient Vitals for the past 6 hrs: 
 BP BP Patient Position SpO2 Pulse 03/20/19 0728 188/83 At rest 91 % 81  
03/20/19 1115 181/88 At rest 94 % 77 Mental Status Neurologic State: Alert Orientation Level: Appropriate for age Cognition: Decreased attention/concentration, Impaired decision making, Memory loss Perception: Cues to maintain midline in sitting Perseveration: No perseveration noted Safety/Judgement: Decreased awareness of environment, Decreased awareness of need for assistance, Decreased awareness of need for safety, Decreased insight into deficits, Fall prevention Physical Skills Involved: 
Balance Strength Activity Tolerance Cognitive Skills Affected (resulting in the inability to perform in a timely and safe manner): Comprehension Psychosocial Skills Affected: 
Habits/Routines Environmental Adaptation Self-Awareness Number of elements that affect the Plan of Care: 5+:  HIGH COMPLEXITY CLINICAL DECISION MAKING:  
[employer] AM-PAC? ?6 Clicks? Daily Activity Inpatient Short Form How much help from another person does the patient currently need. .. Total A Lot A Little None 1. Putting on and taking off regular lower body clothing? ? 1   ? 2   ? 3   ? 4  
2. Bathing (including washing, rinsing, drying)? ? 1   ? 2   ? 3   ? 4  
3. Toileting, which includes using toilet, bedpan or urinal?   ? 1   ? 2   ? 3   ? 4  
4. Putting on and taking off regular upper body clothing? ? 1   ? 2   ? 3   ? 4  
5. Taking care of personal grooming such as brushing teeth? ? 1   ? 2   ? 3   ? 4  
6. Eating meals? ? 1   ? 2   ? 3   ? 4  
© 2007, Trustees of [facility] [employer], under license to [employer]. All rights reserved Score:  Initial:14 Most Recent: X (Date: -- ) Interpretation of Tool:  Represents activities that are increasingly more difficult (i.e. Bed mobility, Transfers, Gait). Medical Necessity:    
Patient is expected to demonstrate progress in balance, coordination and functional technique 
 to decrease assistance required with self care and functional mobility and improve safety during self care and functional mobility Mariah Fish Reason for Services/Other Comments: 
Patient continues to require skilled intervention due to decreased self care and functional mobility Mariah Fish Use of outcome tool(s) and clinical judgement create a POC that gives a: MODERATE COMPLEXITY  
 
 
 
TREATMENT:  
(In addition to Assessment/Re-Assessment sessions the following treatments were rendered) Pre-treatment Symptoms/Complaints:   
Pain: Initial:  
Pain Intensity 1: 0  Post Session:  0/10 rest  
 
Self Care: (30): Procedure(s) (per grid) utilized to improve and/or restore self-care/home management as related to dressing, bathing and grooming. Required moderate visual, verbal, manual and tactile cueing to facilitate activities of daily living skills and compensatory activities. Assessment/Reassessment (10) Braces/Orthotics/Lines/Etc:  
O2 Device: Nasal cannula Treatment/Session Assessment:   
Response to Treatment:  tolerated well, mod with sliding board use, plans for rehab Interdisciplinary Collaboration:  
Physical Therapist 
Occupational Therapist 
Registered Nurse Certified Nursing Assistant/Patient Care Technician After treatment position/precautions:  
Up in chair Bed alarm/tab alert on Bed/Chair-wheels locked Bed in low position Call light within reach RN notified Family at bedside Compliance with Program/Exercises: Compliant all of the time. Recommendations/Intent for next treatment session: \"Next visit will focus on advancements to more challenging activities and reduction in assistance provided\". Total Treatment Duration:30 
OT Patient Time In/Time Out Time In: 1010 Time Out: 1050 Janes Hannah OT

## 2019-03-20 NOTE — PROGRESS NOTES
Pt is in the bed with 2 family members at the bedside. Pt asking to get up to urinate. Bedside commode placed close to the bed and male family member picked her up under the arms and turned and put her on BSC. Told family to call when pt is finished.

## 2019-03-20 NOTE — PROGRESS NOTES
2019 Post Op day: * No surgery found * Admit Date: 3/19/2019 Admit Diagnosis: Left hip pain [M25.552] Subjective: Patient stable. No acute events. Objective:  
Visit Vitals /79 (BP 1 Location: Right arm, BP Patient Position: At rest) Pulse 85 Temp 98 °F (36.7 °C) Resp 18 Ht 4' 11\" (1.499 m) Wt 45.4 kg (100 lb) SpO2 90% Breastfeeding? No  
BMI 20.20 kg/m² Temp (24hrs), Av.7 °F (36.5 °C), Min:97.3 °F (36.3 °C), Max:98 °F (36.7 °C) Lab Results Component Value Date/Time HGB 9.3 (L) 2019 07:22 PM  
 
Extremity Exam 
Tibialis Anterior and Gastroc-Soleus functioning normally left lower extremity Sensation intact to light touch on operative limb Extremity perfused SCDS in place No sign of DVT Assessment / Plan : 
 
Again, treatment options (surgery versus nonop and protected weightbearing) discussed with patient and patients family yesterday in office. Patient has significant cardiac disease and given the time and blood loss that would accompany repair of her periprosthetic fracture I fear that her heart may not tolerate such a stress. If her case the risk of mortality during or shortly after the procedure is a very real possibility. After this conversation both patient and family opted toward placement and nonop treatment. This unfortunately is most likely going to mean the patient will be wheelchair bound at least for the foreseeable future if not forever and require a level of assistance that I do not feel can be provided at home. I light of this we will attempt to get patient placed in a long term care facility that can handle these requirements. NWB LLE Continue PT/OT Continue current DVT prophylaxis in house. Discharge on Eliquis BID DIspo-long term care facility Patient Active Problem List  
Diagnosis Code  Syncope R55  Bradycardia R00.1  Giant cell arteritis ([facility] [address] M31.6  Hypertension I10  
  Abnormal EKG R94.31  
 Back pain M54.9  TIA (transient ischemic attack) G45.9  Hyperlipidemia LDL goal <100 E78.5  Diarrhea R19.7  Gastroesophageal reflux disease K21.9  Insulin resistance E88.81  
 Acquired hypothyroidism E03.9  Chest pain R07.9  Anemia D64.9  
 NSTEMI (non-ST elevated myocardial infarction) (HCC) I21.4  Delirium R41.0  Acute cystitis without hematuria N30.00  
 IFG (impaired fasting glucose) R73.01  
 Hip pain M25.559  Atypical chest pain R07.89  Left hip pain M25.552 Signed By: Romeo Page MD  
 
I have reviewed the patients controlled substance prescription history, as maintained in the [place] prescription monitoring program, so that the prescription(s) for a  controlled substance can be given.

## 2019-03-20 NOTE — PROGRESS NOTES
Pt up in recliner with daughter at bedside. Appetite good for dinner. Pain to hip controlled well. VSS. Assessment unchanged. inst pt/ family to call for needs.

## 2019-03-20 NOTE — PROGRESS NOTES
Pt returned to bed by family member and then we pulled her up for comfort. Pt reminded me that she is legally blind  Hand held call light placed at bedside and a sticker placed so she can locate it. Pt has bruising on arms. And skin is very fragile. Pillow placed under knees for comfort.

## 2019-03-20 NOTE — PROGRESS NOTES
Pt scooted to the foot of the bed and states she has to use the bathroom. Bedpan used and pt voided approximately 300 cc clear yellow urine. Clean bed pads placed under pt and reposition up in the bed  With  Pillow under knees.

## 2019-03-20 NOTE — PROGRESS NOTES
Hospitalist Progress Note Admit Date:  3/19/2019  3:26 PM  
Name:  Milagros Conley Age:  80 y.o. 
:  1928 MRN:  [id number] PCP:  Katalina Kent MD 
Treatment Team: Attending Provider: Kevin Merlos MD; Consulting Provider: Tonia Zamora MD; Consulting Provider: Rachel Erazo MD; Care Manager: Cat Rodas; Utilization Review: Derek Shelley Subjective: The patient feels well and denies any significant complaints, except for intermittent pain in the left ribcage and left hip, particularly when she moves. Objective:  
 
Patient Vitals for the past 24 hrs: 
 Temp Pulse Resp BP SpO2  
19 1115 97.6 °F (36.4 °C) 77 16 181/88 94 % 19 0728 97.6 °F (36.4 °C) 81 16 188/83 91 %  
19 0445 98 °F (36.7 °C) 85 18 169/79 90 % 19 2318 97.3 °F (36.3 °C) 83 16 (!) 199/92 (!) 88 % 191 97.9 °F (36.6 °C) 75 16 164/79 (!) 89 % 19 1604 97.6 °F (36.4 °C) 65 16 199/62 94 % Oxygen Therapy O2 Sat (%): 94 % (19) O2 Device: Nasal cannula (19) O2 Flow Rate (L/min): 1 l/min (19) Intake/Output Summary (Last 24 hours) at 3/20/2019 1442 Last data filed at 3/20/2019 7754 Gross per 24 hour Intake  Output 1450 ml Net -1450 ml General:    Well nourished. Alert. CV:   RRR. No murmur, rub, or gallop. Lungs:   Clear to auscultation bilaterally. No wheezing, rhonchi, or rales. Abdomen:   Soft, nontender, nondistended. Extremities: Warm and dry. No cyanosis or edema. Tenderness on palpation of the left hip. Pain is worse with movements. Skin:     No rashes or jaundice. Data Review: 
I have reviewed all labs, meds, telemetry events, and studies from the last 24 hours. Recent Results (from the past 24 hour(s)) CBC W/O DIFF Collection Time: 19  7:22 PM  
Result Value Ref Range WBC 9.8 4.3 - 11.1 K/uL  
 RBC 3.61 (L) 4.05 - 5.2 M/uL HGB 9.3 (L) 11.7 - 15.4 g/dL HCT 31.8 (L) 35.8 - 46.3 % MCV 88.1 79.6 - 97.8 FL  
 MCH 25.8 (L) 26.1 - 32.9 PG  
 MCHC 29.2 (L) 31.4 - 35.0 g/dL RDW 20.0 (H) 11.9 - 14.6 % PLATELET 678 888 - 468 K/uL MPV 11.3 9.4 - 12.3 FL ABSOLUTE NRBC 0.00 0.0 - 0.2 K/uL METABOLIC PANEL, BASIC Collection Time: 03/19/19  7:22 PM  
Result Value Ref Range Sodium 142 136 - 145 mmol/L Potassium 3.4 (L) 3.5 - 5.1 mmol/L Chloride 108 (H) 98 - 107 mmol/L  
 CO2 25 21 - 32 mmol/L Anion gap 9 mmol/L Glucose 188 (H) 65 - 100 mg/dL BUN 16 8 - 23 MG/DL Creatinine 0.70 0.6 - 1.0 MG/DL  
 GFR est AA >60 >60 ml/min/1.73m2 GFR est non-AA >60 ml/min/1.73m2 Calcium 9.2 8.3 - 10.4 MG/DL PROTHROMBIN TIME + INR Collection Time: 03/19/19  7:22 PM  
Result Value Ref Range Prothrombin time 14.4 11.7 - 14.5 sec INR 1.1 All Micro Results None Current Meds: 
Current Facility-Administered Medications Medication Dose Route Frequency  hydrALAZINE (APRESOLINE) tablet 25 mg  25 mg Oral TID PRN  
 cloNIDine HCl (CATAPRES) tablet 0.1 mg  0.1 mg Oral Q4H PRN  
 hydrALAZINE (APRESOLINE) 20 mg/mL injection 20 mg  20 mg IntraVENous Q4H PRN  
 tuberculin injection 5 Units  5 Units IntraDERMal ONCE  
 HYDROmorphone (PF) (DILAUDID) injection 0.5 mg  0.5 mg IntraVENous Q4H PRN  
 HYDROcodone-acetaminophen (NORCO) 5-325 mg per tablet 1 Tab  1 Tab Oral Q4H PRN  
 acetaminophen (TYLENOL) tablet 650 mg  650 mg Oral Q4H PRN  
 amLODIPine (NORVASC) tablet 5 mg  5 mg Oral DAILY  apixaban (ELIQUIS) tablet 2.5 mg  2.5 mg Oral BID  atorvastatin (LIPITOR) tablet 5 mg  5 mg Oral QHS  cholecalciferol (VITAMIN D3) tablet 1,000 Units  1,000 Units Oral DAILY  diazePAM (VALIUM) tablet 1 mg  1 mg Oral Q8H PRN  pantoprazole (PROTONIX) tablet 40 mg  40 mg Oral ACB  levothyroxine (SYNTHROID) tablet 25 mcg  25 mcg Oral ACB  metoprolol succinate (TOPROL-XL) XL tablet 25 mg  25 mg Oral DAILY  olmesartan (BENICAR) tablet 20 mg  20 mg Oral DAILY  nitroglycerin (NITROSTAT) tablet 0.4 mg  0.4 mg SubLINGual Q5MIN PRN  predniSONE (DELTASONE) tablet 3 mg  3 mg Oral DAILY  sertraline (ZOLOFT) tablet 25 mg  25 mg Oral DAILY Other Studies (last 24 hours): No results found. Assessment and Plan:  
 
Hospital Problems as of 3/20/2019 Date Reviewed: 2/18/2019 Codes Class Noted - Resolved POA Left hip pain ICD-10-CM: M25.552 ICD-9-CM: 719.45  3/19/2019 - Present Unknown 1 - Periprosthetic Fracture of the Left Hip 2 - HTN, uncontrolled 3 - Hypothyroidism 4 - GERD 
5 - Giant Cell Arteritis on Prednisone 6 - h/o recent DVT on Eliquis 7 - Legal Blindness/ Macular Degeneration 8 - Chronic Normocytic Anemia 
  
PLAN: 
· As per the family, a conservative nonsurgical approach was opted for further management of the periprosthetic fracture · Agree with Norco and Dilaudid as needed for pain control. Will also encourage the use of Tylenol, as the family would like to minimize the use of narcotics · Physical Therapy · Continue home antihypertensive medications, namely olmesartan, metoprolol and amlodipine · Continue PPI for GERD · Continue Prednisone for giant cell arteritis · Continue Eliquis for recent DVT of LLE 
· Continue all other home medications for chronic conditions DC planning/Dispo: Per Orthopedics. Will likely need extended rehab. DVT ppx: the patient is on Eliquis Signed: Eloy Patricia MD

## 2019-03-20 NOTE — CONSULTS
Physical Medicine & Rehabilitation Note-consult Patient: Drea Moya MRN: [id number]  SSN: [id number] YOB: 1928  Age: 80 y.o. Sex: female Admit Date: 3/19/2019 Admitting Physician: Wes Ariza MD 
 
Medical Decision Making/Plan/Recommend: Gait impairment and functional deficits following left total hip arthroplasty. Patient will be NWB LLE. Continue PT, OT for  WC mobility, transfers, and  ambulation training. Best care option is admission to SNF and sub acute rehab program. Patient will benefit from daily skilled rehabilitation efforts and regular medical and nursing care at [facility]. Plan for SNF sub acute rehab. Chief Complaint : Gait dysfunction secondary to below. Admit Diagnosis: Left hip pain [M25.552] left hip hemiarthroplasty 12/24/2019 Periprosthetic left  Femur fracture 3/17/2019 Pain DVT risk Post op hemorrhagic anemia 
multiple bilateral rib fractures (1/2019) Acute Rehab Dx: 
Gait impairment Debility   
deconditioning Mobility and ambulation deficits Self Care/ADL deficits NWB LLE Medical Dx: 
Past Medical History:  
Diagnosis Date  Acquired hypothyroidism   
 synthroid daily  Anemia   
 blood transfusion 9/2018; [facility]  Arthritis  Chronic pain  Fract[facility] hip ([facility]  Gastroesophageal reflux disease 12/9/2016  GERD (gastroesophageal reflux disease)   
 nexium daily,well controlled per daughter-    hiatal hernia,   
 Giant cell arteritis ([address] 03/20/2010  
 monitored by rheumatologist, prednisone daily  Hip fracture ([facility] 10/29/2018  
 left hip fracture-  followed by Dr Tobin Osgood @ [address] [place] [address]  History of heart attack 09/12/2018  History of hip fracture 2018  
 left hip  Hypertension   
 managed with meds  Legally blind  Macular degeneration Legally blind  Memory deficit  NSTEMI (non-ST elevated myocardial infarction) ([facility] 9/14/2018 9/12/18 \"mild mi, no damage, no heart cath or stents\"- medical management recommended however anemia persists, no current asa or thinners  Other ill-defined conditions(799.89)   
 back and pelvis pain  Other ill-defined conditions(799.89)   
 hip fx ;   arteritis  TIA (transient ischemic attack) 6/23/2015  
 no residual  
 Varicose veins of left lower extremity Subjective: HPI: Milagros Conley is a 80 y.o. female patient at [address] [place] [address] who was admitted on 3/19/2019  by Adonis Arnold MD with below mentioned medical history, is being seen for Physical Medicine and Rehabilitation consult. Milagros Conley who is s/p  left hip hemiarthroplasty due to femoral head fracture in 12/24/2019, fell and sustaned a periprosthetic left femur fracture 3/17/2019. Patient is admitted to the hospital and was decided to be managed non surgically due to medical risk. This means patient will be NWB LLE indefinitely. Patient will require WC for ambulation and assistance for mobility, transfers. We are consulted to assist with rehab needs and placement. Patient is to be NWB LLE. Patient also has multiple rib fractures from recent fall, causing pain with mobility. Patient is dependent for mobility, transfers and ambulation with WC. Prior Level of Function/Work/Activity:   Pt walked with rollator. Present Level of Function:  dependent Family History Problem Relation Age of Onset  Cancer Father   
     stomach [facility] Arthritis-osteo Mother  Hypertension Mother  Arthritis-osteo Sister Social History Tobacco Use  Smoking status: Never Smoker  Smokeless tobacco: Never Used Substance Use Topics  Alcohol use: No  
 
Past Surgical History:  
Procedure Laterality Date  CARDIAC SURG PROCEDURE UNLIST    
 Pt's family reports heart attack in Sept while in hospital at [facility].  HX CHOLECYSTECTOMY  HX ENDOSCOPY    
 HX HEENT    
 torn retina  HX ORTHOPAEDIC  12/24/2018  
 left hip repair  HX TONSILLECTOMY Prior to Admission medications Medication Sig Start Date End Date Taking? Authorizing Provider HYDROcodone-acetaminophen (NORCO) 5-325 mg per tablet Take 1 Tab by mouth every four (4) hours as needed for Pain for up to 7 days. Max Daily Amount: 6 Tabs. 3/20/19 3/27/19 Yes Roland Perkins MD  
levothyroxine (SYNTHROID) 50 mcg tablet TAKE 1 TABLET BY MOUTH EVERY OTHER DAY (ALTERNATE BETWEEN 50MCG & 25 MCG) 3/18/19   Koby Howard MD  
diazePAM (VALIUM) 2 mg tablet Take 0.5 Tabs by mouth every eight (8) hours as needed (spasm). Max Daily Amount: 3 mg. 3/17/19   Marvel Rosales MD  
atorvastatin (LIPITOR) 10 mg tablet Take 0.5 Tabs by mouth nightly. 2/18/19   Ankita Najera NP  
metoprolol succinate (TOPROL-XL) 25 mg XL tablet Take 1 Tab by mouth daily. 1/31/19   Laurel Gupta MD  
olmesartan (BENICAR) 40 mg tablet Take 0.5 Tabs by mouth daily. 1/31/19   Lucas Hillman MD  
apixaban (ELIQUIS) 2.5 mg tablet Take 1 Tab by mouth two (2) times a day. 1/31/19   Lucas Hillman MD  
nitroglycerin (NITROSTAT) 0.4 mg SL tablet 1 Tab by SubLINGual route every five (5) minutes as needed for Chest Pain. Up to 3 doses. 1/31/19   Laurel Gupta MD  
melatonin 3 mg tablet Take  by mouth. Provider, Historical  
sertraline (ZOLOFT) 25 mg tablet Take 1 Tab by mouth daily. Patient taking differently: Take 25 mg by mouth daily. 1/24/19   Koby Howard MD  
OTHER Calcium - OTC    Provider, Historical  
acetaminophen (TYLENOL) 650 mg TbER Take 650 mg by mouth every eight (8) hours. 500 mg in morning daily    Provider, Historical  
predniSONE (DELTASONE) 1 mg tablet Take 3 mg by mouth daily. 3/22/18   Provider, Historical  
esomeprazole (NEXIUM) 40 mg capsule Take 40 mg by mouth daily. Provider, Historical  
amLODIPine (NORVASC) 5 mg tablet Take 1 Tab by mouth daily. Patient taking differently: Take 5 mg by mouth daily. 18   Asaf Norman MD  
levothyroxine (SYNTHROID) 25 mcg tablet Take 1 Tab by mouth Daily (before breakfast). Patient taking differently: Take 25-50 mcg by mouth every other day. 25 mg alternating daily with 50 mg 18   Jone Rogers MD  
Cholecalciferol, Vitamin D3, (VITAMIN D3) 1,000 unit cap Take 1 Cap by mouth daily. Provider, Historical  
Denosumab (PROLIA) 60 mg/mL injection 60 mg by SubCUTAneous route every 6 months. Provider, Historical  
 
Allergies Allergen Reactions  Calcium Carbonate Diarrhea  Ranitidine Hcl Diarrhea  Guaifenesin Other (comments) Hallucinations Review of Systems: + L hip  pain, +antalgic gait. Denies chest pain, shortness of breath, cough, headache, visual problems, abdominal pain, dysurea, calf pain. Pertinent positives are as noted in the medical records and unremarkable otherwise. Objective:  
 
Vitals: 
Blood pressure 111/62, pulse 61, temperature 98.1 °F (36.7 °C), resp. rate 16, height 4' 11\" (1.499 m), weight 100 lb (45.4 kg), SpO2 94 %, not currently breastfeeding. Temp (24hrs), Av.7 °F (36.5 °C), Min:97.3 °F (36.3 °C), Max:98.1 °F (36.7 °C) BMI (calculated): 20.2 (19 0008) Intake and Output: 
 1901 -  0700 In: -  
Out: 8818 [GDODS:] Physical Exam:  
General: Alert and age appropriately oriented. No acute cardio respiratory distress. HEENT: Normocephalic, no conjunctival pallor, no scleral icterus Oral mucosa moist without cyanosis Lungs: Clear to auscultation anteriorly Heart: Regular rate and rhythm, S1, S2 No  murmurs, clicks, rub or gallops Abdomen: Soft, non-tender, non-distended. Genitourinary: defered Neuromuscular:  
 
 Grossly no focal motor deficits. Left ankle dorsiflexion 5/5 Left ankle plantarflexion 5/5 No sensory deficits distally BLE to soft touch. Skin/extremity: Calves non tender BLE. No rashes, no marginal erythema. Labs/Studies: 
Recent Results (from the past 72 hour(s)) CBC W/O DIFF Collection Time: 03/19/19  7:22 PM  
Result Value Ref Range WBC 9.8 4.3 - 11.1 K/uL  
 RBC 3.61 (L) 4.05 - 5.2 M/uL HGB 9.3 (L) 11.7 - 15.4 g/dL HCT 31.8 (L) 35.8 - 46.3 % MCV 88.1 79.6 - 97.8 FL  
 MCH 25.8 (L) 26.1 - 32.9 PG  
 MCHC 29.2 (L) 31.4 - 35.0 g/dL RDW 20.0 (H) 11.9 - 14.6 % PLATELET 832 895 - 244 K/uL MPV 11.3 9.4 - 12.3 FL ABSOLUTE NRBC 0.00 0.0 - 0.2 K/uL METABOLIC PANEL, BASIC Collection Time: 03/19/19  7:22 PM  
Result Value Ref Range Sodium 142 136 - 145 mmol/L Potassium 3.4 (L) 3.5 - 5.1 mmol/L Chloride 108 (H) 98 - 107 mmol/L  
 CO2 25 21 - 32 mmol/L Anion gap 9 mmol/L Glucose 188 (H) 65 - 100 mg/dL BUN 16 8 - 23 MG/DL Creatinine 0.70 0.6 - 1.0 MG/DL  
 GFR est AA >60 >60 ml/min/1.73m2 GFR est non-AA >60 ml/min/1.73m2 Calcium 9.2 8.3 - 10.4 MG/DL PROTHROMBIN TIME + INR Collection Time: 03/19/19  7:22 PM  
Result Value Ref Range Prothrombin time 14.4 11.7 - 14.5 sec INR 1.1 Functional Assessment: 
Reviewed participation and progress in therapies Gross Assessment AROM: Generally decreased, functional (03/20/19 1147) Strength: Generally decreased, functional (03/20/19 1147) Coordination: Generally decreased, functional (03/20/19 1147) Bed Mobility Supine to Sit: Maximum assistance;Assist x2 (03/20/19 1147) Sit to Supine: Maximum assistance;Assist x2 (03/20/19 1147) Scooting: Maximum assistance;Assist x2 (03/20/19 1010) Balance Sitting: Intact (03/20/19 1147) Standing: Pull to stand; With support (03/20/19 1147) Grooming Grooming Assistance: Supervision (03/20/19 1010) Washing Face: Supervision/set-up (03/20/19 1010) Washing Hands: Supervision/set-up (03/20/19 1010) Brushing/Combing Hair: Supervision/set-up (03/20/19 1010) Bed/Mat Mobility Supine to Sit: Maximum assistance;Assist x2 (03/20/19 1147) Sit to Supine: Maximum assistance;Assist x2 (03/20/19 1147) Sit to Stand: Moderate assistance;Assist x2(plus one for support) (03/20/19 1147) Stand to Sit: Assist x2; Moderate assistance(plus one for support) (03/20/19 1147) Bed to Chair: Moderate assistance(using sliding board) (03/20/19 1010) Scooting: Maximum assistance;Assist x2 (03/20/19 1010) Ambulation: 
Gait Gait Abnormalities: Other (03/20/19 1147) Distance (ft): 1 Feet (ft) (03/20/19 1147) Assistive Device: Walker, rolling (03/20/19 1147) Impression/Plan: Active Problems: 
  Left hip pain (3/19/2019) Current Facility-Administered Medications Medication Dose Route Frequency Provider Last Rate Last Dose  hydrALAZINE (APRESOLINE) tablet 25 mg  25 mg Oral TID PRN Paula Stephens MD   25 mg at 03/20/19 1224  cloNIDine HCl (CATAPRES) tablet 0.1 mg  0.1 mg Oral Q4H PRN Paula Stephens MD   0.1 mg at 03/20/19 1224  hydrALAZINE (APRESOLINE) 20 mg/mL injection 20 mg  20 mg IntraVENous Q4H PRN Paula Stephens MD   20 mg at 03/20/19 3377  tuberculin injection 5 Units  5 Units IntraDERMal ONCE John Perez MD   5 Units at 03/20/19 0911  
 [START ON 3/22/2019] levothyroxine (SYNTHROID) tablet 50 mcg  50 mcg Oral EVERY OTHER DAY John Perez MD      
 HYDROmorphone (PF) (DILAUDID) injection 0.5 mg  0.5 mg IntraVENous Q4H PRN John Perez MD      
 HYDROcodone-acetaminophen [facility]) 5-325 mg per tablet 1 Tab  1 Tab Oral Q4H PRN John Perez MD   0.5 Tab at 03/19/19 2016  acetaminophen (TYLENOL) tablet 650 mg  650 mg Oral Q4H PRN Craig Street MD   650 mg at 03/20/19 1532  amLODIPine (NORVASC) tablet 5 mg  5 mg Oral DAILY Craig Street MD   5 mg at 03/20/19 5754  apixaban (ELIQUIS) tablet 2.5 mg  2.5 mg Oral BID Jefferson Castanon MD 2.5 mg at 03/20/19 0911  
 atorvastatin (LIPITOR) tablet 5 mg  5 mg Oral QHS Noe Street MD   5 mg at 03/20/19 1066  cholecalciferol (VITAMIN D3) tablet 1,000 Units  1,000 Units Oral DAILY Noe Street MD      
 diazePAM (VALIUM) tablet 1 mg  1 mg Oral Q8H PRN Noe Street MD      
 pantoprazole (PROTONIX) tablet 40 mg  40 mg Oral ACB Noe Street MD   40 mg at 03/20/19 0700  [START ON 3/21/2019] levothyroxine (SYNTHROID) tablet 25 mcg  25 mcg Oral Q48H Noe Street MD      
 metoprolol succinate (TOPROL-XL) XL tablet 25 mg  25 mg Oral DAILY Noe Street MD   25 mg at 03/20/19 7108  olmesartan (BENICAR) tablet 20 mg  20 mg Oral DAILY Noe Street MD   20 mg at 03/20/19 0911  
 nitroglycerin (NITROSTAT) tablet 0.4 mg  0.4 mg SubLINGual Q5MIN PRN Noe Street MD      
 predniSONE (DELTASONE) tablet 3 mg  3 mg Oral DAILY Noe Street MD   3 mg at 03/20/19 0214  sertraline (ZOLOFT) tablet 25 mg  25 mg Oral DAILY Noe Street MD   25 mg at 03/20/19 2564 Recommendations: Recommend sub acute rehab at [facility]. Continue Acute Rehab Program. Continue gait training, transfer training, balance activities. Coordination of rehab/medical care. Counseling of Physical Medicine & Rehab care issues management. Monitoring and management of rehab conditions per the plan of care/orders. Rehabilitation Management/ Medical Management: 1. Devices: WC 
2. Consult:Rehab team including PT, OT,  and . 3. Disposition Rehab-discussed with patient. 4. Thigh-high or knee-high ROMAIN's when out of bed. 5. DVT Prophylaxis - on eliquis 2.5 bid. 6. Incentive spirometer Q1H while awake 7. Post op hemorrhagic anemia- monitor. 8. Activity: NWB LLE. Requires assist with all activities. 9. Planned Labs: CBC,BMP 10. Pain Control:    Continue scheduled tylenol, and  PRN meds. 11.Wound Care: none. Monitor for pressure ulcers. Follow up with ORTHO per instructions. Thank you for the opportunity to participate in the care of this patient.  
 
Signed By: Aria Rangel MD

## 2019-03-20 NOTE — PROGRESS NOTES
Problem: Mobility Impaired (Adult and Pediatric) Goal: *Acute Goals and Plan of Care (Insert Text) Description STG: 
(1.)Ms. Jacki Brewer will move from supine to sit and sit to supine  with MAX ASSIST within 1-2 treatment day(s). (2.)Ms. Jacki Brewer will transfer from bed to chair and chair to bed with MIN ASSIST using the least restrictive device within 1-2 treatment day(s). LTG: 
(1.)Ms. Jacki Brewer will move from supine to sit and sit to supine  in bed with MODERATE ASSIST within 1-2 treatment day(s). (2.)Ms. Jacki Brewer will transfer from bed to chair and chair to bed with MODERATE ASSIST using the least restrictive device within seven treatment day(s). ________________________________________________________________________________________________ Outcome: Progressing Towards Goal 
  
PHYSICAL THERAPY: Initial Assessment 3/20/2019 INPATIENT:  NWB left [place] / Product Type: Medicare /   
  
NAME/AGE/GENDER: Aramis Morales is a 80 y.o. female PRIMARY DIAGNOSIS: Left hip pain [M25.552] <principal problem not specified> 
 <principal problem not specified> 
 
 
  
ICD-10: Treatment Diagnosis: · Difficulty in walking, Not elsewhere classified (R26.2) · Other abnormalities of gait and mobility (R26.89) Precaution/Allergies: 
Calcium carbonate; Ranitidine hcl; and Guaifenesin ASSESSMENT:  
Ms. Jacki Brewer presents with decreased mobility. Pt performed supine to sit. Pt sat edge of bed. Pt performed sit to stand. Pt performed sliding board transfer to bedside chair. Pt in chair with needs in reach. Pt with chair alarm. This section established at most recent assessment PROBLEM LIST (Impairments causing functional limitations): 1. Decreased Strength 2. Decreased Transfer Abilities 3. Decreased Ambulation Ability/Technique 4. Decreased Balance 5. Decreased Activity Tolerance 6. Decreased Flexibility/Joint Mobility INTERVENTIONS PLANNED: (Benefits and precautions of physical therapy have been discussed with the patient.) 1. Bed Mobility 2. Therapeutic Activites 3. Transfer Training TREATMENT PLAN: Frequency/Duration: daily for duration of hospital stay Rehabilitation Potential For Stated Goals: Fair RECOMMENDED REHABILITATION/EQUIPMENT: (at time of discharge pending progress): Due to the probability of continued deficits (see above) this patient will likely need continued skilled physical therapy after discharge. Equipment:  
? None at this time HISTORY:  
History of Present Injury/Illness (Reason for Referral): 
Pt admitted with above diagnosis with decreased mobility ability. Past Medical History/Comorbidities: Ms. Alberto Almeida  has a past medical history of Acquired hypothyroidism, Anemia, Arthritis, Chronic pain, Fractured hip ([facility] [address] Gastroesophageal reflux disease (12/9/2016), GERD (gastroesophageal reflux disease), Giant cell arteritis ([facility] [address] (03/20/2010), Hip fracture ([facility] [address] (10/29/2018), History of heart attack (09/12/2018), History of hip fracture (2018), Hypertension, Legally blind, Macular degeneration, Memory deficit, NSTEMI (non-ST elevated myocardial infarction) ([facility] [address] (9/14/2018), Other ill-defined conditions(799.89), Other ill-defined conditions(799.89), TIA (transient ischemic attack) (6/23/2015), and Varicose veins of left lower extremity. Ms. Alberto Almeida  has a past surgical history that includes hx endoscopy; pr cardiac surg procedure unlist; hx cholecystectomy; hx tonsillectomy; hx heent; and hx orthopaedic (12/24/2018). Social History/Living Environment:  
Home Environment: Private residence # Steps to Enter: 1 One/Two Story Residence: One story Living Alone: No 
Support Systems: Child(yesi) Patient Expects to be Discharged to[de-identified] Rehabilitation facility Current DME Used/Available at Home: Shower chair, Walker, rollator, Walker, rolling Tub or Shower Type: Shower(built in seat) Prior Level of Function/Work/Activity: Pt walked with rollator. Number of Personal Factors/Comorbidities that affect the Plan of Care: 1-2: MODERATE COMPLEXITY EXAMINATION:  
Most Recent Physical Functioning:  
Gross Assessment: 
AROM: Generally decreased, functional 
Strength: Generally decreased, functional 
Coordination: Generally decreased, functional 
         
  
Posture: 
Posture (WDL): Within defined limits Balance: 
Sitting: Intact Standing: Pull to stand; With support Bed Mobility: 
Supine to Sit: Maximum assistance;Assist x2 Sit to Supine: Maximum assistance;Assist x2 Wheelchair Mobility: 
  
Transfers: 
Sit to Stand: Moderate assistance;Assist x2(plus one for support) Stand to Sit: Assist x2; Moderate assistance(plus one for support) Gait: 
Left Side Weight Bearing: Non-weight bearing Gait Abnormalities: Other Distance (ft): 1 Feet (ft) Assistive Device: Walker, rolling Body Structures Involved: 1. Bones 2. Joints 3. Muscles 4. Ligaments Body Functions Affected: 1. Neuromusculoskeletal 
2. Movement Related Activities and Participation Affected: 1. Mobility Number of elements that affect the Plan of Care: 4+: HIGH COMPLEXITY CLINICAL PRESENTATION:  
Presentation: Evolving clinical presentation with changing clinical characteristics: MODERATE COMPLEXITY CLINICAL DECISION MAKIN[address] [place] [address]-[facility] 6 Clicks Basic Mobility Inpatient Short Form How much difficulty does the patient currently have. .. Unable A Lot A Little None 1. Turning over in bed (including adjusting bedclothes, sheets and blankets)? ? 1   ? 2   ? 3   ? 4  
2. Sitting down on and standing up from a chair with arms ( e.g., wheelchair, bedside commode, etc.)   ? 1   ? 2   ? 3   ? 4  
3. Moving from lying on back to sitting on the side of the bed?   ? 1   ? 2   ? 3   ? 4 How much help from another person does the patient currently need. .. Total A Lot A Little None 4. Moving to and from a bed to a chair (including a wheelchair)? ? 1   ? 2   ? 3   ? 4  
5. Need to walk in hospital room? ? 1   ? 2   ? 3   ? 4  
6. Climbing 3-5 steps with a railing? ? 1   ? 2   ? 3   ? 4  
© 2007, Trustees of [address] [place] [address] [address] [id number], under license to [employer]. All rights reserved Score:  Initial: 12 Most Recent: X (Date: -- ) Interpretation of Tool:  Represents activities that are increasingly more difficult (i.e. Bed mobility, Transfers, Gait). Medical Necessity:    
· Skilled intervention continues to be required due to decreased mobility ability. Reason for Services/Other Comments: 
· Patient continues to require skilled intervention due to decreased mobility ability. · . Use of outcome tool(s) and clinical judgement create a POC that gives a: Questionable prediction of patient's progress: MODERATE COMPLEXITY  
  
 
 
 
TREATMENT:  
(In addition to Assessment/Re-Assessment sessions the following treatments were rendered) Pre-treatment Symptoms/Complaints:   
Pain: Initial:  
Pain Intensity 1: 2 Pain Location 1: Hip Pain Orientation 1: Left  Post Session:  pain in left hip Therapeutic Activity: (    15 minutes): Therapeutic activities including Chair transfers to improve mobility. Assessment/Reassessment only, no treatment provided today Braces/Orthotics/Lines/Etc:  
· O2 Device: Nasal cannula Treatment/Session Assessment:   
· Response to Treatment:  Pt agreeable to get to bedside chair. · Interdisciplinary Collaboration:  
o Physical Therapist 
o Occupational Therapist 
o Registered Nurse · After treatment position/precautions:  
o Up in chair 
o Bed/Chair-wheels locked 
o Bed in low position 
o Caregiver at bedside 
o Call light within reach 
o RN notified 
o Family at bedside · Compliance with Program/Exercises: Compliant most of the time, Will assess as treatment progresses · Recommendations/Intent for next treatment session: \"Next visit will focus on advancements to more challenging activities and reduction in assistance provided\". Total Treatment Duration: PT Patient Time In/Time Out Time In: 1050 Time Out: 1115 Mechelle Paez, PT

## 2019-03-20 NOTE — PROGRESS NOTES
Care Management Interventions Mode of Transport at Discharge: BLS Transition of Care Consult (CM Consult): SNF Partner SNF: Yes Physical Therapy Consult: Yes Occupational Therapy Consult: Yes Current Support Network: Relative's Home Confirm Follow Up Transport: Family Plan discussed with Pt/Family/Caregiver: Yes Freedom of Choice Offered: Yes Discharge Location Discharge Placement: Skilled nursing facility MD order rec'd to assist with long term care. Chart reviewed and patient/son and dtr interviewed. Patient with hx: falls. She normally lives with dtr who works full time at [employer]. Son is visiting from out of town. Patient is now w/c bound; not a candidate for hip surgery. Patient wants to do what is the easiest for family. Everyone is in agreement that long term NHP is needed. We hope to initially go to NH under Medicare with therapy and then move to a long term bed as private pay. Area NH list given and options discussed. They have requested [facility] where she has been before and has a personal friend that works there as a NP. Referral sent to [facility] for their view. Maia at [facility] [facility] anticipates they will have a bed end of week. We hope to send on Friday 3-22 after her required 3 night Inpt stay. PPD placed and Dr. Lydia Trujillo consulted. Melchor arnold. Rory Cardona

## 2019-03-21 LAB
MM INDURATION POC: NORMAL MM (ref 0–5)
PPD POC: NORMAL NEGATIVE

## 2019-03-21 PROCEDURE — 97535 SELF CARE MNGMENT TRAINING: CPT

## 2019-03-21 PROCEDURE — 97530 THERAPEUTIC ACTIVITIES: CPT

## 2019-03-21 PROCEDURE — 97110 THERAPEUTIC EXERCISES: CPT

## 2019-03-21 PROCEDURE — 65270000029 HC RM PRIVATE

## 2019-03-21 PROCEDURE — 74011250637 HC RX REV CODE- 250/637: Performed by: INTERNAL MEDICINE

## 2019-03-21 PROCEDURE — 74011636637 HC RX REV CODE- 636/637: Performed by: INTERNAL MEDICINE

## 2019-03-21 RX ADMIN — AMLODIPINE BESYLATE 5 MG: 5 TABLET ORAL at 08:26

## 2019-03-21 RX ADMIN — METOPROLOL SUCCINATE 25 MG: 25 TABLET, EXTENDED RELEASE ORAL at 08:26

## 2019-03-21 RX ADMIN — LEVOTHYROXINE SODIUM 25 MCG: 50 TABLET ORAL at 05:52

## 2019-03-21 RX ADMIN — ATORVASTATIN CALCIUM 5 MG: 10 TABLET, FILM COATED ORAL at 21:15

## 2019-03-21 RX ADMIN — ACETAMINOPHEN 650 MG: 325 TABLET, FILM COATED ORAL at 17:34

## 2019-03-21 RX ADMIN — PANTOPRAZOLE SODIUM 40 MG: 40 TABLET, DELAYED RELEASE ORAL at 05:52

## 2019-03-21 RX ADMIN — PREDNISONE 3 MG: 1 TABLET ORAL at 08:27

## 2019-03-21 RX ADMIN — OLMESARTAN MEDOXOMIL 20 MG: 20 TABLET, COATED ORAL at 08:26

## 2019-03-21 RX ADMIN — SERTRALINE HYDROCHLORIDE 25 MG: 25 TABLET ORAL at 08:27

## 2019-03-21 RX ADMIN — APIXABAN 2.5 MG: 2.5 TABLET, FILM COATED ORAL at 08:26

## 2019-03-21 RX ADMIN — ATORVASTATIN CALCIUM 5 MG: 10 TABLET, FILM COATED ORAL at 05:51

## 2019-03-21 RX ADMIN — APIXABAN 2.5 MG: 2.5 TABLET, FILM COATED ORAL at 17:34

## 2019-03-21 NOTE — PROGRESS NOTES
Problem: Mobility Impaired (Adult and Pediatric) Goal: *Acute Goals and Plan of Care (Insert Text) Description STG: 
(1.)Ms. Phoenix Alejo will move from supine to sit and sit to supine  with MAX ASSIST within 1-2 treatment day(s). (2.)Ms. Phoenix Alejo will transfer from bed to chair and chair to bed with MIN ASSIST using the least restrictive device within 1-2 treatment day(s). LTG: 
(1.)Ms. Phoenix Alejo will move from supine to sit and sit to supine  in bed with MODERATE ASSIST within 1-2 treatment day(s). (2.)Ms. Phoenix Alejo will transfer from bed to chair and chair to bed with MODERATE ASSIST using the least restrictive device within seven treatment day(s). ________________________________________________________________________________________________ Outcome: Progressing Towards Goal 
  
PHYSICAL THERAPY: Daily Note and PM 3/21/2019 INPATIENT:  NWB left [place] / Product Type: Medicare /   
  
NAME/AGE/GENDER: Nay Ojeda is a 80 y.o. female PRIMARY DIAGNOSIS: Left hip pain [M25.552] <principal problem not specified> 
 <principal problem not specified> 
 
 
  
ICD-10: Treatment Diagnosis: · Difficulty in walking, Not elsewhere classified (R26.2) · Other abnormalities of gait and mobility (R26.89) Precaution/Allergies: 
Calcium carbonate; Ranitidine hcl; and Guaifenesin ASSESSMENT:  
Ms. Phoenix Alejo presents up in chair on contact and had been up for a good part of the day. She is agreeable to work with therapy, family present. Worked on sit to stand to walker NWB left LE. Pt doing well with this and some single leg stance time on right in standing with support. Then worked on a pivot transfer with the walker and mod/max assist of 2. Pt did well first few \"hops\" and then began to fatigue very quickly and sat on the very edge of the bed.  Assisted her into supine and worked on single leg bridging and positioning. Then some therapeutic exercises in the bed. Hope to further progress at next session. This section established at most recent assessment PROBLEM LIST (Impairments causing functional limitations): 1. Decreased Strength 2. Decreased Transfer Abilities 3. Decreased Ambulation Ability/Technique 4. Decreased Balance 5. Decreased Activity Tolerance 6. Decreased Flexibility/Joint Mobility INTERVENTIONS PLANNED: (Benefits and precautions of physical therapy have been discussed with the patient.) 1. Bed Mobility 2. Therapeutic Activites 3. Transfer Training TREATMENT PLAN: Frequency/Duration: daily for duration of hospital stay Rehabilitation Potential For Stated Goals: Fair RECOMMENDED REHABILITATION/EQUIPMENT: (at time of discharge pending progress): Due to the probability of continued deficits (see above) this patient will likely need continued skilled physical therapy after discharge. Equipment:  
? None at this time HISTORY:  
History of Present Injury/Illness (Reason for Referral): 
Pt admitted with above diagnosis with decreased mobility ability. Past Medical History/Comorbidities: Ms. Leigh Ann Jin  has a past medical history of Acquired hypothyroidism, Anemia, Arthritis, Chronic pain, Fractured hip ([facility] [address] Gastroesophageal reflux disease (12/9/2016), GERD (gastroesophageal reflux disease), Giant cell arteritis ([facility] [address] (03/20/2010), Hip fracture ([facility] [address] (10/29/2018), History of heart attack (09/12/2018), History of hip fracture (2018), Hypertension, Legally blind, Macular degeneration, Memory deficit, NSTEMI (non-ST elevated myocardial infarction) ([facility] [address] (9/14/2018), Other ill-defined conditions(799.89), Other ill-defined conditions(799.89), TIA (transient ischemic attack) (6/23/2015), and Varicose veins of left lower extremity.   Ms. Leigh Ann Jin  has a past surgical history that includes hx endoscopy; pr cardiac surg procedure unlist; hx cholecystectomy; hx tonsillectomy; hx heent; and hx orthopaedic (2018). Social History/Living Environment:  
Home Environment: Private residence # Steps to Enter: 1 One/Two Story Residence: One story Living Alone: No 
Support Systems: Child(yesi) Patient Expects to be Discharged to[de-identified] Rehabilitation facility Current DME Used/Available at Home: Shower chair, Walker, rollator, Walker, rolling Tub or Shower Type: Shower(built in seat) Prior Level of Function/Work/Activity: Pt walked with rollator. Number of Personal Factors/Comorbidities that affect the Plan of Care: 1-2: MODERATE COMPLEXITY EXAMINATION:  
Most Recent Physical Functioning:  
Gross Assessment: 
  
         
  
Posture: 
  
Balance: 
Sitting: Intact; High guard Standing: Pull to stand; With support Bed Mobility: 
Supine to Sit: (up in chair on contact) Sit to Supine: Maximum assistance;Assist x2 Scooting: Moderate assistance;Maximum assistance(of 1 to 2) Duration: 5 Minutes Wheelchair Mobility: 
  
Transfers: 
Sit to Stand: Moderate assistance; Additional time;Assist x2 Stand to Sit: Moderate assistance; Additional time;Assist x2 Bed to Chair: Moderate assistance;Maximum assistance; Additional time;Assist x2 Interventions: Safety awareness training;Verbal cues Duration: 10 Minutes Gait: 
Left Side Weight Bearing: Non-weight bearing Distance (ft): 2 Feet (ft) Assistive Device: Walker, rolling Body Structures Involved: 1. Bones 2. Joints 3. Muscles 4. Ligaments Body Functions Affected: 1. Neuromusculoskeletal 
2. Movement Related Activities and Participation Affected: 1. Mobility Number of elements that affect the Plan of Care: 4+: HIGH COMPLEXITY CLINICAL PRESENTATION:  
Presentation: Evolving clinical presentation with changing clinical characteristics: MODERATE COMPLEXITY CLINICAL DECISION MAKIN [facility] [address] [address] [facility] 6 Clicks Basic Mobility Inpatient Short Form How much difficulty does the patient currently have. ..  Unable A Lot A Little None 1. Turning over in bed (including adjusting bedclothes, sheets and blankets)? ? 1   ? 2   ? 3   ? 4  
2. Sitting down on and standing up from a chair with arms ( e.g., wheelchair, bedside commode, etc.)   ? 1   ? 2   ? 3   ? 4  
3. Moving from lying on back to sitting on the side of the bed?   ? 1   ? 2   ? 3   ? 4 How much help from another person does the patient currently need. .. Total A Lot A Little None 4. Moving to and from a bed to a chair (including a wheelchair)? ? 1   ? 2   ? 3   ? 4  
5. Need to walk in hospital room? ? 1   ? 2   ? 3   ? 4  
6. Climbing 3-5 steps with a railing? ? 1   ? 2   ? 3   ? 4  
© 2007, Trustees of [facility] [employer], under license to [employer]. All rights reserved Score:  Initial: 12 Most Recent: X (Date: -- ) Interpretation of Tool:  Represents activities that are increasingly more difficult (i.e. Bed mobility, Transfers, Gait). Medical Necessity:    
· Skilled intervention continues to be required due to decreased mobility ability. Reason for Services/Other Comments: 
· Patient continues to require skilled intervention due to decreased mobility ability. · . Use of outcome tool(s) and clinical judgement create a POC that gives a: Questionable prediction of patient's progress: MODERATE COMPLEXITY  
  
 
 
 
TREATMENT:  
(In addition to Assessment/Re-Assessment sessions the following treatments were rendered) Pre-treatment Symptoms/Complaints:   
Pain: Initial: sore all over Post Session:  pain in left hip Therapeutic Activity: (5 Minutes 10 Minutes ):  Therapeutic activities including Bed transfers, Chair transfers and sit to stand transfers, single leg stance on right to improve mobility, strength, balance and coordination. Required maximal   to promote static and dynamic balance in standing.   
 
Therapeutic Exercise: (10 Minutes):  Exercises per grid below to improve mobility and strength. Required minimal verbal and manual cues to promote proper body alignment and promote proper body posture. Progressed repetitions as indicated. Date: 
3/21/19 Date: 
 Date: 
  
ACTIVITY/EXERCISE AM PM AM PM AM PM  
GROUP THERAPY  []  []  []  []  []  [] Ankle Pumps  10 Quad Sets  10 Gluteal Sets  10 Hip ABd/ADduction Straight Leg Raises  10 on right only Knee Slides  10, small range on left B = bilateral; AA = active assistive; A = active; P = passive Braces/Orthotics/Lines/Etc:  
· O2 Device: Nasal cannula Treatment/Session Assessment:   
· Response to Treatment:  Pt tolerated well, fatigued quickly · Interdisciplinary Collaboration:  
o Registered Nurse 
o Rehabilitation Attendant · After treatment position/precautions:  
o Supine in bed 
o Bed alarm/tab alert on 
o Bed/Chair-wheels locked 
o Bed in low position 
o Call light within reach 
o Family at bedside · Compliance with Program/Exercises: Compliant most of the time, Will assess as treatment progresses · Recommendations/Intent for next treatment session: \"Next visit will focus on advancements to more challenging activities and reduction in assistance provided\". Total Treatment Duration: PT Patient Time In/Time Out Time In: 9658 Time Out: 1515 Barb Henry PT

## 2019-03-21 NOTE — PROGRESS NOTES
Problem: Self Care Deficits Care Plan (Adult) Goal: *Acute Goals and Plan of Care (Insert Text) Description 1. Patient will perform grooming with supervision siting on EOB. PROGRESSING 2. Patient will perform Upper body dressing with supervision sitting on EOB. PROGRESSING 3. Patient will perform lower body dressing with max assist 
4. Patient will perform upper and lower body bathing with min assist using adaptive techniques and equipment PRN. Jonny Valerio PROGRESSING 5. Patient will perform toilet transfers with moderate assistance 6. Patient will participate in 30 + minutes of ADL/ therapeutic exercise/therapeutic activity with min rest breaks to increase activity tolerance for self care. Jonny Valerio PROGRESSING 7. Patient will perform sliding board transfers with min assist  And NWB L LE. 8. Patient will stand NWB L LE with mod assist for 1 minute to assist caregiver with self care. Goals to be achieved in 7 days. 3/20/2019 1233 by Timo Trujillo OT Outcome: Progressing Towards Goal 
3/20/2019 1229 by Timo Trujillo OT Outcome: Progressing Towards Goal 
 
OCCUPATIONAL THERAPY: Daily Note and AM 3/21/2019 INPATIENT: OT Visit Days: 2 [place] / [place] / Product Type: Medicare /  
  
NAME/AGE/GENDER: Shannan Mcintosh is a 80 y.o. female PRIMARY DIAGNOSIS:  Left hip pain [M25.552] <principal problem not specified> 
 <principal problem not specified> 
 
 
  
ICD-10: Treatment Diagnosis:  
 · Pain in left hip (M25.552) · Stiffness of Left Hip, Not elsewhere classified (M25.652) · Generalized Muscle Weakness (M62.81) · Other lack of cordination (R27.8) · Difficulty in walking, Not elsewhere classified (R26.2) Precautions/Allergies: 
   Calcium carbonate; Ranitidine hcl; and Guaifenesin ASSESSMENT:  
Ms. Renée Huff presents supine in bed with above diagnosis. She is cooperative and motivated with decreased knowledge of NWB OF L LE with rib fractures. Her son was present initially then her daughter. SHe completed a sponge bath, dressing and grooming while long sitting in bed. She transferred to EOB with max x 2. She sits on the EOB leaning constantly to the right side. She attempted sit to stand 3 times with mod x 2. SHe used a sliding board to transfer to bedside chair with mod assist. She plans to go to rehab after this hospital stay. She is unable to remember that she is NWB on her L LE when asked throughout the session. She  is sitting up in the recliner, adaptive call button in hand as she is leagally blind. She would continue to benefit from skilled OT will follow. Prior to admit and this fall  the patient was supervision level using her rollator at home. SHe was grossly min to SBA for ADLS, and supervision for bathing. Nursing and CNA aware of patient location and needs. Will follow. 3/21/19 1015 patient received up in recliner s/p getting OOB to [facility], toilieted and then transferred to recliner with CNA. She completed sponge bath B LE elevated in recliner and in sitting up in recliner. SHe was able to sit without arm support and work on snaps for the gown. SHe is legally blind but very motivated. She reported she is not having any pain. She was able to lift herself for tab alert adjustment with min assist NWB [place] is making progress towards goals and will continue to benefit from skilled OT will. Will continue with established POC. This section established at most recent assessment PROBLEM LIST (Impairments causing functional limitations): 1. Decreased Strength 2. Decreased ADL/Functional Activities 3. Decreased Transfer Abilities 4. Decreased Ambulation Ability/Technique 5. Decreased Balance 6. Increased Pain 7. Decreased Activity Tolerance 8. Decreased Pacing Skills 9. Decreased Work Simplification/Energy Conservation Techniques 10. Decreased Flexibility/Joint Mobility INTERVENTIONS PLANNED: (Benefits and precautions of occupational therapy have been discussed with the patient.) 1. Activities of daily living training 2. Adaptive equipment training 3. Balance training 4. Cognitive training 5. Donning&doffing training 6. Hygiene training 7. Neuromuscular re-eduation 8. Therapeutic activity 9. Therapeutic exercise TREATMENT PLAN: Frequency/Duration: Follow patient 3 times to address above goals. Rehabilitation Potential For Stated Goals: Good RECOMMENDED REHABILITATION/EQUIPMENT: (at time of discharge pending progress): Due to the probability of continued deficits (see above) this patient will likely need continued skilled occupational therapy after discharge. Equipment:  
? None at this time OCCUPATIONAL PROFILE AND HISTORY:  
History of Present Injury/Illness (Reason for Referral): 
See H and P Past Medical History/Comorbidities: Ms. Julianna Veliz  has a past medical history of Acquired hypothyroidism, Anemia, Arthritis, Chronic pain, Fractured hip ([facility] [address] Gastroesophageal reflux disease (12/9/2016), GERD (gastroesophageal reflux disease), Giant cell arteritis ([facility] [address] (03/20/2010), Hip fracture ([facility] [address] (10/29/2018), History of heart attack (09/12/2018), History of hip fracture (2018), Hypertension, Legally blind, Macular degeneration, Memory deficit, NSTEMI (non-ST elevated myocardial infarction) ([address] (9/14/2018), Other ill-defined conditions(799.89), Other ill-defined conditions(799.89), TIA (transient ischemic attack) (6/23/2015), and Varicose veins of left lower extremity. Ms. Julianna Veliz  has a past surgical history that includes hx endoscopy; pr cardiac surg procedure unlist; hx cholecystectomy; hx tonsillectomy; hx heent; and hx orthopaedic (12/24/2018). Social History/Living Environment:  
Home Environment: Private residence # Steps to Enter: 1 One/Two Story Residence: One story Living Alone: No 
Support Systems: Child(yesi) Patient Expects to be Discharged to[de-identified] Rehabilitation facility Current DME Used/Available at Home: Shower chair, Walker, rollator, Walker, rolling Tub or Shower Type: Shower(built in seat) Prior Level of Function/Work/Activity: 
Supervision with rollator, min assist with ADLS, supervision with showering lives with daughter Number of Personal Factors/Comorbidities that affect the Plan of Care: Brief history (0):  LOW COMPLEXITY ASSESSMENT OF OCCUPATIONAL PERFORMANCE[de-identified]  
Activities of Daily Living:  
Basic ADLs (From Assessment) Complex ADLs (From Assessment) Oral Facial Hygiene/Grooming: Supervision Bathing: Moderate assistance Upper Body Dressing: Stand-by assistance(changed gown) Lower Body Dressing: Total assistance Toileting: Total assistance Grooming/Bathing/Dressing Activities of Daily Living Grooming Grooming Assistance: Supervision Washing Face: Supervision/set-up Washing Hands: Supervision/set-up Brushing/Combing Hair: Supervision/set-up Cognitive Retraining Safety/Judgement: Awareness of environment; Fall prevention Upper Body Bathing Bathing Assistance: Supervision Position Performed: Seated in chair(reclined) Lower Body Bathing Bathing Assistance: Moderate assistance Perineal  : Supervision/set-up Position Performed: Seated in chair(reclinedin bedside chair) Lower Body : Moderate assistance(pt did upper LEs, therapist did lower LEs) Position Performed: Seated in chair Toileting Toileting Assistance: (toileted with CNA prior to session,) Upper Body Dressing Assistance Dressing Assistance: Contact guard assistance Hospital Gown: Supervision/ set-up; Contact guard assistance(supervision doff gown, CGa don decreased vision) Lower Body Dressing Assistance Socks: Total assistance (dependent) Most Recent Physical Functioning:  
Gross Assessment: 
  
         
  
Posture: 
Posture (WDL): Within defined limits Balance: Sitting: Intact; High guard Bed Mobility: 
  
Wheelchair Mobility: 
  
Transfers: 
   
 
    
 
Patient Vitals for the past 6 hrs: 
 BP BP Patient Position SpO2 Pulse 03/21/19 0541 171/72  91 % 67  
03/21/19 0754 164/70 At rest 92 % 62  
03/21/19 1020   93 %  Mental Status Neurologic State: Alert, Appropriate for age Orientation Level: Appropriate for age Cognition: Appropriate decision making, Appropriate for age attention/concentration, Appropriate safety awareness, Follows commands Perception: Appears intact Perseveration: No perseveration noted Safety/Judgement: Awareness of environment, Fall prevention Physical Skills Involved: 
1. Balance 2. Strength 3. Activity Tolerance Cognitive Skills Affected (resulting in the inability to perform in a timely and safe manner): 1. Comprehension Psychosocial Skills Affected: 1. Habits/Routines 2. Environmental Adaptation 3. Self-Awareness Number of elements that affect the Plan of Care: 5+:  HIGH COMPLEXITY CLINICAL DECISION MAKING:  
[facility] AM-PAC 6 Clicks Daily Activity Inpatient Short Form How much help from another person does the patient currently need. .. Total A Lot A Little None 1. Putting on and taking off regular lower body clothing? ? 1   ? 2   ? 3   ? 4  
2. Bathing (including washing, rinsing, drying)? ? 1   ? 2   ? 3   ? 4  
3. Toileting, which includes using toilet, bedpan or urinal?   ? 1   ? 2   ? 3   ? 4  
4. Putting on and taking off regular upper body clothing? ? 1   ? 2   ? 3   ? 4  
5. Taking care of personal grooming such as brushing teeth? ? 1   ? 2   ? 3   ? 4  
6. Eating meals? ? 1   ? 2   ? 3   ? 4  
© 2007, Trustees of [facility], under license to [employer]. All rights reserved Score:  Initial:14 Most Recent: X (Date: -- ) Interpretation of Tool:  Represents activities that are increasingly more difficult (i.e. Bed mobility, Transfers, Gait). Medical Necessity:    
· Patient is expected to demonstrate progress in balance, coordination and functional technique ·  to decrease assistance required with self care and functional mobility and improve safety during self care and functional mobility · . Reason for Services/Other Comments: 
· Patient continues to require skilled intervention due to decreased self care and functional mobility · . Use of outcome tool(s) and clinical judgement create a POC that gives a: MODERATE COMPLEXITY  
 
 
 
TREATMENT:  
(In addition to Assessment/Re-Assessment sessions the following treatments were rendered) Pre-treatment Symptoms/Complaints:   
Pain: Initial:  
Pain Intensity 1: 0  Post Session:  0/10 rest  
 
Self Care: (25): Procedure(s) (per grid) utilized to improve and/or restore self-care/home management as related to dressing, bathing and grooming. Required moderate visual, verbal, manual and tactile cueing to facilitate activities of daily living skills and compensatory activities. Braces/Orthotics/Lines/Etc:  
· room air Treatment/Session Assessment:   
· Response to Treatment:  tolerated well, mod with sliding board use, plans for rehab · Interdisciplinary Collaboration:  
o Physical Therapist 
o Occupational Therapist 
o Registered Nurse 
o Certified Nursing Assistant/Patient Care Technician · After treatment position/precautions:  
o Up in chair 
o Bed alarm/tab alert on 
o Bed/Chair-wheels locked 
o Bed in low position 
o Call light within reach 
o RN notified 
o Family at bedside · Compliance with Program/Exercises: Compliant all of the time. · Recommendations/Intent for next treatment session: \"Next visit will focus on advancements to more challenging activities and reduction in assistance provided\". Total Treatment Duration:25 OT Patient Time In/Time Out Time In: 0473 Time Out: 1040 Henrique Van OT

## 2019-03-21 NOTE — PROGRESS NOTES
2019 No surgery - Observation Periprosthetic Fracture Left Hip Admit Date: 3/19/2019 Admit Diagnosis: Left hip pain [M25.552] Subjective: Patient stable. No acute events. Objective:  
Visit Vitals /72 (BP 1 Location: Right arm) Pulse 67 Temp 98 °F (36.7 °C) Resp 17 Ht 4' 11\" (1.499 m) Wt 45.4 kg (100 lb) SpO2 91% Breastfeeding? No  
BMI 20.20 kg/m² Temp (24hrs), Av.8 °F (36.6 °C), Min:97.4 °F (36.3 °C), Max:98.1 °F (36.7 °C) Lab Results Component Value Date/Time HGB 9.3 (L) 2019 07:22 PM  
 
Extremity Exam 
 
Tibialis Anterior and Gastroc-Soleus functioning normally left lower extremity Sensation intact to light touch on operative limb Extremity perfused TEDS/SCDS in place No sign of DVT Assessment / Plan : 
NWB LLE Continue PT/OT Continue current DVT prophylaxis in house. Discharge on Eliquis BID [facility] care facility Patient Active Problem List  
Diagnosis Code  Syncope R55  Bradycardia R00.1  Giant cell arteritis ([facility] [address] M31.6  Hypertension I10  Abnormal EKG R94.31  
 Back pain M54.9  TIA (transient ischemic attack) G45.9  Hyperlipidemia LDL goal <100 E78.5  Diarrhea R19.7  Gastroesophageal reflux disease K21.9  Insulin resistance E88.81  
 Acquired hypothyroidism E03.9  Chest pain R07.9  Anemia D64.9  
 NSTEMI (non-ST elevated myocardial infarction) (HCC) I21.4  Delirium R41.0  Acute cystitis without hematuria N30.00  
 IFG (impaired fasting glucose) R73.01  
 Hip pain M25.559  Atypical chest pain R07.89  Left hip pain M25.552 Signed By: Josesito Woods NP

## 2019-03-21 NOTE — PROGRESS NOTES
Hospitalist Progress Note Admit Date:  3/19/2019  3:26 PM  
Name:  Jane Tucker Age:  80 y.o. 
:  1928 MRN:  [id number] PCP:  Jone Rogers MD 
Treatment Team: Attending Provider: Cortez Estrada MD; Consulting Provider: Kevin Naranjo MD; Consulting Provider: Lawana Eisenmenger, MD; Care Manager: Domingo Wilder; Utilization Review: Jaden Christensen Subjective: The patient feels overall well. Her pain seems to be under control. She only reports some mild intermittent pain in the left ribcage and left hip, which is getting better. Objective:  
 
Patient Vitals for the past 24 hrs: 
 Temp Pulse Resp BP SpO2  
19 1133 98.2 °F (36.8 °C) 65 16 160/66 93 % 19 1020     93 % 19 0754 97.7 °F (36.5 °C) 62 16 164/70 92 % 19 0541 98 °F (36.7 °C) 67 17 171/72 91 %  
19 2348 97.7 °F (36.5 °C) 69 18 169/60 92 % 19 1936 97.7 °F (36.5 °C) (!) 59 16 148/57 94 % 19 1540 98.1 °F (36.7 °C) 61 16 111/62 94 % 19 1536 97.4 °F (36.3 °C) 73 16 136/76 99 % Oxygen Therapy O2 Sat (%): 93 % (19 1133) O2 Device: Room air (19 1020) O2 Flow Rate (L/min): 1 l/min (19) Intake/Output Summary (Last 24 hours) at 3/21/2019 1502 Last data filed at 3/21/2019 3087 Gross per 24 hour Intake 120 ml Output 250 ml Net -130 ml General:    Well nourished. Alert. CV:   RRR. No murmur, rub, or gallop. Lungs:   Clear to auscultation bilaterally. No wheezing, rhonchi, or rales. Abdomen:   Soft, nontender, nondistended. Extremities: Warm and dry. No cyanosis or edema. Tenderness on palpation of the left hip, worse with movements. Skin:     No rashes or jaundice. Data Review: 
I have reviewed all labs, meds, telemetry events, and studies from the last 24 hours. Recent Results (from the past 24 hour(s)) PLEASE READ & DOCUMENT PPD TEST IN 72 HRS  Collection Time: 19  9:00 AM  
 Result Value Ref Range PPD  Negative  
 mm Induration  0 - 5 mm All Micro Results None Current Meds: 
Current Facility-Administered Medications Medication Dose Route Frequency  hydrALAZINE (APRESOLINE) tablet 25 mg  25 mg Oral TID PRN  
 cloNIDine HCl (CATAPRES) tablet 0.1 mg  0.1 mg Oral Q4H PRN  
 hydrALAZINE (APRESOLINE) 20 mg/mL injection 20 mg  20 mg IntraVENous Q4H PRN  
 [START ON 3/22/2019] levothyroxine (SYNTHROID) tablet 50 mcg  50 mcg Oral EVERY OTHER DAY  
 HYDROmorphone (PF) (DILAUDID) injection 0.5 mg  0.5 mg IntraVENous Q4H PRN  
 HYDROcodone-acetaminophen (NORCO) 5-325 mg per tablet 1 Tab  1 Tab Oral Q4H PRN  
 acetaminophen (TYLENOL) tablet 650 mg  650 mg Oral Q4H PRN  
 amLODIPine (NORVASC) tablet 5 mg  5 mg Oral DAILY  apixaban (ELIQUIS) tablet 2.5 mg  2.5 mg Oral BID  atorvastatin (LIPITOR) tablet 5 mg  5 mg Oral QHS  cholecalciferol (VITAMIN D3) tablet 1,000 Units  1,000 Units Oral DAILY  diazePAM (VALIUM) tablet 1 mg  1 mg Oral Q8H PRN  pantoprazole (PROTONIX) tablet 40 mg  40 mg Oral ACB  levothyroxine (SYNTHROID) tablet 25 mcg  25 mcg Oral Q48H  
 metoprolol succinate (TOPROL-XL) XL tablet 25 mg  25 mg Oral DAILY  olmesartan (BENICAR) tablet 20 mg  20 mg Oral DAILY  nitroglycerin (NITROSTAT) tablet 0.4 mg  0.4 mg SubLINGual Q5MIN PRN  predniSONE (DELTASONE) tablet 3 mg  3 mg Oral DAILY  sertraline (ZOLOFT) tablet 25 mg  25 mg Oral DAILY Other Studies (last 24 hours): No results found. Assessment and Plan:  
 
Hospital Problems as of 3/21/2019 Date Reviewed: 2/18/2019 Codes Class Noted - Resolved POA Left hip pain ICD-10-CM: M25.552 ICD-9-CM: 719.45  3/19/2019 - Present Unknown 1 - Periprosthetic Fracture of the Left Hip 2 - HTN, uncontrolled 3 - Hypothyroidism 4 - GERD 
5 - Giant Cell Arteritis on Prednisone 6 - h/o recent DVT on Eliquis 7 - Legal Blindness/ Macular Degeneration 8 - Chronic Normocytic Anemia 
  
PLAN: 
· As per the family, a conservative nonsurgical approach was opted for further management of the periprosthetic fracture · Agree with Norco and Dilaudid as needed for pain control. Will also encourage the use of Tylenol, as the family would like to minimize the use of narcotics · Physical Therapy · Continue home antihypertensive medications, namely olmesartan, metoprolol and amlodipine. No need to give prn hydralazine unless SBP > 170 
· Continue PPI for GERD · Continue Prednisone for giant cell arteritis · Continue Eliquis for recent DVT of LLE 
· Continue all other home medications for chronic conditions 
  
DC planning/Dispo: Per Orthopedics. Will likely need extended rehab. DVT ppx: the patient is on Eliquis Signed: Aisha Frost MD

## 2019-03-22 VITALS
TEMPERATURE: 97.7 F | OXYGEN SATURATION: 91 % | HEIGHT: 59 IN | WEIGHT: 100 LBS | DIASTOLIC BLOOD PRESSURE: 75 MMHG | HEART RATE: 65 BPM | SYSTOLIC BLOOD PRESSURE: 176 MMHG | RESPIRATION RATE: 18 BRPM | BODY MASS INDEX: 20.16 KG/M2

## 2019-03-22 LAB
MM INDURATION POC: 0 MM (ref 0–5)
PPD POC: NEGATIVE NEGATIVE

## 2019-03-22 PROCEDURE — 97530 THERAPEUTIC ACTIVITIES: CPT

## 2019-03-22 PROCEDURE — 74011250637 HC RX REV CODE- 250/637: Performed by: INTERNAL MEDICINE

## 2019-03-22 PROCEDURE — 99239 HOSP IP/OBS DSCHRG MGMT >30: CPT | Performed by: PHYSICAL MEDICINE & REHABILITATION

## 2019-03-22 PROCEDURE — 74011636637 HC RX REV CODE- 636/637: Performed by: INTERNAL MEDICINE

## 2019-03-22 RX ADMIN — PANTOPRAZOLE SODIUM 40 MG: 40 TABLET, DELAYED RELEASE ORAL at 05:24

## 2019-03-22 RX ADMIN — APIXABAN 2.5 MG: 2.5 TABLET, FILM COATED ORAL at 08:55

## 2019-03-22 RX ADMIN — LEVOTHYROXINE SODIUM 25 MCG: 50 TABLET ORAL at 05:23

## 2019-03-22 RX ADMIN — METOPROLOL SUCCINATE 25 MG: 25 TABLET, EXTENDED RELEASE ORAL at 08:55

## 2019-03-22 RX ADMIN — VITAMIN D, TAB 1000IU (100/BT) 1000 UNITS: 25 TAB at 08:54

## 2019-03-22 RX ADMIN — PREDNISONE 3 MG: 1 TABLET ORAL at 08:54

## 2019-03-22 RX ADMIN — OLMESARTAN MEDOXOMIL 20 MG: 20 TABLET, COATED ORAL at 08:55

## 2019-03-22 RX ADMIN — ACETAMINOPHEN 650 MG: 325 TABLET, FILM COATED ORAL at 05:23

## 2019-03-22 RX ADMIN — SERTRALINE HYDROCHLORIDE 25 MG: 25 TABLET ORAL at 08:55

## 2019-03-22 RX ADMIN — AMLODIPINE BESYLATE 5 MG: 5 TABLET ORAL at 08:55

## 2019-03-22 RX ADMIN — ACETAMINOPHEN 650 MG: 325 TABLET, FILM COATED ORAL at 12:18

## 2019-03-22 NOTE — PROGRESS NOTES
Problem: Mobility Impaired (Adult and Pediatric) Goal: *Acute Goals and Plan of Care (Insert Text) Description STG: 
(1.)Ms. Uzair Young will move from supine to sit and sit to supine  with MAX ASSIST within 1-2 treatment day(s). (2.)Ms. Uzair Young will transfer from bed to chair and chair to bed with MIN ASSIST using the least restrictive device within 1-2 treatment day(s). LTG: 
(1.)Ms. Uzair Young will move from supine to sit and sit to supine  in bed with MODERATE ASSIST within 1-2 treatment day(s). (2.)Ms. Uzair Young will transfer from bed to chair and chair to bed with MODERATE ASSIST using the least restrictive device within seven treatment day(s). ________________________________________________________________________________________________ Outcome: Progressing Towards Goal 
  
PHYSICAL THERAPY: Daily Note and AM 3/22/2019 INPATIENT:  NWB left [place] / [place] / Product Type: Medicare /   
  
NAME/AGE/GENDER: Aleksandar Luque is a 80 y.o. female PRIMARY DIAGNOSIS: Left hip pain [M25.552] <principal problem not specified> 
 <principal problem not specified> 
 
 
  
ICD-10: Treatment Diagnosis: · Difficulty in walking, Not elsewhere classified (R26.2) · Other abnormalities of gait and mobility (R26.89) Precaution/Allergies: 
Calcium carbonate; Ranitidine hcl; and Guaifenesin With MOd   
ASSESSMENT:  
Ms. Uzair Young is supine upon arrival.  She stated I need to get straight in the bed. Therapist said would you like to get up, she said yes. She work on bed mobility as follows: supine>EOB with Mod A x 1. She stood and pivot over to the chair with RW and [address] She remains in the chair with call light near. Therapist and pt review [address] status before and during transfers. She is leaving today. This section established at most recent assessment PROBLEM LIST (Impairments causing functional limitations): 1. Decreased Strength 2. Decreased Transfer Abilities 3. Decreased Ambulation Ability/Technique 4. Decreased Balance 5. Decreased Activity Tolerance 6. Decreased Flexibility/Joint Mobility INTERVENTIONS PLANNED: (Benefits and precautions of physical therapy have been discussed with the patient.) 1. Bed Mobility 2. Therapeutic Activites 3. Transfer Training TREATMENT PLAN: Frequency/Duration: daily for duration of hospital stay Rehabilitation Potential For Stated Goals: Fair RECOMMENDED REHABILITATION/EQUIPMENT: (at time of discharge pending progress): Due to the probability of continued deficits (see above) this patient will likely need continued skilled physical therapy after discharge. Equipment:  
? None at this time HISTORY:  
History of Present Injury/Illness (Reason for Referral): 
Pt admitted with above diagnosis with decreased mobility ability. Past Medical History/Comorbidities: Ms. Judy Chadwick  has a past medical history of Acquired hypothyroidism, Anemia, Arthritis, Chronic pain, Fractured hip ([facility] [address] Gastroesophageal reflux disease (12/9/2016), GERD (gastroesophageal reflux disease), Giant cell arteritis ([facility] (03/20/2010), Hip fracture ([facility] [address] (10/29/2018), History of heart attack (09/12/2018), History of hip fracture (2018), Hypertension, Legally blind, Macular degeneration, Memory deficit, NSTEMI (non-ST elevated myocardial infarction) ([facility] [address] (9/14/2018), Other ill-defined conditions(799.89), Other ill-defined conditions(799.89), TIA (transient ischemic attack) (6/23/2015), and Varicose veins of left lower extremity. Ms. Judy Chadwick  has a past surgical history that includes hx endoscopy; pr cardiac surg procedure unlist; hx cholecystectomy; hx tonsillectomy; hx heent; and hx orthopaedic (12/24/2018). Social History/Living Environment:  
Home Environment: Private residence # Steps to Enter: 1 One/Two Story Residence: One story Living Alone: No 
Support Systems: Child(yesi) Patient Expects to be Discharged to[de-identified] Rehabilitation facility Current DME Used/Available at Home: Shower chair, Walker, rollator, Walker, rolling Tub or Shower Type: Shower(built in seat) Prior Level of Function/Work/Activity: Pt walked with rollator. Number of Personal Factors/Comorbidities that affect the Plan of Care: 1-2: MODERATE COMPLEXITY EXAMINATION:  
Most Recent Physical Functioning:  
Gross Assessment: 
  
         
  
Posture: 
  
Balance: 
Sitting: Intact; High guard Standing: Pull to stand; With support Bed Mobility: 
Supine to Sit: Moderate assistance;Assist x2 Sit to Supine: (left up in chair) Wheelchair Mobility: 
  
Transfers: 
Sit to Stand: Moderate assistance;Assist x2 Stand to Sit: Moderate assistance;Assist x2 Bed to Chair: Moderate assistance;Assist x2 Interventions: Safety awareness training Duration: 30 Minutes Gait: 
  
   
  
Body Structures Involved: 1. Bones 2. Joints 3. Muscles 4. Ligaments Body Functions Affected: 1. Neuromusculoskeletal 
2. Movement Related Activities and Participation Affected: 1. Mobility Number of elements that affect the Plan of Care: 4+: HIGH COMPLEXITY CLINICAL PRESENTATION:  
Presentation: Evolving clinical presentation with changing clinical characteristics: MODERATE COMPLEXITY CLINICAL DECISION MAKING:  
[employer] AM-PAC 6 Clicks Basic Mobility Inpatient Short Form How much difficulty does the patient currently have. .. Unable A Lot A Little None 1. Turning over in bed (including adjusting bedclothes, sheets and blankets)? ? 1   ? 2   ? 3   ? 4  
2. Sitting down on and standing up from a chair with arms ( e.g., wheelchair, bedside commode, etc.)   ? 1   ? 2   ? 3   ? 4  
3. Moving from lying on back to sitting on the side of the bed?   ? 1   ? 2   ? 3   ? 4 How much help from another person does the patient currently need. .. Total A Lot A Little None 4. Moving to and from a bed to a chair (including a wheelchair)? ? 1   ? 2   ? 3   ? 4  
5. Need to walk in hospital room? ? 1   ? 2   ? 3   ? 4  
6. Climbing 3-5 steps with a railing? ? 1   ? 2   ? 3   ? 4  
© 2007, Trustees of [facility] [employer], under license to [employer]. All rights reserved Score:  Initial: 12 Most Recent: X (Date: -- ) Interpretation of Tool:  Represents activities that are increasingly more difficult (i.e. Bed mobility, Transfers, Gait). Medical Necessity:    
· Skilled intervention continues to be required due to decreased mobility ability. Reason for Services/Other Comments: 
· Patient continues to require skilled intervention due to decreased mobility ability. · . Use of outcome tool(s) and clinical judgement create a POC that gives a: Questionable prediction of patient's progress: MODERATE COMPLEXITY  
  
 
 
 
TREATMENT:  
(In addition to Assessment/Re-Assessment sessions the following treatments were rendered) Pre-treatment Symptoms/Complaints:   
Pain: Initial: sore all over Pain Intensity 1: 4(about the same)  Post Session:  pain in left hip Therapeutic Activity: (  30 Minutes ):  Therapeutic activities including Bed transfers, Chair transfers and sit to stand transfers, single leg stance on right to improve mobility, strength, balance and coordination. Required maximal   to promote static and dynamic balance in standing. Therapeutic Exercise: (0 Minutes):  Exercises per grid below to improve mobility and strength. Required minimal verbal and manual cues to promote proper body alignment and promote proper body posture. Progressed repetitions as indicated. Date: 
3/21/19 Date: 
3/22 Date: 
  
ACTIVITY/EXERCISE AM PM AM PM AM PM  
GROUP THERAPY  []  []  []  []  []  [] Ankle Pumps  10 15 Quad Sets  10 15 Gluteal Sets  10 15 Hip ABd/ADduction   12 on right only Straight Leg Raises  10 on right only 12 on right only Knee Slides  10, small range on left 12 small on left B = bilateral; AA = active assistive; A = active; P = passive Braces/Orthotics/Lines/Etc:  
· Treatment/Session Assessment:   
· Response to Treatment:  She did well with exercises and transfers, just a little fatigue past TX 
· Interdisciplinary Collaboration:  
o Registered Nurse 
o Rehabilitation Attendant · After treatment position/precautions:  
o Up in chair 
o Bed alarm/tab alert on 
o Bed/Chair-wheels locked 
o Bed in low position 
o Call light within reach 
o Family at bedside · Compliance with Program/Exercises: Compliant most of the time, Will assess as treatment progresses · Recommendations/Intent for next treatment session: \"Next visit will focus on advancements to more challenging activities and reduction in assistance provided\". Total Treatment Duration: PT Patient Time In/Time Out Time In: 0700 Time Out: 0730 Nga Villeda, PTA

## 2019-03-22 NOTE — DISCHARGE SUMMARY
REHABILITATION DISCHARGE SUMMARY Patient: Aramis Morales MRN: [id number]  SSN: [id number] YOB: 1928  Age: 80 y.o. Sex: female Date: 3/22/2019 Admit Date: 3/19/2019 Discharge Date: 3/22/2019 Admitting Physician: Mame Meza MD  
Primary Care Physician: Honorio Schaefer MD  
 
Admission Condition: good Chief Complaint : Gait dysfunction secondary to below. Admit Diagnosis: Left hip pain [M25.552] left hip hemiarthroplasty 12/24/2019 Periprosthetic left  Femur fracture 3/17/2019 Pain DVT risk Post op hemorrhagic anemia 
multiple bilateral rib fractures (1/2019) Acute Rehab Dx: 
Gait impairment Debility   
deconditioning Mobility and ambulation deficits Self Care/ADL deficits NWB LLE 
 
 
HPI: Aramis Morales is a 80 y.o. female patient at [address] [place] [address] who was admitted on 3/19/2019  by Mame Meza MD with below mentioned medical history, is being seen for Physical Medicine and Rehabilitation. 
  
Aramis Morales who is s/p  left hip hemiarthroplasty due to femoral head fracture in 12/24/2019, fell and sustaned a periprosthetic left femur fracture 3/17/2019. Patient is admitted to the hospital and was decided to be managed non surgically due to medical risk. This means patient will be NWB LLE indefinitely. Patient will require WC for ambulation and assistance for mobility, transfers. Patient also has multiple rib fractures from recent fall, causing pain with mobility. Patient is dependent for mobility, transfers and ambulation with WC.   
  
  
Rehabiitation Course:  
Functional  Level On Admission: Walk: Modified Independent Functional Level At Discharge: Walk: Contact Guard Assist 
Home Architecture: Home Situation Home Environment: Private residence (03/20/19 1010) # Steps to Enter: 1 (03/20/19 1010) One/Two Story Residence: One story (03/20/19 1010) Living Alone: No (03/20/19 1010) Support Systems: Child(yesi) (03/20/19 1010) Patient Expects to be Discharged to[de-identified] Rehabilitation facility (03/20/19 1010) Current DME Used/Available at Home: Shower chair;Walker, rollator;Walker, rolling (03/20/19 1010) Tub or Shower Type: Shower(built in seat) (03/20/19 1010) Past Medical History:  
Diagnosis Date  Acquired hypothyroidism   
 synthroid daily  Anemia   
 blood transfusion 9/2018; [facility]  Arthritis  Chronic pain  Fractured hip ([address]  Gastroesophageal reflux disease 12/9/2016  GERD (gastroesophageal reflux disease)   
 nexium daily,well controlled per daughter-    hiatal hernia,   
 Giant cell arteritis ([address] 03/20/2010  
 monitored by rheumatologist, prednisone daily  Hip fracture ([address] 10/29/2018  
 left hip fracture-  followed by Dr Benjamin Medina @ [address] [place] [address]  History of heart attack 09/12/2018  History of hip fracture 2018  
 left hip  Hypertension   
 managed with meds  Legally blind  Macular degeneration Legally blind  Memory deficit  NSTEMI (non-ST elevated myocardial infarction) ([address] 9/14/2018 9/12/18 \"mild mi, no damage, no heart cath or stents\"- medical management recommended however anemia persists, no current asa or thinners  Other ill-defined conditions(799.89)   
 back and pelvis pain  Other ill-defined conditions(799.89)   
 hip fx ;   arteritis  TIA (transient ischemic attack) 6/23/2015  
 no residual  
 Varicose veins of left lower extremity Past Surgical History:  
Procedure Laterality Date  CARDIAC SURG PROCEDURE UNLIST    
 Pt's family reports heart attack in Sept while in hospital [facility].  HX CHOLECYSTECTOMY  HX ENDOSCOPY    
 HX HEENT    
 torn retina  HX ORTHOPAEDIC  12/24/2018  
 left hip repair  HX TONSILLECTOMY Family History Problem Relation Age of Onset  Cancer Father   
     stomach [facility] Arthritis-osteo Mother  Hypertension Mother  Arthritis-osteo Sister Social History Tobacco Use  Smoking status: Never Smoker  Smokeless tobacco: Never Used Substance Use Topics  Alcohol use: No  
 
 
Allergies Allergen Reactions  Calcium Carbonate Diarrhea  Ranitidine Hcl Diarrhea  Guaifenesin Other (comments) Hallucinations Prior to Admission medications Medication Sig Start Date End Date Taking? Authorizing Provider HYDROcodone-acetaminophen (NORCO) 5-325 mg per tablet Take 1 Tab by mouth every four (4) hours as needed for Pain for up to 7 days. Max Daily Amount: 6 Tabs. 3/20/19 3/27/19 Yes Hernando Zapata MD  
levothyroxine (SYNTHROID) 50 mcg tablet TAKE 1 TABLET BY MOUTH EVERY OTHER DAY (ALTERNATE BETWEEN 50MCG & 25 MCG) 3/18/19   Giovana Piedra MD  
diazePAM (VALIUM) 2 mg tablet Take 0.5 Tabs by mouth every eight (8) hours as needed (spasm). Max Daily Amount: 3 mg. 3/17/19   Sukhwinder Rosales MD  
atorvastatin (LIPITOR) 10 mg tablet Take 0.5 Tabs by mouth nightly. 2/18/19   Ernst Pugh NP  
metoprolol succinate (TOPROL-XL) 25 mg XL tablet Take 1 Tab by mouth daily. 1/31/19   Nicole Kan MD  
olmesartan (BENICAR) 40 mg tablet Take 0.5 Tabs by mouth daily. 1/31/19   Milli Hillman MD  
apixaban (ELIQUIS) 2.5 mg tablet Take 1 Tab by mouth two (2) times a day. 1/31/19   Milli Hillman MD  
nitroglycerin (NITROSTAT) 0.4 mg SL tablet 1 Tab by SubLINGual route every five (5) minutes as needed for Chest Pain. Up to 3 doses. 1/31/19   Nicole Kan MD  
melatonin 3 mg tablet Take  by mouth. Provider, Historical  
sertraline (ZOLOFT) 25 mg tablet Take 1 Tab by mouth daily. Patient taking differently: Take 25 mg by mouth daily. 1/24/19   Giovana Piedra MD  
OTHER Calcium - OTC    Provider, Historical  
acetaminophen (TYLENOL) 650 mg TbER Take 650 mg by mouth every eight (8) hours. 500 mg in morning daily    Provider, Historical  
predniSONE (DELTASONE) 1 mg tablet Take 3 mg by mouth daily.  3/22/18   Provider, Historical  
 esomeprazole (NEXIUM) 40 mg capsule Take 40 mg by mouth daily. Provider, Historical  
amLODIPine (NORVASC) 5 mg tablet Take 1 Tab by mouth daily. Patient taking differently: Take 5 mg by mouth daily. 9/27/18   Derrek Hercules MD  
levothyroxine (SYNTHROID) 25 mcg tablet Take 1 Tab by mouth Daily (before breakfast). Patient taking differently: Take 25-50 mcg by mouth every other day. 25 mg alternating daily with 50 mg 9/20/18   Tika Parnell MD  
Cholecalciferol, Vitamin D3, (VITAMIN D3) 1,000 unit cap Take 1 Cap by mouth daily. Provider, Historical  
Denosumab (PROLIA) 60 mg/mL injection 60 mg by SubCUTAneous route every 6 months. Provider, Historical  
 
 
Current Medications: 
Current Facility-Administered Medications Medication Dose Route Frequency Provider Last Rate Last Dose  hydrALAZINE (APRESOLINE) tablet 25 mg  25 mg Oral TID PRN Neha Padron MD   25 mg at 03/20/19 1224  cloNIDine HCl (CATAPRES) tablet 0.1 mg  0.1 mg Oral Q4H PRN Neha Padron MD   0.1 mg at 03/20/19 1224  hydrALAZINE (APRESOLINE) 20 mg/mL injection 20 mg  20 mg IntraVENous Q4H PRN Neha Padron MD   20 mg at 03/20/19 2572  levothyroxine (SYNTHROID) tablet 50 mcg  50 mcg Oral EVERY OTHER DAY Karlee Fung MD      
 HYDROmorphone (PF) (DILAUDID) injection 0.5 mg  0.5 mg IntraVENous Q4H PRN Karlee Fung MD      
 HYDROcodone-acetaminophen [facility]) 5-325 mg per tablet 1 Tab  1 Tab Oral Q4H PRN Karlee Fung MD   0.5 Tab at 03/19/19 2016  acetaminophen (TYLENOL) tablet 650 mg  650 mg Oral Q4H PRN Shazia Street MD   650 mg at 03/22/19 0549  
 amLODIPine (NORVASC) tablet 5 mg  5 mg Oral DAILY Shazia Street MD   5 mg at 03/22/19 4165  apixaban (ELIQUIS) tablet 2.5 mg  2.5 mg Oral BID Zaina RASHID MD   2.5 mg at 03/22/19 8865  atorvastatin (LIPITOR) tablet 5 mg  5 mg Oral QHS Shazia Street MD   5 mg at 03/21/19 5811  cholecalciferol (VITAMIN D3) tablet 1,000 Units  1,000 Units Oral DAILY Qamar Street MD   1,000 Units at 03/22/19 6505  diazePAM (VALIUM) tablet 1 mg  1 mg Oral Q8H PRN Qamar Street MD      
 pantoprazole (PROTONIX) tablet 40 mg  40 mg Oral ACB Qamar Street MD   40 mg at 03/22/19 2573  levothyroxine (SYNTHROID) tablet 25 mcg  25 mcg Oral Q48H Qamar Street MD   25 mcg at 03/22/19 0523  
 metoprolol succinate (TOPROL-XL) XL tablet 25 mg  25 mg Oral DAILY Qamar Street MD   25 mg at 03/22/19 9631  olmesartan (BENICAR) tablet 20 mg  20 mg Oral DAILY Qamar Street MD   20 mg at 03/22/19 0855  
 nitroglycerin (NITROSTAT) tablet 0.4 mg  0.4 mg SubLINGual Q5MIN PRN Qamar Street MD      
 predniSONE (DELTASONE) tablet 3 mg  3 mg Oral DAILY Qamar Street MD   3 mg at 03/22/19 6083  sertraline (ZOLOFT) tablet 25 mg  25 mg Oral DAILY Qamar Street MD   25 mg at 03/22/19 5525 Review of Systems: Denies chest pain, shortness of breath, cough, headache, visual problems, abdominal pain, dysurea, calf pain. Pertinent positives are as noted in the medical records and unremarkable otherwise. Vital Signs:  
Patient Vitals for the past 8 hrs: 
 BP Temp Pulse Resp SpO2  
03/22/19 0746 176/75 97.7 °F (36.5 °C) 65 18 91 %  
03/22/19 0430 139/68 98.2 °F (36.8 °C) 67 17 92 % Physical Exam:  
General: Alert and age appropriately oriented. No acute cardio respiratory distress. HEENT: Normocephalic. Oral mucosa moist without cyanosis. Lungs: Clear to auscultation  bilaterally. Respiration even and unlabored Heart: Regular rate and rhythm, S1, S2 No  murmurs, clicks, rub or gallops Abdomen: Soft, non-tender, nondistended. Bowel sounds present Genitourinary: defered Neuromuscular:  
 
 Grossly no focal neurological deficits noted. L Ankle dorsiflexion 5/5 L Ankle plantarflexion 5/5 R Ankle dorsiflexion 5/5 R Ankle plantarflexion 5/5 
 No sensory deficits. Skin/extremity: No rashes, no erythema. Calves soft. Wound covered. Skin Incision(s)/Wound(s):  
  
 
Lab Review:  
Recent Results (from the past 72 hour(s)) CBC W/O DIFF Collection Time: 03/19/19  7:22 PM  
Result Value Ref Range WBC 9.8 4.3 - 11.1 K/uL  
 RBC 3.61 (L) 4.05 - 5.2 M/uL HGB 9.3 (L) 11.7 - 15.4 g/dL HCT 31.8 (L) 35.8 - 46.3 % MCV 88.1 79.6 - 97.8 FL  
 MCH 25.8 (L) 26.1 - 32.9 PG  
 MCHC 29.2 (L) 31.4 - 35.0 g/dL RDW 20.0 (H) 11.9 - 14.6 % PLATELET 458 528 - 933 K/uL MPV 11.3 9.4 - 12.3 FL ABSOLUTE NRBC 0.00 0.0 - 0.2 K/uL METABOLIC PANEL, BASIC Collection Time: 03/19/19  7:22 PM  
Result Value Ref Range Sodium 142 136 - 145 mmol/L Potassium 3.4 (L) 3.5 - 5.1 mmol/L Chloride 108 (H) 98 - 107 mmol/L  
 CO2 25 21 - 32 mmol/L Anion gap 9 mmol/L Glucose 188 (H) 65 - 100 mg/dL BUN 16 8 - 23 MG/DL Creatinine 0.70 0.6 - 1.0 MG/DL  
 GFR est AA >60 >60 ml/min/1.73m2 GFR est non-AA >60 ml/min/1.73m2 Calcium 9.2 8.3 - 10.4 MG/DL PROTHROMBIN TIME + INR Collection Time: 03/19/19  7:22 PM  
Result Value Ref Range Prothrombin time 14.4 11.7 - 14.5 sec INR 1.1 PLEASE READ & DOCUMENT PPD TEST IN 72 HRS Collection Time: 03/21/19  9:00 AM  
Result Value Ref Range PPD  Negative  
 mm Induration  0 - 5 mm PLEASE READ & DOCUMENT PPD TEST IN 48 HRS Collection Time: 03/22/19  8:56 AM  
Result Value Ref Range PPD Negative Negative  
 mm Induration 0 0 - 5 mm PT Initial  PT Most Recent AROM: Generally decreased, functional (03/20/19 1147) Generally decreased, functional (03/20/19 1147) Strength: Generally decreased, functional (03/20/19 1147) Generally decreased, functional (03/20/19 1147) Coordination: Generally decreased, functional (03/20/19 1147) Generally decreased, functional (03/20/19 1147) Distance (ft): 1 Feet (ft) (03/20/19 1147) 2 Feet (ft) (03/21/19 1515) Assistive Device: Walker, rolling (03/20/19 1147) Walker, rolling (03/21/19 7285) OT Initial OT Most Recent ST Initial ST Most Recent Active Problems: 
  Left hip pain (3/19/2019) Condition on discharge :  good Rehabilitation  potential : Good Goals in Rehab : Patient to reach maximal rehabilitation potential in functional mobility,ambulation and ADL/ self care skills ; to improve strength and endurance Expected Length Of Stay : Depending on pace of progress in mobility and self care in PT and OT ,therapies Discharge Instructions: 
Rehabilitation Management/ Medical Management: 1. Devices:Walkers, Type: [address] [facility] 2. Consult:Rehab team including PT, OT,  and . 3. Disposition Rehab-discussed with patient. 4. Thigh-high or knee-high ROMAIN's when out of bed. 5. DVT Prophylaxis - Continue Eliquis 2.5 bid for recent DVT of LLE. Please notify surgeon at [address] [place] [address] if new DVT diagnosed. 6. Incentive spirometer Q1H while awake 7. Post op hemorrhagic anemia- monitor. 8. Activity: NWB LLE 9. Planned Labs: CBC,BMP 10. Pain Control:   Continue scheduled tylenol, celebrex and  PRN meds. 11. Wound Care: May remove Aquacel 1 week post op and replace with Tegaderm and sterile gauze dressing. Keep wound clean and dry and reinforce dressing PRN. Remove staples 12-14 post surgery, when incision appears appropriately closed and apply benzoin and 1/2\" steristrips. 12. In case of complications: Please notify surgeon at [address] [place] [address] if suspect infection, cellulitis, wound dehiscence or instrument failure, and if considering starting antibiotic. Follow up with ORTHO per instructions. Discharge Medications: 
 
 
 
Current Discharge Medication List  
  
START taking these medications Details HYDROcodone-acetaminophen (NORCO) 5-325 mg per tablet Take 1 Tab by mouth every four (4) hours as needed for Pain Max Daily Amount: 6 Tabs. Associated Diagnoses: Left hip pain CONTINUE these medications which have NOT CHANGED Details  
levothyroxine (SYNTHROID) 50 mcg tablet TAKE 1 TABLET BY MOUTH EVERY OTHER DAY (ALTERNATE BETWEEN 50MCG & 25 MCG) diazePAM (VALIUM) 2 mg tablet Take 0.5 Tabs by mouth every eight (8) hours as needed (spasm). Max Daily Amount: 3 mg. Associated Diagnoses: Contusion of left hip, initial encounter  
  
atorvastatin (LIPITOR) 10 mg tablet Take 0.5 Tabs by mouth nightly. metoprolol succinate (TOPROL-XL) 25 mg XL tablet Take 1 Tab by mouth daily. Associated Diagnoses: NSTEMI (non-ST elevated myocardial infarction) ([facility] olmesartan (BENICAR) 40 mg tablet Take 0.5 Tabs by mouth daily. Associated Diagnoses: Essential hypertension  
  
apixaban (ELIQUIS) 2.5 mg tablet Take 1 Tab by mouth two (2) times a day. Associated Diagnoses: Deep vein thrombosis (DVT) of proximal lower extremity, unspecified chronicity, unspecified laterality (HCC)  
  
nitroglycerin (NITROSTAT) 0.4 mg SL tablet 1 Tab by SubLINGual route every five (5) minutes as needed for Chest Pain. Up to 3 doses. Associated Diagnoses: NSTEMI (non-ST elevated myocardial infarction) ([facility] melatonin 3 mg tablet Take  by mouth. sertraline (ZOLOFT) 25 mg tablet Take 1 Tab by mouth daily. OTHER Calcium - OTC  
  
acetaminophen (TYLENOL) 650 mg TbER Take 650 mg by mouth every eight (8) hours AS NEEDED FOR PAIN  
  
predniSONE (DELTASONE) 1 mg tablet Take 3 mg by mouth daily. esomeprazole (NEXIUM) 40 mg capsule Take 40 mg by mouth daily. amLODIPine (NORVASC) 5 mg tablet Take 1 Tab by mouth daily. Associated Diagnoses: Essential hypertension  
  
!! levothyroxine (SYNTHROID) 25 mcg tablet Take 1 Tab by mouth every other day (before breakfast). (ALTERNATE BETWEEN 50MCG & 25 MCG) Associated Diagnoses: Acquired hypothyroidism Cholecalciferol, Vitamin D3, (VITAMIN D3) 1,000 unit cap Take 1 Cap by mouth daily. Denosumab (PROLIA) 60 mg/mL injection 60 mg by SubCUTAneous route every 6 months. Hold. !! - Potential duplicate medications found. Please discuss with provider. Discharge time: 35 minutes. Signed By: Kailyn Tubbs MD   
 March 22, 2019

## 2019-03-22 NOTE — PROGRESS NOTES
2019 Post Op day: * No surgery found * Admit Date: 3/19/2019 Admit Diagnosis: Left hip pain [M25.552] Subjective: Patient stable. No acute events. Objective:  
Visit Vitals /75 Pulse 65 Temp 97.7 °F (36.5 °C) Resp 18 Ht 4' 11\" (1.499 m) Wt 45.4 kg (100 lb) SpO2 91% Breastfeeding? No  
BMI 20.20 kg/m² Temp (24hrs), Av °F (36.7 °C), Min:97.7 °F (36.5 °C), Max:98.4 °F (36.9 °C) Lab Results Component Value Date/Time HGB 9.3 (L) 2019 07:22 PM  
 
Extremity Exam 
Tibialis Anterior and Gastroc-Soleus functioning normally left lower extremity Sensation intact to light touch on operative limb Extremity perfused TEDS/SCDS in place No sign of DVT Assessment / Plan : 
NWB LLE Continue PT/OT Continue current DVT prophylaxis in house. Discharge on Eliquis BID DIspo-Extended care Patient Active Problem List  
Diagnosis Code  Syncope R55  Bradycardia R00.1  Giant cell arteritis ([facility] [address] M31.6  Hypertension I10  Abnormal EKG R94.31  
 Back pain M54.9  TIA (transient ischemic attack) G45.9  Hyperlipidemia LDL goal <100 E78.5  Diarrhea R19.7  Gastroesophageal reflux disease K21.9  Insulin resistance E88.81  
 Acquired hypothyroidism E03.9  Chest pain R07.9  Anemia D64.9  
 NSTEMI (non-ST elevated myocardial infarction) (HCC) I21.4  Delirium R41.0  Acute cystitis without hematuria N30.00  
 IFG (impaired fasting glucose) R73.01  
 Hip pain M25.559  Atypical chest pain R07.89  Left hip pain M25.552 Signed By: Ti Levin MD  
 
I have reviewed the patients controlled substance prescription history, as maintained in the [place] prescription monitoring program, so that the prescription(s) for a  controlled substance can be given.

## 2019-03-22 NOTE — PROGRESS NOTES
Slept at intervals during shift. No further c/o voiced. No change in NV status noted. Call light within reach. Bed alarm in use.

## 2019-03-25 ENCOUNTER — PATIENT OUTREACH (OUTPATIENT)
Dept: CASE MANAGEMENT | Age: 84
End: 2019-03-25

## 2019-03-25 NOTE — Clinical Note
CT 3.22.19 Osmel SEQUEIRA ALFA, dx: left hip pain. s/p  left hip hemiarthroplasty due to femoral head fracture in 12/24/2019, fell and sustaned a periprosthetic left femur fracture 3/17/2019. Patient is admitted to the hospital and was decided to be managed non surgically due to medical risk. This means patient will be NWB LLE indefinitely.

## 2019-03-25 NOTE — PROGRESS NOTES
· Patient was in ER on 3/17/19 for fall · Patient was direct admit to [facility] on 3/19/19 - 3/22/19 for left hip pain · Patient is s/p left hip hemiarthroplasty due to femoral head fracture in 12/24/2019, fell and sustaned a periprosthetic left femur fracture 3/17/2019. · Patient was admitted to the hospital and managed non surgically due to medical risk. · \"This means patient will be NWB LLE indefinitely. Patient will require WC for ambulation and assistance for mobility, transfers. \" 
· Patient currently has DME at home - lives with children - shower chair, rolling walker, rollator · Patient also has multiple rib fractures from recent fall, causing pain with mobility.  
· Patient is dependent for mobility, transfers and ambulation with WC.   
· Patient discharged to [facility] ([facility] preferred provider network) on 3/22/19. · Patient will be included in weekly care coordination calls with preferred network provider PLAN: 
· Will f/u with patient for ISAÍAS/CCM if patient is discharged from SNF to home This note will not be viewable in [address]

## 2019-03-25 NOTE — PROGRESS NOTES
This note will not be viewable in [address] Patient discharged to [facility] on 3/22/19. Patient discharged to a [facility] Preferred Provider Network facility. Patient will be included in weekly care coordination calls. Information forwarded to Alannah Echols RN, [facility] Preferred Provider Network RN Care Manager.

## 2019-03-29 ENCOUNTER — APPOINTMENT (OUTPATIENT)
Dept: PHYSICAL THERAPY | Age: 84
End: 2019-03-29
Payer: MEDICARE

## 2019-04-02 ENCOUNTER — APPOINTMENT (OUTPATIENT)
Dept: PHYSICAL THERAPY | Age: 84
End: 2019-04-02

## 2019-04-04 ENCOUNTER — APPOINTMENT (OUTPATIENT)
Dept: PHYSICAL THERAPY | Age: 84
End: 2019-04-04

## 2019-04-09 ENCOUNTER — APPOINTMENT (OUTPATIENT)
Dept: PHYSICAL THERAPY | Age: 84
End: 2019-04-09

## 2019-04-11 ENCOUNTER — APPOINTMENT (OUTPATIENT)
Dept: PHYSICAL THERAPY | Age: 84
End: 2019-04-11

## 2019-04-16 ENCOUNTER — PATIENT OUTREACH (OUTPATIENT)
Dept: CASE MANAGEMENT | Age: 84
End: 2019-04-16

## 2019-04-16 NOTE — PROGRESS NOTES
Patient still currently admitted to [facility] PLAN: 
Will f/u in 2 weeks about possible discharge date This note will not be viewable in [address]

## 2019-05-01 ENCOUNTER — PATIENT OUTREACH (OUTPATIENT)
Dept: CASE MANAGEMENT | Age: 84
End: 2019-05-01

## 2019-05-01 NOTE — PROGRESS NOTES
Patient still currently admitted to [facility] PLAN:  
Will f/u in 2 weeks about possible discharge date This note will not be viewable in [address]

## 2019-05-15 ENCOUNTER — PATIENT OUTREACH (OUTPATIENT)
Dept: CASE MANAGEMENT | Age: 84
End: 2019-05-15

## 2019-05-15 NOTE — PROGRESS NOTES
Outreached to [facility] Patient still currently admitted to [facility] PLAN:  
Will f/u again in 2 weeks about possible discharge date This note will not be viewable in [address]

## 2019-06-03 ENCOUNTER — PATIENT OUTREACH (OUTPATIENT)
Dept: CASE MANAGEMENT | Age: 84
End: 2019-06-03

## 2019-06-03 NOTE — PROGRESS NOTES
Attempted outreach to patient due to discharge from SNF - UTR no answer at this time  Per connect care patient may possibly be at Cardiology apt today and patient has apt with PCP tomorrow   PLAN:  Will attempt outreach to patient later this week after her appointments this week  This note will not be viewable in [address]

## 2019-06-07 ENCOUNTER — PATIENT OUTREACH (OUTPATIENT)
Dept: CASE MANAGEMENT | Age: 84
End: 2019-06-07

## 2019-06-07 NOTE — PROGRESS NOTES
· Outreached to f/u with patient and patient's daughter - reviewed chart - patient discharged from [facility] to home · ISAÍAS done by PCP office after patient discharged from SNF · Patient had f/u with cardiology on 6/3/19 · Patient had f/u with PCP on 6/5/19 · Patient is living with her daughter at this time who is her primary caregiver · Patient working with home health PT (currently only toe touch per ortho orders) · Meds reviewed and denies issues · Daughter transports to all appointments PLAN: 
· Will f/u with patient/patient's daughter next week · Patients daughter has my contact information This note will not be viewable in [address]

## 2019-06-14 ENCOUNTER — PATIENT OUTREACH (OUTPATIENT)
Dept: CASE MANAGEMENT | Age: 84
End: 2019-06-14

## 2019-06-14 NOTE — PROGRESS NOTES
· Outreached to f/u with patient and patient's daughter - reviewed chart - patient discharged from [facility] to home · Patient has f/u apt with rheumatology specialist at [facility] on 6/18/19 · Patient is living with her daughter at this time who is her primary caregiver · Patient continuing to work with home health PT (currently only toe touch per ortho orders) · Meds reviewed and denies issues (cardiology clarified patients Benicar dose) · Daughter transports to all appointments PLAN: 
· Will f/u with patient/patient's daughter next week · Patients daughter has my contact information This note will not be viewable in [address]

## 2019-06-21 ENCOUNTER — PATIENT OUTREACH (OUTPATIENT)
Dept: CASE MANAGEMENT | Age: 84
End: 2019-06-21

## 2019-06-21 NOTE — PROGRESS NOTES
· Outreached to f/u with patient and patient's daughter - reviewed chart - patient discharged from [facility] to home · Patient is living with her daughter at this time who is her primary caregiver · Patient continuing to work with [facility] services · Meds reviewed and denies issues · Patient had rheumatology f/u apt on 6/18/19 · Daughter transports to all appointments PLAN: 
· Will f/u with patient/patient's daughter next week · Patients daughter has my contact information This note will not be viewable in [address]

## 2019-07-09 ENCOUNTER — PATIENT OUTREACH (OUTPATIENT)
Dept: CASE MANAGEMENT | Age: 84
End: 2019-07-09

## 2019-07-09 NOTE — PROGRESS NOTES
· Outreached to f/u with patient and patient's daughter - reviewed chart - patient discharged from [facility] to home · Patient is living with her daughter at this time who is her primary caregiver · Patient continuing to work with [facility] services · Meds reviewed and denies issues · Daughter transports to all appointments PLAN: 
· Case closed at this time  patient has been somewhat stable and no further CCM needs identified at this time · Patients daughter has my contact information This note will not be viewable in [address]

## 2019-08-01 PROBLEM — D50.8 IRON DEFICIENCY ANEMIA SECONDARY TO INADEQUATE DIETARY IRON INTAKE: Status: ACTIVE | Noted: 2018-09-13

## 2019-08-01 PROBLEM — R07.89 ATYPICAL CHEST PAIN: Status: RESOLVED | Noted: 2019-01-06 | Resolved: 2019-01-01

## 2019-08-01 PROBLEM — I25.2 HISTORY OF NON-ST ELEVATION MYOCARDIAL INFARCTION (NSTEMI): Status: ACTIVE | Noted: 2018-09-14

## 2019-08-01 PROBLEM — R54 AGE-RELATED PHYSICAL DEBILITY: Status: ACTIVE | Noted: 2019-01-01

## 2019-08-01 PROBLEM — R41.0 DELIRIUM: Status: RESOLVED | Noted: 2018-09-15 | Resolved: 2019-01-01

## 2019-08-01 PROBLEM — R07.9 CHEST PAIN: Status: RESOLVED | Noted: 2018-09-12 | Resolved: 2019-01-01

## 2019-09-05 PROBLEM — L81.8 OTHER SPECIFIED DISORDERS OF PIGMENTATION: Status: ACTIVE | Noted: 2019-01-01

## 2019-09-05 PROBLEM — Z79.01 LONG TERM (CURRENT) USE OF ANTICOAGULANTS: Status: ACTIVE | Noted: 2019-01-01

## 2019-09-05 PROBLEM — Z85.828 PERSONAL HISTORY OF OTHER MALIGNANT NEOPLASM OF SKIN: Status: ACTIVE | Noted: 2019-01-01

## 2019-09-05 PROBLEM — E13.9 OTHER SPECIFIED DIABETES MELLITUS WITHOUT COMPLICATIONS: Status: ACTIVE | Noted: 2019-01-01

## 2019-09-05 PROBLEM — E78.5 HYPERLIPIDEMIA, UNSPECIFIED: Status: ACTIVE | Noted: 2019-01-01

## 2019-09-05 PROBLEM — L55.1 SUNBURN OF SECOND DEGREE: Status: ACTIVE | Noted: 2019-01-01

## 2019-09-05 PROBLEM — L82.1 OTHER SEBORRHEIC KERATOSIS: Status: ACTIVE | Noted: 2019-01-01

## 2019-09-05 PROBLEM — I10 ESSENTIAL (PRIMARY) HYPERTENSION: Status: ACTIVE | Noted: 2019-01-01

## 2020-01-01 ENCOUNTER — HOME CARE VISIT (OUTPATIENT)
Dept: SCHEDULING | Facility: HOME HEALTH | Age: 85
End: 2020-01-01
Payer: MEDICARE

## 2020-01-01 ENCOUNTER — HOME CARE VISIT (OUTPATIENT)
Dept: HOME HEALTH SERVICES | Facility: HOME HEALTH | Age: 85
End: 2020-01-01
Payer: MEDICARE

## 2020-01-01 ENCOUNTER — APPOINTMENT (RX ONLY)
Dept: URBAN - METROPOLITAN AREA CLINIC 23 | Facility: CLINIC | Age: 85
Setting detail: DERMATOLOGY
End: 2020-01-01

## 2020-01-01 VITALS
HEART RATE: 56 BPM | DIASTOLIC BLOOD PRESSURE: 78 MMHG | SYSTOLIC BLOOD PRESSURE: 112 MMHG | OXYGEN SATURATION: 96 % | RESPIRATION RATE: 18 BRPM | TEMPERATURE: 98.6 F

## 2020-01-01 VITALS
DIASTOLIC BLOOD PRESSURE: 20 MMHG | HEART RATE: 78 BPM | RESPIRATION RATE: 18 BRPM | SYSTOLIC BLOOD PRESSURE: 120 MMHG | TEMPERATURE: 98.5 F | OXYGEN SATURATION: 98 %

## 2020-01-01 VITALS
RESPIRATION RATE: 18 BRPM | TEMPERATURE: 97.8 F | DIASTOLIC BLOOD PRESSURE: 68 MMHG | HEART RATE: 86 BPM | SYSTOLIC BLOOD PRESSURE: 116 MMHG

## 2020-01-01 VITALS
HEART RATE: 57 BPM | OXYGEN SATURATION: 97 % | DIASTOLIC BLOOD PRESSURE: 70 MMHG | RESPIRATION RATE: 17 BRPM | TEMPERATURE: 98.3 F | SYSTOLIC BLOOD PRESSURE: 140 MMHG

## 2020-01-01 VITALS
HEART RATE: 56 BPM | SYSTOLIC BLOOD PRESSURE: 150 MMHG | TEMPERATURE: 97.5 F | RESPIRATION RATE: 18 BRPM | DIASTOLIC BLOOD PRESSURE: 80 MMHG

## 2020-01-01 DIAGNOSIS — I78.8 OTHER DISEASES OF CAPILLARIES: ICD-10-CM

## 2020-01-01 DIAGNOSIS — L82.1 OTHER SEBORRHEIC KERATOSIS: ICD-10-CM

## 2020-01-01 PROCEDURE — 3331090002 HH PPS REVENUE DEBIT

## 2020-01-01 PROCEDURE — 3331090001 HH PPS REVENUE CREDIT

## 2020-01-01 PROCEDURE — G0151 HHCP-SERV OF PT,EA 15 MIN: HCPCS

## 2020-01-01 PROCEDURE — G0157 HHC PT ASSISTANT EA 15: HCPCS

## 2020-01-01 PROCEDURE — 400013 HH SOC

## 2020-01-01 PROCEDURE — 99212 OFFICE O/P EST SF 10 MIN: CPT

## 2020-01-01 PROCEDURE — ? COUNSELING

## 2020-01-01 PROCEDURE — ? LIQUID NITROGEN (COSMETIC)

## 2020-01-01 ASSESSMENT — LOCATION SIMPLE DESCRIPTION DERM
LOCATION SIMPLE: RIGHT CHEEK
LOCATION SIMPLE: LEFT CHEEK
LOCATION SIMPLE: NOSE
LOCATION SIMPLE: RIGHT ZYGOMA
LOCATION SIMPLE: RIGHT TEMPLE

## 2020-01-01 ASSESSMENT — LOCATION DETAILED DESCRIPTION DERM
LOCATION DETAILED: LEFT MEDIAL MALAR CHEEK
LOCATION DETAILED: RIGHT LATERAL ZYGOMA
LOCATION DETAILED: NASAL DORSUM
LOCATION DETAILED: RIGHT MEDIAL MALAR CHEEK
LOCATION DETAILED: RIGHT CENTRAL TEMPLE
LOCATION DETAILED: RIGHT CENTRAL ZYGOMA

## 2020-01-01 ASSESSMENT — LOCATION ZONE DERM
LOCATION ZONE: NOSE
LOCATION ZONE: FACE

## 2020-02-13 PROBLEM — E13.9 OTHER SPECIFIED DIABETES MELLITUS WITHOUT COMPLICATIONS: Status: ACTIVE | Noted: 2020-01-01

## 2020-02-13 PROBLEM — I78.8 OTHER DISEASES OF CAPILLARIES: Status: ACTIVE | Noted: 2020-01-01

## 2020-02-13 PROBLEM — M12.9 ARTHROPATHY, UNSPECIFIED: Status: ACTIVE | Noted: 2020-01-01

## 2020-02-13 PROBLEM — L55.1 SUNBURN OF SECOND DEGREE: Status: ACTIVE | Noted: 2020-01-01

## 2020-02-13 PROBLEM — Z85.828 PERSONAL HISTORY OF OTHER MALIGNANT NEOPLASM OF SKIN: Status: ACTIVE | Noted: 2020-01-01

## 2020-02-13 PROBLEM — L82.1 OTHER SEBORRHEIC KERATOSIS: Status: ACTIVE | Noted: 2020-01-01

## 2020-02-13 PROBLEM — I10 ESSENTIAL (PRIMARY) HYPERTENSION: Status: ACTIVE | Noted: 2020-01-01

## 2020-02-13 PROBLEM — E78.5 HYPERLIPIDEMIA, UNSPECIFIED: Status: ACTIVE | Noted: 2020-01-01

## 2020-02-13 NOTE — PROCEDURE: LIQUID NITROGEN (COSMETIC)
Detail Level: Detailed
Price (Use Numbers Only, No Special Characters Or $): 50
Post-Care Instructions: I reviewed with the patient in detail post-care instructions. Patient is to wear sunprotection, and avoid picking at any of the treated lesions. Pt may apply Vaseline to crusted or scabbing areas.
Consent: The patient's consent was obtained including but not limited to risks of crusting, scabbing, blistering, scarring, darker or lighter pigmentary change, recurrence, incomplete removal and infection. The patient understands that the procedure is cosmetic in nature and is not covered by insurance.
Render Post-Care Instructions In Note?: no

## 2020-03-14 PROBLEM — M17.0 BILATERAL PRIMARY OSTEOARTHRITIS OF KNEE: Status: ACTIVE | Noted: 2020-01-01

## 2021-03-22 NOTE — PROGRESS NOTES
Babita Goddard  : [address] [facility] [place] [address], [place] [address].  [phone]   [phone]           OUTPATIENT PHYSICAL THERAPY:Daily Note 2017    ICD-10: Treatment Diagnosis: Other abnormalities of gait and mobility R26.89;  Precautions/Allergies:   Calcium carbonate; Ranitidine hcl; and Guaifenesin   Fall Risk Score: 6 (? 5 = High Risk)  MD Orders: Eval and treat MEDICAL/REFERRING DIAGNOSIS:  Balance problem [R26.89]  Frequent falls [R29.6]  Physical deconditioning [R53.81]   DATE OF ONSET: a couple of years  REFERRING PHYSICIAN: Krys Awan MD  RETURN PHYSICIAN APPOINTMENT:      INITIAL ASSESSMENT:  Ms. Martinez Overall presents with decreased gait, imbalance, and high risk of falls. Patient would benefit from PT to address these problems to improve patient's independence and safety with mobility and daily activities. Thank you. PROBLEM LIST (Impacting functional limitations):  1. Decreased Strength  2. Decreased ADL/Functional Activities  3. Decreased Transfer Abilities  4. Decreased Ambulation Ability/Technique  5. Decreased Balance  6. Decreased Activity Tolerance  7. Decreased [place] with Home Exercise Program INTERVENTIONS PLANNED:  1. Balance Exercise  2. Gait Training  3. Home Exercise Program (HEP)  4. Neuromuscular Re-education/Strengthening  5. Therapeutic Activites  6. Therapeutic Exercise/Strengthening  7. Transfer Training   8. TREATMENT PLAN:  Effective Dates: 2017 TO 11/3/2017. Frequency/Duration: 1-2 times a week for 8-12 weeks  GOALS: (Goals have been discussed and agreed upon with patient.)  Short-Term Functional Goals: Time Frame: 2-4 weeks  1. Patient will demonstrate independence and compliance with home exercise program to improve balance and strength for daily activities.    2. Patient will increase her score on the Oliva Balance Scale to greater than or equal to 38/56 indicating improved safety and decreased fall risk for daily activities. Discharge Goals: Time Frame: 8-12 weeks  1. Patient will increase bilateral lower extremity strength to greater than or equal to 4+/5 to improve strength for functional mobility activities. 2. Patient will increase her score on the Oliva Balance Scale to greater than or equal to 48/56 indicating improved safety and decreased fall risk for daily activities. 3. Patient will increase her score on the dynamic gait index to greater than or equal to 15/24 indicating improved balance and safety for community ambulation. 4. Patient will ambulate with least assistive device over level and unlevel surfaces without evidence of imbalance to improve safety for daily activities. Rehabilitation Potential For Stated Goals: Good            The information in this section was collected on 8/9/17 (except where otherwise noted). HISTORY:   History of Present Injury/Illness (Reason for Referral):  Brittany Judi 2/14/17 and broke ribs, getting tea out of microwave and burned self. 7/3/17 fell when standing and changing clothes and bruised her ribs. In the last 6 months, vision has decreased a lot, macular degeneration, L eye 20/200. No dizziness. Starts to lose balance and can't catch self. Has temperal arteritis and take prednisone for that. No pain. Gets back pain with standing, relieved with sitting. Was using no AD until fell in July, now uses RW or HHA. Past Medical History/Comorbidities:   Ms. Jeremy Montalvo  has a past medical history of Gastroesophageal reflux disease (12/9/2016); Giant cell arteritis ([facility] [address] Giant cell arteritis ([address] (3/20/2010); Hypertension; Macular degeneration; Other ill-defined conditions; Other ill-defined conditions; and TIA (transient ischemic attack) (6/23/2015). She also has no past medical history of Arthritis; Asthma; Autoimmune disease ([address] CAD (coronary artery disease); Cancer ([address]  Chronic kidney disease; COPD; DEMENTIA; Diabetes ([address] Endocrine disease; Heart failure ([facility] [address] Infectious disease; Liver disease; Psychiatric disorder; PUD (peptic ulcer disease); Seizures ([facility] [address] Sleep disorder; or Thromboembolus ([facility] [address] Ms. Eun Perea  has a past surgical history that includes cholecystectomy; heent; and orthopaedic. Social History/Living Environment:     lives with family; no driving. One step to enter home. Walk in shower with bench but doesn't use bench. Prior Level of Function/Work/Activity:  Still cooks and cleans some  Dominant Side:         RIGHT  Personal Factors:          Sex:  female        Age:  80 y.o. Current Medications:       Current Outpatient Prescriptions:     HYDROcodone-acetaminophen (NORCO) 5-325 mg per tablet, Take 1/2 to 1 tablet every 6 hours as needed for pain, Disp: 20 Tab, Rfl: 0    amLODIPine (NORVASC) 10 mg tablet, Take 1 Tab by mouth daily. , Disp: 90 Tab, Rfl: 1    mupirocin (BACTROBAN) 2 % ointment, Apply  to affected area daily. , Disp: 22 g, Rfl: 0    predniSONE (DELTASONE) 1 mg tablet, TAKE FOUR TABLETS BY MOUTH ONE TIME DAILY, Disp: , Rfl: 6    atorvastatin (LIPITOR) 10 mg tablet, Take 1/2 tab every other night., Disp: 30 Tab, Rfl: 2    calcium-vitamin D (CALCIUM 600 + D,3,) 600 mg(1,500mg) -200 unit tab, Take 1 Tab by mouth., Disp: , Rfl:     aspirin delayed-release 81 mg tablet, Take 1 Tab by mouth daily. , Disp: 100 Tab, Rfl: 0    Cholecalciferol, Vitamin D3, (VITAMIN D3) 1,000 unit cap, Take 1 Cap by mouth daily. , Disp: , Rfl:     Denosumab (PROLIA) 60 mg/mL injection, 60 mg by SubCUTAneous route., Disp: , Rfl:     bevacizumab (AVASTIN) 25 mg/ml soln intravitreal solution, by IntraVITreal route once., Disp: , Rfl:    Date Last Reviewed:  8/24/17   Number of Personal Factors/Comorbidities that affect the Plan of Care: 1-2: MODERATE COMPLEXITY   EXAMINATION:   Observation/Orthostatic Postural Assessment:          Mild forward head posture  Strength:          LE STRENGTH:  4/5 B LE  Functional Mobility: Gait/Ambulation:  Patient ambulates at a slower pace, decreased step length and heel strike, path deviations, with no AD. Transfers:  Slow, supervision with no AD        Bed Mobility:  independent        Stairs:  NT  Sensation:         intact  Postural Control & Balance:  · Oliva Balance Scale:  35/56.   (A score less than 45/56 indicates high risk of falls)     · Dynamic Gait Index:  8/24.   (A score less than or equal to19/24 is abnormal and predictive of falls)     · [employer] Sensory Organization Test:  ONT     Body Structures Involved:  1. Nerves  2. Eyes and Ears  3. Muscles Body Functions Affected:  1. Sensory/Pain  2. Neuromusculoskeletal  3. Movement Related Activities and Participation Affected:  1. General Tasks and Demands  2. Mobility  3. Community, Social and [place]   Number of elements (examined above) that affect the Plan of Care: 3: MODERATE COMPLEXITY   CLINICAL PRESENTATION:   Presentation: Evolving clinical presentation with changing clinical characteristics: MODERATE COMPLEXITY   CLINICAL DECISION MAKING:   Outcome Measure: Tool Used: Oliva Balance Scale  Score:  Initial: 35/56 Most Recent: X/56 (Date: -- )   Interpretation of Score: Each section is scored on a 0-4 scale, 0 representing the patients inability to perform the task and 4 representing independence. The scores of each section are added together for a total score of 56. The higher the patients score, the more independent the patient is. Any score below 45 indicates increased risk for falls. Score 56 55-45 44-34 33-23 22-12 11-1 0   Modifier CH CI CJ CK CL CM CN     ?  Changing and Maintaining Body Position:    [id number] - CURRENT STATUS: CJ - 20%-39% impaired, limited or restricted    - GOAL STATUS: CI - 1%-19% impaired, limited or restricted    - D/C STATUS:  ---------------To be determined---------------    Tool Used: Timed Up and Go (TUG)  Score:  Initial: 23 seconds Most Recent: X seconds (Date: -- )   Interpretation of Score: The test measures, in seconds, the time taken by an individual to stand up from a standard arm chair (seat height 46 cm [18 in], arm height 65 cm [25.6 in]), walk a distance of 3 meters (118 in, approx 10 ft), turn, walk back to the chair and sit down. If the individual takes longer than 14 seconds to complete TUG, this indicates risk for falls. Score 7 7.5-10.5 11-14 14.5-17.5 18-21 21.5-24.5 25+   Modifier CH CI CJ CK CL CM CN     Medical Necessity:   · Patient is expected to demonstrate progress in strength, balance and functional technique to improve safety during daily activities. Reason for Services/Other Comments:  · Patient continues to demonstrate capacity to improve strength, balance, gait which will increase independence and increase safety. Use of outcome tool(s) and clinical judgement create a POC that gives a: Questionable prediction of patient's progress: MODERATE COMPLEXITY            TREATMENT:   (In addition to Assessment/Re-Assessment sessions the following treatments were rendered)  Pre-treatment Symptoms/Complaints: 8/29/2017: Patient reports she is doing ok today. Pain: Initial: 0/10    Post Session:  0     Neuromuscular Re-education (30 Minutes):  Exercise/activities per grid below to improve balance, coordination and proprioception. Required minimal verbal cues to promote static and dynamic balance in standing.   Balance/Vestibular Treatment:   Activity   Date  8/22/17 Date   8/24/17 Date:  8/29/2017   Activity/Exercise        Walking with head turns          Walking with head up & down          Step overs     Over blue foam   2x10 reps   B LE Over blue foam   2x10 reps   B LE forward and laterally Blue foam  2 sets  10 reps  B LE  Forward and lateral   Step taps     6 inch step  30 reps forward and cross tap 6 inch step  30 reps forward and cross tap 6 inch step  30 reps   Forward and cross tap   Marching    4 laps in hallway 4 laps   Sidestepping    4 laps in hallway 4 laps   Crossovers        [place]        Walking  backwards          Walking with obstacles   In between cones and over 4 inch block x 6 reps with CGA In between cones and over 4 inch block, up/down 4 inch and six inch step x 6 reps with CGA Cones, 4 inch step, 6 inch step  6 reps  CGA   Walking with heel strike     4 laps collier 4 laps collier and up/down ramps 4 laps with ramps   Picking up cones          Sports cord          [employer]        Kick ball        Figure 8s         Circles right/left        Walking with 360 degree turns        Spirals        Weight shifting:    Left & Right     rockerboard  Eyes open rockerboard  Eyes open Rockerboard  Eyes open   Weight shifting: Forward & Backward      rockerboard  Eyes open rockerboard  Eyes open Rockerboard  Eyes open   Static Standing Balance     rockerboard  Eyes open rockerboard  Eyes open and closed Rockerboard  Eyes open and closed   Standing with feet apart     Blue foams eyes open and closed     Standing with feet together          Standing with feet semitandem   Blue foams  Eyes open and closed Blue foams  Eyes open and closed Blue foams  Eyes open and closed   Standing with feet tandem        Single leg stance   Each LE  Each LE B LE     Therapeutic Exercise: (15 Minutes):  Exercises per grid below to improve mobility, strength and balance. Required minimal verbal cues to promote proper body alignment, promote proper body posture and promote proper body mechanics. Progressed resistance, repetitions and complexity of movement as indicated.    Date   8/22/17 Date   8/24/17 Date:  8/29/2017   Activity/Exercise   Parameters   nustep      Sit to stand  2 x 10 reps from chair with no UE assist 10 reps  2 sets  No UE assst   Step ups 6 inch step  10 reps  B LE 6 inch step  10 reps  B LE 6 inch step  10 reps  B LE   Standing heel and toe raises  2 x 10 reps each B 2 sets  10 reps  B LE     [facility] Portal  Treatment/Session Assessment: · Response to Treatment:  Patient tolerated treatment well. Patient demonstrated improved overall balance and mobility. · Compliance with Program/Exercises: Will assess as treatment progresses. · Recommendations/Intent for next treatment session: \"Next visit will focus on advancements to more challenging activities\".   Total Treatment Duration:      Dilcia Schaffer, PT Left arm;

## 2023-03-01 NOTE — PROGRESS NOTES
----- Message from Mary Jo Rico DO sent at 3/1/2023  2:03 PM EST -----  Thre is significant findings on the ultrasound include a pancreatitic cyst and changes associated with the gallbladder  I recommend making an appointment with the gastroenterologist for further work up  Problem: Self Care Deficits Care Plan (Adult)  Goal: *Acute Goals and Plan of Care (Insert Text)  1. Patient will verbalize and demonstrate understanding of hip precautions with 100% accuracy during ADL. 2. Patient will complete functional transfers with modified independence and adaptive equipment as needed. PROGRESSING 12/27/2018   3. Patient will complete lower body bathing and dressing with minimal assistance and adaptive equipment as needed. PROGRESSING 12/27/2018   4. Patient will complete toileting with modified independence. 5. Patient will tolerate BUE therapeutic exercises to increase strength in E to [facility] to aid in functional transfers. 6. Patient will tolerate at least 10 minutes of OT treatment with no rest breaks to increase activity tolerance for ADLs. PROGRESSING 12/27/2018     Timeframe: 7 visits       OCCUPATIONAL THERAPY: Daily Note, Treatment Day: 1st and PM    12/27/2018  INPATIENT: Hospital Day: 4  [place] / [place] / Product Type: Medicare /      NAME/AGE/GENDER: Inder Araiza is a 80 y.o. female   PRIMARY DIAGNOSIS:  Osteoarthritis of left hip, unspecified osteoarthritis type [M16.12] <principal problem not specified> <principal problem not specified>  Procedure(s) (LRB):  LEFT HIP HEMIARTHROPLASTY (Left)  3 Days Post-Op  ICD-10: Treatment Diagnosis:    · Pain in left hip (M25.552)  · Stiffness of Left Hip, Not elsewhere classified (M25.652)  · Generalized Muscle Weakness (M62.81)  · Other lack of cordination (R27.8)   Precautions/Allergies:    Fall precautions  Hip Precautions  LLE WBAT   Calcium carbonate; Ranitidine hcl; and Guaifenesin      ASSESSMENT:   Per initial evaluation:  Ms. Danny Avendaño is a 80 y.o. female admitted with the above. Pt now s/p L hip hemiarthroplasty and WBAT in LLE with L hip precautions.  Pt lives with her daughter and reports independence with most ADLs at baseline, but that she has been needing assistance to weave legs into pants and to step into the shower since she broke her hip in August. Pt found supine in bed, drowsy but alert and agreeable to OT evaluation upon arrival. Pt educated on hip precautions. Pt is [place] and legally blind due to macular degeneration. BUE assessment revealed AROM WFL, strength generally decreased bilaterally. 12/27/2018 : Pt presents up in the chair upon arrival. Pt agreeable to treatment and completed sit to stand with min a and additional time. Pt completed functional mobility in room and in bathroom with CGA/min a using a rolling walker. Pt completed toilet transfer with min a, toileting with SBA, and clothing management with min a. Pt had more difficulty getting off the toilet requiring mod a. Pt completed UB dressing with min a and then transport came and pt walked to the stretcher with min a and a rolling walker. Good effort. Continue OT POC. This section established at most recent assessment   PROBLEM LIST (Impairments causing functional limitations):  1. Decreased Strength  2. Decreased ADL/Functional Activities  3. Decreased Transfer Abilities  4. Decreased Ambulation Ability/Technique  5. Decreased Balance  6. Increased Pain  7. Decreased Activity Tolerance  8. Increased Fatigue  9. Decreased Flexibility/Joint Mobility  10. Decreased Knowledge of Precautions   11. Decreased Vision  12. Decreased Hearing   INTERVENTIONS PLANNED: (Benefits and precautions of occupational therapy have been discussed with the patient.)  1. Activities of daily living training  2. Adaptive equipment training  3. Balance training  4. Clothing management  5. Donning&doffing training  6. Hygiene training  7. Therapeutic activity  8. Therapeutic exercise     TREATMENT PLAN: Frequency/Duration: Follow patient 6x/week to address above goals.   Rehabilitation Potential For Stated Goals: Good     RECOMMENDED REHABILITATION/EQUIPMENT: (at time of discharge pending progress): Due to the probability of continued deficits (see above) this patient will likely need continued skilled occupational therapy after discharge. Equipment:    TBD              OCCUPATIONAL PROFILE AND HISTORY:   History of Present Injury/Illness (Reason for Referral):  See H&P. Past Medical History/Comorbidities:   Ms. Polo Vaca  has a past medical history of Acquired hypothyroidism, Anemia, Arthritis, Chronic pain, Fractured hip ([address] Gastroesophageal reflux disease, GERD (gastroesophageal reflux disease), Giant cell arteritis ([address] Hip fracture ([facility] [address] Hypertension, Legally blind, Macular degeneration, Memory deficit, NSTEMI (non-ST elevated myocardial infarction) ([address] Other ill-defined conditions(799.89), Other ill-defined conditions(799.89), TIA (transient ischemic attack), and Varicose veins of left lower extremity. Ms. Polo Vaca  has a past surgical history that includes hx endoscopy; pr cardiac surg procedure unlist; hx cholecystectomy; hx tonsillectomy; hx heent; LEFT HIP HEMIARTHROPLASTY (Left, 12/24/2018); and ESOPHAGOGASTRODUODENOSCOPY (EGD)/ 21 (N/A, 11/16/2018). Social History/Living Environment:   Home Environment: Private residence  # Steps to Enter: 1  One/Two Story Residence: One story  Living Alone: No  Support Systems: Child(yesi)  Patient Expects to be Discharged to[de-identified] Private residence  Current DME Used/Available at Home: [employer] or Shower Type: Shower  Prior Level of Function/Work/Activity:  Pt lives with her daughter and reports independence with most ADLs at baseline, but that she has been needing assistance to weave legs into pants and to step into the shower since she broke her hip in August.   Dominant Side:         RIGHT  Personal Factors:          Sex:  female        Age:  80 y.o.    Number of Personal Factors/Comorbidities that affect the Plan of Care: Expanded review of therapy/medical records (1-2):  MODERATE COMPLEXITY   ASSESSMENT OF OCCUPATIONAL PERFORMANCE[de-identified]   Activities of Daily Living:   Basic ADLs (From Assessment) Complex ADLs (From Assessment)   Feeding: Independent  Oral Facial Hygiene/Grooming: Setup  Bathing: Moderate assistance  Upper Body Dressing: Setup  Lower Body Dressing: Total assistance  Toileting: Maximum assistance Instrumental ADL  Meal Preparation: Total assistance  Homemaking: Total assistance   Grooming/Bathing/Dressing Activities of Daily Living                 Toileting  Toileting Assistance: Stand-by assistance  Bladder Hygiene: Stand-by assistance  Clothing Management: Minimum assistance   [address] Gown: Minimum  assistance Functional Transfers  Bathroom Mobility: Minimum assistance  Toilet Transfer : Minimum assistance     Bed/Mat Mobility  Sit to Stand: Minimum assistance; Moderate assistance       Most Recent Physical Functioning:   Gross Assessment:                  Posture:  Posture (WDL): Exceptions to WDL  Posture Assessment: Forward head, Kyphosis, Rounded shoulders  Balance:  Sitting: Intact  Standing: Impaired  Standing - Static: Fair  Standing - Dynamic : Fair Bed Mobility:     Wheelchair Mobility:     Transfers:  Sit to Stand: Minimum assistance; Moderate assistance  Stand to Sit: Contact guard assistance            Patient Vitals for the past 6 hrs:   BP BP Patient Position SpO2 Pulse   12/27/18 1156 133/71 At rest 98 % 72       Mental Status  Neurologic State: Alert, Confused  Orientation Level: Oriented to person, Disoriented to place, Disoriented to situation, Disoriented to time  Cognition: Decreased attention/concentration  Perseveration: No perseveration noted  Safety/Judgement: Decreased awareness of environment, Decreased insight into deficits, Fall prevention                          Physical Skills Involved:  1. Range of Motion  2. Balance  3. Strength  4. Activity Tolerance  5. Vision  6. Pain (acute)   7. Hearing Cognitive Skills Affected (resulting in the inability to perform in a timely and safe manner):  1.  None Psychosocial Skills Affected:  1. Habits/Routines  2. Environmental Adaptation  3. Self-Awareness  4. Social Roles   Number of elements that affect the Plan of Care: 5+:  HIGH COMPLEXITY   CLINICAL DECISION MAKIN[address] [place] [address] AM-PAC 6 Clicks   Daily Activity Inpatient Short Form  How much help from another person does the patient currently need. .. Total A Lot A Little None   1. Putting on and taking off regular lower body clothing? [x] 1   [] 2   [] 3   [] 4   2. Bathing (including washing, rinsing, drying)? [] 1   [x] 2   [] 3   [] 4   3. Toileting, which includes using toilet, bedpan or urinal?   [] 1   [x] 2   [] 3   [] 4   4. Putting on and taking off regular upper body clothing? [] 1   [] 2   [x] 3   [] 4   5. Taking care of personal grooming such as brushing teeth? [] 1   [] 2   [x] 3   [] 4   6. Eating meals? [] 1   [] 2   [] 3   [x] 4   © , Trustees of [address] [place] [id number], under license to [employer]. All rights reserved      Score:  Initial: 15  2018  Most Recent: X (Date: -- )    Interpretation of Tool:  Represents activities that are increasingly more difficult (i.e. Bed mobility, Transfers, Gait). Score 24 23 22-20 19-15 14-10 9-7 6     Modifier CH CI CJ CK CL CM CN      ? Self Care:     - CURRENT STATUS: CK - 40%-59% impaired, limited or restricted    - GOAL STATUS: CJ - 20%-39% impaired, limited or restricted    - D/C STATUS:  ---------------To be determined---------------  [place] / Product Type: Medicare /      Medical Necessity:     · Patient demonstrates good rehab potential due to higher previous functional level. Reason for Services/Other Comments:  · Patient continues to require skilled intervention due to inability to complete ADLs at prior level of independence.    Use of outcome tool(s) and clinical judgement create a POC that gives a: MODERATE COMPLEXITY         TREATMENT:   (In addition to Assessment/Re-Assessment sessions the following treatments were rendered)     Pre-treatment Symptoms/Complaints:    Pain: Initial:   Pain Intensity 1: 0  Post Session:  same     Self Care: (10 minutes): Procedure(s) (per grid) utilized to improve and/or restore self-care/home management as related to dressing and toileting. Required minimal visual, verbal, manual and   cueing to facilitate activities of daily living skills and compensatory activities. Therapeutic Activity: (13 minutes): Therapeutic activities including Bed transfers, Chair transfers, Toilet transfers and static/dynamic balance  to improve mobility, strength and balance. Required minimal assist to promote static and dynamic balance in standing. Braces/Orthotics/Lines/Etc:   · O2 Device: Nasal cannula  Treatment/Session Assessment:    Response to Treatment:  Good participation. Tolerated well  · Interdisciplinary Collaboration:   o Occupational Therapist  o Certified Nursing Assistant/Patient Care Technician  · After treatment position/precautions:   o on stretcher   · Compliance with Program/Exercises: Compliant all of the time. · Recommendations/Intent for next treatment session: \"Next visit will focus on advancements to more challenging activities and reduction in assistance provided\".   Total Treatment Duration:  OT Patient Time In/Time Out  Time In: 1310  Time Out: 230 [facility] Ama Aguirre

## 2025-07-03 NOTE — PROGRESS NOTES
Pt transported to the ER per Dr. Bridget Pal for severe hip pain. He spoke with the ER MD.   
Dr. Venus Hurd notified. Azithromycin Pregnancy And Lactation Text: This medication is considered safe during pregnancy and is also secreted in breast milk. Topical Retinoid Pregnancy And Lactation Text: This medication is Pregnancy Category C. It is unknown if this medication is excreted in breast milk. Glycopyrrolate Pregnancy And Lactation Text: This medication is Pregnancy Category B and is considered safe during pregnancy. It is unknown if it is excreted breast milk. Doxepin Pregnancy And Lactation Text: This medication is Pregnancy Category C and it isn't known if it is safe during pregnancy. It is also excreted in breast milk and breast feeding isn't recommended. Cyclophosphamide Counseling:  I discussed with the patient the risks of cyclophosphamide including but not limited to hair loss, hormonal abnormalities, decreased fertility, abdominal pain, diarrhea, nausea and vomiting, bone marrow suppression and infection. The patient understands that monitoring is required while taking this medication. Stelara Counseling:  I discussed with the patient the risks of ustekinumab including but not limited to immunosuppression, malignancy, posterior leukoencephalopathy syndrome, and serious infections.  The patient understands that monitoring is required including a PPD at baseline and must alert us or the primary physician if symptoms of infection or other concerning signs are noted. Klisyri Counseling:  I discussed with the patient the risks of Klisyri including but not limited to erythema, scaling, itching, weeping, crusting, and pain. Erivedge Pregnancy And Lactation Text: This medication is Pregnancy Category X and is absolutely contraindicated during pregnancy. It is unknown if it is excreted in breast milk. Sarecycline Counseling: Patient advised regarding possible photosensitivity and discoloration of the teeth, skin, lips, tongue and gums.  Patient instructed to avoid sunlight, if possible.  When exposed to sunlight, patients should wear protective clothing, sunglasses, and sunscreen.  The patient was instructed to call the office immediately if the following severe adverse effects occur:  hearing changes, easy bruising/bleeding, severe headache, or vision changes.  The patient verbalized understanding of the proper use and possible adverse effects of sarecycline.  All of the patient's questions and concerns were addressed. Zoryve Counseling:  I discussed with the patient that Zoryve is not for use in the eyes, mouth or vagina. The most commonly reported side effects include diarrhea, headache, insomnia, application site pain, upper respiratory tract infections, and urinary tract infections.  All of the patient's questions and concerns were addressed. Oral Minoxidil Pregnancy And Lactation Text: This medication should only be used when clearly needed if you are pregnant, attempting to become pregnant or breast feeding. Bactrim Counseling:  I discussed with the patient the risks of sulfa antibiotics including but not limited to GI upset, allergic reaction, drug rash, diarrhea, dizziness, photosensitivity, and yeast infections.  Rarely, more serious reactions can occur including but not limited to aplastic anemia, agranulocytosis, methemoglobinemia, blood dyscrasias, liver or kidney failure, lung infiltrates or desquamative/blistering drug rashes. Itraconazole Pregnancy And Lactation Text: This medication is Pregnancy Category C and it isn't know if it is safe during pregnancy. It is also excreted in breast milk. Finasteride Female Counseling: Finasteride Counseling:  I discussed with the patient the risks of use of finasteride including but not limited to decreased libido and sexual dysfunction. Explained the teratogenic nature of the medication and stressed the importance of not getting pregnant during treatment. All of the patient's questions and concerns were addressed. Benzoyl Peroxide Pregnancy And Lactation Text: This medication is Pregnancy Category C. It is unknown if benzoyl peroxide is excreted in breast milk. Ilumya Counseling: I discussed with the patient the risks of tildrakizumab including but not limited to immunosuppression, malignancy, posterior leukoencephalopathy syndrome, and serious infections.  The patient understands that monitoring is required including a PPD at baseline and must alert us or the primary physician if symptoms of infection or other concerning signs are noted. Ozempic Counseling: I reviewed the possible side effects including: thyroid tumors, kidney disease, gallbladder disease, abdominal pain, constipation, diarrhea, nausea, vomiting and pancreatitis. Do not take this medication if you have a history or family history of multiple endocrine neoplasia syndrome type 2. Side effects reviewed, pt to contact office should one occur. Hydroxyzine Counseling: Patient advised that the medication is sedating and not to drive a car after taking this medication.  Patient informed of potential adverse effects including but not limited to dry mouth, urinary retention, and blurry vision.  The patient verbalized understanding of the proper use and possible adverse effects of hydroxyzine.  All of the patient's questions and concerns were addressed. Tazorac Counseling:  Patient advised that medication is irritating and drying.  Patient may need to apply sparingly and wash off after an hour before eventually leaving it on overnight.  The patient verbalized understanding of the proper use and possible adverse effects of tazorac.  All of the patient's questions and concerns were addressed. Hydroxychloroquine Counseling:  I discussed with the patient that a baseline ophthalmologic exam is needed at the start of therapy and every year thereafter while on therapy. A CBC may also be warranted for monitoring.  The side effects of this medication were discussed with the patient, including but not limited to agranulocytosis, aplastic anemia, seizures, rashes, retinopathy, and liver toxicity. Patient instructed to call the office should any adverse effect occur.  The patient verbalized understanding of the proper use and possible adverse effects of Plaquenil.  All the patient's questions and concerns were addressed. Cyclophosphamide Pregnancy And Lactation Text: This medication is Pregnancy Category D and it isn't considered safe during pregnancy. This medication is excreted in breast milk. Sarecycline Pregnancy And Lactation Text: This medication is Pregnancy Category D and not consider safe during pregnancy. It is also excreted in breast milk. Klisyri Pregnancy And Lactation Text: It is unknown if this medication can harm a developing fetus or if it is excreted in breast milk. Stelara Pregnancy And Lactation Text: This medication is Pregnancy Category B and is considered safe during pregnancy. It is unknown if this medication is excreted in breast milk. Ketoconazole Counseling:   Patient counseled regarding improving absorption with orange juice.  Adverse effects include but are not limited to breast enlargement, headache, diarrhea, nausea, upset stomach, liver function test abnormalities, taste disturbance, and stomach pain.  There is a rare possibility of liver failure that can occur when taking ketoconazole. The patient understands that monitoring of LFTs may be required, especially at baseline. The patient verbalized understanding of the proper use and possible adverse effects of ketoconazole.  All of the patient's questions and concerns were addressed. Carac Counseling:  I discussed with the patient the risks of Carac including but not limited to erythema, scaling, itching, weeping, crusting, and pain. Otezla Counseling: The side effects of Otezla were discussed with the patient, including but not limited to worsening or new depression, weight loss, diarrhea, nausea, upper respiratory tract infection, and headache. Patient instructed to call the office should any adverse effect occur.  The patient verbalized understanding of the proper use and possible adverse effects of Otezla.  All the patient's questions and concerns were addressed. Zoryve Pregnancy And Lactation Text: It is unknown if this medication can cause problems during pregnancy and breastfeeding. Opzelura Counseling:  I discussed with the patient the risks of Opzelura including but not limited to nasopharngitis, bronchitis, ear infection, eosinophila, hives, diarrhea, folliculitis, tonsillitis, and rhinorrhea.  Taken orally, this medication has been linked to serious infections; higher rate of mortality; malignancy and lymphoproliferative disorders; major adverse cardiovascular events; thrombosis; thrombocytopenia, anemia, and neutropenia; and lipid elevations. Acitretin Counseling:  I discussed with the patient the risks of acitretin including but not limited to hair loss, dry lips/skin/eyes, liver damage, hyperlipidemia, depression/suicidal ideation, photosensitivity.  Serious rare side effects can include but are not limited to pancreatitis, pseudotumor cerebri, bony changes, clot formation/stroke/heart attack.  Patient understands that alcohol is contraindicated since it can result in liver toxicity and significantly prolong the elimination of the drug by many years. Libtayo Counseling- I discussed with the patient the risks of Libtayo including but not limited to nausea, vomiting, diarrhea, and bone or muscle pain.  The patient verbalized understanding of the proper use and possible adverse effects of Libtayo.  All of the patient's questions and concerns were addressed. Bactrim Pregnancy And Lactation Text: This medication is Pregnancy Category D and is known to cause fetal risk.  It is also excreted in breast milk. Ilumya Pregnancy And Lactation Text: The risk during pregnancy and breastfeeding is uncertain with this medication. Hydroxyzine Pregnancy And Lactation Text: This medication is not safe during pregnancy and should not be taken. It is also excreted in breast milk and breast feeding isn't recommended. Cyclosporine Counseling:  I discussed with the patient the risks of cyclosporine including but not limited to hypertension, gingival hyperplasia,myelosuppression, immunosuppression, liver damage, kidney damage, neurotoxicity, lymphoma, and serious infections. The patient understands that monitoring is required including baseline blood pressure, CBC, CMP, lipid panel and uric acid, and then 1-2 times monthly CMP and blood pressure. Finasteride Pregnancy And Lactation Text: This medication is absolutely contraindicated during pregnancy. It is unknown if it is excreted in breast milk. Oxempic Pregnancy And Lactation Text: The fetal risk of this medication is unknown and taking while pregnant is not recommended. It is unknown if this medication is present in breast milk. Tazorac Pregnancy And Lactation Text: This medication is not safe during pregnancy. It is unknown if this medication is excreted in breast milk. Taltz Counseling: I discussed with the patient the risks of ixekizumab including but not limited to immunosuppression, serious infections, worsening of inflammatory bowel disease and drug reactions.  The patient understands that monitoring is required including a PPD at baseline and must alert us or the primary physician if symptoms of infection or other concerning signs are noted. Hydroxychloroquine Pregnancy And Lactation Text: This medication has been shown to cause fetal harm but it isn't assigned a Pregnancy Risk Category. There are small amounts excreted in breast milk. Tetracycline Counseling: Patient counseled regarding possible photosensitivity and increased risk for sunburn.  Patient instructed to avoid sunlight, if possible.  When exposed to sunlight, patients should wear protective clothing, sunglasses, and sunscreen.  The patient was instructed to call the office immediately if the following severe adverse effects occur:  hearing changes, easy bruising/bleeding, severe headache, or vision changes.  The patient verbalized understanding of the proper use and possible adverse effects of tetracycline.  All of the patient's questions and concerns were addressed. Patient understands to avoid pregnancy while on therapy due to potential birth defects. Latisse Counseling: Lattise Counseling: I reviewed the possible side-effects of Latisse including itching, eye irritation, discoloration and exacerbating glaucoma. I also discussed application methods. Libtayo Pregnancy And Lactation Text: This medication is contraindicated in pregnancy and when breast feeding. Infliximab Counseling:  I discussed with the patient the risks of infliximab including but not limited to myelosuppression, immunosuppression, autoimmune hepatitis, demyelinating diseases, lymphoma, and serious infections.  The patient understands that monitoring is required including a PPD at baseline and must alert us or the primary physician if symptoms of infection or other concerning signs are noted. Ketoconazole Pregnancy And Lactation Text: This medication is Pregnancy Category C and it isn't know if it is safe during pregnancy. It is also excreted in breast milk and breast feeding isn't recommended. Acitretin Pregnancy And Lactation Text: This medication is Pregnancy Category X and should not be given to women who are pregnant or may become pregnant in the future. This medication is excreted in breast milk. Otezla Pregnancy And Lactation Text: This medication is Pregnancy Category C and it isn't known if it is safe during pregnancy. It is unknown if it is excreted in breast milk. Zyclara Counseling:  I discussed with the patient the risks of imiquimod including but not limited to erythema, scaling, itching, weeping, crusting, and pain.  Patient understands that the inflammatory response to imiquimod is variable from person to person and was educated regarded proper titration schedule.  If flu-like symptoms develop, patient knows to discontinue the medication and contact us. Opzelura Pregnancy And Lactation Text: There is insufficient data to evaluate drug-associated risk for major birth defects, miscarriage, or other adverse maternal or fetal outcomes.  There is a pregnancy registry that monitors pregnancy outcomes in pregnant persons exposed to the medication during pregnancy.  It is unknown if this medication is excreted in breast milk.  Do not breastfeed during treatment and for about 4 weeks after the last dose. Carac Pregnancy And Lactation Text: This medication is Pregnancy Category X and contraindicated in pregnancy and in women who may become pregnant. It is unknown if this medication is excreted in breast milk. Birth Control Pills Counseling: Birth Control Pill Counseling: I discussed with the patient the potential side effects of OCPs including but not limited to increased risk of stroke, heart attack, thrombophlebitis, deep venous thrombosis, hepatic adenomas, breast changes, GI upset, headaches, and depression.  The patient verbalized understanding of the proper use and possible adverse effects of OCPs. All of the patient's questions and concerns were addressed. Cephalexin Counseling: I counseled the patient regarding use of cephalexin as an antibiotic for prophylactic and/or therapeutic purposes. Cephalexin (commonly prescribed under brand name Keflex) is a cephalosporin antibiotic which is active against numerous classes of bacteria, including most skin bacteria. Side effects may include nausea, diarrhea, gastrointestinal upset, rash, hives, yeast infections, and in rare cases, hepatitis, kidney disease, seizures, fever, confusion, neurologic symptoms, and others. Patients with severe allergies to penicillin medications are cautioned that there is about a 10% incidence of cross-reactivity with cephalosporins. When possible, patients with penicillin allergies should use alternatives to cephalosporins for antibiotic therapy. Topical Clindamycin Counseling: Patient counseled that this medication may cause skin irritation or allergic reactions.  In the event of skin irritation, the patient was advised to reduce the amount of the drug applied or use it less frequently.   The patient verbalized understanding of the proper use and possible adverse effects of clindamycin.  All of the patient's questions and concerns were addressed. Low Dose Naltrexone Counseling- I discussed with the patient the potential risks and side effects of low dose naltrexone including but not limited to: more vivid dreams, headaches, nausea, vomiting, abdominal pain, fatigue, dizziness, and anxiety. Cyclosporine Pregnancy And Lactation Text: This medication is Pregnancy Category C and it isn't know if it is safe during pregnancy. This medication is excreted in breast milk. Saxenda Counseling: I reviewed the possible side effects including: thyroid tumors, kidney disease, gallbladder disease, abdominal pain, constipation, diarrhea, nausea, vomiting and pancreatitis. Do not take this medication if you have a history or family history of multiple endocrine neoplasia syndrome type 2. Side effects reviewed, pt to contact office should one occur. Latisse Pregnancy And Lactation Text: It is not recommended to use Latisse if you are pregnant or trying to become pregnant. Detail Level: Simple Odomzo Counseling- I discussed with the patient the risks of Odomzo including but not limited to nausea, vomiting, diarrhea, constipation, weight loss, changes in the sense of taste, decreased appetite, muscle spasms, and hair loss.  The patient verbalized understanding of the proper use and possible adverse effects of Odomzo.  All of the patient's questions and concerns were addressed. Picato Counseling:  I discussed with the patient the risks of Picato including but not limited to erythema, scaling, itching, weeping, crusting, and pain. Oxybutynin Counseling:  I discussed with the patient the risks of oxybutynin including but not limited to skin rash, drowsiness, dry mouth, difficulty urinating, and blurred vision. Birth Control Pills Pregnancy And Lactation Text: This medication should be avoided if pregnant and for the first 30 days post-partum. Bexarotene Counseling:  I discussed with the patient the risks of bexarotene including but not limited to hair loss, dry lips/skin/eyes, liver abnormalities, hyperlipidemia, pancreatitis, depression/suicidal ideation, photosensitivity, drug rash/allergic reactions, hypothyroidism, anemia, leukopenia, infection, cataracts, and teratogenicity.  Patient understands that they will need regular blood tests to check lipid profile, liver function tests, white blood cell count, thyroid function tests and pregnancy test if applicable. Terbinafine Counseling: Patient counseling regarding adverse effects of terbinafine including but not limited to headache, diarrhea, rash, upset stomach, liver function test abnormalities, itching, taste/smell disturbance, nausea, abdominal pain, and flatulence.  There is a rare possibility of liver failure that can occur when taking terbinafine.  The patient understands that a baseline LFT and kidney function test may be required. The patient verbalized understanding of the proper use and possible adverse effects of terbinafine.  All of the patient's questions and concerns were addressed. Calcipotriene Counseling:  I discussed with the patient the risks of calcipotriene including but not limited to erythema, scaling, itching, and irritation. Spironolactone Pregnancy And Lactation Text: This medication can cause feminization of the male fetus and should be avoided during pregnancy. The active metabolite is also found in breast milk. Topical Clindamycin Pregnancy And Lactation Text: This medication is Pregnancy Category B and is considered safe during pregnancy. It is unknown if it is excreted in breast milk. Cephalexin Pregnancy And Lactation Text: This medication is Pregnancy Category B and considered safe during pregnancy.  It is also excreted in breast milk but can be used safely for shorter doses. Low Dose Naltrexone Pregnancy And Lactation Text: Naltrexone is pregnancy category C.  There have been no adequate and well-controlled studies in pregnant women.  It should be used in pregnancy only if the potential benefit justifies the potential risk to the fetus.   Limited data indicates that naltrexone is minimally excreted into breastmilk. Minoxidil Counseling: Minoxidil is a topical medication which can increase blood flow where it is applied. It is uncertain how this medication increases hair growth. Side effects are uncommon and include stinging and allergic reactions. Methotrexate Counseling:  Patient counseled regarding adverse effects of methotrexate including but not limited to nausea, vomiting, abnormalities in liver function tests. Patients may develop mouth sores, rash, diarrhea, and abnormalities in blood counts. The patient understands that monitoring is required including LFT's and blood counts.  There is a rare possibility of scarring of the liver and lung problems that can occur when taking methotrexate. Persistent nausea, loss of appetite, pale stools, dark urine, cough, and shortness of breath should be reported immediately. Patient advised to discontinue methotrexate treatment at least three months before attempting to become pregnant.  I discussed the need for folate supplements while taking methotrexate.  These supplements can decrease side effects during methotrexate treatment. The patient verbalized understanding of the proper use and possible adverse effects of methotrexate.  All of the patient's questions and concerns were addressed. Tremfya Counseling: I discussed with the patient the risks of guselkumab including but not limited to immunosuppression, serious infections, worsening of inflammatory bowel disease and drug reactions.  The patient understands that monitoring is required including a PPD at baseline and must alert us or the primary physician if symptoms of infection or other concerning signs are noted. Rinvoq Pregnancy And Lactation Text: Based on animal studies, Rinvoq may cause embryo-fetal harm when administered to pregnant women.  The medication should not be used in pregnancy.  Breastfeeding is not recommended during treatment and for 6 days after the last dose. Eucrisa Pregnancy And Lactation Text: This medication has not been assigned a Pregnancy Risk Category but animal studies failed to show danger with the topical medication. It is unknown if the medication is excreted in breast milk. Albendazole Pregnancy And Lactation Text: This medication is Pregnancy Category C and it isn't known if it is safe during pregnancy. It is also excreted in breast milk. Fluconazole Counseling:  Patient counseled regarding adverse effects of fluconazole including but not limited to headache, diarrhea, nausea, upset stomach, liver function test abnormalities, taste disturbance, and stomach pain.  There is a rare possibility of liver failure that can occur when taking fluconazole.  The patient understands that monitoring of LFTs and kidney function test may be required, especially at baseline. The patient verbalized understanding of the proper use and possible adverse effects of fluconazole.  All of the patient's questions and concerns were addressed. Amzeeq Counseling: Amzeeq is a topical antibiotic foam which contains minocycline.  The most commonly reported side effect of Amzeeq is headache.  The medication is flammable and should not be applied near a fire, flame, or while smoking.  Sun precautions is recommended to prevent sunburn. Cosentyx Counseling:  I discussed with the patient the risks of Cosentyx including but not limited to worsening of Crohn's disease, immunosuppression, allergic reactions and infections.  The patient understands that monitoring is required including a PPD at baseline and must alert us or the primary physician if symptoms of infection or other concerning signs are noted. Opioid Counseling: I discussed with the patient the potential side effects of opioids including but not limited to addiction, altered mental status, and depression. I stressed avoiding alcohol, benzodiazepines, muscle relaxants and sleep aids unless specifically okayed by a physician. The patient verbalized understanding of the proper use and possible adverse effects of opioids. All of the patient's questions and concerns were addressed. They were instructed to flush the remaining pills down the toilet if they did not need them for pain. Solaraze Pregnancy And Lactation Text: This medication is Pregnancy Category B and is considered safe. There is some data to suggest avoiding during the third trimester. It is unknown if this medication is excreted in breast milk. Valtrex Pregnancy And Lactation Text: this medication is Pregnancy Category B and is considered safe during pregnancy. This medication is not directly found in breast milk but it's metabolite acyclovir is present. Azathioprine Counseling:  I discussed with the patient the risks of azathioprine including but not limited to myelosuppression, immunosuppression, hepatotoxicity, lymphoma, and infections.  The patient understands that monitoring is required including baseline LFTs, Creatinine, possible TPMP genotyping and weekly CBCs for the first month and then every 2 weeks thereafter.  The patient verbalized understanding of the proper use and possible adverse effects of azathioprine.  All of the patient's questions and concerns were addressed. Dapsone Pregnancy And Lactation Text: This medication is Pregnancy Category C and is not considered safe during pregnancy or breast feeding. Skyrizi Counseling: I discussed with the patient the risks of risankizumab-rzaa including but not limited to immunosuppression, and serious infections.  The patient understands that monitoring is required including a PPD at baseline and must alert us or the primary physician if symptoms of infection or other concerning signs are noted. Ivermectin Counseling:  Patient instructed to take medication on an empty stomach with a full glass of water.  Patient informed of potential adverse effects including but not limited to nausea, diarrhea, dizziness, itching, and swelling of the extremities or lymph nodes.  The patient verbalized understanding of the proper use and possible adverse effects of ivermectin.  All of the patient's questions and concerns were addressed. Hydroquinone Counseling:  Patient advised that medication may result in skin irritation, lightening (hypopigmentation), dryness, and burning.  In the event of skin irritation, the patient was advised to reduce the amount of the drug applied or use it less frequently.  Rarely, spots that are treated with hydroquinone can become darker (pseudoochronosis).  Should this occur, patient instructed to stop medication and call the office. The patient verbalized understanding of the proper use and possible adverse effects of hydroquinone.  All of the patient's questions and concerns were addressed. Xeljanz Counseling: I discussed with the patient the risks of Xeljanz therapy including increased risk of infection, liver issues, headache, diarrhea, or cold symptoms. Live vaccines should be avoided. They were instructed to call if they have any problems. Winlevi Counseling:  I discussed with the patient the risks of topical clascoterone including but not limited to erythema, scaling, itching, and stinging. Patient voiced their understanding. Quinolones Counseling:  I discussed with the patient the risks of fluoroquinolones including but not limited to GI upset, allergic reaction, drug rash, diarrhea, dizziness, photosensitivity, yeast infections, liver function test abnormalities, tendonitis/tendon rupture. Azathioprine Pregnancy And Lactation Text: This medication is Pregnancy Category D and isn't considered safe during pregnancy. It is unknown if this medication is excreted in breast milk. Dutasteride Male Counseling: Dutasteride Counseling:  I discussed with the patient the risks of use of dutasteride including but not limited to decreased libido, decreased ejaculate volume, and gynecomastia. Women who can become pregnant should not handle medication.  All of the patient's questions and concerns were addressed. Amzeeq Pregnancy And Lactation Text: It is not recommended to use the product if you are pregnant. Opioid Pregnancy And Lactation Text: These medications can lead to premature delivery and should be avoided during pregnancy. These medications are also present in breast milk in small amounts. Humira Counseling:  I discussed with the patient the risks of adalimumab including but not limited to myelosuppression, immunosuppression, autoimmune hepatitis, demyelinating diseases, lymphoma, and serious infections.  The patient understands that monitoring is required including a PPD at baseline and must alert us or the primary physician if symptoms of infection or other concerning signs are noted. Gabapentin Counseling: I discussed with the patient the risks of gabapentin including but not limited to dizziness, somnolence, fatigue and ataxia. Cimetidine Counseling:  I discussed with the patient the risks of Cimetidine including but not limited to gynecomastia, headache, diarrhea, nausea, drowsiness, arrhythmias, pancreatitis, skin rashes, psychosis, bone marrow suppression and kidney toxicity. Cantharidin Pregnancy And Lactation Text: This medication has not been proven safe during pregnancy. It is unknown if this medication is excreted in breast milk. Soolantra Counseling: I discussed with the patients the risks of topial Soolantra. This is a medicine which decreases the number of mites and inflammation in the skin. You experience burning, stinging, eye irritation or allergic reactions.  Please call our office if you develop any problems from using this medication. Use Enhanced Medication Counseling?: No Xelpenelopez Pregnancy And Lactation Text: This medication is Pregnancy Category D and is not considered safe during pregnancy.  The risk during breast feeding is also uncertain. Griseofulvin Counseling:  I discussed with the patient the risks of griseofulvin including but not limited to photosensitivity, cytopenia, liver damage, nausea/vomiting and severe allergy.  The patient understands that this medication is best absorbed when taken with a fatty meal (e.g., ice cream or french fries). Winlevi Pregnancy And Lactation Text: This medication is considered safe during pregnancy and breastfeeding. Dupixent Counseling: I discussed with the patient the risks of dupilumab including but not limited to eye infection and irritation, cold sores, injection site reactions, worsening of asthma, allergic reactions and increased risk of parasitic infection.  Live vaccines should be avoided while taking dupilumab. Dupilumab will also interact with certain medications such as warfarin and cyclosporine. The patient understands that monitoring is required and they must alert us or the primary physician if symptoms of infection or other concerning signs are noted. Cimetidine Pregnancy And Lactation Text: This medication is Pregnancy Category B and is considered safe during pregnancy. It is also excreted in breast milk and breast feeding isn't recommended. Cellcept Counseling:  I discussed with the patient the risks of mycophenolate mofetil including but not limited to infection/immunosuppression, GI upset, hypokalemia, hypercholesterolemia, bone marrow suppression, lymphoproliferative disorders, malignancy, GI ulceration/bleed/perforation, colitis, interstitial lung disease, kidney failure, progressive multifocal leukoencephalopathy, and birth defects.  The patient understands that monitoring is required including a baseline creatinine and regular CBC testing. In addition, patient must alert us immediately if symptoms of infection or other concerning signs are noted. Azelaic Acid Counseling: Patient counseled that medicine may cause skin irritation and to avoid applying near the eyes.  In the event of skin irritation, the patient was advised to reduce the amount of the drug applied or use it less frequently.   The patient verbalized understanding of the proper use and possible adverse effects of azelaic acid.  All of the patient's questions and concerns were addressed. Dutasteride Female Counseling: Dutasteride Counseling:  I discussed with the patient the risks of use of dutasteride including but not limited to decreased libido and sexual dysfunction. Explained the teratogenic nature of the medication and stressed the importance of not getting pregnant during treatment. All of the patient's questions and concerns were addressed. Spevigo Counseling: I discussed with the patient the risks of Spevigo including but not limited to fatigue, nasuea, vomiting, headache, pruritus, urinary tract infection, an infusion related reactions.  The patient understands that monitoring is required including screening for tuberculosis at baseline and yearly screening thereafter while continuing Spevigo therapy. They should contact us if symptoms of infection or other concerning signs are noted. Imiquimod Counseling:  I discussed with the patient the risks of imiquimod including but not limited to erythema, scaling, itching, weeping, crusting, and pain.  Patient understands that the inflammatory response to imiquimod is variable from person to person and was educated regarded proper titration schedule.  If flu-like symptoms develop, patient knows to discontinue the medication and contact us. Gabapentin Pregnancy And Lactation Text: This medication is Pregnancy Category C and isn't considered safe during pregnancy. It is excreted in breast milk. Soolantra Pregnancy And Lactation Text: This medication is Pregnancy Category C. This medication is considered safe during breast feeding. Rifampin Counseling: I discussed with the patient the risks of rifampin including but not limited to liver damage, kidney damage, red-orange body fluids, nausea/vomiting and severe allergy. Dupixent Pregnancy And Lactation Text: This medication likely crosses the placenta but the risk for the fetus is uncertain. This medication is excreted in breast milk. VTAMA Counseling: I discussed with the patient that VTAMA is not for use in the eyes, mouth or mouth. They should call the office if they develop any signs of allergic reactions to VTAMA. The patient verbalized understanding of the proper use and possible adverse effects of VTAMA.  All of the patient's questions and concerns were addressed. Olanzapine Pregnancy And Lactation Text: This medication is pregnancy category C.   There are no adequate and well controlled trials with olanzapine in pregnant females.  Olanzapine should be used during pregnancy only if the potential benefit justifies the potential risk to the fetus.   In a study in lactating healthy women, olanzapine was excreted in breast milk.  It is recommended that women taking olanzapine should not breast feed. Hyrimoz Counseling:  I discussed with the patient the risks of adalimumab including but not limited to myelosuppression, immunosuppression, autoimmune hepatitis, demyelinating diseases, lymphoma, and serious infections.  The patient understands that monitoring is required including a PPD at baseline and must alert us or the primary physician if symptoms of infection or other concerning signs are noted. Azelaic Acid Pregnancy And Lactation Text: This medication is considered safe during pregnancy and breast feeding. Dutasteride Pregnancy And Lactation Text: This medication is absolutely contraindicated in women, especially during pregnancy and breast feeding. Feminization of male fetuses is possible if taking while pregnant. Doxepin Counseling:  Patient advised that the medication is sedating and not to drive a car after taking this medication. Patient informed of potential adverse effects including but not limited to dry mouth, urinary retention, and blurry vision.  The patient verbalized understanding of the proper use and possible adverse effects of doxepin.  All of the patient's questions and concerns were addressed. Azithromycin Counseling:  I discussed with the patient the risks of azithromycin including but not limited to GI upset, allergic reaction, drug rash, diarrhea, and yeast infections. Griseofulvin Pregnancy And Lactation Text: This medication is Pregnancy Category X and is known to cause serious birth defects. It is unknown if this medication is excreted in breast milk but breast feeding should be avoided. Glycopyrrolate Counseling:  I discussed with the patient the risks of glycopyrrolate including but not limited to skin rash, drowsiness, dry mouth, difficulty urinating, and blurred vision. Spevigo Pregnancy And Lactation Text: The risk during pregnancy and breastfeeding is uncertain with this medication. This medication does cross the placenta. It is unknown if this medication is found in breast milk. Topical Retinoid counseling:  Patient advised to apply a pea-sized amount only at bedtime and wait 30 minutes after washing their face before applying.  If too drying, patient may add a non-comedogenic moisturizer. The patient verbalized understanding of the proper use and possible adverse effects of retinoids.  All of the patient's questions and concerns were addressed. Erivedge Counseling- I discussed with the patient the risks of Erivedge including but not limited to nausea, vomiting, diarrhea, constipation, weight loss, changes in the sense of taste, decreased appetite, muscle spasms, and hair loss.  The patient verbalized understanding of the proper use and possible adverse effects of Erivedge.  All of the patient's questions and concerns were addressed. Ebglyss Counseling: I discussed with the patient the risks of lebrikizumab including but not limited to eye inflammation and irritation, cold sores, injection site reactions, allergic reactions and increased risk of parasitic infection. The patient understands that monitoring is required and they must alert us or the primary physician if symptoms of infection or other concerning signs are noted. Rifampin Pregnancy And Lactation Text: This medication is Pregnancy Category C and it isn't know if it is safe during pregnancy. It is also excreted in breast milk and should not be used if you are breast feeding. Cibinqo Counseling: I discussed with the patient the risks of Cibinqo therapy including but not limited to common cold, nausea, headache, cold sores, increased blood CPK levels, dizziness, UTIs, fatigue, acne, and vomitting. Live vaccines should be avoided.  This medication has been linked to serious infections; higher rate of mortality; malignancy and lymphoproliferative disorders; major adverse cardiovascular events; thrombosis; thrombocytopenia and lymphopenia; lipid elevations; and retinal detachment. Finasteride Male Counseling: Finasteride Counseling:  I discussed with the patient the risks of use of finasteride including but not limited to decreased libido, decreased ejaculate volume, gynecomastia, and depression. Women should not handle medication.  All of the patient's questions and concerns were addressed. Itraconazole Counseling:  I discussed with the patient the risks of itraconazole including but not limited to liver damage, nausea/vomiting, neuropathy, and severe allergy.  The patient understands that this medication is best absorbed when taken with acidic beverages such as non-diet cola or ginger ale.  The patient understands that monitoring is required including baseline LFTs and repeat LFTs at intervals.  The patient understands that they are to contact us or the primary physician if concerning signs are noted. Benzoyl Peroxide Counseling: Patient counseled that medicine may cause skin irritation and bleach clothing.  In the event of skin irritation, the patient was advised to reduce the amount of the drug applied or use it less frequently.   The patient verbalized understanding of the proper use and possible adverse effects of benzoyl peroxide.  All of the patient's questions and concerns were addressed. Oral Minoxidil Counseling- I discussed with the patient the risks of oral minoxidil including but not limited to shortness of breath, swelling of the feet or ankles, dizziness, lightheadedness, unwanted hair growth and allergic reaction.  The patient verbalized understanding of the proper use and possible adverse effects of oral minoxidil.  All of the patient's questions and concerns were addressed. Zepbound Counseling: I reviewed the possible side effects including: thyroid tumors, kidney disease, gallbladder disease, abdominal pain, constipation, diarrhea, nausea, vomiting and pancreatitis. Do not take this medication if you have a history or family history of multiple endocrine neoplasia syndrome type 2. Side effects reviewed, pt to contact office should one occur. Adbry Pregnancy And Lactation Text: It is unknown if this medication will adversely affect pregnancy or breast feeding. Olanzapine Counseling- I discussed with the patient the common side effects of olanzapine including but are not limited to: lack of energy, dry mouth, increased appetite, sleepiness, tremor, constipation, dizziness, changes in behavior, or restlessness.  Explained that teenagers are more likely to experience headaches, abdominal pain, pain in the arms or legs, tiredness, and sleepiness.  Serious side effects include but are not limited: increased risk of death in elderly patients who are confused, have memory loss, or dementia-related psychosis; hyperglycemia; increased cholesterol and triglycerides; and weight gain. Sotyktu Pregnancy And Lactation Text: There is insufficient data to evaluate whether or not Sotyktu is safe to use during pregnancy.   It is not known if Sotyktu passes into breast milk and whether or not it is safe to use when breastfeeding.   Topical Steroids Counseling: I discussed with the patient that prolonged use of topical steroids can result in the increased appearance of superficial blood vessels (telangiectasias), lightening (hypopigmentation) and thinning of the skin (atrophy).  Patient understands to avoid using high potency steroids in skin folds, the groin or the face.  The patient verbalized understanding of the proper use and possible adverse effects of topical steroids.  All of the patient's questions and concerns were addressed. Thalidomide Counseling: I discussed with the patient the risks of thalidomide including but not limited to birth defects, anxiety, weakness, chest pain, dizziness, cough and severe allergy. Rhofade Counseling: Rhofade is a topical medication which can decrease superficial blood flow where applied. Side effects are uncommon and include stinging, redness and allergic reactions. Litfulo Pregnancy And Lactation Text: Based on animal studies, Lifulo may cause embryo-fetal harm when administered to pregnant women.  The medication should not be used in pregnancy.  Breastfeeding is not recommended during treatment. Clofazimine Counseling:  I discussed with the patient the risks of clofazimine including but not limited to skin and eye pigmentation, liver damage, nausea/vomiting, gastrointestinal bleeding and allergy. Erythromycin Pregnancy And Lactation Text: This medication is Pregnancy Category B and is considered safe during pregnancy. It is also excreted in breast milk. Topical Steroids Applications Pregnancy And Lactation Text: Most topical steroids are considered safe to use during pregnancy and lactation.  Any topical steroid applied to the breast or nipple should be washed off before breastfeeding. Bimzelx Counseling:  I discussed with the patient the risks of Bimzelx including but not limited to depression, immunosuppression, allergic reactions and infections.  The patient understands that monitoring is required including a PPD at baseline and must alert us or the primary physician if symptoms of infection or other concerning signs are noted. Rhofade Pregnancy And Lactation Text: This medication has not been assigned a Pregnancy Risk Category. It is unknown if the medication is excreted in breast milk. Simlandi Counseling:  I discussed with the patient the risks of adalimumab including but not limited to myelosuppression, immunosuppression, autoimmune hepatitis, demyelinating diseases, lymphoma, and serious infections.  The patient understands that monitoring is required including a PPD at baseline and must alert us or the primary physician if symptoms of infection or other concerning signs are noted. Elidel Counseling: Patient may experience a mild burning sensation during topical application. Elidel is not approved in children less than 2 years of age. There have been case reports of hematologic and skin malignancies in patients using topical calcineurin inhibitors although causality is questionable. Olumiant Counseling: I discussed with the patient the risks of Olumiant therapy including but not limited to upper respiratory tract infections, shingles, cold sores, and nausea. Live vaccines should be avoided.  This medication has been linked to serious infections; higher rate of mortality; malignancy and lymphoproliferative disorders; major adverse cardiovascular events; thrombosis; gastrointestinal perforations; neutropenia; lymphopenia; anemia; liver enzyme elevations; and lipid elevations. Topical Sulfur Applications Counseling: Topical Sulfur Counseling: Patient counseled that this medication may cause skin irritation or allergic reactions.  In the event of skin irritation, the patient was advised to reduce the amount of the drug applied or use it less frequently.   The patient verbalized understanding of the proper use and possible adverse effects of topical sulfur application.  All of the patient's questions and concerns were addressed. Metronidazole Counseling:  I discussed with the patient the risks of metronidazole including but not limited to seizures, nausea/vomiting, a metallic taste in the mouth, nausea/vomiting and severe allergy. Bimzelx Pregnancy And Lactation Text: This medication crosses the placenta and the safety is uncertain during pregnancy. It is unknown if this medication is present in breast milk. Over the Counter Salicylic Acid Counseling: Over the counter salicylic acid preparations can be used effectively to treat warts at home. There are two types of application: liquid and plaster. Liquid preparations are applied like nail polish and the plaster applications are applied like a bandage (you may need to apply duct tape over the plaster to keep it in place). Dead and macerated skin should be removed regularly with a nail file or nail clippers for best results. Colchicine Counseling:  Patient counseled regarding adverse effects including but not limited to stomach upset (nausea, vomiting, stomach pain, or diarrhea).  Patient instructed to limit alcohol consumption while taking this medication.  Colchicine may reduce blood counts especially with prolonged use.  The patient understands that monitoring of kidney function and blood counts may be required, especially at baseline. The patient verbalized understanding of the proper use and possible adverse effects of colchicine.  All of the patient's questions and concerns were addressed. Tranexamic Acid Counseling:  Patient advised of the small risk of bleeding problems with tranexamic acid. They were also instructed to call if they developed any nausea, vomiting or diarrhea. All of the patient's questions and concerns were addressed. Olumiant Pregnancy And Lactation Text: Based on animal studies, Olumiant may cause embryo-fetal harm when administered to pregnant women.  The medication should not be used in pregnancy.  Breastfeeding is not recommended during treatment. Metronidazole Pregnancy And Lactation Text: This medication is Pregnancy Category B and considered safe during pregnancy.  It is also excreted in breast milk. Cimzia Counseling:  I discussed with the patient the risks of Cimzia including but not limited to immunosuppression, allergic reactions and infections.  The patient understands that monitoring is required including a PPD at baseline and must alert us or the primary physician if symptoms of infection or other concerning signs are noted. Topical Sulfur Applications Pregnancy And Lactation Text: This medication is Pregnancy Category C and has an unknown safety profile during pregnancy. It is unknown if this topical medication is excreted in breast milk. Aklief counseling:  Patient advised to apply a pea-sized amount only at bedtime and wait 30 minutes after washing their face before applying.  If too drying, patient may add a non-comedogenic moisturizer.  The most commonly reported side effects including irritation, redness, scaling, dryness, stinging, burning, itching, and increased risk of sunburn.  The patient verbalized understanding of the proper use and possible adverse effects of retinoids.  All of the patient's questions and concerns were addressed. Ebglyss Pregnancy And Lactation Text: This medication likely crosses the placenta but the risk for the fetus is uncertain. It is unknown if this medication is excreted in breast milk. Albendazole Counseling:  I discussed with the patient the risks of albendazole including but not limited to cytopenia, kidney damage, nausea/vomiting and severe allergy.  The patient understands that this medication is being used in an off-label manner. Tranexamic Acid Pregnancy And Lactation Text: It is unknown if this medication is safe during pregnancy or breast feeding. Over The Counter Salicylic Acid Pregnancy And Lactation Text: It is not recommended to use high dose OTC salicylic acid while pregnant. Lower dose topical preparations are considered safe. Simponi Counseling:  I discussed with the patient the risks of golimumab including but not limited to myelosuppression, immunosuppression, autoimmune hepatitis, demyelinating diseases, lymphoma, and serious infections.  The patient understands that monitoring is required including a PPD at baseline and must alert us or the primary physician if symptoms of infection or other concerning signs are noted. Cimzia Pregnancy And Lactation Text: This medication crosses the placenta but can be considered safe in certain situations. Cimzia may be excreted in breast milk. Minocycline Counseling: Patient advised regarding possible photosensitivity and discoloration of the teeth, skin, lips, tongue and gums.  Patient instructed to avoid sunlight, if possible.  When exposed to sunlight, patients should wear protective clothing, sunglasses, and sunscreen.  The patient was instructed to call the office immediately if the following severe adverse effects occur:  hearing changes, easy bruising/bleeding, severe headache, or vision changes.  The patient verbalized understanding of the proper use and possible adverse effects of minocycline.  All of the patient's questions and concerns were addressed. Rinvoq Counseling: I discussed with the patient the risks of Rinvoq therapy including but not limited to upper respiratory tract infections, shingles, cold sores, bronchitis, nausea, cough, fever, acne, and headache. Live vaccines should be avoided.  This medication has been linked to serious infections; higher rate of mortality; malignancy and lymphoproliferative disorders; major adverse cardiovascular events; thrombosis; thrombocytopenia, anemia, and neutropenia; lipid elevations; liver enzyme elevations; and gastrointestinal perforations. Eucrisa Counseling: Patient may experience a mild burning sensation during topical application. Eucrisa is not approved in children less than 2 years of age. Wartpeel Counseling:  I discussed with the patient the risks of Wartpeel including but not limited to erythema, scaling, itching, weeping, crusting, and pain. Enbrel Counseling:  I discussed with the patient the risks of etanercept including but not limited to myelosuppression, immunosuppression, autoimmune hepatitis, demyelinating diseases, lymphoma, and infections.  The patient understands that monitoring is required including a PPD at baseline and must alert us or the primary physician if symptoms of infection or other concerning signs are noted. Aklief Pregnancy And Lactation Text: It is unknown if this medication is safe to use during pregnancy.  It is unknown if this medication is excreted in breast milk.  Breastfeeding women should use the topical cream on the smallest area of the skin for the shortest time needed while breastfeeding.  Do not apply to nipple and areola. Dapsone Counseling: I discussed with the patient the risks of dapsone including but not limited to hemolytic anemia, agranulocytosis, rashes, methemoglobinemia, kidney failure, peripheral neuropathy, headaches, GI upset, and liver toxicity.  Patients who start dapsone require monitoring including baseline LFTs and weekly CBCs for the first month, then every month thereafter.  The patient verbalized understanding of the proper use and possible adverse effects of dapsone.  All of the patient's questions and concerns were addressed. Solaraze Counseling:  I discussed with the patient the risks of Solaraze including but not limited to erythema, scaling, itching, weeping, crusting, and pain. Valtrex Counseling: I discussed with the patient the risks of valacyclovir including but not limited to kidney damage, nausea, vomiting and severe allergy.  The patient understands that if the infection seems to be worsening or is not improving, they are to call. Include Pregnancy/Lactation Warning?: Add Automatically Based on Childbearing Potential and Patient Age Bexarotene Pregnancy And Lactation Text: This medication is Pregnancy Category X and should not be given to women who are pregnant or may become pregnant. This medication should not be used if you are breast feeding. Clindamycin Counseling: I counseled the patient regarding use of clindamycin as an antibiotic for prophylactic and/or therapeutic purposes. Clindamycin is active against numerous classes of bacteria, including skin bacteria. Side effects may include nausea, diarrhea, gastrointestinal upset, rash, hives, yeast infections, and in rare cases, colitis. Calcipotriene Pregnancy And Lactation Text: The use of this medication during pregnancy or lactation is not recommended as there is insufficient data. Spironolactone Counseling: Patient advised regarding risks of diarrhea, abdominal pain, hyperkalemia, birth defects (for female patients), liver toxicity and renal toxicity. The patient may need blood work to monitor liver and kidney function and potassium levels while on therapy. The patient verbalized understanding of the proper use and possible adverse effects of spironolactone.  All of the patient's questions and concerns were addressed. Nemluvio Counseling: I discussed with the patient the risks of nemolizumab including but not limited to headache, gastrointestinal complaints, nasopharyngitis, musculoskeletal complaints, injection site reactions, and allergic reactions. The patient understands that monitoring is required and they must alert us or the primary physician if any side effects are noted. Semaglutide Counseling: I reviewed the possible side effects including: thyroid tumors, kidney disease, gallbladder disease, abdominal pain, constipation, diarrhea, nausea, vomiting and pancreatitis. Do not take this medication if you have a history or family history of multiple endocrine neoplasia syndrome type 2. Side effects reviewed, pt to contact office should one occur. Niacinamide Counseling: I recommended taking niacin or niacinamide, also know as vitamin B3, twice daily. Recent evidence suggests that taking vitamin B3 (500 mg twice daily) can reduce the risk of actinic keratoses and non-melanoma skin cancers. Side effects of vitamin B3 include flushing and headache. Topical Ketoconazole Counseling: Patient counseled that this medication may cause skin irritation or allergic reactions.  In the event of skin irritation, the patient was advised to reduce the amount of the drug applied or use it less frequently.   The patient verbalized understanding of the proper use and possible adverse effects of ketoconazole.  All of the patient's questions and concerns were addressed. Methotrexate Pregnancy And Lactation Text: This medication is Pregnancy Category X and is known to cause fetal harm. This medication is excreted in breast milk. Protopic Counseling: Patient may experience a mild burning sensation during topical application. Protopic is not approved in children less than 2 years of age. There have been case reports of hematologic and skin malignancies in patients using topical calcineurin inhibitors although causality is questionable. Nemluvio Pregnancy And Lactation Text: It is not known if Nemluvio causes fetal harm or is present in breast milk. Please proceed with caution if patients who are pregnant or breastfeeding. Cantharidin Counseling:  I discussed with the patient the risks of Cantharidin including but not limited to pain, redness, burning, itching, and blistering. Propranolol Counseling:  I discussed with the patient the risks of propranolol including but not limited to low heart rate, low blood pressure, low blood sugar, restlessness and increased cold sensitivity. They should call the office if they experience any of these side effects. Clindamycin Pregnancy And Lactation Text: This medication can be used in pregnancy if certain situations. Clindamycin is also present in breast milk. Isotretinoin Counseling: Patient should get monthly blood tests, not donate blood, not drive at night if vision affected, not share medication, and not undergo elective surgery for 6 months after tx completed. Side effects reviewed, pt to contact office should one occur. Niacinamide Pregnancy And Lactation Text: These medications are considered safe during pregnancy. Xolair Counseling:  Patient informed of potential adverse effects including but not limited to fever, muscle aches, rash and allergic reactions.  The patient verbalized understanding of the proper use and possible adverse effects of Xolair.  All of the patient's questions and concerns were addressed. Prednisone Counseling:  I discussed with the patient the risks of prolonged use of prednisone including but not limited to weight gain, insomnia, osteoporosis, mood changes, diabetes, susceptibility to infection, glaucoma and high blood pressure.  In cases where prednisone use is prolonged, patients should be monitored with blood pressure checks, serum glucose levels and an eye exam.  Additionally, the patient may need to be placed on GI prophylaxis, PCP prophylaxis, and calcium and vitamin D supplementation and/or a bisphosphonate.  The patient verbalized understanding of the proper use and the possible adverse effects of prednisone.  All of the patient's questions and concerns were addressed. Mirvaso Counseling: Mirvaso is a topical medication which can decrease superficial blood flow where applied. Side effects are uncommon and include stinging, redness and allergic reactions. Rituxan Counseling:  I discussed with the patient the risks of Rituxan infusions. Side effects can include infusion reactions, severe drug rashes including mucocutaneous reactions, reactivation of latent hepatitis and other infections and rarely progressive multifocal leukoencephalopathy.  All of the patient's questions and concerns were addressed. Isotretinoin Pregnancy And Lactation Text: This medication is Pregnancy Category X and is considered extremely dangerous during pregnancy. It is unknown if it is excreted in breast milk. Propranolol Pregnancy And Lactation Text: This medication is Pregnancy Category C and it isn't known if it is safe during pregnancy. It is excreted in breast milk. Protopic Pregnancy And Lactation Text: This medication is Pregnancy Category C. It is unknown if this medication is excreted in breast milk when applied topically. 5-Fu Counseling: 5-Fluorouracil Counseling:  I discussed with the patient the risks of 5-fluorouracil including but not limited to erythema, scaling, itching, weeping, crusting, and pain. Doxycycline Counseling:  Patient counseled regarding possible photosensitivity and increased risk for sunburn.  Patient instructed to avoid sunlight, if possible.  When exposed to sunlight, patients should wear protective clothing, sunglasses, and sunscreen.  The patient was instructed to call the office immediately if the following severe adverse effects occur:  hearing changes, easy bruising/bleeding, severe headache, or vision changes.  The patient verbalized understanding of the proper use and possible adverse effects of doxycycline.  All of the patient's questions and concerns were addressed. Topical Metronidazole Counseling: Metronidazole is a topical antibiotic medication. You may experience burning, stinging, redness, or allergic reactions.  Please call our office if you develop any problems from using this medication. Wegovy Counseling: I reviewed the possible side effects including: thyroid tumors, kidney disease, gallbladder disease, abdominal pain, constipation, diarrhea, nausea, vomiting and pancreatitis. Do not take this medication if you have a history or family history of multiple endocrine neoplasia syndrome type 2. Side effects reviewed, pt to contact office should one occur. Nsaids Counseling: NSAID Counseling: I discussed with the patient that NSAIDs should be taken with food. Prolonged use of NSAIDs can result in the development of stomach ulcers.  Patient advised to stop taking NSAIDs if abdominal pain occurs.  The patient verbalized understanding of the proper use and possible adverse effects of NSAIDs.  All of the patient's questions and concerns were addressed. Xolair Pregnancy And Lactation Text: This medication is Pregnancy Category B and is considered safe during pregnancy. This medication is excreted in breast milk. Qbrexza Counseling:  I discussed with the patient the risks of Qbrexza including but not limited to headache, mydriasis, blurred vision, dry eyes, nasal dryness, dry mouth, dry throat, dry skin, urinary hesitation, and constipation.  Local skin reactions including erythema, burning, stinging, and itching can also occur. Arava Counseling:  Patient counseled regarding adverse effects of Arava including but not limited to nausea, vomiting, abnormalities in liver function tests. Patients may develop mouth sores, rash, diarrhea, and abnormalities in blood counts. The patient understands that monitoring is required including LFTs and blood counts.  There is a rare possibility of scarring of the liver and lung problems that can occur when taking methotrexate. Persistent nausea, loss of appetite, pale stools, dark urine, cough, and shortness of breath should be reported immediately. Patient advised to discontinue Arava treatment and consult with a physician prior to attempting conception. The patient will have to undergo a treatment to eliminate Arava from the body prior to conception. Cibinqo Pregnancy And Lactation Text: It is unknown if this medication will adversely affect pregnancy or breast feeding.  You should not take this medication if you are currently pregnant or planning a pregnancy or while breastfeeding. SSKI Counseling:  I discussed with the patient the risks of SSKI including but not limited to thyroid abnormalities, metallic taste, GI upset, fever, headache, acne, arthralgias, paraesthesias, lymphadenopathy, easy bleeding, arrhythmias, and allergic reaction. Rituxan Pregnancy And Lactation Text: This medication is Pregnancy Category C and it isn't know if it is safe during pregnancy. It is unknown if this medication is excreted in breast milk but similar antibodies are known to be excreted. Doxycycline Pregnancy And Lactation Text: This medication is Pregnancy Category D and not consider safe during pregnancy. It is also excreted in breast milk but is considered safe for shorter treatment courses. Topical Metronidazole Pregnancy And Lactation Text: This medication is Pregnancy Category B and considered safe during pregnancy.  It is also considered safe to use while breastfeeding. Nsaids Pregnancy And Lactation Text: These medications are considered safe up to 30 weeks gestation. It is excreted in breast milk. Adbry Counseling: I discussed with the patient the risks of tralokinumab including but not limited to eye infection and irritation, cold sores, injection site reactions, worsening of asthma, allergic reactions and increased risk of parasitic infection.  Live vaccines should be avoided while taking tralokinumab. The patient understands that monitoring is required and they must alert us or the primary physician if symptoms of infection or other concerning signs are noted. High Dose Vitamin A Counseling: Side effects reviewed, pt to contact office should one occur. Sotyktu Counseling:  I discussed the most common side effects of Sotyktu including: common cold, sore throat, sinus infections, cold sores, canker sores, folliculitis, and acne.  I also discussed more serious side effects of Sotyktu including but not limited to: serious allergic reactions; increased risk for infections such as TB; cancers such as lymphomas; rhabdomyolysis and elevated CPK; and elevated triglycerides and liver enzymes.  Litfulo Counseling: I discussed with the patient the risks of Litfulo therapy including but not limited to upper respiratory tract infections, shingles, cold sores, and nausea. Live vaccines should be avoided.  This medication has been linked to serious infections; higher rate of mortality; malignancy and lymphoproliferative disorders; major adverse cardiovascular events; thrombosis; gastrointestinal perforations; neutropenia; lymphopenia; anemia; liver enzyme elevations; and lipid elevations. Sski Pregnancy And Lactation Text: This medication is Pregnancy Category D and isn't considered safe during pregnancy. It is excreted in breast milk. Qbrexza Pregnancy And Lactation Text: There is no available data on Qbrexza use in pregnant women.  There is no available data on Qbrexza use in lactation. Erythromycin Counseling:  I discussed with the patient the risks of erythromycin including but not limited to GI upset, allergic reaction, drug rash, diarrhea, increase in liver enzymes, and yeast infections. High Dose Vitamin A Pregnancy And Lactation Text: High dose vitamin A therapy is contraindicated during pregnancy and breast feeding. Drysol Counseling:  I discussed with the patient the risks of drysol/aluminum chloride including but not limited to skin rash, itching, irritation, burning. Siliq Counseling:  I discussed with the patient the risks of Siliq including but not limited to new or worsening depression, suicidal thoughts and behavior, immunosuppression, malignancy, posterior leukoencephalopathy syndrome, and serious infections.  The patient understands that monitoring is required including a PPD at baseline and must alert us or the primary physician if symptoms of infection or other concerning signs are noted. There is also a special program designed to monitor depression which is required with Siliq.

## (undated) DEVICE — FAN SPRAY KIT: Brand: PULSAVAC®

## (undated) DEVICE — 1840 FOAM BLOCK NEEDLE COUNTER: Brand: DEVON

## (undated) DEVICE — SURGICAL PROCEDURE PACK TOT HIP CDS

## (undated) DEVICE — SOLUTION IV DEXTROSE/SALINE 5%-0.9% 500ML - 500ML

## (undated) DEVICE — BUTTON SWITCH PENCIL BLADE ELECTRODE, HOLSTER: Brand: EDGE

## (undated) DEVICE — SUTURE STRATAFIX SPRL SZ 1 L5IN ABSRB VLT CT-1 L36MM 1/2 SXPD2B401

## (undated) DEVICE — BANDAGE COMPR SELF ADH 5 YDX6 IN TAN STRL PREMIERPRO LF

## (undated) DEVICE — TRAY CATH 16F DRN BG LTX -- CONVERT TO ITEM 363158

## (undated) DEVICE — SUTURE STRATAFIX SZ 3-0 L30CM NONABSORBABLE UD L19MM FS-2 SXMP2B408

## (undated) DEVICE — 3M™ STERI-DRAPE™ INSTRUMENT POUCH 1018: Brand: STERI-DRAPE™

## (undated) DEVICE — SOLUTION IRRIG 3000ML 0.9% SOD CHL FLX CONT 0797208] ICU MEDICAL INC]

## (undated) DEVICE — Device: Brand: POWER-FLO®

## (undated) DEVICE — T4 HOOD

## (undated) DEVICE — SUTURE FIBERWIRE SZ 2 W/ TAPERED NEEDLE BLUE L38IN NONABSORB BLU L26.5MM 1/2 CIRCLE AR7200

## (undated) DEVICE — DRSG AQUACEL SURG 3.5 X 10IN -- CONVERT TO ITEM 370183

## (undated) DEVICE — SPONGE LAP 18X18IN STRL -- 5/PK

## (undated) DEVICE — CONTAINER,SPECIMEN,O.R.STRL,4.5OZ: Brand: MEDLINE

## (undated) DEVICE — AMD ANTIMICROBIAL NON-ADHERENT PAD,0.2% POLYHEXAMETHYLENE BIGUANIDE HCI (PHMB): Brand: TELFA

## (undated) DEVICE — CANNULA NSL ORAL AD FOR CAPNOFLEX CO2 O2 AIRLFE

## (undated) DEVICE — DRAPE SHT 3 QTR PROXIMA 53X77 --

## (undated) DEVICE — SUTURE MCRYL SZ 2-0 L27IN ABSRB UD CP-1 1 L36MM 1/2 CIR REV Y266H

## (undated) DEVICE — MCLASS OSCILLATING SAW BLADE 19 X 1.27 (0.050") X 90 MM: Brand: MCLASS

## (undated) DEVICE — SLIM BODY SKIN STAPLER: Brand: APPOSE ULC

## (undated) DEVICE — 3000CC GUARDIAN II: Brand: GUARDIAN

## (undated) DEVICE — ABDOMINAL PAD: Brand: DERMACEA

## (undated) DEVICE — BLOCK BITE AD 60FR W/ VELC STRP ADDRESSES MOST PT AND

## (undated) DEVICE — 3M™ STERI-DRAPE™ INCISE DRAPE, XL 1051: Brand: STERI-DRAPE™

## (undated) DEVICE — REM POLYHESIVE ADULT PATIENT RETURN ELECTRODE: Brand: VALLEYLAB

## (undated) DEVICE — INTENDED FOR TISSUE SEPARATION, AND OTHER PROCEDURES THAT REQUIRE A SHARP SURGICAL BLADE TO PUNCTURE OR CUT.: Brand: BARD-PARKER ® STAINLESS STEEL BLADES

## (undated) DEVICE — RETRIEVER SUT ARTHSCP HOFFEE --

## (undated) DEVICE — COVER,MAYO STAND,STERILE: Brand: MEDLINE

## (undated) DEVICE — BLADE ELECTRODE: Brand: VALLEYLAB

## (undated) DEVICE — Z DISCONTINUED USE 2423037 APPLICATOR MEDICATED 3 CC CHLORHEXIDINE GLUC 2% CHLORAPREP

## (undated) DEVICE — MEDI-VAC YANKAUER SUCTION HANDLE W/BULBOUS TIP: Brand: CARDINAL HEALTH

## (undated) DEVICE — CONNECTOR TBNG OD5-7MM O2 END DISP

## (undated) DEVICE — AMD ANTIMICROBIAL GAUZE SPONGES,12 PLY USP TYPE VII, 0.2% POLYHEXAMETHYLENE BIGUANIDE HCI (PHMB): Brand: CURITY

## (undated) DEVICE — GOWN,AURORA,FABRIC-REINFORCED,2XL: Brand: MEDLINE

## (undated) DEVICE — NEEDLE HYPO 21GA L1.5IN INTRAMUSCULAR S STL LATCH BVL UP

## (undated) DEVICE — DRAPE,U/SHT,SPLIT,FILM,60X84,STERILE: Brand: MEDLINE

## (undated) DEVICE — BASIC SINGLE BASIN BTC-LF: Brand: MEDLINE INDUSTRIES, INC.

## (undated) DEVICE — SUTURE FIBERWIRE SZ 5 L38IN NONABSORBABLE BLU L48MM 1/2 AR7211

## (undated) DEVICE — SYR 10ML LUER LOK 1/5ML GRAD --

## (undated) DEVICE — 1010 S-DRAPE TOWEL DRAPE 10/BX: Brand: STERI-DRAPE™

## (undated) DEVICE — STOCKINETTE TUBE 6X48 -- MEDICHOICE

## (undated) DEVICE — NEEDLE SPNL 22GA L3.5IN BLK HUB S STL REG WALL FIT STYL W/

## (undated) DEVICE — (D)PREP SKN CHLRAPRP APPL 26ML -- CONVERT TO ITEM 371833

## (undated) DEVICE — DRAPE,HIP,W/POUCHES,STERILE: Brand: MEDLINE

## (undated) DEVICE — KENDALL RADIOLUCENT FOAM MONITORING ELECTRODE RECTANGULAR SHAPE: Brand: KENDALL

## (undated) DEVICE — BIPOLAR SEALER 23-112-1 AQM 6.0: Brand: AQUAMANTYS ®

## (undated) DEVICE — KERLIX BANDAGE ROLL: Brand: KERLIX

## (undated) DEVICE — 2000CC GUARDIAN II: Brand: GUARDIAN